# Patient Record
Sex: MALE | Race: WHITE | NOT HISPANIC OR LATINO
[De-identification: names, ages, dates, MRNs, and addresses within clinical notes are randomized per-mention and may not be internally consistent; named-entity substitution may affect disease eponyms.]

---

## 2020-01-01 ENCOUNTER — RESULT REVIEW (OUTPATIENT)
Age: 60
End: 2020-01-01

## 2020-01-01 ENCOUNTER — APPOINTMENT (OUTPATIENT)
Dept: HEMATOLOGY ONCOLOGY | Facility: CLINIC | Age: 60
End: 2020-01-01

## 2020-01-01 ENCOUNTER — APPOINTMENT (OUTPATIENT)
Dept: INFUSION THERAPY | Facility: HOSPITAL | Age: 60
End: 2020-01-01

## 2020-01-01 ENCOUNTER — APPOINTMENT (OUTPATIENT)
Dept: PULMONOLOGY | Facility: CLINIC | Age: 60
End: 2020-01-01

## 2020-01-01 ENCOUNTER — APPOINTMENT (OUTPATIENT)
Dept: HEMATOLOGY ONCOLOGY | Facility: CLINIC | Age: 60
End: 2020-01-01
Payer: COMMERCIAL

## 2020-01-01 ENCOUNTER — APPOINTMENT (OUTPATIENT)
Dept: DISASTER EMERGENCY | Facility: CLINIC | Age: 60
End: 2020-01-01

## 2020-01-01 ENCOUNTER — NON-APPOINTMENT (OUTPATIENT)
Age: 60
End: 2020-01-01

## 2020-01-01 ENCOUNTER — OUTPATIENT (OUTPATIENT)
Dept: OUTPATIENT SERVICES | Facility: HOSPITAL | Age: 60
LOS: 1 days | Discharge: HOME | End: 2020-01-01

## 2020-01-01 VITALS
WEIGHT: 302.03 LBS | HEART RATE: 89 BPM | OXYGEN SATURATION: 97 % | SYSTOLIC BLOOD PRESSURE: 153 MMHG | TEMPERATURE: 97.7 F | HEIGHT: 69.29 IN | BODY MASS INDEX: 44.23 KG/M2 | DIASTOLIC BLOOD PRESSURE: 98 MMHG | RESPIRATION RATE: 16 BRPM

## 2020-01-01 VITALS
WEIGHT: 302.03 LBS | RESPIRATION RATE: 18 BRPM | OXYGEN SATURATION: 98 % | HEART RATE: 102 BPM | TEMPERATURE: 98 F | DIASTOLIC BLOOD PRESSURE: 88 MMHG | BODY MASS INDEX: 43.73 KG/M2 | SYSTOLIC BLOOD PRESSURE: 152 MMHG

## 2020-01-01 VITALS
WEIGHT: 304.24 LBS | SYSTOLIC BLOOD PRESSURE: 154 MMHG | OXYGEN SATURATION: 99 % | HEART RATE: 75 BPM | BODY MASS INDEX: 44.05 KG/M2 | HEIGHT: 69.69 IN | TEMPERATURE: 96.8 F | RESPIRATION RATE: 18 BRPM | DIASTOLIC BLOOD PRESSURE: 90 MMHG

## 2020-01-01 DIAGNOSIS — Z90.2 ACQUIRED ABSENCE OF LUNG [PART OF]: Chronic | ICD-10-CM

## 2020-01-01 DIAGNOSIS — R11.2 NAUSEA WITH VOMITING, UNSPECIFIED: ICD-10-CM

## 2020-01-01 DIAGNOSIS — C34.90 MALIGNANT NEOPLASM OF UNSPECIFIED PART OF UNSPECIFIED BRONCHUS OR LUNG: ICD-10-CM

## 2020-01-01 DIAGNOSIS — Z51.11 ENCOUNTER FOR ANTINEOPLASTIC CHEMOTHERAPY: ICD-10-CM

## 2020-01-01 LAB
ALBUMIN SERPL ELPH-MCNC: 4 G/DL
ALBUMIN SERPL ELPH-MCNC: 4 G/DL
ALBUMIN SERPL ELPH-MCNC: 4.2 G/DL
ALP BLD-CCNC: 76 U/L
ALP BLD-CCNC: 85 U/L
ALP BLD-CCNC: 89 U/L
ALT SERPL-CCNC: 28 U/L
ALT SERPL-CCNC: 45 U/L
ALT SERPL-CCNC: 49 U/L
ANION GAP SERPL CALC-SCNC: 11 MMOL/L
ANION GAP SERPL CALC-SCNC: 11 MMOL/L
ANION GAP SERPL CALC-SCNC: 12 MMOL/L
AST SERPL-CCNC: 25 U/L
AST SERPL-CCNC: 29 U/L
AST SERPL-CCNC: 33 U/L
BASOPHILS # BLD AUTO: 0 K/UL — SIGNIFICANT CHANGE UP (ref 0–0.2)
BASOPHILS # BLD AUTO: 0 K/UL — SIGNIFICANT CHANGE UP (ref 0–0.2)
BASOPHILS # BLD AUTO: 0.01 K/UL — SIGNIFICANT CHANGE UP (ref 0–0.2)
BASOPHILS # BLD AUTO: 0.02 K/UL — SIGNIFICANT CHANGE UP (ref 0–0.2)
BASOPHILS NFR BLD AUTO: 0 % — SIGNIFICANT CHANGE UP (ref 0–2)
BASOPHILS NFR BLD AUTO: 0 % — SIGNIFICANT CHANGE UP (ref 0–2)
BASOPHILS NFR BLD AUTO: 0.2 % — SIGNIFICANT CHANGE UP (ref 0–2)
BILIRUB SERPL-MCNC: 0.4 MG/DL
BILIRUB SERPL-MCNC: 0.4 MG/DL
BILIRUB SERPL-MCNC: 0.5 MG/DL
BUN SERPL-MCNC: 17 MG/DL
BUN SERPL-MCNC: 19 MG/DL
BUN SERPL-MCNC: 20 MG/DL
CALCIUM SERPL-MCNC: 9 MG/DL
CALCIUM SERPL-MCNC: 9.6 MG/DL
CALCIUM SERPL-MCNC: 9.6 MG/DL
CEA SERPL-MCNC: 61.5 NG/ML
CHLORIDE SERPL-SCNC: 102 MMOL/L
CHLORIDE SERPL-SCNC: 103 MMOL/L
CHLORIDE SERPL-SCNC: 103 MMOL/L
CO2 SERPL-SCNC: 26 MMOL/L
CO2 SERPL-SCNC: 27 MMOL/L
CO2 SERPL-SCNC: 27 MMOL/L
CREAT SERPL-MCNC: 0.82 MG/DL
CREAT SERPL-MCNC: 0.88 MG/DL
CREAT SERPL-MCNC: 0.96 MG/DL
EOSINOPHIL # BLD AUTO: 0 K/UL — SIGNIFICANT CHANGE UP (ref 0–0.5)
EOSINOPHIL # BLD AUTO: 0.04 K/UL — SIGNIFICANT CHANGE UP (ref 0–0.5)
EOSINOPHIL # BLD AUTO: 0.06 K/UL — SIGNIFICANT CHANGE UP (ref 0–0.5)
EOSINOPHIL # BLD AUTO: 0.13 K/UL — SIGNIFICANT CHANGE UP (ref 0–0.5)
EOSINOPHIL NFR BLD AUTO: 0 % — SIGNIFICANT CHANGE UP (ref 0–6)
EOSINOPHIL NFR BLD AUTO: 0.7 % — SIGNIFICANT CHANGE UP (ref 0–6)
EOSINOPHIL NFR BLD AUTO: 0.7 % — SIGNIFICANT CHANGE UP (ref 0–6)
EOSINOPHIL NFR BLD AUTO: 1.4 % — SIGNIFICANT CHANGE UP (ref 0–6)
GLUCOSE SERPL-MCNC: 103 MG/DL
GLUCOSE SERPL-MCNC: 104 MG/DL
GLUCOSE SERPL-MCNC: 98 MG/DL
HCT VFR BLD CALC: 35.9 % — LOW (ref 39–50)
HCT VFR BLD CALC: 36.6 % — LOW (ref 39–50)
HCT VFR BLD CALC: 41.1 % — SIGNIFICANT CHANGE UP (ref 39–50)
HCT VFR BLD CALC: 41.5 % — SIGNIFICANT CHANGE UP (ref 39–50)
HCT VFR BLD CALC: 41.9 % — SIGNIFICANT CHANGE UP (ref 39–50)
HCT VFR BLD CALC: 43.3 % — SIGNIFICANT CHANGE UP (ref 39–50)
HGB BLD-MCNC: 12.4 G/DL — LOW (ref 13–17)
HGB BLD-MCNC: 12.5 G/DL — LOW (ref 13–17)
HGB BLD-MCNC: 13.9 G/DL — SIGNIFICANT CHANGE UP (ref 13–17)
HGB BLD-MCNC: 14.1 G/DL — SIGNIFICANT CHANGE UP (ref 13–17)
HGB BLD-MCNC: 14.2 G/DL — SIGNIFICANT CHANGE UP (ref 13–17)
HGB BLD-MCNC: 14.7 G/DL — SIGNIFICANT CHANGE UP (ref 13–17)
IMM GRANULOCYTES NFR BLD AUTO: 0.3 % — SIGNIFICANT CHANGE UP (ref 0–1.5)
IMM GRANULOCYTES NFR BLD AUTO: 0.3 % — SIGNIFICANT CHANGE UP (ref 0–1.5)
IMM GRANULOCYTES NFR BLD AUTO: 0.5 % — SIGNIFICANT CHANGE UP (ref 0–1.5)
IMM GRANULOCYTES NFR BLD AUTO: 0.6 % — SIGNIFICANT CHANGE UP (ref 0–1.5)
LYMPHOCYTES # BLD AUTO: 1.38 K/UL — SIGNIFICANT CHANGE UP (ref 1–3.3)
LYMPHOCYTES # BLD AUTO: 1.55 K/UL — SIGNIFICANT CHANGE UP (ref 1–3.3)
LYMPHOCYTES # BLD AUTO: 1.63 K/UL — SIGNIFICANT CHANGE UP (ref 1–3.3)
LYMPHOCYTES # BLD AUTO: 16 % — SIGNIFICANT CHANGE UP (ref 13–44)
LYMPHOCYTES # BLD AUTO: 19.4 % — SIGNIFICANT CHANGE UP (ref 13–44)
LYMPHOCYTES # BLD AUTO: 2.17 K/UL — SIGNIFICANT CHANGE UP (ref 1–3.3)
LYMPHOCYTES # BLD AUTO: 22 % — SIGNIFICANT CHANGE UP (ref 13–44)
LYMPHOCYTES # BLD AUTO: 3.12 K/UL — SIGNIFICANT CHANGE UP (ref 1–3.3)
LYMPHOCYTES # BLD AUTO: 3.32 K/UL — HIGH (ref 1–3.3)
LYMPHOCYTES # BLD AUTO: 33.7 % — SIGNIFICANT CHANGE UP (ref 13–44)
LYMPHOCYTES # BLD AUTO: 36.7 % — SIGNIFICANT CHANGE UP (ref 13–44)
LYMPHOCYTES # BLD AUTO: 38.5 % — SIGNIFICANT CHANGE UP (ref 13–44)
MCHC RBC-ENTMCNC: 30.9 PG — SIGNIFICANT CHANGE UP (ref 27–34)
MCHC RBC-ENTMCNC: 31.2 PG — SIGNIFICANT CHANGE UP (ref 27–34)
MCHC RBC-ENTMCNC: 31.3 PG — SIGNIFICANT CHANGE UP (ref 27–34)
MCHC RBC-ENTMCNC: 31.7 PG — SIGNIFICANT CHANGE UP (ref 27–34)
MCHC RBC-ENTMCNC: 32.1 PG — SIGNIFICANT CHANGE UP (ref 27–34)
MCHC RBC-ENTMCNC: 32.8 PG — SIGNIFICANT CHANGE UP (ref 27–34)
MCHC RBC-ENTMCNC: 33.7 G/DL — SIGNIFICANT CHANGE UP (ref 32–36)
MCHC RBC-ENTMCNC: 33.8 G/DL — SIGNIFICANT CHANGE UP (ref 32–36)
MCHC RBC-ENTMCNC: 33.9 G/DL — SIGNIFICANT CHANGE UP (ref 32–36)
MCHC RBC-ENTMCNC: 34.2 G/DL — SIGNIFICANT CHANGE UP (ref 32–36)
MCHC RBC-ENTMCNC: 34.2 G/DL — SIGNIFICANT CHANGE UP (ref 32–36)
MCHC RBC-ENTMCNC: 34.5 G/DL — SIGNIFICANT CHANGE UP (ref 32–36)
MCV RBC AUTO: 91.9 FL — SIGNIFICANT CHANGE UP (ref 80–100)
MCV RBC AUTO: 92.3 FL — SIGNIFICANT CHANGE UP (ref 80–100)
MCV RBC AUTO: 92.4 FL — SIGNIFICANT CHANGE UP (ref 80–100)
MCV RBC AUTO: 92.6 FL — SIGNIFICANT CHANGE UP (ref 80–100)
MCV RBC AUTO: 93 FL — SIGNIFICANT CHANGE UP (ref 80–100)
MCV RBC AUTO: 96.1 FL — SIGNIFICANT CHANGE UP (ref 80–100)
MONOCYTES # BLD AUTO: 0.22 K/UL — SIGNIFICANT CHANGE UP (ref 0–0.9)
MONOCYTES # BLD AUTO: 0.26 K/UL — SIGNIFICANT CHANGE UP (ref 0–0.9)
MONOCYTES # BLD AUTO: 0.28 K/UL — SIGNIFICANT CHANGE UP (ref 0–0.9)
MONOCYTES # BLD AUTO: 0.71 K/UL — SIGNIFICANT CHANGE UP (ref 0–0.9)
MONOCYTES # BLD AUTO: 0.99 K/UL — HIGH (ref 0–0.9)
MONOCYTES # BLD AUTO: 1.2 K/UL — HIGH (ref 0–0.9)
MONOCYTES NFR BLD AUTO: 10.7 % — SIGNIFICANT CHANGE UP (ref 2–14)
MONOCYTES NFR BLD AUTO: 12.6 % — SIGNIFICANT CHANGE UP (ref 2–14)
MONOCYTES NFR BLD AUTO: 13.3 % — SIGNIFICANT CHANGE UP (ref 2–14)
MONOCYTES NFR BLD AUTO: 2.6 % — SIGNIFICANT CHANGE UP (ref 2–14)
MONOCYTES NFR BLD AUTO: 3 % — SIGNIFICANT CHANGE UP (ref 2–14)
MONOCYTES NFR BLD AUTO: 4 % — SIGNIFICANT CHANGE UP (ref 2–14)
MYELOCYTES NFR BLD: 2 % — HIGH (ref 0–0)
NEUTROPHILS # BLD AUTO: 2.68 K/UL — SIGNIFICANT CHANGE UP (ref 1.8–7.4)
NEUTROPHILS # BLD AUTO: 4.41 K/UL — SIGNIFICANT CHANGE UP (ref 1.8–7.4)
NEUTROPHILS # BLD AUTO: 4.98 K/UL — SIGNIFICANT CHANGE UP (ref 1.8–7.4)
NEUTROPHILS # BLD AUTO: 5.08 K/UL — SIGNIFICANT CHANGE UP (ref 1.8–7.4)
NEUTROPHILS # BLD AUTO: 6.47 K/UL — SIGNIFICANT CHANGE UP (ref 1.8–7.4)
NEUTROPHILS # BLD AUTO: 6.97 K/UL — SIGNIFICANT CHANGE UP (ref 1.8–7.4)
NEUTROPHILS NFR BLD AUTO: 47.5 % — SIGNIFICANT CHANGE UP (ref 43–77)
NEUTROPHILS NFR BLD AUTO: 48.8 % — SIGNIFICANT CHANGE UP (ref 43–77)
NEUTROPHILS NFR BLD AUTO: 53.7 % — SIGNIFICANT CHANGE UP (ref 43–77)
NEUTROPHILS NFR BLD AUTO: 72 % — SIGNIFICANT CHANGE UP (ref 43–77)
NEUTROPHILS NFR BLD AUTO: 77.2 % — HIGH (ref 43–77)
NEUTROPHILS NFR BLD AUTO: 81 % — HIGH (ref 43–77)
NRBC # BLD: 0 /100 WBCS — SIGNIFICANT CHANGE UP (ref 0–0)
NRBC # BLD: 0 /100 — SIGNIFICANT CHANGE UP (ref 0–0)
NRBC # BLD: 0 /100 — SIGNIFICANT CHANGE UP (ref 0–0)
NRBC # BLD: SIGNIFICANT CHANGE UP /100 WBCS (ref 0–0)
NRBC # BLD: SIGNIFICANT CHANGE UP /100 WBCS (ref 0–0)
PLAT MORPH BLD: NORMAL — SIGNIFICANT CHANGE UP
PLAT MORPH BLD: NORMAL — SIGNIFICANT CHANGE UP
PLATELET # BLD AUTO: 132 K/UL — LOW (ref 150–400)
PLATELET # BLD AUTO: 154 K/UL — SIGNIFICANT CHANGE UP (ref 150–400)
PLATELET # BLD AUTO: 209 K/UL — SIGNIFICANT CHANGE UP (ref 150–400)
PLATELET # BLD AUTO: 285 K/UL — SIGNIFICANT CHANGE UP (ref 150–400)
PLATELET # BLD AUTO: 330 K/UL — SIGNIFICANT CHANGE UP (ref 150–400)
PLATELET # BLD AUTO: 58 K/UL — LOW (ref 150–400)
POTASSIUM SERPL-SCNC: 3.7 MMOL/L
POTASSIUM SERPL-SCNC: 4.7 MMOL/L
POTASSIUM SERPL-SCNC: 4.7 MMOL/L
PROT SERPL-MCNC: 6.4 G/DL
PROT SERPL-MCNC: 6.5 G/DL
PROT SERPL-MCNC: 6.9 G/DL
RBC # BLD: 3.81 M/UL — LOW (ref 4.2–5.8)
RBC # BLD: 3.86 M/UL — LOW (ref 4.2–5.8)
RBC # BLD: 4.45 M/UL — SIGNIFICANT CHANGE UP (ref 4.2–5.8)
RBC # BLD: 4.48 M/UL — SIGNIFICANT CHANGE UP (ref 4.2–5.8)
RBC # BLD: 4.56 M/UL — SIGNIFICANT CHANGE UP (ref 4.2–5.8)
RBC # BLD: 4.69 M/UL — SIGNIFICANT CHANGE UP (ref 4.2–5.8)
RBC # FLD: 13.1 % — SIGNIFICANT CHANGE UP (ref 10.3–14.5)
RBC # FLD: 13.9 % — SIGNIFICANT CHANGE UP (ref 10.3–14.5)
RBC # FLD: 14.4 % — SIGNIFICANT CHANGE UP (ref 10.3–14.5)
RBC # FLD: 15.2 % — HIGH (ref 10.3–14.5)
RBC # FLD: 15.4 % — HIGH (ref 10.3–14.5)
RBC # FLD: 17.4 % — HIGH (ref 10.3–14.5)
RBC BLD AUTO: SIGNIFICANT CHANGE UP
RBC BLD AUTO: SIGNIFICANT CHANGE UP
SARS-COV-2 N GENE NPH QL NAA+PROBE: NOT DETECTED
SODIUM SERPL-SCNC: 140 MMOL/L
SODIUM SERPL-SCNC: 141 MMOL/L
SODIUM SERPL-SCNC: 141 MMOL/L
WBC # BLD: 5.64 K/UL — SIGNIFICANT CHANGE UP (ref 3.8–10.5)
WBC # BLD: 7.05 K/UL — SIGNIFICANT CHANGE UP (ref 3.8–10.5)
WBC # BLD: 8.39 K/UL — SIGNIFICANT CHANGE UP (ref 3.8–10.5)
WBC # BLD: 8.61 K/UL — SIGNIFICANT CHANGE UP (ref 3.8–10.5)
WBC # BLD: 9.04 K/UL — SIGNIFICANT CHANGE UP (ref 3.8–10.5)
WBC # BLD: 9.27 K/UL — SIGNIFICANT CHANGE UP (ref 3.8–10.5)
WBC # FLD AUTO: 5.64 K/UL — SIGNIFICANT CHANGE UP (ref 3.8–10.5)
WBC # FLD AUTO: 7.05 K/UL — SIGNIFICANT CHANGE UP (ref 3.8–10.5)
WBC # FLD AUTO: 8.39 K/UL — SIGNIFICANT CHANGE UP (ref 3.8–10.5)
WBC # FLD AUTO: 8.61 K/UL — SIGNIFICANT CHANGE UP (ref 3.8–10.5)
WBC # FLD AUTO: 9.04 K/UL — SIGNIFICANT CHANGE UP (ref 3.8–10.5)
WBC # FLD AUTO: 9.27 K/UL — SIGNIFICANT CHANGE UP (ref 3.8–10.5)

## 2020-01-01 PROCEDURE — 99214 OFFICE O/P EST MOD 30 MIN: CPT

## 2020-01-01 PROCEDURE — 99072 ADDL SUPL MATRL&STAF TM PHE: CPT

## 2020-01-01 PROCEDURE — 99215 OFFICE O/P EST HI 40 MIN: CPT

## 2020-01-01 PROCEDURE — 99213 OFFICE O/P EST LOW 20 MIN: CPT

## 2020-02-24 ENCOUNTER — TRANSCRIPTION ENCOUNTER (OUTPATIENT)
Age: 60
End: 2020-02-24

## 2020-02-24 ENCOUNTER — LABORATORY RESULT (OUTPATIENT)
Age: 60
End: 2020-02-24

## 2020-02-24 ENCOUNTER — APPOINTMENT (OUTPATIENT)
Dept: PULMONOLOGY | Facility: CLINIC | Age: 60
End: 2020-02-24
Payer: COMMERCIAL

## 2020-02-24 VITALS
HEIGHT: 70 IN | HEART RATE: 92 BPM | RESPIRATION RATE: 18 BRPM | TEMPERATURE: 97.7 F | BODY MASS INDEX: 45.1 KG/M2 | DIASTOLIC BLOOD PRESSURE: 85 MMHG | SYSTOLIC BLOOD PRESSURE: 132 MMHG | OXYGEN SATURATION: 90 % | WEIGHT: 315 LBS

## 2020-02-24 DIAGNOSIS — Z82.49 FAMILY HISTORY OF ISCHEMIC HEART DISEASE AND OTHER DISEASES OF THE CIRCULATORY SYSTEM: ICD-10-CM

## 2020-02-24 DIAGNOSIS — Z87.891 PERSONAL HISTORY OF NICOTINE DEPENDENCE: ICD-10-CM

## 2020-02-24 DIAGNOSIS — Z78.9 OTHER SPECIFIED HEALTH STATUS: ICD-10-CM

## 2020-02-24 DIAGNOSIS — Z80.0 FAMILY HISTORY OF MALIGNANT NEOPLASM OF DIGESTIVE ORGANS: ICD-10-CM

## 2020-02-24 PROCEDURE — 99406 BEHAV CHNG SMOKING 3-10 MIN: CPT

## 2020-02-24 PROCEDURE — 99205 OFFICE O/P NEW HI 60 MIN: CPT | Mod: 25

## 2020-02-24 NOTE — ASSESSMENT
[FreeTextEntry1] : 59M former smoker (quit 2/18/2020) with obesity presents for initial evaluation of abnormal lung CT - with evidence of pulmonary nodules and interstitial lung disease:\par \par 1. Pulmonary Nodule and Adrenal Nodule - given extensive smoking history there is a risk of malignancy - plan for PET/CT scan and then subsequent biopsy based on PET findings. Depending on PET/CT will consider biopsy of adrenal gland or lung nodule vs thoracic surgery evaluation.\par - Smoking cessation - patient now quit for 1 week and will continue to abstain from smoking\par - PET/CT scan as part of initial treatment strategy and to guide biopsy\par - patient amenable to enrolling in SEER study\par - PreTest Probability of Malignancy is 25.2% (Partha Calculator) and 15.9% (Lavern Calculator) - both fall in the intermediate range and recommend followup with functional imaging study.\par \par 2. Interstitial Lung Disease - no symptoms at present therefore will hold off initiation of medications. 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 One of these is the likely culprit. He does have crackles on lung exam and digital clubbing.\par - PFTs at next visit\par - serologic workup today\par - Will hold off discussion of antifibrotic therapy until after evaluation of pulmonary nodules\par \par 3. Snoring/ANTIONETTE - patient with signs and symptoms suggestive of sleep disorder breathing\par - referred for HST to evaluate for ANTIONETTE and nocturnal hypoxemia\par \par 4. Followup visit with PFT on 4/22/2020. \par - Patient/wife provided information for patient portal. They will notify me once he has his PET/CT done at [facility] and we will decide on next course of action including biopsy.\par \par 5. Health Maintenance - patient refused vaccinations today based on personal preference\par - Patient encouraged to followup with PMD or GI for evaluation of colon cancer screening. Given his family history he is at risk for colon cancer.

## 2020-02-24 NOTE — COUNSELING
[Cessation strategies including cessation program discussed] : Cessation strategies including cessation program discussed [Risk of tobacco use and health benefits of smoking cessation discussed] : Risk of tobacco use and health benefits of smoking cessation discussed [Use of nicotine replacement therapies and other medications discussed] : Use of nicotine replacement therapies and other medications discussed [Willing to Quit Smoking] : Willing to quit smoking [Tobacco Use Cessation Intermediate Greater Than 3 Minutes Up to 10 Minutes] : Tobacco Use Cessation Intermediate Greater Than 3 Minutes Up to 10 Minutes [FreeTextEntry2] : patient has quit smoking last week - at this time does not feel need for nicotine replacement or behavioral support [FreeTextEntry1] : 3

## 2020-02-24 NOTE — PHYSICAL EXAM
[No Acute Distress] : no acute distress [Well Nourished] : well nourished [Well Groomed] : well groomed [Well Developed] : well developed [No Deformities] : no deformities [III] : Mallampati Class: III [Normal Oropharynx] : normal oropharynx [Supple] : supple [Normal Appearance] : normal appearance [No JVD] : no jvd [No Neck Mass] : no neck mass [Normal Rate/Rhythm] : normal rate/rhythm [Normal S1, S2] : normal s1, s2 [No Resp Distress] : no resp distress [No Murmurs] : no murmurs [No Acc Muscle Use] : no acc muscle use [Normal Rhythm and Effort] : normal rhythm and effort [No Abnormalities] : no abnormalities [Benign] : benign [Not Tender] : not tender [Soft] : soft [Normal Gait] : normal gait [No Cyanosis] : no cyanosis [No Edema] : no edema [Normal Color/ Pigmentation] : normal color/ pigmentation [FROM] : FROM [No Focal Deficits] : no focal deficits [No Motor Deficits] : no motor deficits [Oriented x3] : oriented x3 [Normal Mood] : normal mood [Normal Affect] : normal affect [TextBox_11] : NCAT, EOMI, anicteric, MMM, nares clear, trachea midline [TextBox_68] : bilateral lower lung field inspiratory crackles R>L [TextBox_105] : + clubbing

## 2020-02-24 NOTE — HISTORY OF PRESENT ILLNESS
[Former] : former [>= 30 pack years] : >= 30 pack years [Never] : never [Difficulty Breathing During Exertion] : dyspnea on exertion [Feelings Of Weakness On Exertion] : exercise intolerance [Nasal Passage Blockage (Stuffiness)] : edema [Nonspecific Pain, Swelling, And Stiffness] : chest pain [Cough] : coughing [Fever] : fever [Wheezing] : wheezing [0  -  Nothing at all] : 0, nothing at all [Awakes with Dry Mouth] : awakes with dry mouth [Unintentional Sleep while Inactive] : unintentional sleep while inactive [Fatigue] : fatigue [Snoring] : snoring [Witnessed Apneas] : witnessed apneas [Wt Gain ___ kg] : No recent weight gain [Wt Loss ___ kg] : No recent weight loss [TextBox_4] : 59M obesity, recent former smoker (quit 1 week ago) presents for evaluation in Pulmonary Nodule Clinic after an outpatient CT done showed pulmonary nodules (2) and evidence of ILD.\par \par Patient started having cough with productive sputum a few weeks ago. He went to Urgent care and had a CXR that was abnormal. He was started on antibiotics and referred for a CT chest. He had the CT chest done 2/18/2020 at [facility] and it was read as evidence of ILD, pulmonary nodules (up to 1.5 cm) and an adrenal nodule. He was then referred to Nodule Clinic.\par \par After receiving antibiotics for a possible pneumonia he had improvement in his pulmonary symptoms. He denies any dyspnea. His cough has resolved and he no longer has sputum production. He does not have chest pain or palpitations. He does not have hemoptysis. 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 He works > 80 hours/week. He was previously exposed to numerous printing chemicals. He is a recent former smoker. He also grew up with racing pigeons from birth until 1990 with extensive bird exposure. He has not had any recent travels, vacations, or sick contacts.\par \par Patient has a family history of colon cancer in 2 brothers. He has not had a colonoscopy. He denies any rectal bleeding.\par He has not followed up with doctors regularly.\par He does not have any known family history of lung cancer. [TextBox_11] : 0.5-1 [TextBox_13] : 39 [TextBox_15] : 2020 [TextBox_42] : 2/18/2020 - There is evidence of subpleural reticular densities with associated "honeycombing" within both lungs. The right lung is more involved than the left. Within the right lung, there is involvement of the RUL, RML, and RLL. Within the less involved left lung, BROOKE appears to be slightly more involved than the LLL. Bronchial wall thickening and irregularity is seen bilaterally. An element of traction bronchiectasis cannot be excluded. There is a 1.0-1.5 cm irregular pleural based nodular density in the RUL. there is a 1.5 cm pleural based nodular density more inferiorly in the RLL. There is a prominent pretracheal LN measuring 1.6 x 1.2 cm. Hilar regions are grossly unremarkable on this noncontrast study. There is no evidence of axillary lymphadenopathy. The visualized liver and spleen are homogenous attenuation. There is a heterogeneous 3.2 x 2.2 cm nodule arising from the left adrenal gland. The right adrenal gland is unremarkable. [TextBox_52] : 1

## 2020-02-24 NOTE — REVIEW OF SYSTEMS
[Dyspnea] : dyspnea [Wheezing] : wheezing [A.M. Dry Mouth] : a.m. dry mouth [SOB on Exertion] : sob on exertion [Hay Fever] : hay fever [Seasonal Allergies] : seasonal allergies [Obesity] : obesity [Fever] : no fever [Fatigue] : no fatigue [Chills] : no chills [Poor Appetite] : no poor appetite [Dry Eyes] : no dry eyes [Ear Disturbance] : no ear disturbance [Epistaxis] : no epistaxis [Sore Throat] : no sore throat [Eye Irritation] : no eye irritation [Nasal Congestion] : no nasal congestion [Postnasal Drip] : no postnasal drip [Sinus Problems] : no sinus problems [Mouth Ulcers] : no mouth ulcers [Cough] : no cough [Hemoptysis] : no hemoptysis [Chest Tightness] : no chest tightness [Frequent URIs] : no frequent URIs [Sputum] : no sputum [Pleuritic Pain] : no pleuritic pain [Chest Discomfort] : no chest discomfort [Claudication] : no claudication [Edema] : no edema [Leg Cramps] : no leg cramps [Orthopnea] : no orthopnea [Syncope] : no syncope [Watery Eyes] : no watery eyes [Itchy Eyes] : no itchy eyes [GERD] : no gerd [Abdominal Pain] : no abdominal pain [Nausea] : no nausea [Vomiting] : no vomiting [Dysphagia] : no dysphagia [Frequency] : no dysuria [Urgency] : no frequency [Dysuria] : no urgency [Arthralgias] : no arthralgias [Myalgias] : no myalgias [Fracture] : no fracture [Rash] : no rash [Easy Bruising] : no easy bruising [Clotting Disorder/ Frequent bleeding] : no clotting disorder/ frequent bleeding [Headache] : no headache [Focal Weakness] : no focal weakness [Seizures] : no seizures [Head Injury] : no head injury [Dizziness] : no dizziness [Numbness] : no numbness [Memory Loss] : no memory loss [Paralysis] : no paralysis [Confusion] : no confusion [Depression] : no depression [Anxiety] : no anxiety [Diabetes] : no diabetes [Thyroid Problem] : no thyroid problem

## 2020-02-24 NOTE — REASON FOR VISIT
[Abnormal CXR/ Chest CT] : an abnormal CXR/ chest CT [Initial] : an initial visit [Spouse] : spouse [ILD] : ILD

## 2020-02-25 LAB
A1AT SERPL-MCNC: 139 MG/DL
ALBUMIN SERPL ELPH-MCNC: 4.2 G/DL
ALP BLD-CCNC: 66 U/L
ALT SERPL-CCNC: 16 U/L
ANION GAP SERPL CALC-SCNC: 24 MMOL/L
AST SERPL-CCNC: 18 U/L
B2 MICROGLOB SERPL-MCNC: 2 MG/L
BASOPHILS # BLD AUTO: 0.09 K/UL
BASOPHILS NFR BLD AUTO: 0.7 %
BILIRUB SERPL-MCNC: 0.4 MG/DL
BUN SERPL-MCNC: 23 MG/DL
C3 SERPL-MCNC: 136 MG/DL
C4 SERPL-MCNC: 25 MG/DL
CALCIUM SERPL-MCNC: 9.5 MG/DL
CHLORIDE SERPL-SCNC: 102 MMOL/L
CK SERPL-CCNC: 67 U/L
CO2 SERPL-SCNC: 18 MMOL/L
CREAT SERPL-MCNC: 0.87 MG/DL
CRP SERPL-MCNC: 0.96 MG/DL
EOSINOPHIL # BLD AUTO: 0.19 K/UL
EOSINOPHIL NFR BLD AUTO: 1.5 %
ERYTHROCYTE [SEDIMENTATION RATE] IN BLOOD BY WESTERGREN METHOD: 7 MM/HR
GLUCOSE SERPL-MCNC: 121 MG/DL
HCT VFR BLD CALC: 52.5 %
HGB BLD-MCNC: 16.6 G/DL
IMM GRANULOCYTES NFR BLD AUTO: 0.3 %
INR PPP: 0.87 RATIO
LDH SERPL-CCNC: 256 U/L
LYMPHOCYTES # BLD AUTO: 2.95 K/UL
LYMPHOCYTES NFR BLD AUTO: 23.8 %
MAN DIFF?: NORMAL
MCHC RBC-ENTMCNC: 30.6 PG
MCHC RBC-ENTMCNC: 31.6 GM/DL
MCV RBC AUTO: 96.7 FL
MONOCYTES # BLD AUTO: 0.99 K/UL
MONOCYTES NFR BLD AUTO: 8 %
MPO AB + PR3 PNL SER: NORMAL
NEUTROPHILS # BLD AUTO: 8.14 K/UL
NEUTROPHILS NFR BLD AUTO: 65.7 %
NT-PROBNP SERPL-MCNC: 245 PG/ML
PLATELET # BLD AUTO: 211 K/UL
POTASSIUM SERPL-SCNC: 4.6 MMOL/L
PROT SERPL-MCNC: 6.8 G/DL
PT BLD: 9.9 SEC
RBC # BLD: 5.43 M/UL
RBC # FLD: 13 %
RHEUMATOID FACT SER QL: 10 IU/ML
SODIUM SERPL-SCNC: 143 MMOL/L
WBC # FLD AUTO: 12.4 K/UL

## 2020-02-26 LAB
24R-OH-CALCIDIOL SERPL-MCNC: 32.2 PG/ML
B2 GLYCOPROT1 IGA SERPL IA-ACNC: 5.8 SAU
CARDIOLIPIN AB SER IA-ACNC: NEGATIVE
ENA JO1 AB SER IA-ACNC: <0.2 AL
ENA RNP AB SER IA-ACNC: 0.5 AL
ENA SCL70 IGG SER IA-ACNC: <0.2 AL
ENA SS-A AB SER IA-ACNC: <0.2 AL
ENA SS-B AB SER IA-ACNC: <0.2 AL

## 2020-02-27 LAB
ANA SER IF-ACNC: NEGATIVE
ANCA AB SER-IMP: NEGATIVE
C-ANCA SER-ACNC: NEGATIVE
FERRITIN SERPL-MCNC: 404 NG/ML
P-ANCA TITR SER IF: NEGATIVE
TOTAL IGE SMQN RAST: 38 KU/L
TSH SERPL-ACNC: 1.57 UIU/ML

## 2020-03-06 NOTE — CONSULT LETTER
[Dear  ___] : Dear  [unfilled], [Courtesy Letter:] : I had the pleasure of seeing your patient, [unfilled], in my office today. [Please see my note below.] : Please see my note below. [Referral Closing:] : Thank you very much for seeing this patient.  If you have any questions, please do not hesitate to contact me. [Sincerely,] : Sincerely, [FreeTextEntry2] : Chante Rice MD [FreeTextEntry3] : Ramon Ann MD\par Director of Thoracic, [facility]\par Cardiovascular & Thoracic Surgery\par Assitant Professor\par Cardiovascular & Thoracic Surgery\par [employer]\par \par [facility] \par [address] [place] [address]\[place] [address]\par [phone] Tel\par [phone] Fax\par

## 2020-03-06 NOTE — DATA REVIEWED
[FreeTextEntry1] : CT scan performed on 02/18/2020 at [facility] revealed \par -interstitial lung disease. There is evidence of extensive subpleural reticular densities within both lungs associated with varying degree of honeycombing." The right lung is more involved than the left. There are also findings suggestive of bronchia wall thickening as well s a suggestion of traction bronchiectasis. idiopathy pulmonary fibrosis cannot be excluded. Differential diagnosis would include entities such as hypersensitivity pneumonitis and connective tissue disorder.  \par - irregular pleural based nodular densities are seen in the right upper lobe and right lower lobe. There are nonspecific findings with the differential diagnosis including neoplastic involvement as well as possible small areas of consolidation atelectasis. \par -Left adrenal nodule. There is a 3.2 cm heterogenous nodule arising from the left adrenal gland. Differential diagnosis includes both benign and malignant etiologist. \par \par \par \par PET scan performed on 03/04/2020 at [facility] revealed \par -Foci of FDG activity corresponding with right upper lobe and right lower lobe pulmonary lesions asa described above. These are suspicious for malignancy.\par There is a intense FDG activity corresponding with a lytic lesion at the L5 vertebral body. Metastatic disease is suspected.\par -FDG activity at the T10 vertebral body may be due to acute compression fracture. Clinical correlation is recommended.\par - A left adrenal lesion does not demonstrate significant FDG activity.

## 2020-03-06 NOTE — HISTORY OF PRESENT ILLNESS
[FreeTextEntry1] : Mr. Swanson is a 59 year old male referred by Dr. Sharif, recent former smoker,  for initial evaluation of Lung Nodule. He has a past medical history significant for Interstitial Lung Disease, Snoring, Obesity and Lung Nodule.\par \par originally Mr. Swanson  cough with productive sputum a few weeks ago. He went to Urgent care and had a CXR that was abnormal. He was started on antibiotics and referred for a CT chest. He had the CT chest done 2/18/2020 at [facility] and it was read as evidence of ILD, pulmonary nodules (up to 1.5 cm) and an adrenal nodule. He was then referred to Nodule Clinic.\par \par He was referred by Dr. Sharif for evaluation and biopsy as the PET/Avid nodules appear peripheral in the right lung and a lung biopsy will likely yield sample than bone biopsy.

## 2020-03-10 ENCOUNTER — TRANSCRIPTION ENCOUNTER (OUTPATIENT)
Age: 60
End: 2020-03-10

## 2020-03-10 ENCOUNTER — APPOINTMENT (OUTPATIENT)
Dept: THORACIC SURGERY | Facility: CLINIC | Age: 60
End: 2020-03-10

## 2020-03-13 LAB
EJ AB SER QL: NEGATIVE
ENA JO1 AB SER IA-ACNC: <20 UNITS
ENA PM/SCL AB SER-ACNC: <20 UNITS
ENA SM+RNP AB SER IA-ACNC: <20 UNITS
ENA SS-A IGG SER QL: <20 UNITS
FIBRILLARIN AB SER QL: NEGATIVE
KU AB SER QL: NEGATIVE
MDA-5 (P140)(CADM-140): <20 UNITS
MI2 AB SER QL: NEGATIVE
NXP-2 (P140): <20 UNITS
OJ AB SER QL: NEGATIVE
PL12 AB SER QL: NEGATIVE
PL7 AB SER QL: NEGATIVE
SRP AB SERPL QL: NEGATIVE
TIF GAMMA (P155/140): <20 UNITS
U2 SNRNP AB SER QL: NEGATIVE

## 2020-03-24 ENCOUNTER — APPOINTMENT (OUTPATIENT)
Dept: THORACIC SURGERY | Facility: CLINIC | Age: 60
End: 2020-03-24

## 2020-03-24 ENCOUNTER — APPOINTMENT (OUTPATIENT)
Dept: THORACIC SURGERY | Facility: CLINIC | Age: 60
End: 2020-03-24
Payer: COMMERCIAL

## 2020-03-24 PROCEDURE — G2012 BRIEF CHECK IN BY MD/QHP: CPT

## 2020-03-24 NOTE — ASSESSMENT
[FreeTextEntry1] : This interview was done over the phone given the current COVID-19 restrictions.  I discussed with the patient and his wife regarding the need of an excisional biopsy via VATS for diagnostic purposes.  I discussed the high probability of metastatic disease and the dire need for diagnosis.  I have discussed the risks and benefits of the excisional biopsy.  They are in agreement to proceed.  He will be scheduled in the next one to two weeks.

## 2020-03-24 NOTE — CONSULT LETTER
[Consult Letter:] : I had the pleasure of evaluating your patient, [unfilled]. [Please see my note below.] : Please see my note below. [Consult Closing:] : Thank you very much for allowing me to participate in the care of this patient.  If you have any questions, please do not hesitate to contact me. [Sincerely,] : Sincerely, [FreeTextEntry2] : Dr. Dwayne Sharif (Pulm/ref) [FreeTextEntry3] : \par \par \par Ramon Ann MD\par Director of Thoracic, [facility]\par Cardiovascular & Thoracic Surgery

## 2020-03-24 NOTE — HISTORY OF PRESENT ILLNESS
[FreeTextEntry1] : Mr. Swanson is a 59 year old male who presents today for evaluation to discuss biopsy of lung nodue concerning for metastatic lung cancer, referred by his pulmonologist, Dr. Dwayne Sharif.  He is a former smoker (Quit Feb 2020, 39 years, 1 PPD) with no significant past medical history. \par \par He presented to urgent care in early February 2020 with productive cough, reports CXR was abnormal, completed course of antibiotics + notes improvement in his pulmonary symptoms.  Due to abnormla CXR, he was referred for CT Chest.\par \par CT Chest on 2/18/2020 reveal:\par - RUL 1.0-1.5 cm irregular pleural based nodular density\par - RLL 1.5 cm irregular pleural based nodular density\par - Prominent 1.6 x 1.2 cm pretracheal lymph node\par - Left adrenal 3.2 x 2.2 cm heterogeneous nodule\par - Interstitial lung disease, evidence of subpleural reticular densities with associated "honeycombing" within both lungs\par \par PET/CT on 3/4/2020 reveals:\par - RUL pleural based nodule, 1.1 x 1.3 cm, SUV 4.6\par - RLL pleural based nodule, 1.5 cm, SUV 5.3\par - Left adrenal lesion, SUV 1.6\par - Lytic lesion at L5 vertebral body, 1.6 cm, SUV 13.3\par - Increased FDG activity at T10 vertebral body, SUV 4.7\par \par

## 2020-04-22 ENCOUNTER — APPOINTMENT (OUTPATIENT)
Dept: PULMONOLOGY | Facility: CLINIC | Age: 60
End: 2020-04-22

## 2020-05-26 ENCOUNTER — OUTPATIENT (OUTPATIENT)
Dept: OUTPATIENT SERVICES | Facility: HOSPITAL | Age: 60
LOS: 1 days | End: 2020-05-26

## 2020-05-26 VITALS
SYSTOLIC BLOOD PRESSURE: 112 MMHG | HEART RATE: 87 BPM | WEIGHT: 300.05 LBS | RESPIRATION RATE: 16 BRPM | TEMPERATURE: 97 F | OXYGEN SATURATION: 96 % | HEIGHT: 70 IN | DIASTOLIC BLOOD PRESSURE: 73 MMHG

## 2020-05-26 DIAGNOSIS — R91.1 SOLITARY PULMONARY NODULE: ICD-10-CM

## 2020-05-26 LAB
ANION GAP SERPL CALC-SCNC: 15 MMO/L — HIGH (ref 7–14)
BLD GP AB SCN SERPL QL: NEGATIVE — SIGNIFICANT CHANGE UP
BUN SERPL-MCNC: 23 MG/DL — SIGNIFICANT CHANGE UP (ref 7–23)
CALCIUM SERPL-MCNC: 9.9 MG/DL — SIGNIFICANT CHANGE UP (ref 8.4–10.5)
CHLORIDE SERPL-SCNC: 101 MMOL/L — SIGNIFICANT CHANGE UP (ref 98–107)
CO2 SERPL-SCNC: 26 MMOL/L — SIGNIFICANT CHANGE UP (ref 22–31)
CREAT SERPL-MCNC: 0.99 MG/DL — SIGNIFICANT CHANGE UP (ref 0.5–1.3)
GLUCOSE SERPL-MCNC: 104 MG/DL — HIGH (ref 70–99)
HCT VFR BLD CALC: 47.1 % — SIGNIFICANT CHANGE UP (ref 39–50)
HGB BLD-MCNC: 15.8 G/DL — SIGNIFICANT CHANGE UP (ref 13–17)
MCHC RBC-ENTMCNC: 31.6 PG — SIGNIFICANT CHANGE UP (ref 27–34)
MCHC RBC-ENTMCNC: 33.5 % — SIGNIFICANT CHANGE UP (ref 32–36)
MCV RBC AUTO: 94.2 FL — SIGNIFICANT CHANGE UP (ref 80–100)
NRBC # FLD: 0 K/UL — SIGNIFICANT CHANGE UP (ref 0–0)
PLATELET # BLD AUTO: 340 K/UL — SIGNIFICANT CHANGE UP (ref 150–400)
PMV BLD: 10.4 FL — SIGNIFICANT CHANGE UP (ref 7–13)
POTASSIUM SERPL-MCNC: 4 MMOL/L — SIGNIFICANT CHANGE UP (ref 3.5–5.3)
POTASSIUM SERPL-SCNC: 4 MMOL/L — SIGNIFICANT CHANGE UP (ref 3.5–5.3)
RBC # BLD: 5 M/UL — SIGNIFICANT CHANGE UP (ref 4.2–5.8)
RBC # FLD: 12.7 % — SIGNIFICANT CHANGE UP (ref 10.3–14.5)
RH IG SCN BLD-IMP: POSITIVE — SIGNIFICANT CHANGE UP
SODIUM SERPL-SCNC: 142 MMOL/L — SIGNIFICANT CHANGE UP (ref 135–145)
WBC # BLD: 12.49 K/UL — HIGH (ref 3.8–10.5)
WBC # FLD AUTO: 12.49 K/UL — HIGH (ref 3.8–10.5)

## 2020-05-26 NOTE — H&P PST ADULT - NSICDXPROBLEM_GEN_ALL_CORE_FT
R/O PROBLEM DIAGNOSES  Problem: Solitary pulmonary nodule  Assessment and Plan: Scheduled for Flexible bronchoscopy, Robotic assisted right wedge resection on 6/2/2020  Preop instructions provided and patient verbalizes understanding.  Labs done and results pending.   Famotidine provided with instructions. Hibiclens provided with instructions and was signed by patient. Teach-back method was utilized to assess patient's understanding. Patient verbalized understanding.      Problem: ANTIONETTE (obstructive sleep apnea)  Assessment and Plan: ANTIONETTE precautions, OR booking notified R/O PROBLEM DIAGNOSES  Problem: Solitary pulmonary nodule  Assessment and Plan: Scheduled for Flexible bronchoscopy, Robotic assisted right wedge resection on 6/2/2020  Preop instructions provided and patient verbalizes understanding.  Labs done and results pending.   Famotidine provided with instructions. Hibiclens provided with instructions and was signed by patient. Teach-back method was utilized to assess patient's understanding. Patient verbalized understanding.  PAtient had medical clearance today will obtain along with EKG,      Problem: ANTIONETTE (obstructive sleep apnea)  Assessment and Plan: ANTIONETTE precautions, OR booking notified

## 2020-05-26 NOTE — H&P PST ADULT - NSANTHOSAYNRD_GEN_A_CORE
No. ANTIONETTE screening performed.  STOP BANG Legend: 0-2 = LOW Risk; 3-4 = INTERMEDIATE Risk; 5-8 = HIGH Risk

## 2020-05-26 NOTE — H&P PST ADULT - HISTORY OF PRESENT ILLNESS
60yr old morbidly obese male with preop dx of solitary pulmonary nodule presents to have PST eval for Flexible bronchoscopy, Robotic assisted right wedge resection.

## 2020-06-02 ENCOUNTER — APPOINTMENT (OUTPATIENT)
Dept: THORACIC SURGERY | Facility: HOSPITAL | Age: 60
End: 2020-06-02

## 2020-06-23 PROBLEM — J18.9 PNEUMONIA, UNSPECIFIED ORGANISM: Chronic | Status: ACTIVE | Noted: 2020-05-26

## 2020-06-23 PROBLEM — E66.01 MORBID (SEVERE) OBESITY DUE TO EXCESS CALORIES: Chronic | Status: ACTIVE | Noted: 2020-05-26

## 2020-07-13 ENCOUNTER — OUTPATIENT (OUTPATIENT)
Dept: OUTPATIENT SERVICES | Facility: HOSPITAL | Age: 60
LOS: 1 days | End: 2020-07-13

## 2020-07-13 VITALS
RESPIRATION RATE: 16 BRPM | DIASTOLIC BLOOD PRESSURE: 90 MMHG | SYSTOLIC BLOOD PRESSURE: 142 MMHG | TEMPERATURE: 98 F | HEART RATE: 92 BPM | WEIGHT: 300.05 LBS | HEIGHT: 70 IN | OXYGEN SATURATION: 97 %

## 2020-07-13 DIAGNOSIS — M79.89 OTHER SPECIFIED SOFT TISSUE DISORDERS: ICD-10-CM

## 2020-07-13 DIAGNOSIS — R91.1 SOLITARY PULMONARY NODULE: ICD-10-CM

## 2020-07-13 DIAGNOSIS — R06.83 SNORING: ICD-10-CM

## 2020-07-13 LAB
BLD GP AB SCN SERPL QL: NEGATIVE — SIGNIFICANT CHANGE UP
HCT VFR BLD CALC: 44.9 % — SIGNIFICANT CHANGE UP (ref 39–50)
HGB BLD-MCNC: 14.8 G/DL — SIGNIFICANT CHANGE UP (ref 13–17)
MCHC RBC-ENTMCNC: 30.3 PG — SIGNIFICANT CHANGE UP (ref 27–34)
MCHC RBC-ENTMCNC: 33 % — SIGNIFICANT CHANGE UP (ref 32–36)
MCV RBC AUTO: 92 FL — SIGNIFICANT CHANGE UP (ref 80–100)
NRBC # FLD: 0 K/UL — SIGNIFICANT CHANGE UP (ref 0–0)
PLATELET # BLD AUTO: 305 K/UL — SIGNIFICANT CHANGE UP (ref 150–400)
PMV BLD: 9.9 FL — SIGNIFICANT CHANGE UP (ref 7–13)
RBC # BLD: 4.88 M/UL — SIGNIFICANT CHANGE UP (ref 4.2–5.8)
RBC # FLD: 12.4 % — SIGNIFICANT CHANGE UP (ref 10.3–14.5)
RH IG SCN BLD-IMP: POSITIVE — SIGNIFICANT CHANGE UP
WBC # BLD: 12.14 K/UL — HIGH (ref 3.8–10.5)
WBC # FLD AUTO: 12.14 K/UL — HIGH (ref 3.8–10.5)

## 2020-07-13 RX ORDER — SODIUM CHLORIDE 9 MG/ML
1000 INJECTION, SOLUTION INTRAVENOUS
Refills: 0 | Status: DISCONTINUED | OUTPATIENT
Start: 2020-07-21 | End: 2020-07-23

## 2020-07-13 NOTE — H&P PST ADULT - NSICDXPROBLEM_GEN_ALL_CORE_FT
PROBLEM DIAGNOSES  Problem: Solitary pulmonary nodule  Assessment and Plan: Pt scheduled for surgery on 7/21/2020.  Pre-op instructions provided. Pt verbalized understanding.   Pepcid provided for GI prophylaxis.   Pt given detailed verbal and written instructions on chlorhexidine wash. Pt verbalized understanding with teachback.   Pt states he went for medical clearance prior to initially scheduled surgery - requested copy.   Pt given number to call to schedule preop COVID test.     Problem: Snoring  Assessment and Plan: ANTIONETTE precautions. Pt with >3 criteria on STOP-Bang Questionairre. OR booking notified.     Problem: Leg swelling  Assessment and Plan: Requested copy of recent LE doppler.

## 2020-07-13 NOTE — H&P PST ADULT - MUSCULOSKELETAL COMMENTS
pt states he has has some swelling in left leg and pain in left knee, pt saw orthopedist who did evaluation including venous doppler which he states was negative.

## 2020-07-13 NOTE — H&P PST ADULT - MUSCULOSKELETAL
details… detailed exam no calf tenderness/ROM intact/no joint erythema/normal strength/no joint warmth

## 2020-07-13 NOTE — H&P PST ADULT - HISTORY OF PRESENT ILLNESS
60yr old  male with finding of pulmonary nodule in 2/2020 presents t    Pt was scheduled for procedure prior but case was postponed by pt. 60 yr old  male with finding of pulmonary nodule on imaging done in 2/2020 presents today for presurgical evaluation for Flexible Bronchoscopy, Robotic Assisted Wedge Resection.     Pt was scheduled for procedure prior but case was postponed by pt.

## 2020-07-13 NOTE — H&P PST ADULT - VENOUS THROMBOEMBOLISM CURRENT STATUS
(0) indicator not present (1) varicose veins/(1) other risk factor (includes escalating BMI, pack-years of smoking, diabetes requiring insulin, chemotherapy, female gender and length of surgery)

## 2020-07-13 NOTE — H&P PST ADULT - ALLERGIC/IMMUNOLOGIC
COLUMBIA-SUICIDE SEVERITY RATING SCALE   Screen with Triage Points for Emergency Department      Ask questions that are bolded and underlined.   Past  month   Ask Questions 1 and 2 YES NO   1)  Have you wished you were dead or wished you could go to sleep and not wake up?      2)  Have you actually had any thoughts of killing yourself?      If YES to 2, ask questions 3, 4, 5, and 6.  If NO to 2, go directly to question 6.   3)  Have you been thinking about how you might do this?   E.g.  I thought about taking an overdose but I never made a specific plan as to when where or how I would actually do it .and I would never go through with it.       4)  Have you had these thoughts and had some intention of acting on them?   As opposed to  I have the thoughts but I definitely will not do anything about them.       5)  Have you started to work out or worked out the details of how to kill yourself? Do you intend to carry out this plan?      6)  Have you ever done anything, started to do anything, or prepared to do anything to end your life?  Examples: Collected pills, obtained a gun, gave away valuables, wrote a will or suicide note, took out pills but didn t swallow any, held a gun but changed your mind or it was grabbed from your hand, went to the roof but didn t jump; or actually took pills, tried to shoot yourself, cut yourself, tried to hang yourself, etc.    If YES, ask: Was this within the past three months?  Lifetime         Past 3 Months        Item 1:  Behavioral Health Referral at Discharge  Item 2:  Behavioral Health Referral at Discharge   Item 3:  Behavioral Health Consult (Psychiatric Nurse/) and consider Patient Safety Precautions  Item 4:  Immediate Notification of Physician and/or Behavioral Health and Patient Safety Precautions   Item 5:  Immediate Notification of Physician and/or Behavioral Health and Patient Safety Precautions  Item 6:  Over 3 months ago: Behavioral Health Consult  (Psychiatric Nurse/) and consider Patient Safety Precautions  OR  Item 6:  3 months ago or less: Immediate Notification of Physician and/or Behavioral Health and Patient Safety Precautions      Unable to complete at this time, patient intubated   details…

## 2020-07-14 DIAGNOSIS — Z01.818 ENCOUNTER FOR OTHER PREPROCEDURAL EXAMINATION: ICD-10-CM

## 2020-07-18 ENCOUNTER — APPOINTMENT (OUTPATIENT)
Dept: DISASTER EMERGENCY | Facility: CLINIC | Age: 60
End: 2020-07-18

## 2020-07-19 LAB — SARS-COV-2 N GENE NPH QL NAA+PROBE: NOT DETECTED

## 2020-07-20 ENCOUNTER — TRANSCRIPTION ENCOUNTER (OUTPATIENT)
Age: 60
End: 2020-07-20

## 2020-07-21 ENCOUNTER — RESULT REVIEW (OUTPATIENT)
Age: 60
End: 2020-07-21

## 2020-07-21 ENCOUNTER — INPATIENT (INPATIENT)
Facility: HOSPITAL | Age: 60
LOS: 1 days | Discharge: HOME HEALTH | End: 2020-07-23
Attending: THORACIC SURGERY (CARDIOTHORACIC VASCULAR SURGERY) | Admitting: THORACIC SURGERY (CARDIOTHORACIC VASCULAR SURGERY)
Payer: COMMERCIAL

## 2020-07-21 ENCOUNTER — APPOINTMENT (OUTPATIENT)
Dept: THORACIC SURGERY | Facility: HOSPITAL | Age: 60
End: 2020-07-21

## 2020-07-21 VITALS
TEMPERATURE: 98 F | HEART RATE: 84 BPM | SYSTOLIC BLOOD PRESSURE: 142 MMHG | DIASTOLIC BLOOD PRESSURE: 85 MMHG | WEIGHT: 300.05 LBS | HEIGHT: 70 IN | RESPIRATION RATE: 16 BRPM | OXYGEN SATURATION: 96 %

## 2020-07-21 DIAGNOSIS — R91.1 SOLITARY PULMONARY NODULE: ICD-10-CM

## 2020-07-21 PROCEDURE — 32666 THORACOSCOPY W/WEDGE RESECT: CPT | Mod: RT

## 2020-07-21 PROCEDURE — 88334 PATH CONSLTJ SURG CYTO XM EA: CPT | Mod: 26,59

## 2020-07-21 PROCEDURE — 88313 SPECIAL STAINS GROUP 2: CPT | Mod: 26

## 2020-07-21 PROCEDURE — 71045 X-RAY EXAM CHEST 1 VIEW: CPT | Mod: 26

## 2020-07-21 PROCEDURE — 31622 DX BRONCHOSCOPE/WASH: CPT | Mod: 59

## 2020-07-21 PROCEDURE — 32666 THORACOSCOPY W/WEDGE RESECT: CPT | Mod: AS,RT

## 2020-07-21 PROCEDURE — 88307 TISSUE EXAM BY PATHOLOGIST: CPT | Mod: 26

## 2020-07-21 PROCEDURE — 88311 DECALCIFY TISSUE: CPT | Mod: 26

## 2020-07-21 PROCEDURE — 88305 TISSUE EXAM BY PATHOLOGIST: CPT | Mod: 26

## 2020-07-21 PROCEDURE — 88360 TUMOR IMMUNOHISTOCHEM/MANUAL: CPT | Mod: 26

## 2020-07-21 PROCEDURE — 88304 TISSUE EXAM BY PATHOLOGIST: CPT | Mod: 26

## 2020-07-21 PROCEDURE — 88341 IMHCHEM/IMCYTCHM EA ADD ANTB: CPT | Mod: 26,59

## 2020-07-21 PROCEDURE — 99223 1ST HOSP IP/OBS HIGH 75: CPT

## 2020-07-21 PROCEDURE — 32674 THORACOSCOPY LYMPH NODE EXC: CPT

## 2020-07-21 PROCEDURE — 88331 PATH CONSLTJ SURG 1 BLK 1SPC: CPT | Mod: 26

## 2020-07-21 PROCEDURE — 99233 SBSQ HOSP IP/OBS HIGH 50: CPT | Mod: 57

## 2020-07-21 PROCEDURE — 88342 IMHCHEM/IMCYTCHM 1ST ANTB: CPT | Mod: 26,59

## 2020-07-21 RX ORDER — HEPARIN SODIUM 5000 [USP'U]/ML
5000 INJECTION INTRAVENOUS; SUBCUTANEOUS ONCE
Refills: 0 | Status: COMPLETED | OUTPATIENT
Start: 2020-07-21 | End: 2020-07-21

## 2020-07-21 RX ORDER — ACETAMINOPHEN 500 MG
1000 TABLET ORAL ONCE
Refills: 0 | Status: COMPLETED | OUTPATIENT
Start: 2020-07-21 | End: 2021-01-01

## 2020-07-21 RX ORDER — DIPHENHYDRAMINE HCL 50 MG
50 CAPSULE ORAL EVERY 4 HOURS
Refills: 0 | Status: DISCONTINUED | OUTPATIENT
Start: 2020-07-21 | End: 2020-07-23

## 2020-07-21 RX ORDER — HEPARIN SODIUM 5000 [USP'U]/ML
7500 INJECTION INTRAVENOUS; SUBCUTANEOUS EVERY 8 HOURS
Refills: 0 | Status: DISCONTINUED | OUTPATIENT
Start: 2020-07-21 | End: 2020-07-23

## 2020-07-21 RX ORDER — HYDROMORPHONE HYDROCHLORIDE 2 MG/ML
0.5 INJECTION INTRAMUSCULAR; INTRAVENOUS; SUBCUTANEOUS
Refills: 0 | Status: DISCONTINUED | OUTPATIENT
Start: 2020-07-21 | End: 2020-07-23

## 2020-07-21 RX ORDER — ACETAMINOPHEN 500 MG
1000 TABLET ORAL ONCE
Refills: 0 | Status: COMPLETED | OUTPATIENT
Start: 2020-07-21 | End: 2020-07-21

## 2020-07-21 RX ORDER — ACETAMINOPHEN 500 MG
975 TABLET ORAL ONCE
Refills: 0 | Status: COMPLETED | OUTPATIENT
Start: 2020-07-21 | End: 2020-07-21

## 2020-07-21 RX ORDER — IPRATROPIUM/ALBUTEROL SULFATE 18-103MCG
3 AEROSOL WITH ADAPTER (GRAM) INHALATION ONCE
Refills: 0 | Status: COMPLETED | OUTPATIENT
Start: 2020-07-21 | End: 2020-07-21

## 2020-07-21 RX ORDER — ACETAMINOPHEN 500 MG
1000 TABLET ORAL ONCE
Refills: 0 | Status: DISCONTINUED | OUTPATIENT
Start: 2020-07-21 | End: 2020-07-23

## 2020-07-21 RX ORDER — ALBUTEROL 90 UG/1
2.5 AEROSOL, METERED ORAL EVERY 8 HOURS
Refills: 0 | Status: DISCONTINUED | OUTPATIENT
Start: 2020-07-21 | End: 2020-07-22

## 2020-07-21 RX ORDER — HYDROMORPHONE HYDROCHLORIDE 2 MG/ML
30 INJECTION INTRAMUSCULAR; INTRAVENOUS; SUBCUTANEOUS
Refills: 0 | Status: DISCONTINUED | OUTPATIENT
Start: 2020-07-21 | End: 2020-07-23

## 2020-07-21 RX ORDER — GABAPENTIN 400 MG/1
300 CAPSULE ORAL ONCE
Refills: 0 | Status: COMPLETED | OUTPATIENT
Start: 2020-07-21 | End: 2020-07-21

## 2020-07-21 RX ORDER — ONDANSETRON 8 MG/1
4 TABLET, FILM COATED ORAL EVERY 6 HOURS
Refills: 0 | Status: DISCONTINUED | OUTPATIENT
Start: 2020-07-21 | End: 2020-07-23

## 2020-07-21 RX ORDER — NALOXONE HYDROCHLORIDE 4 MG/.1ML
0.1 SPRAY NASAL
Refills: 0 | Status: DISCONTINUED | OUTPATIENT
Start: 2020-07-21 | End: 2020-07-23

## 2020-07-21 RX ADMIN — HYDROMORPHONE HYDROCHLORIDE 30 MILLILITER(S): 2 INJECTION INTRAMUSCULAR; INTRAVENOUS; SUBCUTANEOUS at 19:32

## 2020-07-21 RX ADMIN — GABAPENTIN 300 MILLIGRAM(S): 400 CAPSULE ORAL at 07:03

## 2020-07-21 RX ADMIN — Medication 975 MILLIGRAM(S): at 07:03

## 2020-07-21 RX ADMIN — Medication 400 MILLIGRAM(S): at 23:00

## 2020-07-21 RX ADMIN — SODIUM CHLORIDE 30 MILLILITER(S): 9 INJECTION, SOLUTION INTRAVENOUS at 19:00

## 2020-07-21 RX ADMIN — SODIUM CHLORIDE 30 MILLILITER(S): 9 INJECTION, SOLUTION INTRAVENOUS at 07:04

## 2020-07-21 RX ADMIN — HYDROMORPHONE HYDROCHLORIDE 30 MILLILITER(S): 2 INJECTION INTRAMUSCULAR; INTRAVENOUS; SUBCUTANEOUS at 11:26

## 2020-07-21 RX ADMIN — ALBUTEROL 2.5 MILLIGRAM(S): 90 AEROSOL, METERED ORAL at 23:27

## 2020-07-21 RX ADMIN — Medication 3 MILLILITER(S): at 15:31

## 2020-07-21 RX ADMIN — HEPARIN SODIUM 7500 UNIT(S): 5000 INJECTION INTRAVENOUS; SUBCUTANEOUS at 18:17

## 2020-07-21 RX ADMIN — Medication 1000 MILLIGRAM(S): at 23:15

## 2020-07-21 RX ADMIN — HEPARIN SODIUM 5000 UNIT(S): 5000 INJECTION INTRAVENOUS; SUBCUTANEOUS at 07:07

## 2020-07-21 NOTE — PROGRESS NOTE ADULT - SUBJECTIVE AND OBJECTIVE BOX
PROCEDURE: Right VATS, Robotic-Assisted Right Upper Lobe Wedge Resection, Pleural Biopsy, and Mediastinal Lymph Node Dissection 21-Jul-2020      ISSUES:   Lung nodule  Chest tube in place  Post op pain  Morbid obesity    INTERVAL EVENTS:   OR today. Extubated in OR. Transferred to CTICU.      HISTORY:   Patient reports moderate pain at chest wall incision sites which is worse with coughing and deep breathing without associated fever or dyspnea. Pain is improved with use of pain meds.     PHYSICAL EXAM:   Gen: Comfortable, No acute distress  Eyes: Sclera white, Conjunctiva normal, Eyelids normal, Pupils symmetrical   ENT: Mucous membranes moist,  ,  ,    Neck: Trachea midline,  ,  ,  ,  ,    CV: Rate regular, Rhythm regular,  ,  ,    Resp: Breath sounds clear, No accessory muscles use, R chest tube in place,    Abd: Soft, Non-distended, Non-tender, Bowel sounds normal,  ,    Skin: Warm, No peripheral edema of lower extremities,  ,    : No fuentes  Neuro: Moving all 4 extremities,    Psych: A&Ox3      ASSESSMENT AND PLAN:     NEURO:  Post-operative Pain - Pain control with PCA and Tylenol IV PRN.       RESPIRATORY:  Hypoxia - Wean nasal cannula for goal O2sat above 92. Obtain CXR. Incentive spirometry. Chest PT and frequent suctioning. Continue bronchodilators. OOB to chair & ambulate w/ assistance. Continuous pulse oximetry for support & to prevent decompensation.             CARDIOVASCULAR:  Hemodynamically stable - Not on pressors. Continue hemodynamic monitoring.             RENAL:  Stable – LR IVF 30mL/hr. Monitor IOs and electrolytes.          GASTROINTESTINAL:  GI prophylaxis not indicated  Zofran and Reglan IV PRN for nausea  Regular consistency diet            HEMATOLOGIC:  No signs of active bleeding. Monitor Hgb in CBC in AM  DVT prophylaxis with heparin subQ and SCDs.         INFECTIOUS DISEASE:  All surgical sites appear clean. No signs of active infection. Will monitor for fever and leukocytosis.            ENDOCRINE:  Stable – Monitor glucose fingersticks for goal 120-180.          Pertinent clinical, laboratory, radiographic, hemodynamic, echocardiographic, respiratory data, microbiologic data and chart were reviewed by myself and analyzed frequently throughout the course of the day and night by myself.    Plan discussed at length with the CTICU staff and Attending CT Surgeon.     Patient's status was discussed with patient at bedside.           ________________________________________________    _________________________  VITAL SIGNS:  Vital Signs Last 24 Hrs  T(C): 36.7 (21 Jul 2020 06:45), Max: 36.7 (21 Jul 2020 06:45)  T(F): 98.1 (21 Jul 2020 06:45), Max: 98.1 (21 Jul 2020 06:45)  HR: 82 (21 Jul 2020 12:30) (75 - 86)  BP: 107/58 (21 Jul 2020 12:30) (107/58 - 142/85)  BP(mean): 70 (21 Jul 2020 12:30) (70 - 88)  RR: 17 (21 Jul 2020 12:30) (16 - 24)  SpO2: 95% (21 Jul 2020 12:30) (93% - 96%)  I/Os:   I&O's Detail    21 Jul 2020 07:01  -  21 Jul 2020 13:01  --------------------------------------------------------  IN:    lactated ringers.: 90 mL  Total IN: 90 mL    OUT:    Chest Tube: 120 mL  Total OUT: 120 mL    Total NET: -30 mL              MEDICATIONS:  MEDICATIONS  (STANDING):  heparin   Injectable 7500 Unit(s) SubCutaneous every 8 hours  HYDROmorphone PCA (1 mG/mL) 30 milliLiter(s) PCA Continuous PCA Continuous  lactated ringers. 1000 milliLiter(s) (30 mL/Hr) IV Continuous <Continuous>    MEDICATIONS  (PRN):  diphenhydrAMINE   Injectable 50 milliGRAM(s) IV Push every 4 hours PRN Pruritus  HYDROmorphone PCA (1 mG/mL) Rescue Clinician Bolus 0.5 milliGRAM(s) IV Push every 15 minutes PRN for Pain Scale GREATER THAN 6  naloxone Injectable 0.1 milliGRAM(s) IV Push every 3 minutes PRN For ANY of the following changes in patient status:  A. RR LESS THAN 10 breaths per minute, B. Oxygen saturation LESS THAN 90%, C. Sedation score of 6  ondansetron Injectable 4 milliGRAM(s) IV Push every 6 hours PRN Nausea      LABS:                  _________________________

## 2020-07-21 NOTE — BRIEF OPERATIVE NOTE - OPERATION/FINDINGS
Right VATS, Robotic-Assisted Right Upper Lobe Wedge Resection, Pleural Biopsy, and Mediastinal Lymph Node Dissection    MIRZA, Camron Sauer PA-C, was the first-assistant in this operation, including but not limited to docking the da Surinder robot, serving as the bedside assistant while the surgeon was at the robot console, and wound closure.

## 2020-07-21 NOTE — CONSULT NOTE ADULT - SUBJECTIVE AND OBJECTIVE BOX
HPI:  60M PMH obesity, 39 year pack year smoking (quit 2/2020), h/o raising pigeons (stopped 1990's) originally presented to the outpatient office of Dr. Sharif with cough and productive sputum-->found to have ILD on CTchest    PAST MEDICAL & SURGICAL HISTORY:  Pneumonia: Feb 2020  Morbid obesity  No significant past surgical history      FAMILY HISTORY:  FH: HTN (hypertension) (Father)      SOCIAL HISTORY:  Smoking: __ packs x ___ years  EtOH Use:  Marital Status:  Occupation:  Exposures:  Recent Travel:    Allergies    No Known Allergies    Intolerances        HOME MEDICATIONS:    REVIEW OF SYSTEMS:  CONSTITUTIONAL: No weakness, fevers or chills  EYES/ENT: No visual changes;  No vertigo or throat pain   NECK: No pain or stiffness  RESPIRATORY: No cough, wheezing, hemoptysis; No shortness of breath  CARDIOVASCULAR: No chest pain or palpitations  GASTROINTESTINAL: No abdominal or epigastric pain. No nausea, vomiting, or hematemesis; No diarrhea or constipation. No melena or hematochezia.  GENITOURINARY: No dysuria, frequency or hematuria  NEUROLOGICAL: No numbness or weakness  SKIN: No itching, burning, rashes, or lesions   All other review of systems is negative unless indicated above.    OBJECTIVE:  ICU Vital Signs Last 24 Hrs  T(C): 36.7 (21 Jul 2020 06:45), Max: 36.7 (21 Jul 2020 06:45)  T(F): 98.1 (21 Jul 2020 06:45), Max: 98.1 (21 Jul 2020 06:45)  HR: 87 (21 Jul 2020 16:00) (75 - 87)  BP: 122/72 (21 Jul 2020 16:00) (107/58 - 142/85)  BP(mean): 85 (21 Jul 2020 16:00) (70 - 88)  ABP: --  ABP(mean): --  RR: 20 (21 Jul 2020 16:00) (16 - 24)  SpO2: 96% (21 Jul 2020 16:00) (93% - 99%)        07-21 @ 07:01  -  07-21 @ 17:26  --------------------------------------------------------  IN: 180 mL / OUT: 180 mL / NET: 0 mL      CAPILLARY BLOOD GLUCOSE          PHYSICAL EXAM:  General: WN/WD NAD  Neurology: A&Ox3, nonfocal, BRYANT x 4  Eyes: PERRLA/ EOMI, Gross vision intact  ENT/Neck: Neck supple, trachea midline, No JVD, Gross hearing intact  Respiratory: CTA B/L, No wheezing, rales, rhonchi  CV: RRR, +S1/S2, -S3/S4, no murmurs, rubs or gallops  Abdominal: Soft, NT, ND +BS, No HSM  MSK: 5/5 strength UE/LE bilaterally  Extremities: No edema, 2+ peripheral pulses  Skin: No Rashes, Hematoma, Ecchymosis  Incisions:   Tubes:    HOSPITAL MEDICATIONS:  MEDICATIONS  (STANDING):  heparin   Injectable 7500 Unit(s) SubCutaneous every 8 hours  HYDROmorphone PCA (1 mG/mL) 30 milliLiter(s) PCA Continuous PCA Continuous  lactated ringers. 1000 milliLiter(s) (30 mL/Hr) IV Continuous <Continuous>    MEDICATIONS  (PRN):  diphenhydrAMINE   Injectable 50 milliGRAM(s) IV Push every 4 hours PRN Pruritus  HYDROmorphone PCA (1 mG/mL) Rescue Clinician Bolus 0.5 milliGRAM(s) IV Push every 15 minutes PRN for Pain Scale GREATER THAN 6  naloxone Injectable 0.1 milliGRAM(s) IV Push every 3 minutes PRN For ANY of the following changes in patient status:  A. RR LESS THAN 10 breaths per minute, B. Oxygen saturation LESS THAN 90%, C. Sedation score of 6  ondansetron Injectable 4 milliGRAM(s) IV Push every 6 hours PRN Nausea      LABS:                    MICROBIOLOGY:     RADIOLOGY:  [ ] Reviewed by me    Point of Care Ultrasound Findings:    PFT:    EKG: HPI:  60M PMH obesity, 39 year pack year smoking (quit 2020), h/o raising pigeons (stopped ) originally presented to the outpatient office of Dr. Sharif with cough and productive sputum-->found to have ILD on CTchest with 1-1.5cm nodular densities in the RUL and RLL respectively that turned out to be PET-avid on PET/CT, concerning for malignancy. Patient presents today for elective VATS wedge resection and mediastinal LN dissection, currently s/p procedure.     On exam: T: 98 BP: 122/79  HR: 80  RR: 24  SpO2: 95% 3L NC. On exam patient is comfortable, sitting up in bed with PCA pump for comfort. Chest tube present on right side, well-dressed. Mild wheezing present bilaterally. Patient has no complaints. IS at bedside.     PAST MEDICAL & SURGICAL HISTORY:  Pneumonia: 2020  Morbid obesity  No significant past surgical history      FAMILY HISTORY:  FH: HTN (hypertension) (Father)  FH: Colon CA      SOCIAL HISTORY:  Smokin pack years  EtOH Use: occasional  Marital Status:   Occupation: worked with [email or web address]  Exposures: printing chemicals  Recent Travel: none    Allergies    No Known Allergies    Intolerances        HOME MEDICATIONS:    REVIEW OF SYSTEMS:  CONSTITUTIONAL: No weakness, fevers or chills  EYES/ENT: No visual changes;  No vertigo or throat pain   NECK: No pain or stiffness  RESPIRATORY: No cough, wheezing, hemoptysis; No shortness of breath  CARDIOVASCULAR: No chest pain or palpitations  GASTROINTESTINAL: No abdominal or epigastric pain. No nausea, vomiting, or hematemesis; No diarrhea or constipation. No melena or hematochezia.  GENITOURINARY: No dysuria, frequency or hematuria  NEUROLOGICAL: No numbness or weakness  SKIN: No itching, burning, rashes, or lesions   All other review of systems is negative unless indicated above.    OBJECTIVE:  ICU Vital Signs Last 24 Hrs  T(C): 36.7 (2020 06:45), Max: 36.7 (2020 06:45)  T(F): 98.1 (2020 06:45), Max: 98.1 (2020 06:45)  HR: 87 (2020 16:00) (75 - 87)  BP: 122/72 (2020 16:00) (107/58 - 142/85)  BP(mean): 85 (2020 16:00) (70 - 88)  ABP: --  ABP(mean): --  RR: 20 (2020 16:00) (16 - 24)  SpO2: 96% (2020 16:00) (93% - 99%)        - @ 07:01  -   @ 17:26  --------------------------------------------------------  IN: 180 mL / OUT: 180 mL / NET: 0 mL      CAPILLARY BLOOD GLUCOSE          PHYSICAL EXAM:  General: WN/WD NAD  Neurology: A&Ox3, nonfocal, BRYANT x 4  Eyes: PERRLA/ EOMI, Gross vision intact  ENT/Neck: Neck supple, trachea midline, No JVD, Gross hearing intact  Respiratory: Mild bilateral wheezing.   CV: RRR, +S1/S2, -S3/S4, no murmurs, rubs or gallops  Abdominal: Soft, NT, ND +BS, No HSM  MSK: 5/5 strength UE/LE bilaterally  Extremities: No edema, 2+ peripheral pulses  Skin: No Rashes, Hematoma, Ecchymosis  Tubes: Right sided chest tube. dressings c/d/i    HOSPITAL MEDICATIONS:  MEDICATIONS  (STANDING):  heparin   Injectable 7500 Unit(s) SubCutaneous every 8 hours  HYDROmorphone PCA (1 mG/mL) 30 milliLiter(s) PCA Continuous PCA Continuous  lactated ringers. 1000 milliLiter(s) (30 mL/Hr) IV Continuous <Continuous>    MEDICATIONS  (PRN):  diphenhydrAMINE   Injectable 50 milliGRAM(s) IV Push every 4 hours PRN Pruritus  HYDROmorphone PCA (1 mG/mL) Rescue Clinician Bolus 0.5 milliGRAM(s) IV Push every 15 minutes PRN for Pain Scale GREATER THAN 6  naloxone Injectable 0.1 milliGRAM(s) IV Push every 3 minutes PRN For ANY of the following changes in patient status:  A. RR LESS THAN 10 breaths per minute, B. Oxygen saturation LESS THAN 90%, C. Sedation score of 6  ondansetron Injectable 4 milliGRAM(s) IV Push every 6 hours PRN Nausea      LABS:                    MICROBIOLOGY:     RADIOLOGY:  [x] Reviewed by me    CXR:   INTERPRETATION:     Heart size and the mediastinum cannot be accurately evaluated on this projection.  There is right lung volume loss.  The tip of the chest tube projects over the right upper chest. No gross pneumothorax seen.  There is right subcutaneous emphysema.  Patchy opacity is present throughout the right lung. There may be a small right pleural effusion.  The left lung is clear. Opacity projecting over the lower lateralleft lung is likely due to overlying soft tissues.  No acute bony abnormality seen.

## 2020-07-21 NOTE — BRIEF OPERATIVE NOTE - NSICDXBRIEFPROCEDURE_GEN_ALL_CORE_FT
PROCEDURES:  Lung wedge resection 21-Jul-2020 11:07:49  Camron Sauer  Thoracoscopic robotic assisted procedure 21-Jul-2020 11:07:37  Camron Sauer

## 2020-07-22 DIAGNOSIS — Z71.89 OTHER SPECIFIED COUNSELING: ICD-10-CM

## 2020-07-22 DIAGNOSIS — K59.00 CONSTIPATION, UNSPECIFIED: ICD-10-CM

## 2020-07-22 LAB
ANION GAP SERPL CALC-SCNC: 10 MMO/L — SIGNIFICANT CHANGE UP (ref 7–14)
BASOPHILS # BLD AUTO: 0.06 K/UL — SIGNIFICANT CHANGE UP (ref 0–0.2)
BASOPHILS NFR BLD AUTO: 0.5 % — SIGNIFICANT CHANGE UP (ref 0–2)
BUN SERPL-MCNC: 15 MG/DL — SIGNIFICANT CHANGE UP (ref 7–23)
CALCIUM SERPL-MCNC: 8.8 MG/DL — SIGNIFICANT CHANGE UP (ref 8.4–10.5)
CHLORIDE SERPL-SCNC: 99 MMOL/L — SIGNIFICANT CHANGE UP (ref 98–107)
CO2 SERPL-SCNC: 25 MMOL/L — SIGNIFICANT CHANGE UP (ref 22–31)
CREAT SERPL-MCNC: 0.86 MG/DL — SIGNIFICANT CHANGE UP (ref 0.5–1.3)
EOSINOPHIL # BLD AUTO: 0.16 K/UL — SIGNIFICANT CHANGE UP (ref 0–0.5)
EOSINOPHIL NFR BLD AUTO: 1.3 % — SIGNIFICANT CHANGE UP (ref 0–6)
GLUCOSE SERPL-MCNC: 105 MG/DL — HIGH (ref 70–99)
HCT VFR BLD CALC: 45.6 % — SIGNIFICANT CHANGE UP (ref 39–50)
HGB BLD-MCNC: 14.5 G/DL — SIGNIFICANT CHANGE UP (ref 13–17)
IMM GRANULOCYTES NFR BLD AUTO: 0.4 % — SIGNIFICANT CHANGE UP (ref 0–1.5)
LYMPHOCYTES # BLD AUTO: 2.86 K/UL — SIGNIFICANT CHANGE UP (ref 1–3.3)
LYMPHOCYTES # BLD AUTO: 22.6 % — SIGNIFICANT CHANGE UP (ref 13–44)
MAGNESIUM SERPL-MCNC: 2.1 MG/DL — SIGNIFICANT CHANGE UP (ref 1.6–2.6)
MCHC RBC-ENTMCNC: 30.1 PG — SIGNIFICANT CHANGE UP (ref 27–34)
MCHC RBC-ENTMCNC: 31.8 % — LOW (ref 32–36)
MCV RBC AUTO: 94.8 FL — SIGNIFICANT CHANGE UP (ref 80–100)
MONOCYTES # BLD AUTO: 1.16 K/UL — HIGH (ref 0–0.9)
MONOCYTES NFR BLD AUTO: 9.2 % — SIGNIFICANT CHANGE UP (ref 2–14)
NEUTROPHILS # BLD AUTO: 8.37 K/UL — HIGH (ref 1.8–7.4)
NEUTROPHILS NFR BLD AUTO: 66 % — SIGNIFICANT CHANGE UP (ref 43–77)
NRBC # FLD: 0 K/UL — SIGNIFICANT CHANGE UP (ref 0–0)
PLATELET # BLD AUTO: 271 K/UL — SIGNIFICANT CHANGE UP (ref 150–400)
PMV BLD: 9.6 FL — SIGNIFICANT CHANGE UP (ref 7–13)
POTASSIUM SERPL-MCNC: 4.3 MMOL/L — SIGNIFICANT CHANGE UP (ref 3.5–5.3)
POTASSIUM SERPL-SCNC: 4.3 MMOL/L — SIGNIFICANT CHANGE UP (ref 3.5–5.3)
RBC # BLD: 4.81 M/UL — SIGNIFICANT CHANGE UP (ref 4.2–5.8)
RBC # FLD: 12.5 % — SIGNIFICANT CHANGE UP (ref 10.3–14.5)
SODIUM SERPL-SCNC: 134 MMOL/L — LOW (ref 135–145)
WBC # BLD: 12.66 K/UL — HIGH (ref 3.8–10.5)
WBC # FLD AUTO: 12.66 K/UL — HIGH (ref 3.8–10.5)

## 2020-07-22 PROCEDURE — 99233 SBSQ HOSP IP/OBS HIGH 50: CPT

## 2020-07-22 PROCEDURE — 71045 X-RAY EXAM CHEST 1 VIEW: CPT | Mod: 26

## 2020-07-22 PROCEDURE — 99232 SBSQ HOSP IP/OBS MODERATE 35: CPT

## 2020-07-22 RX ORDER — ACETAMINOPHEN 500 MG
1000 TABLET ORAL ONCE
Refills: 0 | Status: COMPLETED | OUTPATIENT
Start: 2020-07-22 | End: 2020-07-22

## 2020-07-22 RX ORDER — IPRATROPIUM/ALBUTEROL SULFATE 18-103MCG
3 AEROSOL WITH ADAPTER (GRAM) INHALATION EVERY 6 HOURS
Refills: 0 | Status: DISCONTINUED | OUTPATIENT
Start: 2020-07-22 | End: 2020-07-23

## 2020-07-22 RX ORDER — DORNASE ALFA 1 MG/ML
2.5 SOLUTION RESPIRATORY (INHALATION) DAILY
Refills: 0 | Status: DISCONTINUED | OUTPATIENT
Start: 2020-07-22 | End: 2020-07-23

## 2020-07-22 RX ORDER — SODIUM CHLORIDE 9 MG/ML
4 INJECTION INTRAMUSCULAR; INTRAVENOUS; SUBCUTANEOUS EVERY 6 HOURS
Refills: 0 | Status: DISCONTINUED | OUTPATIENT
Start: 2020-07-22 | End: 2020-07-23

## 2020-07-22 RX ADMIN — HYDROMORPHONE HYDROCHLORIDE 0.5 MILLIGRAM(S): 2 INJECTION INTRAMUSCULAR; INTRAVENOUS; SUBCUTANEOUS at 03:45

## 2020-07-22 RX ADMIN — Medication 400 MILLIGRAM(S): at 06:45

## 2020-07-22 RX ADMIN — HYDROMORPHONE HYDROCHLORIDE 30 MILLILITER(S): 2 INJECTION INTRAMUSCULAR; INTRAVENOUS; SUBCUTANEOUS at 07:41

## 2020-07-22 RX ADMIN — Medication 3 MILLILITER(S): at 12:41

## 2020-07-22 RX ADMIN — HYDROMORPHONE HYDROCHLORIDE 30 MILLILITER(S): 2 INJECTION INTRAMUSCULAR; INTRAVENOUS; SUBCUTANEOUS at 19:08

## 2020-07-22 RX ADMIN — ALBUTEROL 2.5 MILLIGRAM(S): 90 AEROSOL, METERED ORAL at 07:57

## 2020-07-22 RX ADMIN — HYDROMORPHONE HYDROCHLORIDE 30 MILLILITER(S): 2 INJECTION INTRAMUSCULAR; INTRAVENOUS; SUBCUTANEOUS at 13:10

## 2020-07-22 RX ADMIN — HEPARIN SODIUM 7500 UNIT(S): 5000 INJECTION INTRAVENOUS; SUBCUTANEOUS at 11:25

## 2020-07-22 RX ADMIN — HYDROMORPHONE HYDROCHLORIDE 30 MILLILITER(S): 2 INJECTION INTRAMUSCULAR; INTRAVENOUS; SUBCUTANEOUS at 13:28

## 2020-07-22 RX ADMIN — Medication 1000 MILLIGRAM(S): at 07:15

## 2020-07-22 RX ADMIN — SODIUM CHLORIDE 4 MILLILITER(S): 9 INJECTION INTRAMUSCULAR; INTRAVENOUS; SUBCUTANEOUS at 12:52

## 2020-07-22 RX ADMIN — HEPARIN SODIUM 7500 UNIT(S): 5000 INJECTION INTRAVENOUS; SUBCUTANEOUS at 17:56

## 2020-07-22 RX ADMIN — Medication 3 MILLILITER(S): at 22:05

## 2020-07-22 RX ADMIN — SODIUM CHLORIDE 30 MILLILITER(S): 9 INJECTION, SOLUTION INTRAVENOUS at 07:40

## 2020-07-22 RX ADMIN — SODIUM CHLORIDE 4 MILLILITER(S): 9 INJECTION INTRAMUSCULAR; INTRAVENOUS; SUBCUTANEOUS at 22:11

## 2020-07-22 RX ADMIN — HEPARIN SODIUM 7500 UNIT(S): 5000 INJECTION INTRAVENOUS; SUBCUTANEOUS at 01:13

## 2020-07-22 NOTE — PHYSICAL THERAPY INITIAL EVALUATION ADULT - ADDITIONAL COMMENTS
Pt lives in a house with 2 exterior steps to enter. One flight of stairs within home to second floor bedroom. Pt lives with spouse, daughter, son and reports ambulating independently prior to admission.    Pt was left seated in bedside chair as found, all lines/tubes intact and call bell within reach, RN aware.

## 2020-07-22 NOTE — DIETITIAN INITIAL EVALUATION ADULT. - OTHER INFO
Patient is a 59 y/o old male with PMH of obesity, former smoker who was found to have pulmonary nodules now admitted s/p elective VATS wedge resection and mediastinal LN dissection. Pt. presented alert, oriented , tolerating PO diet, reports good appetite, PO , no known food allergies, no issues with PO nutrition , on Regular diet , pt. to be transferred to the floor as RDN visit .

## 2020-07-22 NOTE — CONSULT NOTE ADULT - PROBLEM SELECTOR RECOMMENDATION 9
-post op 2/2 anesthesia; non-obstructed  -avoid straining post op  -miralax qd  -dulcolax supp prn   -oob/ambulate as tolerated if okay with thoracic team  -high fiber diet

## 2020-07-22 NOTE — PROGRESS NOTE ADULT - ATTENDING COMMENTS
59 y/o M with PMH of significant tobacco use for which he quit in 02/2020, had CT-chest which shoiwed a CT-chest pattern of IPF/UIP with e/o honeycombing as well as irregular nodular densities in the RUL and RLL. He underwent a PET which showed uptake in the RUL/RLL.  Today, he presents for elective VATS.    Patient went for right sided VATS with RUL wedge resection, pleural biopsy and lymph node dissection yesterday.  Post-operatively he is doing well.  He was extubated in the OR and is on 3L NC saturating 97%.  He still has wheezes present bilaterally with a right sided chest tube in place.    Plan:  -Cont incentive spirometry as well as pain control to prevent atelectasis  -Wean oxygen as tolerated and encourage OOB to chair  -Would start Duonebs q6 as needed for reactive airway disease  -Follow up with results of the biopsy    Codey Morfin DO . 61 y/o M with PMH of significant tobacco use for which he quit in 02/2020, had CT-chest which shoiwed a CT-chest pattern of IPF/UIP with e/o honeycombing as well as irregular nodular densities in the RUL and RLL. He underwent a PET which showed uptake in the RUL/RLL.  Today, he presents for elective VATS.    Patient went for right sided VATS with RUL wedge resection, pleural biopsy and lymph node dissection yesterday.  Post-operatively he is doing well.  He was extubated in the OR and is on 3L NC saturating 97%.  He still has wheezes present bilaterally with a right sided chest tube in place.    Plan:  -Cont incentive spirometry as well as pain control to prevent atelectasis  -Wean oxygen as tolerated and encourage OOB to chair  -Duonebs q6 as needed for reactive airway disease  -Follow up with results of the biopsy    Codey Morfin DO .

## 2020-07-22 NOTE — PROGRESS NOTE ADULT - SUBJECTIVE AND OBJECTIVE BOX
Anesthesia Pain Management Service    SUBJECTIVE: Patient is doing well with IV PCA and no significant problems reported.    Pain Scale Score	At rest: _3__ 	With Activity: _8__ 	[X ] Refer to charted pain scores    THERAPY:    [ ] IV PCA Morphine		[ ] 5 mg/mL	[ ] 1 mg/mL  [X ] IV PCA Hydromorphone	[ ] 5 mg/mL	[X ] 1 mg/mL  [ ] IV PCA Fentanyl		[ ] 50 micrograms/mL    Demand dose __0.2_ lockout __6_ (minutes) Continuous Rate _0__ Total: _9.3__  mg used (in past 24 hours)      MEDICATIONS  (STANDING):  acetaminophen  IVPB .. 1000 milliGRAM(s) IV Intermittent once  ALBUTerol    0.083% 2.5 milliGRAM(s) Nebulizer every 8 hours  heparin   Injectable 7500 Unit(s) SubCutaneous every 8 hours  HYDROmorphone PCA (1 mG/mL) 30 milliLiter(s) PCA Continuous PCA Continuous  lactated ringers. 1000 milliLiter(s) (30 mL/Hr) IV Continuous <Continuous>    MEDICATIONS  (PRN):  diphenhydrAMINE   Injectable 50 milliGRAM(s) IV Push every 4 hours PRN Pruritus  HYDROmorphone PCA (1 mG/mL) Rescue Clinician Bolus 0.5 milliGRAM(s) IV Push every 15 minutes PRN for Pain Scale GREATER THAN 6  naloxone Injectable 0.1 milliGRAM(s) IV Push every 3 minutes PRN For ANY of the following changes in patient status:  A. RR LESS THAN 10 breaths per minute, B. Oxygen saturation LESS THAN 90%, C. Sedation score of 6  ondansetron Injectable 4 milliGRAM(s) IV Push every 6 hours PRN Nausea      OBJECTIVE: laying in bed     Sedation Score:	[ X] Alert	[ ] Drowsy 	[ ] Arousable	[ ] Asleep	[ ] Unresponsive    Side Effects:	[X ] None	[ ] Nausea	[ ] Vomiting	[ ] Pruritus  		[ ] Other:    Vital Signs Last 24 Hrs  T(C): 36.2 (22 Jul 2020 08:00), Max: 36.8 (22 Jul 2020 00:00)  T(F): 97.2 (22 Jul 2020 08:00), Max: 98.2 (22 Jul 2020 00:00)  HR: 79 (22 Jul 2020 09:00) (73 - 88)  BP: 127/57 (22 Jul 2020 09:00) (107/58 - 136/91)  BP(mean): 74 (22 Jul 2020 09:00) (70 - 99)  RR: 18 (22 Jul 2020 09:00) (13 - 24)  SpO2: 94% (22 Jul 2020 09:00) (88% - 100%)    ASSESSMENT/ PLAN    Therapy to  be:	[ X] Continue   [ ] Discontinued   [ ] Change to prn Analgesics    Documentation and Verification of current medications:   [X] Done	[ ] Not done, not elligible    Comments: Recommend non-opioid adjuvant analgesics to be used when possible and when allowed by primary surgical team.    Progress Note written now but Patient was seen earlier.

## 2020-07-22 NOTE — PHYSICAL THERAPY INITIAL EVALUATION ADULT - PATIENT PROFILE REVIEW, REHAB EVAL
yes/PT orders received: Ambulate as tolerated, Out of bed to chair. Consult with KENDY MANNING, patient may participate in PT evaluation.

## 2020-07-22 NOTE — PROGRESS NOTE ADULT - ASSESSMENT
60M PMH obesity, 39 year pack year smoking (quit 2020), h/o raising pigeons (stopped ) found to have ILD on CTchest with 1-1.5cm nodular densities in the RUL and RLL respectively that turned out to be PET-avid on PET/CT currently POD 2 s/p elective VATS wedge resection and mediastinal LN dissection.    # Post-operative management: Currently with mild hemoptysis and lower lobe wheezing on clinical exam  - IS 10x/hour and OOB2C as tolerated  - ambulate as tolerated  - Pain management to prevent splinting  - duonebs q6h PRN SOB/wheezing    When discharged can follow-up with Dr. Sharif (Pulmonologist) at [address] [place] [address]. To obtain an expedited post hospitalization appointment, please e-mail: [email or web address] and include patients name, , and discharge diagnosis.     Thank you for excellent care of this patient        Rob Mejia MD  PGY-6  Pulmonary and Critical Care Fellow  Pager: [phone]

## 2020-07-22 NOTE — CHART NOTE - NSCHARTNOTEFT_GEN_A_CORE
Pt resting in bed. No distress. No c/o SOB  When pt exerts himself or with talking O2 sat to 84-86% with 4LNC in place  Denies any SOB. no signs of Distress. Using IS to 2000.  Dr. Perez made aware. Pt with poor O2 sat to mid 80s prior to OR.   Ideal O2 sat while on oxygen is only 88%.  Will likely need home O2.   Will continue to observe

## 2020-07-22 NOTE — CONSULT NOTE ADULT - SUBJECTIVE AND OBJECTIVE BOX
HISTORY OF PRESENT ILLNESS: HPI:  Patient is a 59 y/o old male with PMH of obesity, former smoker who was found to have pulmonary nodules now admitted s/p elective VATS wedge resection and mediastinal LN dissection. Tolerated procedure well from CV perspective. Patient reports breathing more comfortable this morning on nasal cannula. Denies any cardiac history including CAD/MI/CHF/Arrhythmia. Denies chest pain, palpitations, syncope, dizziness, abd pain, n/v, fever/chills.    PAST MEDICAL & SURGICAL HISTORY:  Pneumonia: Feb 2020  Morbid obesity  No significant past surgical history    MEDICATIONS:  MEDICATIONS  (STANDING):  acetaminophen  IVPB .. 1000 milliGRAM(s) IV Intermittent once  ALBUTerol    0.083% 2.5 milliGRAM(s) Nebulizer every 8 hours  heparin   Injectable 7500 Unit(s) SubCutaneous every 8 hours  HYDROmorphone PCA (1 mG/mL) 30 milliLiter(s) PCA Continuous PCA Continuous  lactated ringers. 1000 milliLiter(s) (30 mL/Hr) IV Continuous <Continuous>      Allergies    No Known Allergies    Intolerances        FAMILY HISTORY:  FH: HTN (hypertension) (Father)    Non-contributary for premature coronary disease or sudden cardiac death    SOCIAL HISTORY:    [ ] Non-smoker  [ x] Former Smoker  [ ] Alcohol    FLU VACCINE THIS YEAR STARTS IN AUGUST:  [ ] Yes    [ ] No    IF OVER 65 HAVE YOU EVER HAD A PNA VACCINE:  [ ] Yes    [ ] No       [ ] N/A      REVIEW OF SYSTEMS:  [ ]chest pain  [ x ]shortness of breath  [  ]palpitations  [  ]syncope  [ ]near syncope [ ]upper extremity weakness   [ ] lower extremity weakness  [  ]diplopia  [  ]altered mental status   [  ]fevers  [ ]chills [ ]nausea  [ ]vomitting  [  ]dysphagia    [ ]abdominal pain  [ ]melena  [ ]BRBPR    [  ]epistaxis  [  ]rash    [ ]lower extremity edema        [x ] All others negative	  [ ] Unable to obtain      LABS:	 	    CARDIAC MARKERS:                              14.5   12.66 )-----------( 271      ( 22 Jul 2020 03:20 )             45.6     Hb Trend: 14.5<--    07-22    134<L>  |  99  |  15  ----------------------------<  105<H>  4.3   |  25  |  0.86    Ca    8.8      22 Jul 2020 03:20  Mg     2.1     07-22      Creatinine Trend: 0.86<--    Coags:      proBNP:   Lipid Profile:   HgA1c:   TSH:         PHYSICAL EXAM:  T(C): 36.2 (07-22-20 @ 08:00), Max: 36.8 (07-22-20 @ 00:00)  HR: 83 (07-22-20 @ 11:00) (73 - 88)  BP: 125/71 (07-22-20 @ 11:00) (107/58 - 136/91)  RR: 16 (07-22-20 @ 11:00) (13 - 24)  SpO2: 92% (07-22-20 @ 11:00) (88% - 100%)  Wt(kg): --   BMI (kg/m2): 43.1 (07-21-20 @ 06:45)  I&O's Summary    21 Jul 2020 07:01  -  22 Jul 2020 07:00  --------------------------------------------------------  IN: 1230 mL / OUT: 1395 mL / NET: -165 mL    22 Jul 2020 07:01  -  22 Jul 2020 11:55  --------------------------------------------------------  IN: 360 mL / OUT: 495 mL / NET: -135 mL        Gen: Appears well in NAD  HEENT:  (-)icterus (-)pallor  CV: N S1 S2 1/6 JOSE (+)2 Pulses B/l  Resp:  Clear to auscultation B/L, normal effort  GI: (+) BS Soft, NT, ND  Lymph:  (-)Edema, (-)obvious lymphadenopathy  Skin: Warm to touch, Normal turgor  Psych: Appropriate mood and affect    TELEMETRY: NSR 80s	      ECG: Pending	    RADIOLOGY:         CXR: < from: Xray Chest 1 View AP/PA (07.22.20 @ 04:46) >  IMPRESSION:     Clear lungs.    < end of copied text >      ASSESSMENT/PLAN: Patient is a 59 y/o old male with PMH of obesity, former smoker who was found to have pulmonary nodules now admitted s/p elective VATS wedge resection and mediastinal LN dissection.    - Tolerated procedure well from CV perspective  - No evidence of clinical HF or anginal symptoms  - Tele stable in NSR  - F/u EKG  - Will follow with you post op  - Supportive care per CTU appreciated    Nic Hamilton PA-C  Pager: [phone]

## 2020-07-22 NOTE — CONSULT NOTE ADULT - ASSESSMENT
60M PMH obesity, 40 year pack year smoking (quit 2/2020), h/o raising pigeons (stopped 1990's) found to have ILD on CTchest with 1-1.5cm nodular densities in the RUL and RLL respectively that turned out to be PET-avid on PET/CT currently POD 1 s/p elective VATS wedge resection and mediastinal LN dissection.    # Post-operative management  - IS 10x/hour and OOB2C as tolerated  - ambulate as tolerated  - Pain management to prevent splinting  - duonebs q6h PRN SOB/wheezing
60 yr old  male with finding of pulmonary nodule on imaging done in 2/2020 presents for Flexible Bronchoscopy, Robotic Assisted Wedge Resection. GI consulted for post-op constipation.

## 2020-07-22 NOTE — PHYSICAL THERAPY INITIAL EVALUATION ADULT - PERTINENT HX OF CURRENT PROBLEM, REHAB EVAL
Pt is a 60 year old male presenting with a pre-operative diagnosis of lung nodule. Pt s/p right VATS, robotic-assisted right upper lobe wedge resection, pleural biopsy, and mediastinal lymph node dissection on 7/21/2020. PMH: obesity, pneumonia

## 2020-07-22 NOTE — PHYSICAL THERAPY INITIAL EVALUATION ADULT - GROSSLY INTACT, SENSORY
Grossly Intact Patient 8 weeks pregnant LMP 19  vaginal bleeding and lower pelvic x 1 day. Denies dizziness, weakness, dysuria. hx  section Patient 8 weeks pregnant LMP 19  vaginal bleeding and lower pelvic x 1 day. Denies dizziness, weakness, dysuria. hx  section     id: [id number]

## 2020-07-22 NOTE — CONSULT NOTE ADULT - ATTENDING COMMENTS
Agree with above  Tolerated procedure well in terms of cv perspective  Monitor tele  Will follow with you  Continue with supportive care per LOUISA Becker MD
61 y/o M with PMH of significant tobacco use for which he quit in 02/2020, had CT-chest which shoiwed a CT-chest pattern of IPF/UIP with e/o honeycombing as well as irregular nodular densities in the RUL and RLL. He underwent a PET which showed uptake in the RUL/RLL.  Today, he presents for elective VATS.    Patient went for right sided VATS with RUL wedge resection, pleural biopsy and lymph node dissection.  Post-operatively he is doing well.  He was extubated in the OR and is on 3L NC saturating 97%.  He has wheezes present bilaterally with a right sided chest tube in place.    Plan:  -Cont incentive spirometry as well as pain control to prevent atelectasis  -Wean oxygen as tolerated and encourage OOB to chair  -Would start Duonebs q6 as needed for reactive airway disease  -Follow up with results of the biopsy    Codey Morfin DO

## 2020-07-22 NOTE — PHYSICAL THERAPY INITIAL EVALUATION ADULT - GENERAL OBSERVATIONS, REHAB EVAL
Pt received ambulating in hallway with nursing staff, +chest tube, +cardiac monitor, +oxygen via nasal cannula, in no apparent distress. Pt agreeable to participate in physical therapy evaluation.

## 2020-07-22 NOTE — CONSULT NOTE ADULT - SUBJECTIVE AND OBJECTIVE BOX
Chief Complaint:  Patient is a 60y old  Male who presents with a chief complaint of " I am having lung surgery" (13 Jul 2020 09:51)    Pneumonia  Morbid obesity  No significant past surgical history     HPI:  60 yr old  male with finding of pulmonary nodule on imaging done in 2/2020 presents for Flexible Bronchoscopy, Robotic Assisted Wedge Resection. GI consulted for post-op constipation. The patient is now s/p procedure and recovering well in the ICU. The patient endorses overall feeling well. His last bm was prior to the procedure yesterday morning. He is tolerating PO intake well, however, is not passing any flatus or having bowel movements with a mildly distended abdomen. He has no associated nausea or vomiting and endorses feeling some gas bubbles. He plans to get up and walk around the unit as discussed.     Pt was scheduled for procedure prior but case was postponed by pt. (13 Jul 2020 09:51)      No Known Allergies      acetaminophen  IVPB .. 1000 milliGRAM(s) IV Intermittent once  ALBUTerol    0.083% 2.5 milliGRAM(s) Nebulizer every 8 hours  diphenhydrAMINE   Injectable 50 milliGRAM(s) IV Push every 4 hours PRN  heparin   Injectable 7500 Unit(s) SubCutaneous every 8 hours  HYDROmorphone PCA (1 mG/mL) 30 milliLiter(s) PCA Continuous PCA Continuous  HYDROmorphone PCA (1 mG/mL) Rescue Clinician Bolus 0.5 milliGRAM(s) IV Push every 15 minutes PRN  lactated ringers. 1000 milliLiter(s) IV Continuous <Continuous>  naloxone Injectable 0.1 milliGRAM(s) IV Push every 3 minutes PRN  ondansetron Injectable 4 milliGRAM(s) IV Push every 6 hours PRN        FAMILY HISTORY:  FH: HTN (hypertension) (Father)        Review of Systems:    General:  No wt loss, fevers, chills, night sweats, fatigue  Eyes:  Good vision, no reported pain  ENT:  No sore throat, pain, runny nose, dysphagia  CV:  No pain, palpitations, no lightheadedness  Resp:  No dyspnea, cough, tachypnea, wheezing  GI: denies n/v/d, abdominal pain, melena or brbpr   :  No pain, bleeding, incontinence, nocturia  Muscle:  No pain, weakness  Neuro:  No weakness, tingling, memory problems  Psych:  No fatigue, insomnia, mood problems, depression  Endocrine:  No polyuria, polydypsia, cold/heat intolerance  Heme:  No petechiae, ecchymosis, easy bruisability  Skin:  No rash, tattoos, scars, edema    Relevant Family History:   n/c    Relevant Social History: n/c      Physical Exam:    Vital Signs:  Vital Signs Last 24 Hrs  T(C): 36.2 (22 Jul 2020 08:00), Max: 36.8 (22 Jul 2020 00:00)  T(F): 97.2 (22 Jul 2020 08:00), Max: 98.2 (22 Jul 2020 00:00)  HR: 83 (22 Jul 2020 11:00) (73 - 88)  BP: 125/71 (22 Jul 2020 11:00) (107/58 - 136/91)  BP(mean): 84 (22 Jul 2020 11:00) (70 - 99)  RR: 16 (22 Jul 2020 11:00) (13 - 24)  SpO2: 92% (22 Jul 2020 11:00) (88% - 100%)  Daily     Daily     General:  Appears stated age, well-groomed, nad  HEENT:  NC/AT,  conjunctivae clear and pink, no thyromegaly, nodules, adenopathy, no JVD  Chest:  Full & symmetric excursion, no increased effort, breath sounds clear  Cardiovascular:  Regular rhythm, S1, S2, no murmur/rub/S3/S4, no abdominal bruit, no edema  Abdomen:  Soft, non-tender, non-distended, normoactive bowel sounds,  no masses ,no hepatosplenomeagaly, no signs of chronic liver disease  Extremities:  no cyanosis,clubbing or edema  Skin:  No rash/erythema/ecchymoses/petechiae/wounds/abscess/warm/dry  Neuro/Psych:  A&Ox3  , no asterixis, no tremor, no encephalopathy    Laboratory:                            14.5   12.66 )-----------( 271      ( 22 Jul 2020 03:20 )             45.6     07-22    134<L>  |  99  |  15  ----------------------------<  105<H>  4.3   |  25  |  0.86    Ca    8.8      22 Jul 2020 03:20  Mg     2.1     07-22              Imaging:

## 2020-07-22 NOTE — PROGRESS NOTE ADULT - SUBJECTIVE AND OBJECTIVE BOX
CHIEF COMPLAINT:    Interval Events: Afebrile. Saturatin 97% on 2L NC. Currently OOB2C, using IS. Patient feeling well. No SOB. c/o minor hemoptysis overnight. Pain well controlled.     REVIEW OF SYSTEMS:  CONSTITUTIONAL: No weakness, fevers or chills  EYES/ENT: No visual changes;  No vertigo or throat pain   NECK: No pain or stiffness  RESPIRATORY: No cough, wheezing. +hemoptysis; No shortness of breath  CARDIOVASCULAR: No chest pain or palpitations  GASTROINTESTINAL: No abdominal or epigastric pain. No nausea, vomiting, or hematemesis; No diarrhea or constipation. No melena or hematochezia.  GENITOURINARY: No dysuria, frequency or hematuria  NEUROLOGICAL: No numbness or weakness  SKIN: No itching, burning, rashes, or lesions   All other review of systems is negative unless indicated above.    OBJECTIVE:  ICU Vital Signs Last 24 Hrs  T(C): 36.2 (22 Jul 2020 08:00), Max: 36.8 (22 Jul 2020 00:00)  T(F): 97.2 (22 Jul 2020 08:00), Max: 98.2 (22 Jul 2020 00:00)  HR: 79 (22 Jul 2020 08:00) (73 - 88)  BP: 134/86 (22 Jul 2020 08:00) (107/58 - 136/81)  BP(mean): 99 (22 Jul 2020 08:00) (70 - 99)  ABP: --  ABP(mean): --  RR: 13 (22 Jul 2020 08:00) (13 - 24)  SpO2: 100% (22 Jul 2020 08:00) (88% - 100%)        07-21 @ 07:01  -  07-22 @ 07:00  --------------------------------------------------------  IN: 1230 mL / OUT: 1395 mL / NET: -165 mL    07-22 @ 07:01  -  07-22 @ 08:46  --------------------------------------------------------  IN: 0 mL / OUT: 20 mL / NET: -20 mL      CAPILLARY BLOOD GLUCOSE          PHYSICAL EXAM:  General: WN/WD NAD  Neurology: A&Ox3, nonfocal, BRYANT x 4  Eyes: PERRLA/ EOMI, Gross vision intact  ENT/Neck: Neck supple, trachea midline, No JVD, Gross hearing intact  Respiratory: Mild right sided rales-->otherwise CTA. Minor wheezing posterior lower lobs bilaterally.   CV: RRR, +S1/S2, -S3/S4, no murmurs, rubs or gallops  Abdominal: Soft, NT, ND +BS, No HSM  MSK: 5/5 strength UE/LE bilaterally  Extremities: No edema, 2+ peripheral pulses  Skin: No Rashes, Hematoma, Ecchymosis      HOSPITAL MEDICATIONS:  MEDICATIONS  (STANDING):  acetaminophen  IVPB .. 1000 milliGRAM(s) IV Intermittent once  ALBUTerol    0.083% 2.5 milliGRAM(s) Nebulizer every 8 hours  heparin   Injectable 7500 Unit(s) SubCutaneous every 8 hours  HYDROmorphone PCA (1 mG/mL) 30 milliLiter(s) PCA Continuous PCA Continuous  lactated ringers. 1000 milliLiter(s) (30 mL/Hr) IV Continuous <Continuous>    MEDICATIONS  (PRN):  diphenhydrAMINE   Injectable 50 milliGRAM(s) IV Push every 4 hours PRN Pruritus  HYDROmorphone PCA (1 mG/mL) Rescue Clinician Bolus 0.5 milliGRAM(s) IV Push every 15 minutes PRN for Pain Scale GREATER THAN 6  naloxone Injectable 0.1 milliGRAM(s) IV Push every 3 minutes PRN For ANY of the following changes in patient status:  A. RR LESS THAN 10 breaths per minute, B. Oxygen saturation LESS THAN 90%, C. Sedation score of 6  ondansetron Injectable 4 milliGRAM(s) IV Push every 6 hours PRN Nausea      LABS:                        14.5   12.66 )-----------( 271      ( 22 Jul 2020 03:20 )             45.6     Hgb Trend: 14.5<--  07-22    134<L>  |  99  |  15  ----------------------------<  105<H>  4.3   |  25  |  0.86    Ca    8.8      22 Jul 2020 03:20  Mg     2.1     07-22      Creatinine Trend: 0.86<--

## 2020-07-22 NOTE — PROGRESS NOTE ADULT - SUBJECTIVE AND OBJECTIVE BOX
MAKENZIE NANCE      60y   Male   [id number]         No Known Allergies             Daily     Daily Drug Dosing Weight  Height (cm): 177.8 (21 Jul 2020 06:45)  Weight (kg): 136.1 (21 Jul 2020 06:45)  BMI (kg/m2): 43.1 (21 Jul 2020 06:45)  BSA (m2): 2.48 (21 Jul 2020 06:45)    HPI:  60 yr old  male with finding of pulmonary nodule on imaging done in 2/2020 presents today for presurgical evaluation for Flexible Bronchoscopy, Robotic Assisted Wedge Resection.     Pt was scheduled for procedure prior but case was postponed by pt. (13 Jul 2020 09:51)      Procedure: Right VATS, Robotic-Assisted Right Upper Lobe Wedge Resection, Pleural Biopsy, and Mediastinal Lymph Node Dissection  7/21/20    Issues:  Lung nodule  Postop pain  Morbid obesity  Insterstitial lung disease                 Home Medications:  Centrum Silver oral tablet: 1 tab(s) orally once a day, stop day 7/13/20 (21 Jul 2020 07:12)      PAST MEDICAL & SURGICAL HISTORY:  Pneumonia: Feb 2020  Morbid obesity  No significant past surgical history        Vital Signs Last 24 Hrs  T(C): 36.2 (22 Jul 2020 08:00), Max: 36.8 (22 Jul 2020 00:00)  T(F): 97.2 (22 Jul 2020 08:00), Max: 98.2 (22 Jul 2020 00:00)  HR: 79 (22 Jul 2020 09:00) (73 - 88)  BP: 127/57 (22 Jul 2020 09:00) (107/58 - 136/91)  BP(mean): 74 (22 Jul 2020 09:00) (70 - 99)  RR: 18 (22 Jul 2020 09:00) (13 - 24)  SpO2: 94% (22 Jul 2020 09:00) (88% - 100%)  I&O's Detail    21 Jul 2020 07:01  -  22 Jul 2020 07:00  --------------------------------------------------------  IN:    IV PiggyBack: 200 mL    lactated ringers.: 630 mL    Oral Fluid: 400 mL  Total IN: 1230 mL    OUT:    Chest Tube: 320 mL    Voided: 1075 mL  Total OUT: 1395 mL    Total NET: -165 mL      22 Jul 2020 07:01  -  22 Jul 2020 09:20  --------------------------------------------------------  IN:    lactated ringers.: 90 mL  Total IN: 90 mL    OUT:    Chest Tube: 20 mL  Total OUT: 20 mL    Total NET: 70 mL        CAPILLARY BLOOD GLUCOSE          Home Medications:  Centrum Silver oral tablet: 1 tab(s) orally once a day, stop day 7/13/20 (21 Jul 2020 07:12)      MEDICATIONS  (STANDING):  acetaminophen  IVPB .. 1000 milliGRAM(s) IV Intermittent once  ALBUTerol    0.083% 2.5 milliGRAM(s) Nebulizer every 8 hours  heparin   Injectable 7500 Unit(s) SubCutaneous every 8 hours  HYDROmorphone PCA (1 mG/mL) 30 milliLiter(s) PCA Continuous PCA Continuous  lactated ringers. 1000 milliLiter(s) (30 mL/Hr) IV Continuous <Continuous>    MEDICATIONS  (PRN):  diphenhydrAMINE   Injectable 50 milliGRAM(s) IV Push every 4 hours PRN Pruritus  HYDROmorphone PCA (1 mG/mL) Rescue Clinician Bolus 0.5 milliGRAM(s) IV Push every 15 minutes PRN for Pain Scale GREATER THAN 6  naloxone Injectable 0.1 milliGRAM(s) IV Push every 3 minutes PRN For ANY of the following changes in patient status:  A. RR LESS THAN 10 breaths per minute, B. Oxygen saturation LESS THAN 90%, C. Sedation score of 6  ondansetron Injectable 4 milliGRAM(s) IV Push every 6 hours PRN Nausea          Physical exam:                             General:               Pt is awake, alert,  appears to be in pain but not in  distress                                                  Neuro:                  Nonfocal                             Cardiovascular:   S1 & S2, regular                          Respiratory:         Air entry is fair and equal on both sides, has bilateral conducted sounds                           GI:                          Soft, nondistended and nontender, Bowel sounds active                            Ext:                        No cyanosis or edema     Labs:                                                                           14.5   12.66 )-----------( 271      ( 22 Jul 2020 03:20 )             45.6             07-22    134<L>  |  99  |  15  ----------------------------<  105<H>  4.3   |  25  |  0.86    Ca    8.8      22 Jul 2020 03:20  Mg     2.1     07-22                      CXR:  < from: Xray Chest 1 View- PORTABLE-Urgent (07.21.20 @ 11:00) >  Right chest tube tip projects over the right upper chest. No gross pneumothorax. Right subcutaneous emphysema.    Diffuse patchy right lung opacity.    Possible small right pleural effusion.        Plan:    General: 60yMale s/p Right VATS, Robotic-Assisted Right Upper Lobe Wedge Resection, Pleural Biopsy, and Mediastinal Lymph Node Dissection  7/21/20   , experiencing  pain with deep breathing.                             Neuro:                                         Pain control with PCA  /  Tylenol IV                            Cardiovascular:                                          Continue hemodynamic monitoring.                            Respiratory:                                         Pt is on 2L  nasal canula, wean off as tolerated                                          Comfortable, not in any distress.                                         Encourage incentive spirometry                                          Monitor chest tube output                                         Chest tube to  water seal                                                                  ILD: Continue bronchodilators, pulmonary toilet                            GI                                         On clear liquids, advance to regular diet as tolerated                                         Continue Zofran / Reglan for nausea - PRN	                                                                 Renal:                                         Continue LR   30cc/hr                                         Monitor I/Os and electrolytes                                                                                       Hem/ Onc:                                                                                  Monitor chest tube output &  signs of bleeding.                                          Follow CBC in AM                           Infectious disease:                                            Monitor for fever / leukocytosis.                                          All surgical incision / chest tube  sites look clean                            Endocrine                                             Continue Accu-Checks with coverage    Pt is on SQ Heparin and Venodyne boots for DVT prophylaxis.     Pertinent clinical, laboratory, radiographic, hemodynamic, echocardiographic, respiratory data, microbiologic data and chart were reviewed and analyzed frequently throughout the course of the day and night  Patient seen, examined and plan discussed with CT Surgeon  / CTICU team during rounds.    Status discussed with patient / updated plan of care          Robbie Patel MD

## 2020-07-23 ENCOUNTER — TRANSCRIPTION ENCOUNTER (OUTPATIENT)
Age: 60
End: 2020-07-23

## 2020-07-23 VITALS — RESPIRATION RATE: 24 BRPM | OXYGEN SATURATION: 78 %

## 2020-07-23 LAB
ALBUMIN SERPL ELPH-MCNC: 3.4 G/DL — SIGNIFICANT CHANGE UP (ref 3.3–5)
ALP SERPL-CCNC: 56 U/L — SIGNIFICANT CHANGE UP (ref 40–120)
ALT FLD-CCNC: 12 U/L — SIGNIFICANT CHANGE UP (ref 4–41)
ANION GAP SERPL CALC-SCNC: 7 MMO/L — SIGNIFICANT CHANGE UP (ref 7–14)
AST SERPL-CCNC: 17 U/L — SIGNIFICANT CHANGE UP (ref 4–40)
BILIRUB SERPL-MCNC: 0.3 MG/DL — SIGNIFICANT CHANGE UP (ref 0.2–1.2)
BUN SERPL-MCNC: 15 MG/DL — SIGNIFICANT CHANGE UP (ref 7–23)
CALCIUM SERPL-MCNC: 8.7 MG/DL — SIGNIFICANT CHANGE UP (ref 8.4–10.5)
CHLORIDE SERPL-SCNC: 101 MMOL/L — SIGNIFICANT CHANGE UP (ref 98–107)
CO2 SERPL-SCNC: 27 MMOL/L — SIGNIFICANT CHANGE UP (ref 22–31)
CREAT SERPL-MCNC: 0.86 MG/DL — SIGNIFICANT CHANGE UP (ref 0.5–1.3)
GLUCOSE SERPL-MCNC: 123 MG/DL — HIGH (ref 70–99)
HCT VFR BLD CALC: 39.8 % — SIGNIFICANT CHANGE UP (ref 39–50)
HGB BLD-MCNC: 13.3 G/DL — SIGNIFICANT CHANGE UP (ref 13–17)
MAGNESIUM SERPL-MCNC: 2 MG/DL — SIGNIFICANT CHANGE UP (ref 1.6–2.6)
MCHC RBC-ENTMCNC: 31.3 PG — SIGNIFICANT CHANGE UP (ref 27–34)
MCHC RBC-ENTMCNC: 33.4 % — SIGNIFICANT CHANGE UP (ref 32–36)
MCV RBC AUTO: 93.6 FL — SIGNIFICANT CHANGE UP (ref 80–100)
NRBC # FLD: 0 K/UL — SIGNIFICANT CHANGE UP (ref 0–0)
PLATELET # BLD AUTO: 255 K/UL — SIGNIFICANT CHANGE UP (ref 150–400)
PMV BLD: 10 FL — SIGNIFICANT CHANGE UP (ref 7–13)
POTASSIUM SERPL-MCNC: 4.2 MMOL/L — SIGNIFICANT CHANGE UP (ref 3.5–5.3)
POTASSIUM SERPL-SCNC: 4.2 MMOL/L — SIGNIFICANT CHANGE UP (ref 3.5–5.3)
PROT SERPL-MCNC: 6 G/DL — SIGNIFICANT CHANGE UP (ref 6–8.3)
RBC # BLD: 4.25 M/UL — SIGNIFICANT CHANGE UP (ref 4.2–5.8)
RBC # FLD: 12.5 % — SIGNIFICANT CHANGE UP (ref 10.3–14.5)
SODIUM SERPL-SCNC: 135 MMOL/L — SIGNIFICANT CHANGE UP (ref 135–145)
WBC # BLD: 12.89 K/UL — HIGH (ref 3.8–10.5)
WBC # FLD AUTO: 12.89 K/UL — HIGH (ref 3.8–10.5)

## 2020-07-23 PROCEDURE — 71045 X-RAY EXAM CHEST 1 VIEW: CPT | Mod: 26

## 2020-07-23 PROCEDURE — 71045 X-RAY EXAM CHEST 1 VIEW: CPT | Mod: 26,77

## 2020-07-23 RX ORDER — ACETAMINOPHEN 500 MG
650 TABLET ORAL EVERY 6 HOURS
Refills: 0 | Status: DISCONTINUED | OUTPATIENT
Start: 2020-07-23 | End: 2020-07-23

## 2020-07-23 RX ORDER — HYDROMORPHONE HYDROCHLORIDE 2 MG/ML
0.5 INJECTION INTRAMUSCULAR; INTRAVENOUS; SUBCUTANEOUS
Refills: 0 | Status: DISCONTINUED | OUTPATIENT
Start: 2020-07-23 | End: 2020-07-23

## 2020-07-23 RX ORDER — OXYCODONE HYDROCHLORIDE 5 MG/1
1 TABLET ORAL
Qty: 30 | Refills: 0
Start: 2020-07-23 | End: 2020-07-27

## 2020-07-23 RX ORDER — ACETAMINOPHEN 500 MG
2 TABLET ORAL
Qty: 24 | Refills: 0
Start: 2020-07-23 | End: 2020-07-25

## 2020-07-23 RX ORDER — OXYCODONE HYDROCHLORIDE 5 MG/1
10 TABLET ORAL
Refills: 0 | Status: DISCONTINUED | OUTPATIENT
Start: 2020-07-23 | End: 2020-07-23

## 2020-07-23 RX ORDER — OXYCODONE HYDROCHLORIDE 5 MG/1
5 TABLET ORAL
Refills: 0 | Status: DISCONTINUED | OUTPATIENT
Start: 2020-07-23 | End: 2020-07-23

## 2020-07-23 RX ORDER — DOCUSATE SODIUM 100 MG
1 CAPSULE ORAL
Qty: 21 | Refills: 0
Start: 2020-07-23 | End: 2020-07-29

## 2020-07-23 RX ORDER — POLYETHYLENE GLYCOL 3350 17 G/17G
17 POWDER, FOR SOLUTION ORAL ONCE
Refills: 0 | Status: COMPLETED | OUTPATIENT
Start: 2020-07-23 | End: 2020-07-23

## 2020-07-23 RX ADMIN — SODIUM CHLORIDE 4 MILLILITER(S): 9 INJECTION INTRAMUSCULAR; INTRAVENOUS; SUBCUTANEOUS at 10:09

## 2020-07-23 RX ADMIN — Medication 3 MILLILITER(S): at 10:07

## 2020-07-23 RX ADMIN — POLYETHYLENE GLYCOL 3350 17 GRAM(S): 17 POWDER, FOR SOLUTION ORAL at 12:14

## 2020-07-23 RX ADMIN — HEPARIN SODIUM 7500 UNIT(S): 5000 INJECTION INTRAVENOUS; SUBCUTANEOUS at 11:58

## 2020-07-23 RX ADMIN — Medication 650 MILLIGRAM(S): at 11:58

## 2020-07-23 RX ADMIN — HYDROMORPHONE HYDROCHLORIDE 30 MILLILITER(S): 2 INJECTION INTRAMUSCULAR; INTRAVENOUS; SUBCUTANEOUS at 07:11

## 2020-07-23 RX ADMIN — Medication 3 MILLILITER(S): at 16:24

## 2020-07-23 RX ADMIN — DORNASE ALFA 2.5 MILLIGRAM(S): 1 SOLUTION RESPIRATORY (INHALATION) at 10:09

## 2020-07-23 RX ADMIN — SODIUM CHLORIDE 4 MILLILITER(S): 9 INJECTION INTRAMUSCULAR; INTRAVENOUS; SUBCUTANEOUS at 16:29

## 2020-07-23 RX ADMIN — Medication 3 MILLILITER(S): at 05:15

## 2020-07-23 RX ADMIN — Medication 650 MILLIGRAM(S): at 17:50

## 2020-07-23 RX ADMIN — SODIUM CHLORIDE 4 MILLILITER(S): 9 INJECTION INTRAMUSCULAR; INTRAVENOUS; SUBCUTANEOUS at 05:19

## 2020-07-23 RX ADMIN — HEPARIN SODIUM 7500 UNIT(S): 5000 INJECTION INTRAVENOUS; SUBCUTANEOUS at 01:39

## 2020-07-23 NOTE — DISCHARGE NOTE PROVIDER - CARE PROVIDER_API CALL
Ramon Ann  [id number] [address] [place], [place] [address]  Phone: [phone]  Fax: [phone]  Follow Up Time:

## 2020-07-23 NOTE — DISCHARGE NOTE PROVIDER - NSDCMRMEDTOKEN_GEN_ALL_CORE_FT
acetaminophen 325 mg oral tablet: 2 tab(s) orally every 6 hours, As Needed -for moderate pain   Centrum Silver oral tablet: 1 tab(s) orally once a day, stop day 7/13/20  Colace 100 mg oral capsule: 1 cap(s) orally 3 times a day, As Needed for constipation   CXR: CXR PA/Lateral  Dx: S/p Right Upper lobe wedge rxn  Please give copy of imaging to patient and fax results to Dr. Ann&#x27;s office [phone]  Home O2: Home O2  Portable and concentrator  4L/min via Nasal Cannula continuous  Dx: Interstitial Lung Disease J84.9  oxyCODONE 5 mg oral tablet: 1 tab(s) orally every 4 to 6 hours, As Needed -Moderate Pain (4 - 6) - for severe pain MDD:4

## 2020-07-23 NOTE — DISCHARGE NOTE NURSING/CASE MANAGEMENT/SOCIAL WORK - NSSCNAMETXT_GEN_ALL_CORE
[facility]  initial visit will be day after discharge home. A nurse will call prior to the home visit.

## 2020-07-23 NOTE — PROGRESS NOTE ADULT - ASSESSMENT
60 yr old  male with finding of pulmonary nodule on imaging done in 2/2020 presents for Flexible Bronchoscopy, Robotic Assisted Wedge Resection. GI consulted for post-op constipation.

## 2020-07-23 NOTE — PROGRESS NOTE ADULT - PROBLEM SELECTOR PLAN 1
-post op 2/2 anesthesia; non-obstructed and improved  -avoid straining post op  -miralax qd  -dulcolax supp prn   -oob/ambulate as tolerated if okay with thoracic team  -high fiber diet -post op 2/2 anesthesia; non-obstructed   -avoid straining post op  -miralax qd  -dulcolax supp prn   -oob/ambulate as tolerated if okay with thoracic team  -high fiber diet

## 2020-07-23 NOTE — DISCHARGE NOTE PROVIDER - PROVIDER TOKENS
FREE:[LAST:[Seth],FIRST:[Ramon],PHONE:[phone]],FAX:[phone]],ADDRESS:[[address] [place] [address]]]

## 2020-07-23 NOTE — DISCHARGE NOTE PROVIDER - NSDCACTIVITY_GEN_ALL_CORE
Do not drive or operate machinery/No heavy lifting/straining/Walking - Outdoors allowed/Showering allowed/Walking - Indoors allowed/Do not make important decisions/Stairs allowed

## 2020-07-23 NOTE — DISCHARGE NOTE PROVIDER - NSDCFUADDAPPT_GEN_ALL_CORE_FT
See Dr. Ann's office in 7-10 days. Please call [phone] for an apt. Please get an CXR the day before your appointment and bring it with you to your follow up appointment.  Please call the office at [phone] if you have fever's chills, worsening shortness of breath, chest pain, warmth, redness or purulent discharge from the insertion site.

## 2020-07-23 NOTE — DISCHARGE NOTE PROVIDER - HOSPITAL COURSE
60 year old male, obese, former smoker, interstitial lung diease, s/p Robotic RUL wedge resection with pleural biopsy on 7/21/20. Chest tube removed on 7/23/20, with post-pull CXR showing no obvious pneumothorax. Patient baseline O2 sat 86-89. On 4L NC maintaining saturation >88%. Patient stable for discharge home.

## 2020-07-23 NOTE — PROGRESS NOTE ADULT - PROVIDER SPECIALTY LIST ADULT
Cardiology
Critical Care
Critical Care
Gastroenterology
Pain Medicine
Pulmonology
Pain Medicine

## 2020-07-23 NOTE — PROGRESS NOTE ADULT - ATTENDING COMMENTS
Seen and agree w/ PA.  Stable postop from CV perspective- no post op AFib, no heart failure exacerbation.  Leg edema is chronic, combination of venous stasis and lymphedema.  Followup in office.

## 2020-07-23 NOTE — PROGRESS NOTE ADULT - SUBJECTIVE AND OBJECTIVE BOX
Anesthesia Pain Management Service    SUBJECTIVE: Patient is doing well with IV PCA and no significant problems reported.  Patient denies taking any pain medications at home, or any illicit drug use.  Patient does admit to drinking on occasion, twice a month, 1 wine glass each time.    Pain Scale Score	At rest: _1/10__ 	With Activity: ___ 	[X ] Refer to charted pain scores    THERAPY:    [ ] IV PCA Morphine		[ ] 5 mg/mL	[ ] 1 mg/mL  [X ] IV PCA Hydromorphone	[ ] 5 mg/mL	[X ] 1 mg/mL  [ ] IV PCA Fentanyl		[ ] 50 micrograms/mL    Demand dose __0.2_ lockout __6_ (minutes) Continuous Rate _0__ Total: __7_   mg used (in past 24 hrs)      MEDICATIONS  (STANDING):  acetaminophen   Tablet .. 650 milliGRAM(s) Oral every 6 hours  albuterol/ipratropium for Nebulization 3 milliLiter(s) Nebulizer every 6 hours  dornase monica Solution 2.5 milliGRAM(s) Inhalation daily  heparin   Injectable 7500 Unit(s) SubCutaneous every 8 hours  lactated ringers. 1000 milliLiter(s) (30 mL/Hr) IV Continuous <Continuous>  sodium chloride 3%  Inhalation 4 milliLiter(s) Inhalation every 6 hours    MEDICATIONS  (PRN):  HYDROmorphone  Injectable 0.5 milliGRAM(s) IV Push every 3 hours PRN Severe breakthrough Pain (7 - 10)  naloxone Injectable 0.1 milliGRAM(s) IV Push every 3 minutes PRN For ANY of the following changes in patient status:  A. RR LESS THAN 10 breaths per minute, B. Oxygen saturation LESS THAN 90%, C. Sedation score of 6  ondansetron Injectable 4 milliGRAM(s) IV Push every 6 hours PRN Nausea  oxyCODONE    IR 5 milliGRAM(s) Oral every 3 hours PRN Moderate Pain (4 - 6)  oxyCODONE    IR 10 milliGRAM(s) Oral every 3 hours PRN Severe Pain (7 - 10)      OBJECTIVE:  Patient is lying in bed with supplemental O2.    Sedation Score:	[ X] Alert	[ ] Drowsy 	[ ] Arousable	[ ] Asleep	[ ] Unresponsive    Side Effects:	[X ] None	[ ] Nausea	[ ] Vomiting	[ ] Pruritus  		[ ] Other:    Vital Signs Last 24 Hrs  T(C): 37.2 (23 Jul 2020 08:24), Max: 37.2 (23 Jul 2020 08:24)  T(F): 99 (23 Jul 2020 08:24), Max: 99 (23 Jul 2020 08:24)  HR: 96 (23 Jul 2020 10:07) (83 - 97)  BP: 123/61 (23 Jul 2020 08:24) (115/60 - 152/69)  BP(mean): 84 (22 Jul 2020 13:00) (84 - 87)  RR: 20 (23 Jul 2020 08:24) (17 - 24)  SpO2: 91% (23 Jul 2020 08:24) (70% - 98%)    ASSESSMENT/ PLAN    Therapy to  be:	[ ] Continue   [ X] Discontinued   [X ] Change to prn Analgesics    Documentation and Verification of current medications:   [X] Done	[ ] Not done, not elligible    Comments: Discussed patient with team and will discontinue IV PCA. PRN Oral/IV opioids and/or Adjuvant non-opioid medication to be ordered at this point.

## 2020-07-23 NOTE — DISCHARGE NOTE PROVIDER - NSDCCPTREATMENT_GEN_ALL_CORE_FT
PRINCIPAL PROCEDURE  Procedure: Robotic assistance in thoracoscopic procedure  Findings and Treatment:

## 2020-07-23 NOTE — PROGRESS NOTE ADULT - SUBJECTIVE AND OBJECTIVE BOX
Anesthesia Pain Management Service- Attending Addendum    SUBJECTIVE: Patient's pain control adequate    Therapy:	  [ X] IV PCA	   [ ] Epidural           [ ] s/p Spinal Opoid              [ ] Postpartum infusion	  [ ] Patient controlled regional anesthesia (PCRA)    [ ] prn Analgesics    Allergies    No Known Allergies    Intolerances      MEDICATIONS  (STANDING):  acetaminophen   Tablet .. 650 milliGRAM(s) Oral every 6 hours  albuterol/ipratropium for Nebulization 3 milliLiter(s) Nebulizer every 6 hours  dornase monica Solution 2.5 milliGRAM(s) Inhalation daily  heparin   Injectable 7500 Unit(s) SubCutaneous every 8 hours  lactated ringers. 1000 milliLiter(s) (30 mL/Hr) IV Continuous <Continuous>  sodium chloride 3%  Inhalation 4 milliLiter(s) Inhalation every 6 hours    MEDICATIONS  (PRN):  HYDROmorphone  Injectable 0.5 milliGRAM(s) IV Push every 3 hours PRN Severe breakthrough Pain (7 - 10)  naloxone Injectable 0.1 milliGRAM(s) IV Push every 3 minutes PRN For ANY of the following changes in patient status:  A. RR LESS THAN 10 breaths per minute, B. Oxygen saturation LESS THAN 90%, C. Sedation score of 6  ondansetron Injectable 4 milliGRAM(s) IV Push every 6 hours PRN Nausea  oxyCODONE    IR 5 milliGRAM(s) Oral every 3 hours PRN Moderate Pain (4 - 6)  oxyCODONE    IR 10 milliGRAM(s) Oral every 3 hours PRN Severe Pain (7 - 10)      OBJECTIVE:   [X] No new signs     [ ] Other:    Side Effects:  [X ] None			[ ] Other:      ASSESSMENT/PLAN  -Discontinue current therapy    [ ] Therapy changed to:    [ ] IV PCA       [ ] Epidural     [ X] prn Analgesics     Comments: Pain management per primary team, APS to sign off

## 2020-07-23 NOTE — DISCHARGE NOTE NURSING/CASE MANAGEMENT/SOCIAL WORK - NSDCPEWEB_GEN_ALL_CORE
[place] website --- [email or web address] website --- [email or web address]

## 2020-07-23 NOTE — DISCHARGE NOTE NURSING/CASE MANAGEMENT/SOCIAL WORK - PATIENT PORTAL LINK FT
You can access the FollowMyHealth Patient Portal offered by [facility] by registering at the following website: [email or web address]. By joining [employer]’s FollowMyHealth portal, you will also be able to view your health information using other applications (apps) compatible with our system.

## 2020-07-23 NOTE — PROGRESS NOTE ADULT - SUBJECTIVE AND OBJECTIVE BOX
Patient denies chest pain or shortness of breath.   Review of systems otherwise (-)  	    MEDICATIONS  (STANDING):  acetaminophen   Tablet .. 650 milliGRAM(s) Oral every 6 hours  albuterol/ipratropium for Nebulization 3 milliLiter(s) Nebulizer every 6 hours  dornase monica Solution 2.5 milliGRAM(s) Inhalation daily  heparin   Injectable 7500 Unit(s) SubCutaneous every 8 hours  lactated ringers. 1000 milliLiter(s) (30 mL/Hr) IV Continuous <Continuous>  sodium chloride 3%  Inhalation 4 milliLiter(s) Inhalation every 6 hours      LABS:	 	                          13.3   12.89 )-----------( 255      ( 23 Jul 2020 05:25 )             39.8     Hemoglobin: 13.3 g/dL (07-23 @ 05:25)  Hemoglobin: 14.5 g/dL (07-22 @ 03:20)    07-23    135  |  101  |  15  ----------------------------<  123<H>  4.2   |  27  |  0.86    Ca    8.7      23 Jul 2020 05:25  Mg     2.0     07-23    TPro  6.0  /  Alb  3.4  /  TBili  0.3  /  DBili  x   /  AST  17  /  ALT  12  /  AlkPhos  56  07-23    Creatinine Trend: 0.86<--, 0.86<--  COAGS:       proBNP:   Lipid Profile:   HgA1c:   TSH:     PHYSICAL EXAM:  T(C): 37.2 (07-23-20 @ 08:24), Max: 37.2 (07-23-20 @ 08:24)  HR: 96 (07-23-20 @ 10:07) (83 - 97)  BP: 123/61 (07-23-20 @ 08:24) (115/60 - 152/69)  RR: 20 (07-23-20 @ 08:24) (17 - 24)  SpO2: 91% (07-23-20 @ 08:24) (70% - 98%)  Wt(kg): --  I&O's Summary    22 Jul 2020 07:01  -  23 Jul 2020 07:00  --------------------------------------------------------  IN: 720 mL / OUT: 1595 mL / NET: -875 mL        Gen: Appears well in NAD  HEENT:  (-)icterus (-)pallor  CV: N S1 S2 1/6 JOSE (+)2 Pulses B/l  Resp:  Clear to ausculatation B/L, normal effort  GI: (+) BS Soft, NT, ND  Lymph:  (-)Edema, (-)obvious lymphadenopathy  Skin: Warm to touch, Normal turgor  Psych: Appropriate mood and affect      TELEMETRY: SR 80-90s	      ASSESSMENT/PLAN: Patient is a 59 y/o old male with PMH of obesity, former smoker who was found to have pulmonary nodules now admitted s/p elective VATS wedge resection and mediastinal LN dissection.    - Tolerated procedure well from CV perspective  - No evidence of clinical HF or anginal symptoms  - Tele stable in NSR  - Will follow with you post op  - Supportive care per CTS appreciated  - Patient may f/u in our office after d/c    Nic Hamilton PA-C  Pager: [phone]

## 2020-07-23 NOTE — DISCHARGE NOTE PROVIDER - NSDCFUADDINST_GEN_ALL_CORE_FT
Please walk 4-5 x per day, Increase as tolerated. You may climb stairs. Continue to use incentive spirometer.   You may keep all wounds uncovered. Please shower daily. The suture will be removed in office at follow up apt.

## 2020-07-23 NOTE — PROGRESS NOTE ADULT - SUBJECTIVE AND OBJECTIVE BOX
INTERVAL HPI/OVERNIGHT EVENTS:    doing well   denies n/v/d/c, abdominal pain, melena or brbpr     MEDICATIONS  (STANDING):  acetaminophen   Tablet .. 650 milliGRAM(s) Oral every 6 hours  albuterol/ipratropium for Nebulization 3 milliLiter(s) Nebulizer every 6 hours  dornase monica Solution 2.5 milliGRAM(s) Inhalation daily  heparin   Injectable 7500 Unit(s) SubCutaneous every 8 hours  lactated ringers. 1000 milliLiter(s) (30 mL/Hr) IV Continuous <Continuous>  sodium chloride 3%  Inhalation 4 milliLiter(s) Inhalation every 6 hours    MEDICATIONS  (PRN):  HYDROmorphone  Injectable 0.5 milliGRAM(s) IV Push every 3 hours PRN Severe breakthrough Pain (7 - 10)  naloxone Injectable 0.1 milliGRAM(s) IV Push every 3 minutes PRN For ANY of the following changes in patient status:  A. RR LESS THAN 10 breaths per minute, B. Oxygen saturation LESS THAN 90%, C. Sedation score of 6  ondansetron Injectable 4 milliGRAM(s) IV Push every 6 hours PRN Nausea  oxyCODONE    IR 5 milliGRAM(s) Oral every 3 hours PRN Moderate Pain (4 - 6)  oxyCODONE    IR 10 milliGRAM(s) Oral every 3 hours PRN Severe Pain (7 - 10)      Allergies    No Known Allergies    Intolerances        Review of Systems:    General:  No wt loss, fevers, chills, night sweats, fatigue   Eyes:  Good vision, no reported pain  ENT:  No sore throat, pain, runny nose, dysphagia  CV:  No pain, palpitations, hypo/hypertension  Resp:  No dyspnea, cough, tachypnea, wheezing  GI:  No pain, No nausea, No vomiting, No diarrhea, No constipation, No weight loss, No fever, No pruritis, No rectal bleeding, No melena, No dysphagia  :  No pain, bleeding, incontinence, nocturia  Muscle:  No pain, weakness  Neuro:  No weakness, tingling, memory problems  Psych:  No fatigue, insomnia, mood problems, depression  Endocrine:  No polyuria, polydypsia, cold/heat intolerance  Heme:  No petechiae, ecchymosis, easy bruisability  Skin:  No rash, tattoos, scars, edema      Vital Signs Last 24 Hrs  T(C): 37 (23 Jul 2020 13:30), Max: 37.2 (23 Jul 2020 08:24)  T(F): 98.6 (23 Jul 2020 13:30), Max: 99 (23 Jul 2020 08:24)  HR: 93 (23 Jul 2020 13:30) (83 - 97)  BP: 139/74 (23 Jul 2020 13:30) (115/60 - 152/69)  BP(mean): 87 (23 Jul 2020 13:30) (87 - 87)  RR: 20 (23 Jul 2020 13:30) (17 - 24)  SpO2: 91% (23 Jul 2020 13:30) (70% - 98%)    PHYSICAL EXAM:    Constitutional: NAD  HEENT: EOMI, throat clear  Neck: No LAD, supple  Respiratory: CTA and P  Cardiovascular: S1 and S2, RRR, no M  Gastrointestinal: BS+, soft, NT/ND, neg HSM,  Extremities: No peripheral edema, neg clubbing, cyanosis  Vascular: 2+ peripheral pulses  Neurological: A/O x 3, no focal deficits  Psychiatric: Normal mood, normal affect  Skin: No rashes      LABS:                        13.3   12.89 )-----------( 255      ( 23 Jul 2020 05:25 )             39.8     07-23    135  |  101  |  15  ----------------------------<  123<H>  4.2   |  27  |  0.86    Ca    8.7      23 Jul 2020 05:25  Mg     2.0     07-23    TPro  6.0  /  Alb  3.4  /  TBili  0.3  /  DBili  x   /  AST  17  /  ALT  12  /  AlkPhos  56  07-23          RADIOLOGY & ADDITIONAL TESTS: INTERVAL HPI/OVERNIGHT EVENTS:    doing well   no bm  denies n/v/d, abdominal pain, melena or brbpr     MEDICATIONS  (STANDING):  acetaminophen   Tablet .. 650 milliGRAM(s) Oral every 6 hours  albuterol/ipratropium for Nebulization 3 milliLiter(s) Nebulizer every 6 hours  dornase monica Solution 2.5 milliGRAM(s) Inhalation daily  heparin   Injectable 7500 Unit(s) SubCutaneous every 8 hours  lactated ringers. 1000 milliLiter(s) (30 mL/Hr) IV Continuous <Continuous>  sodium chloride 3%  Inhalation 4 milliLiter(s) Inhalation every 6 hours    MEDICATIONS  (PRN):  HYDROmorphone  Injectable 0.5 milliGRAM(s) IV Push every 3 hours PRN Severe breakthrough Pain (7 - 10)  naloxone Injectable 0.1 milliGRAM(s) IV Push every 3 minutes PRN For ANY of the following changes in patient status:  A. RR LESS THAN 10 breaths per minute, B. Oxygen saturation LESS THAN 90%, C. Sedation score of 6  ondansetron Injectable 4 milliGRAM(s) IV Push every 6 hours PRN Nausea  oxyCODONE    IR 5 milliGRAM(s) Oral every 3 hours PRN Moderate Pain (4 - 6)  oxyCODONE    IR 10 milliGRAM(s) Oral every 3 hours PRN Severe Pain (7 - 10)      Allergies    No Known Allergies    Intolerances        Review of Systems:    General:  No wt loss, fevers, chills, night sweats, fatigue   Eyes:  Good vision, no reported pain  ENT:  No sore throat, pain, runny nose, dysphagia  CV:  No pain, palpitations, hypo/hypertension  Resp:  No dyspnea, cough, tachypnea, wheezing  GI:  No pain, No nausea, No vomiting, No diarrhea, No constipation, No weight loss, No fever, No pruritis, No rectal bleeding, No melena, No dysphagia  :  No pain, bleeding, incontinence, nocturia  Muscle:  No pain, weakness  Neuro:  No weakness, tingling, memory problems  Psych:  No fatigue, insomnia, mood problems, depression  Endocrine:  No polyuria, polydypsia, cold/heat intolerance  Heme:  No petechiae, ecchymosis, easy bruisability  Skin:  No rash, tattoos, scars, edema      Vital Signs Last 24 Hrs  T(C): 37 (23 Jul 2020 13:30), Max: 37.2 (23 Jul 2020 08:24)  T(F): 98.6 (23 Jul 2020 13:30), Max: 99 (23 Jul 2020 08:24)  HR: 93 (23 Jul 2020 13:30) (83 - 97)  BP: 139/74 (23 Jul 2020 13:30) (115/60 - 152/69)  BP(mean): 87 (23 Jul 2020 13:30) (87 - 87)  RR: 20 (23 Jul 2020 13:30) (17 - 24)  SpO2: 91% (23 Jul 2020 13:30) (70% - 98%)    PHYSICAL EXAM:    Constitutional: NAD  HEENT: EOMI, throat clear  Neck: No LAD, supple  Respiratory: CTA and P  Cardiovascular: S1 and S2, RRR, no M  Gastrointestinal: BS+, soft, NT/ND, neg HSM,  Extremities: No peripheral edema, neg clubbing, cyanosis  Vascular: 2+ peripheral pulses  Neurological: A/O x 3, no focal deficits  Psychiatric: Normal mood, normal affect  Skin: No rashes      LABS:                        13.3   12.89 )-----------( 255      ( 23 Jul 2020 05:25 )             39.8     07-23    135  |  101  |  15  ----------------------------<  123<H>  4.2   |  27  |  0.86    Ca    8.7      23 Jul 2020 05:25  Mg     2.0     07-23    TPro  6.0  /  Alb  3.4  /  TBili  0.3  /  DBili  x   /  AST  17  /  ALT  12  /  AlkPhos  56  07-23          RADIOLOGY & ADDITIONAL TESTS:

## 2020-07-23 NOTE — DISCHARGE NOTE NURSING/CASE MANAGEMENT/SOCIAL WORK - NSDCPEEMAIL_GEN_ALL_CORE
[facility] for Tobacco Control email [email or web address]

## 2020-08-04 ENCOUNTER — APPOINTMENT (OUTPATIENT)
Dept: THORACIC SURGERY | Facility: CLINIC | Age: 60
End: 2020-08-04
Payer: COMMERCIAL

## 2020-08-04 VITALS
RESPIRATION RATE: 16 BRPM | WEIGHT: 290 LBS | SYSTOLIC BLOOD PRESSURE: 134 MMHG | DIASTOLIC BLOOD PRESSURE: 86 MMHG | HEART RATE: 74 BPM | OXYGEN SATURATION: 92 % | BODY MASS INDEX: 41.61 KG/M2

## 2020-08-04 PROCEDURE — 99024 POSTOP FOLLOW-UP VISIT: CPT

## 2020-08-12 ENCOUNTER — APPOINTMENT (OUTPATIENT)
Dept: INTERVENTIONAL RADIOLOGY/VASCULAR | Facility: CLINIC | Age: 60
End: 2020-08-12
Payer: COMMERCIAL

## 2020-08-12 VITALS — BODY MASS INDEX: 41.52 KG/M2 | WEIGHT: 290 LBS | HEIGHT: 70 IN

## 2020-08-12 DIAGNOSIS — R91.8 OTHER NONSPECIFIC ABNORMAL FINDING OF LUNG FIELD: ICD-10-CM

## 2020-08-12 PROCEDURE — 99242 OFF/OP CONSLTJ NEW/EST SF 20: CPT | Mod: GT

## 2020-08-14 ENCOUNTER — APPOINTMENT (OUTPATIENT)
Dept: DISASTER EMERGENCY | Facility: CLINIC | Age: 60
End: 2020-08-14

## 2020-08-14 LAB — SARS-COV-2 N GENE NPH QL NAA+PROBE: NOT DETECTED

## 2020-08-17 ENCOUNTER — APPOINTMENT (OUTPATIENT)
Dept: PULMONOLOGY | Facility: CLINIC | Age: 60
End: 2020-08-17
Payer: COMMERCIAL

## 2020-08-17 VITALS
DIASTOLIC BLOOD PRESSURE: 85 MMHG | TEMPERATURE: 98.8 F | BODY MASS INDEX: 44.58 KG/M2 | SYSTOLIC BLOOD PRESSURE: 129 MMHG | HEIGHT: 69 IN | WEIGHT: 301 LBS | HEART RATE: 98 BPM | OXYGEN SATURATION: 92 %

## 2020-08-17 PROCEDURE — 94010 BREATHING CAPACITY TEST: CPT

## 2020-08-17 PROCEDURE — 94726 PLETHYSMOGRAPHY LUNG VOLUMES: CPT

## 2020-08-17 PROCEDURE — 99215 OFFICE O/P EST HI 40 MIN: CPT | Mod: 25

## 2020-08-17 PROCEDURE — 94729 DIFFUSING CAPACITY: CPT

## 2020-08-17 PROCEDURE — ZZZZZ: CPT

## 2020-08-17 RX ORDER — CEPHALEXIN 500 MG/1
500 CAPSULE ORAL
Qty: 14 | Refills: 0 | Status: DISCONTINUED | COMMUNITY
Start: 2020-08-06 | End: 2020-08-17

## 2020-08-17 RX ORDER — OXYCODONE 5 MG/1
5 TABLET ORAL
Qty: 20 | Refills: 0 | Status: DISCONTINUED | COMMUNITY
Start: 2020-08-06 | End: 2020-08-17

## 2020-08-17 RX ORDER — IBUPROFEN 600 MG/1
600 TABLET, FILM COATED ORAL
Qty: 30 | Refills: 0 | Status: DISCONTINUED | COMMUNITY
Start: 2020-02-17

## 2020-08-17 RX ORDER — ACETAMINOPHEN 325 MG/1
325 TABLET ORAL
Qty: 24 | Refills: 0 | Status: DISCONTINUED | COMMUNITY
Start: 2020-07-23

## 2020-08-17 NOTE — REVIEW OF SYSTEMS
[Cough] : cough [Sputum] : sputum [Obesity] : obesity [Fatigue] : no fatigue [Fever] : no fever [Recent Wt Gain (___ Lbs)] : ~T no recent weight gain [Chills] : no chills [Poor Appetite] : no poor appetite [Dry Eyes] : no dry eyes [Ear Disturbance] : no ear disturbance [Epistaxis] : no epistaxis [Sore Throat] : no sore throat [Postnasal Drip] : no postnasal drip [Dry Mouth] : no dry mouth [Sinus Problems] : no sinus problems [Hemoptysis] : no hemoptysis [Frequent URIs] : no frequent URIs [Chest Tightness] : no chest tightness [SOB on Exertion] : no sob on exertion [Dyspnea] : no dyspnea [Chest Discomfort] : no chest discomfort [Claudication] : no claudication [Leg Cramps] : no leg cramps [Edema] : no edema [Orthopnea] : no orthopnea [Watery Eyes] : no watery eyes [Hay Fever] : no hay fever [Itchy Eyes] : no itchy eyes [GERD] : no gerd [Abdominal Pain] : no abdominal pain [Nausea] : no nausea [Vomiting] : no vomiting [Dysuria] : no urgency [Arthralgias] : no arthralgias [Myalgias] : no myalgias [Back Pain] : no back pain [Rash] : no rash [Ulcerations] : no ulcerations [Headache] : no headache [Easy Bruising] : no easy bruising [Focal Weakness] : no focal weakness [Seizures] : no seizures [Numbness] : no numbness [Dizziness] : no dizziness [Confusion] : no confusion [Depression] : no depression [Paralysis] : no paralysis [Anxiety] : no anxiety

## 2020-08-17 NOTE — PHYSICAL EXAM
[Well Nourished] : well nourished [No Acute Distress] : no acute distress [Well Developed] : well developed [Normal Oropharynx] : normal oropharynx [No Deformities] : no deformities [Supple] : supple [Normal Appearance] : normal appearance [No Neck Mass] : no neck mass [Normal S1, S2] : normal s1, s2 [Normal Rate/Rhythm] : normal rate/rhythm [No Acc Muscle Use] : no acc muscle use [No Resp Distress] : no resp distress [No Abnormalities] : no abnormalities [Benign] : benign [Normal Rhythm and Effort] : normal rhythm and effort [No Cyanosis] : no cyanosis [Normal Gait] : normal gait [Not Tender] : not tender [Normal Color/ Pigmentation] : normal color/ pigmentation [No Edema] : no edema [No Motor Deficits] : no motor deficits [Oriented x3] : oriented x3 [No Focal Deficits] : no focal deficits [Normal Mood] : normal mood [Normal Affect] : normal affect [TextBox_11] : NCAT, EOMI, anicteric [TextBox_44] : large neck [TextBox_68] : coarse BS that clear with cough, fine inspiratory crackles bilaterally, bilateral expiratory wheeze [TextBox_105] : + clubbing [TextBox_89] : obese

## 2020-08-17 NOTE — HISTORY OF PRESENT ILLNESS
[Former] : former [Never] : never [Feelings Of Weakness On Exertion] : exercise intolerance [Difficulty Breathing During Exertion] : dyspnea on exertion [Nasal Passage Blockage (Stuffiness)] : edema [Wheezing] : wheezing [Nonspecific Pain, Swelling, And Stiffness] : chest pain [Cough] : coughing [0  -  Nothing at all] : 0, nothing at all [Fever] : fever [Snoring] : snoring [TextBox_4] : 60M obesity, former smoker presents for followup pulmonary evaluation. He was initially seen 2/24/2020 prior to the COVID pandemic in [place]. At that time he was noted to have a CT scan with pulmonary nodules and interstitial lung disease. \par \par He ultimately went for thoracic surgery with Dr. Ramon Ann. His RUL wedge resection demonstrated adenocarcinoma (solid predominan) as well as Langerhans cell histiocytosis. The lymph nodes taken were negative. There was evidence of fibrin and fibrous plaque around the right pleura.\par \par He has an extensive exposure history. 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 He as also around birds extensively from birth until 1990. He smoked until 2020 as well. \par \par He denies any significant pulmonary symptoms. He has a chronic cough which is slightly improved. He denies hemoptysis. He has intermittent sputum. He denies any dyspnea on exertion. He continues to work in printing and is able to perform all his necessary activities. He denies fevers, chills, or sick contacts. He is not on any bronchodilator therapy. He does not require systemic steroids\par \par He was referred to Dr. Faith for IR guided RLL biopsy. He is pending a repeat CT chest to evaluate the RLL nodule prior to biopsy. He also has evidence/concern for osseous metastasis.  [TextBox_29] : No toxic habits [TextBox_165] : patient not interested in sleep study testing [TextBox_12] : 2/24/2020 - [facility] - ILD. There is evidence of extensive subpleural reticular densities within both lungs associated with varying degrees of "honeycombing". the right lung is more involved than the left. There are also findings suggestive of bronchial wall thickening as well as suggestion of traction bronchiectasis. UIP/IPF cannot be excluded. Differential diagnosis would include entities such as HP and connective tissue disorders. Irregular pleural based nodula densities are seen in the RUL and RLL. There are nonspecific findings with the differential diagnosis including neoplastic involement as well as possible small areas of consolidation/atelectasis. PET/CT exam may be of help. Left adrenal nodule. There is a 3.2 cm heterogeneous nodule arising from the L adrenal gland. [TextBox_57] : 3/4/2020 - PET/CT - [facility] - Foci of FDG activity corresponding with RUL and RLL pulmonary lesions. These are suspicious for malignancy. THere is intense FDG activity corresponding with a lytic lesion at the L5 vertebral body. Metastatic disease is suspected. FDG activity at T10 vertebral body may be due to acute compression fracture. Clinical correlation is recommended. A left adreanl lesion does NOT demonstrate significant FDG activity.

## 2020-08-17 NOTE — ASSESSMENT
[FreeTextEntry1] : 60M extensive smoking and exposure history presenting for followup evaluation of an abnormal CT chest with evidence of pulmonary nodules and interstitial lung disease. He has had subsequent lung biopsy and is found to have adenocarcinoma of the lung.\par \par 1. Adenocarcinoma of the lung - patient is being planned for IR guided biopsy of the RLL with Dr. Faith. He is pending a repeat CT chest at [facility] prior to this biopsy.\par - PET/CT with intense FDG activity corresponding to lytic lesion at L5 suspicious for metastatic disease which would make this stage IV disease\par - I do think patient would benefit from an Oncology evaluation for discussion of treatment options and I have referred him to [facility].\par \par 2. Langerhans Cell Histiocytosis - this is often seen in patients with extensive smoking history. I do not believe this is what is causing the extensive interstitial disease\par - continue smoking cessation\par \par 3. Interstitial Lung Disease - will need followup imaging .PFT done today. Extensive serologic workup was sent at last visit which has been negative. His lung biopsy does not document any fibrotic changes but does show fibrin. \par - At this time will continue to follow with serial imaging.\par - Patient will need discussion of treatment of cancer prior to consideration of ILD treatments. 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 Pulmonary Function Testing - patient with evidence of obstructive and restrictive disease with severe reduction in diffusion capacity.\par - Patient denies pulmonary symptoms - we can hold off bronchodilator therapy at this time\par - Restrictive disease and reduction in diffusion capacity is likely related to his underlying ILD - which may require discussions around treatment in the future\par \par 5. Acute Hypoxemic Respiratory Failure - patient was discharged from [facility] with supplemental O2. However, he currently states that his O2 sats are above 88% even with activity and therefore he is not using it.\par - Continue to monitor O2 sats\par - I advised against returning home O2 at this time given the severity of his lung disease I suspect he will require O2  with activity and in the future\par \par 6. Snoring and concern for sleep disorder breathing - patient is not interested in pursuing diagnostic testing at this time. His snoring is slightly improved per his wife.\par - Weight loss will be beneficial\par \par 7. Health Maintenance and followup - patient should followup at least Q6 months for pulmonary evaluation or sooner as needed. He will call with any acute issues or questions.\par - Patient should have pneumococcal vaccine given his extensive underlying lung disease and malignancy but he is not interested at this time. Will continue to discuss on future visits.\par - Oncology evaluation

## 2020-08-20 ENCOUNTER — OUTPATIENT (OUTPATIENT)
Dept: OUTPATIENT SERVICES | Facility: HOSPITAL | Age: 60
LOS: 1 days | Discharge: HOME | End: 2020-08-20

## 2020-08-20 DIAGNOSIS — C34.90 MALIGNANT NEOPLASM OF UNSPECIFIED PART OF UNSPECIFIED BRONCHUS OR LUNG: ICD-10-CM

## 2020-08-25 ENCOUNTER — APPOINTMENT (OUTPATIENT)
Dept: HEMATOLOGY ONCOLOGY | Facility: CLINIC | Age: 60
End: 2020-08-25
Payer: COMMERCIAL

## 2020-08-25 ENCOUNTER — APPOINTMENT (OUTPATIENT)
Dept: INFUSION THERAPY | Facility: HOSPITAL | Age: 60
End: 2020-08-25

## 2020-08-25 VITALS
BODY MASS INDEX: 44.41 KG/M2 | HEART RATE: 82 BPM | RESPIRATION RATE: 14 BRPM | HEIGHT: 68.98 IN | TEMPERATURE: 98 F | SYSTOLIC BLOOD PRESSURE: 130 MMHG | DIASTOLIC BLOOD PRESSURE: 76 MMHG | OXYGEN SATURATION: 96 % | WEIGHT: 299.83 LBS

## 2020-08-25 DIAGNOSIS — R91.1 SOLITARY PULMONARY NODULE: ICD-10-CM

## 2020-08-25 DIAGNOSIS — Z78.9 OTHER SPECIFIED HEALTH STATUS: ICD-10-CM

## 2020-08-25 DIAGNOSIS — M89.9 DISORDER OF BONE, UNSPECIFIED: ICD-10-CM

## 2020-08-25 PROCEDURE — 99205 OFFICE O/P NEW HI 60 MIN: CPT

## 2020-08-27 PROBLEM — R91.1 LUNG NODULE: Status: ACTIVE | Noted: 2020-08-27

## 2020-08-27 PROBLEM — M89.9 LYTIC LESION OF BONE ON X-RAY: Status: ACTIVE | Noted: 2020-08-27

## 2020-08-27 PROBLEM — Z78.9 NO PERTINENT PAST MEDICAL HISTORY: Status: RESOLVED | Noted: 2020-08-27 | Resolved: 2020-08-27

## 2020-09-22 ENCOUNTER — APPOINTMENT (OUTPATIENT)
Dept: THORACIC SURGERY | Facility: CLINIC | Age: 60
End: 2020-09-22
Payer: COMMERCIAL

## 2020-09-22 ENCOUNTER — APPOINTMENT (OUTPATIENT)
Dept: INTERVENTIONAL RADIOLOGY/VASCULAR | Facility: CLINIC | Age: 60
End: 2020-09-22
Payer: COMMERCIAL

## 2020-09-22 VITALS
BODY MASS INDEX: 43.89 KG/M2 | DIASTOLIC BLOOD PRESSURE: 92 MMHG | WEIGHT: 297 LBS | OXYGEN SATURATION: 94 % | SYSTOLIC BLOOD PRESSURE: 137 MMHG | RESPIRATION RATE: 16 BRPM | HEART RATE: 87 BPM

## 2020-09-22 DIAGNOSIS — M89.9 DISORDER OF BONE, UNSPECIFIED: ICD-10-CM

## 2020-09-22 PROCEDURE — 99024 POSTOP FOLLOW-UP VISIT: CPT

## 2020-09-22 PROCEDURE — 99214 OFFICE O/P EST MOD 30 MIN: CPT | Mod: 95

## 2020-09-24 LAB
ANION GAP SERPL CALC-SCNC: 15 MMOL/L
BASOPHILS # BLD AUTO: 0.08 K/UL
BASOPHILS NFR BLD AUTO: 0.6 %
BUN SERPL-MCNC: 20 MG/DL
CALCIUM SERPL-MCNC: 9.1 MG/DL
CHLORIDE SERPL-SCNC: 101 MMOL/L
CO2 SERPL-SCNC: 25 MMOL/L
CREAT SERPL-MCNC: 1.05 MG/DL
EOSINOPHIL # BLD AUTO: 0.17 K/UL
EOSINOPHIL NFR BLD AUTO: 1.2 %
GLUCOSE SERPL-MCNC: 122 MG/DL
HCT VFR BLD CALC: 48.8 %
HGB BLD-MCNC: 15 G/DL
IMM GRANULOCYTES NFR BLD AUTO: 0.4 %
LYMPHOCYTES # BLD AUTO: 4.15 K/UL
LYMPHOCYTES NFR BLD AUTO: 29.5 %
MAN DIFF?: NORMAL
MCHC RBC-ENTMCNC: 30.2 PG
MCHC RBC-ENTMCNC: 30.7 GM/DL
MCV RBC AUTO: 98.2 FL
MONOCYTES # BLD AUTO: 1.11 K/UL
MONOCYTES NFR BLD AUTO: 7.9 %
NEUTROPHILS # BLD AUTO: 8.52 K/UL
NEUTROPHILS NFR BLD AUTO: 60.4 %
PLATELET # BLD AUTO: 365 K/UL
POTASSIUM SERPL-SCNC: 5 MMOL/L
RBC # BLD: 4.97 M/UL
RBC # FLD: 13.2 %
SODIUM SERPL-SCNC: 141 MMOL/L
WBC # FLD AUTO: 14.08 K/UL

## 2020-09-27 ENCOUNTER — APPOINTMENT (OUTPATIENT)
Dept: DISASTER EMERGENCY | Facility: CLINIC | Age: 60
End: 2020-09-27

## 2020-09-28 LAB — SARS-COV-2 N GENE NPH QL NAA+PROBE: NOT DETECTED

## 2020-09-29 ENCOUNTER — OUTPATIENT (OUTPATIENT)
Dept: OUTPATIENT SERVICES | Facility: HOSPITAL | Age: 60
LOS: 1 days | End: 2020-09-29
Payer: COMMERCIAL

## 2020-09-29 ENCOUNTER — RESULT REVIEW (OUTPATIENT)
Age: 60
End: 2020-09-29

## 2020-09-29 DIAGNOSIS — M89.9 DISORDER OF BONE, UNSPECIFIED: ICD-10-CM

## 2020-09-29 PROCEDURE — 88173 CYTOPATH EVAL FNA REPORT: CPT | Mod: 26

## 2020-09-29 PROCEDURE — 88360 TUMOR IMMUNOHISTOCHEM/MANUAL: CPT | Mod: 26

## 2020-09-29 PROCEDURE — 88342 IMHCHEM/IMCYTCHM 1ST ANTB: CPT | Mod: 26,59

## 2020-09-29 PROCEDURE — 88305 TISSUE EXAM BY PATHOLOGIST: CPT | Mod: 26

## 2020-09-29 PROCEDURE — 77012 CT SCAN FOR NEEDLE BIOPSY: CPT | Mod: 26

## 2020-09-29 PROCEDURE — 20225 BONE BIOPSY TROCAR/NDL DEEP: CPT

## 2020-10-05 DIAGNOSIS — M89.9 DISORDER OF BONE, UNSPECIFIED: ICD-10-CM

## 2020-10-05 LAB — NON-GYNECOLOGICAL CYTOLOGY STUDY: SIGNIFICANT CHANGE UP

## 2020-10-09 ENCOUNTER — OUTPATIENT (OUTPATIENT)
Dept: OUTPATIENT SERVICES | Facility: HOSPITAL | Age: 60
LOS: 1 days | Discharge: HOME | End: 2020-10-09

## 2020-10-09 DIAGNOSIS — C34.90 MALIGNANT NEOPLASM OF UNSPECIFIED PART OF UNSPECIFIED BRONCHUS OR LUNG: ICD-10-CM

## 2020-10-13 ENCOUNTER — APPOINTMENT (OUTPATIENT)
Dept: THORACIC SURGERY | Facility: CLINIC | Age: 60
End: 2020-10-13
Payer: COMMERCIAL

## 2020-10-13 ENCOUNTER — APPOINTMENT (OUTPATIENT)
Dept: HEMATOLOGY ONCOLOGY | Facility: CLINIC | Age: 60
End: 2020-10-13
Payer: COMMERCIAL

## 2020-10-13 PROCEDURE — 99024 POSTOP FOLLOW-UP VISIT: CPT

## 2020-10-13 PROCEDURE — 99215 OFFICE O/P EST HI 40 MIN: CPT | Mod: 95

## 2020-10-13 RX ORDER — METOCLOPRAMIDE 10 MG/1
10 TABLET ORAL
Qty: 60 | Refills: 5 | Status: ACTIVE | COMMUNITY
Start: 2020-10-13 | End: 1900-01-01

## 2020-10-13 RX ORDER — FOLIC ACID 1 MG/1
1 TABLET ORAL DAILY
Qty: 30 | Refills: 5 | Status: ACTIVE | COMMUNITY
Start: 2020-10-13 | End: 1900-01-01

## 2020-10-13 NOTE — HISTORY OF PRESENT ILLNESS
[Home] : at home, [unfilled] , at the time of the visit. [Medical Office: ([facility])___] : at the medical office located in  [Verbal consent obtained from patient] : the patient, [unfilled] [FreeTextEntry1] : Mr. Swanson is a 60 year old male who presents today for evaluation to discuss biopsy of lung nodule concerning for metastatic lung cancer, referred by his pulmonologist, Dr. Dwayne Sharif. He is a former smoker (Quit Feb 2020, 39 years, 1 PPD) with no significant past medical history. \par \par He presented to urgent care in early February 2020 with productive cough, reports CXR was abnormal, completed course of antibiotics + notes improvement in his pulmonary symptoms. Due to abnormal CXR, he was referred for CT Chest.\par \par CT Chest on 2/18/2020 reveal:\par - RUL 1.0-1.5 cm irregular pleural based nodular density\par - RLL 1.5 cm irregular pleural based nodular density\par - Prominent 1.6 x 1.2 cm pretracheal lymph node\par - Left adrenal 3.2 x 2.2 cm heterogeneous nodule\par - Interstitial lung disease, evidence of subpleural reticular densities with associated "honeycombing" within both lungs\par \par PET/CT on 3/4/2020 reveals:\par - RUL pleural based nodule, 1.1 x 1.3 cm, SUV 4.6\par - RLL pleural based nodule, 1.5 cm, SUV 5.3\par - Left adrenal lesion, SUV 1.6\par - Lytic lesion at L5 vertebral body, 1.6 cm, SUV 13.3\par - Increased FDG activity at T10 vertebral body, SUV 4.7\par \par Of note, surgery delayed due to COVID-19 pandemic.\par \par Now s/p Flexible Bronchoscopy Right Robot Assisted RUL Wedge Resection with pleural biopsy, and mediastinal lymph node dissection on 7/21/2020. Path revealed RUL AdenoCA, solid predominant, Langerhans cell histiocytosis (LCH), 2cm, G2, +Visceral pleura invasion, all LNs negative, pT2aN0 Stg IB. +S100 and CD1a. \par \par Brain MRI on 9/9/2020 showed LUPE.\par \par CT abdomen/pelvis w/ IV contrast on 9/9/2020:\par - 5cm RLL mass; multiple small LLL nodules\par - stable 1.9 x 2.5cm Lt adrenal nodule\par - stable L5 lytic lesion and a stress heterogeneous T10 vertebral body\par \par FNA of RLL nodule is cancelled by Dr. Richmond Zheng, who ordered a bone biopsy.\par \par Patient is now s/p CT guided core biopsy of L5 on 9/29/20: Path is Positive for malignant cells, lung primary, +TTF-1; PD-L1 less than 1%.\par  \par Patient was contacted today for a follow up and to discuss biopsy report. Denies SOB, cough, fever, chills, lightheadedness or dizziness .\par

## 2020-10-13 NOTE — DATA REVIEWED
[FreeTextEntry1] : CT guided core biopsy of L5 on 9/29/20: Path is Positive for malignant cells, lung primary, +TTF-1; PD-L1 less than 1%.

## 2020-10-13 NOTE — CONSULT LETTER
[Dear  ___] : Dear  [unfilled], [Consult Letter:] : I had the pleasure of evaluating your patient, [unfilled]. [( Thank you for referring [unfilled] for consultation for _____ )] : Thank you for referring [unfilled] for consultation for [unfilled] [Consult Closing:] : Thank you very much for allowing me to participate in the care of this patient.  If you have any questions, please do not hesitate to contact me. [Sincerely,] : Sincerely, [Please see my note below.] : Please see my note below. [FreeTextEntry2] : Dr. Dwayne Sharif (PULM)\par Dr. Richmond Zheng (Hem/onc) [FreeTextEntry3] : Ramon Ann MD\par Director of Thoracic, [facility]\par Cardiovascular & Thoracic Surgery\par \par [facility]\par [address]\par Oncology Building 3rd Fl\[place] [address]\par Tel: [phone]\par Fax: [phone]\par

## 2020-10-13 NOTE — ASSESSMENT
[FreeTextEntry1] : Mr. Swanson is a 60 year old male who presents today for evaluation to discuss biopsy of lung nodule concerning for metastatic lung cancer, referred by his pulmonologist, Dr. Dwayne Sharif. He is a former smoker (Quit Feb 2020, 39 years, 1 PPD) with no significant past medical history. \par \par Now s/p Flexible Bronchoscopy Right Robot Assisted RUL Wedge Resection with pleural biopsy, and mediastinal lymph node dissection on 7/21/2020. Path revealed RUL AdenoCA, solid predominant, Langerhans cell histiocytosis (LCH), 2cm, G2, +Visceral pleura invasion, all LNs negative, pT2aN0 Stg IB. +S100 and CD1a. \par \par Patient is now s/p CT guided core biopsy of L5 on 9/29/20: Path is Positive for malignant cells, lung primary, +TTF-1; PD-L1 less than 1%.\par \par I have reviewed the patient's medical records and diagnostic images at time of this office consultation and have made the following recommendation:\par 1. Path reviewed and explained to patient, clinical Stg IV with lung CA mets to bone. F/u with oncology for treatment. \par 2. RTC as needed. \par \par \par I have spent 25 minutes on the phone with patient explaining all of the above.\par \par I personally performed the services described in the documentation, reviewed the documentation recorded by the scribe in my presence and it accurately and completely records my words and actions.\par \par I, Ynes Saldana RN &  Mehdi Schulte NP, am scribing for and the presence of MELANIE Garcia, the following sections HISTORY OF PRESENT ILLNESS, PAST MEDICAL/FAMILY/SOCIAL HISTORY; REVIEW OF SYSTEMS; VITAL SIGNS; PHYSICAL EXAM; DISPOSITION.\par \par

## 2020-10-15 PROBLEM — J84.82: Chronic | Status: ACTIVE | Noted: 2020-10-14

## 2020-10-15 PROBLEM — M89.9 DISORDER OF BONE, UNSPECIFIED: Chronic | Status: ACTIVE | Noted: 2020-10-14

## 2020-10-15 PROBLEM — R91.1 SOLITARY PULMONARY NODULE: Chronic | Status: ACTIVE | Noted: 2020-10-14

## 2020-10-15 PROBLEM — C34.11 MALIGNANT NEOPLASM OF UPPER LOBE, RIGHT BRONCHUS OR LUNG: Chronic | Status: ACTIVE | Noted: 2020-10-14

## 2020-10-17 DIAGNOSIS — Z01.818 ENCOUNTER FOR OTHER PREPROCEDURAL EXAMINATION: ICD-10-CM

## 2020-10-18 ENCOUNTER — APPOINTMENT (OUTPATIENT)
Dept: DISASTER EMERGENCY | Facility: CLINIC | Age: 60
End: 2020-10-18

## 2020-10-18 LAB — SARS-COV-2 N GENE NPH QL NAA+PROBE: NOT DETECTED

## 2020-10-21 ENCOUNTER — APPOINTMENT (OUTPATIENT)
Dept: INFUSION THERAPY | Facility: HOSPITAL | Age: 60
End: 2020-10-21

## 2020-10-28 ENCOUNTER — LABORATORY RESULT (OUTPATIENT)
Age: 60
End: 2020-10-28

## 2020-10-28 ENCOUNTER — RESULT REVIEW (OUTPATIENT)
Age: 60
End: 2020-10-28

## 2020-10-28 ENCOUNTER — APPOINTMENT (OUTPATIENT)
Dept: INFUSION THERAPY | Facility: HOSPITAL | Age: 60
End: 2020-10-28

## 2020-10-28 ENCOUNTER — APPOINTMENT (OUTPATIENT)
Dept: HEMATOLOGY ONCOLOGY | Facility: CLINIC | Age: 60
End: 2020-10-28
Payer: COMMERCIAL

## 2020-10-28 LAB
BASOPHILS # BLD AUTO: 0.04 K/UL — SIGNIFICANT CHANGE UP (ref 0–0.2)
BASOPHILS NFR BLD AUTO: 0.3 % — SIGNIFICANT CHANGE UP (ref 0–2)
EOSINOPHIL # BLD AUTO: 0.02 K/UL — SIGNIFICANT CHANGE UP (ref 0–0.5)
EOSINOPHIL NFR BLD AUTO: 0.1 % — SIGNIFICANT CHANGE UP (ref 0–6)
HCT VFR BLD CALC: 44.7 % — SIGNIFICANT CHANGE UP (ref 39–50)
HGB BLD-MCNC: 14.9 G/DL — SIGNIFICANT CHANGE UP (ref 13–17)
IMM GRANULOCYTES NFR BLD AUTO: 0.7 % — SIGNIFICANT CHANGE UP (ref 0–1.5)
LYMPHOCYTES # BLD AUTO: 13.2 % — SIGNIFICANT CHANGE UP (ref 13–44)
LYMPHOCYTES # BLD AUTO: 2.06 K/UL — SIGNIFICANT CHANGE UP (ref 1–3.3)
MCHC RBC-ENTMCNC: 31.1 PG — SIGNIFICANT CHANGE UP (ref 27–34)
MCHC RBC-ENTMCNC: 33.3 G/DL — SIGNIFICANT CHANGE UP (ref 32–36)
MCV RBC AUTO: 93.3 FL — SIGNIFICANT CHANGE UP (ref 80–100)
MONOCYTES # BLD AUTO: 0.68 K/UL — SIGNIFICANT CHANGE UP (ref 0–0.9)
MONOCYTES NFR BLD AUTO: 4.4 % — SIGNIFICANT CHANGE UP (ref 2–14)
NEUTROPHILS # BLD AUTO: 12.69 K/UL — HIGH (ref 1.8–7.4)
NEUTROPHILS NFR BLD AUTO: 81.3 % — HIGH (ref 43–77)
NRBC # BLD: 0 /100 WBCS — SIGNIFICANT CHANGE UP (ref 0–0)
PLATELET # BLD AUTO: 363 K/UL — SIGNIFICANT CHANGE UP (ref 150–400)
RBC # BLD: 4.79 M/UL — SIGNIFICANT CHANGE UP (ref 4.2–5.8)
RBC # FLD: 12.9 % — SIGNIFICANT CHANGE UP (ref 10.3–14.5)
WBC # BLD: 15.6 K/UL — HIGH (ref 3.8–10.5)
WBC # FLD AUTO: 15.6 K/UL — HIGH (ref 3.8–10.5)

## 2020-10-28 PROCEDURE — 99214 OFFICE O/P EST MOD 30 MIN: CPT

## 2020-10-29 ENCOUNTER — NON-APPOINTMENT (OUTPATIENT)
Age: 60
End: 2020-10-29

## 2020-10-29 DIAGNOSIS — R11.2 NAUSEA WITH VOMITING, UNSPECIFIED: ICD-10-CM

## 2020-10-29 DIAGNOSIS — Z51.11 ENCOUNTER FOR ANTINEOPLASTIC CHEMOTHERAPY: ICD-10-CM

## 2020-10-30 ENCOUNTER — NON-APPOINTMENT (OUTPATIENT)
Age: 60
End: 2020-10-30

## 2020-11-06 ENCOUNTER — OUTPATIENT (OUTPATIENT)
Dept: OUTPATIENT SERVICES | Facility: HOSPITAL | Age: 60
LOS: 1 days | Discharge: HOME | End: 2020-11-06

## 2020-11-06 DIAGNOSIS — Z90.2 ACQUIRED ABSENCE OF LUNG [PART OF]: Chronic | ICD-10-CM

## 2020-11-06 DIAGNOSIS — C34.90 MALIGNANT NEOPLASM OF UNSPECIFIED PART OF UNSPECIFIED BRONCHUS OR LUNG: ICD-10-CM

## 2020-11-06 PROBLEM — G89.3 NEOPLASM RELATED PAIN (ACUTE) (CHRONIC): Chronic | Status: ACTIVE | Noted: 2020-10-28

## 2021-01-01 ENCOUNTER — APPOINTMENT (OUTPATIENT)
Dept: HEMATOLOGY ONCOLOGY | Facility: CLINIC | Age: 61
End: 2021-01-01
Payer: COMMERCIAL

## 2021-01-01 ENCOUNTER — NON-APPOINTMENT (OUTPATIENT)
Age: 61
End: 2021-01-01

## 2021-01-01 ENCOUNTER — INPATIENT (INPATIENT)
Facility: HOSPITAL | Age: 61
LOS: 24 days | End: 2021-11-09
Attending: STUDENT IN AN ORGANIZED HEALTH CARE EDUCATION/TRAINING PROGRAM | Admitting: STUDENT IN AN ORGANIZED HEALTH CARE EDUCATION/TRAINING PROGRAM
Payer: COMMERCIAL

## 2021-01-01 ENCOUNTER — RESULT REVIEW (OUTPATIENT)
Age: 61
End: 2021-01-01

## 2021-01-01 ENCOUNTER — APPOINTMENT (OUTPATIENT)
Dept: HEMATOLOGY ONCOLOGY | Facility: CLINIC | Age: 61
End: 2021-01-01

## 2021-01-01 ENCOUNTER — OUTPATIENT (OUTPATIENT)
Dept: OUTPATIENT SERVICES | Facility: HOSPITAL | Age: 61
LOS: 1 days | Discharge: HOME | End: 2021-01-01

## 2021-01-01 ENCOUNTER — APPOINTMENT (OUTPATIENT)
Dept: INFUSION THERAPY | Facility: HOSPITAL | Age: 61
End: 2021-01-01

## 2021-01-01 ENCOUNTER — LABORATORY RESULT (OUTPATIENT)
Age: 61
End: 2021-01-01

## 2021-01-01 ENCOUNTER — APPOINTMENT (OUTPATIENT)
Dept: CT IMAGING | Facility: IMAGING CENTER | Age: 61
End: 2021-01-01
Payer: COMMERCIAL

## 2021-01-01 ENCOUNTER — APPOINTMENT (OUTPATIENT)
Dept: DISASTER EMERGENCY | Facility: CLINIC | Age: 61
End: 2021-01-01

## 2021-01-01 ENCOUNTER — OUTPATIENT (OUTPATIENT)
Dept: OUTPATIENT SERVICES | Facility: HOSPITAL | Age: 61
LOS: 1 days | End: 2021-01-01
Payer: COMMERCIAL

## 2021-01-01 ENCOUNTER — RX RENEWAL (OUTPATIENT)
Age: 61
End: 2021-01-01

## 2021-01-01 ENCOUNTER — APPOINTMENT (OUTPATIENT)
Dept: PULMONOLOGY | Facility: CLINIC | Age: 61
End: 2021-01-01
Payer: COMMERCIAL

## 2021-01-01 ENCOUNTER — OUTPATIENT (OUTPATIENT)
Dept: OUTPATIENT SERVICES | Facility: HOSPITAL | Age: 61
LOS: 1 days | Discharge: HOME | End: 2021-01-01
Payer: COMMERCIAL

## 2021-01-01 ENCOUNTER — APPOINTMENT (OUTPATIENT)
Dept: CARDIOLOGY | Facility: CLINIC | Age: 61
End: 2021-01-01
Payer: COMMERCIAL

## 2021-01-01 ENCOUNTER — APPOINTMENT (OUTPATIENT)
Dept: PULMONOLOGY | Facility: CLINIC | Age: 61
End: 2021-01-01

## 2021-01-01 VITALS
HEART RATE: 116 BPM | OXYGEN SATURATION: 93 % | DIASTOLIC BLOOD PRESSURE: 86 MMHG | RESPIRATION RATE: 18 BRPM | SYSTOLIC BLOOD PRESSURE: 150 MMHG | TEMPERATURE: 98 F | BODY MASS INDEX: 43.95 KG/M2 | WEIGHT: 297.62 LBS

## 2021-01-01 VITALS
OXYGEN SATURATION: 83 % | WEIGHT: 285 LBS | HEART RATE: 72 BPM | DIASTOLIC BLOOD PRESSURE: 77 MMHG | RESPIRATION RATE: 18 BRPM | TEMPERATURE: 97.7 F | SYSTOLIC BLOOD PRESSURE: 116 MMHG | HEIGHT: 70 IN | BODY MASS INDEX: 40.8 KG/M2

## 2021-01-01 VITALS
WEIGHT: 302.03 LBS | RESPIRATION RATE: 18 BRPM | OXYGEN SATURATION: 92 % | HEIGHT: 69 IN | BODY MASS INDEX: 44.73 KG/M2 | HEART RATE: 107 BPM | SYSTOLIC BLOOD PRESSURE: 127 MMHG | DIASTOLIC BLOOD PRESSURE: 88 MMHG | TEMPERATURE: 96.6 F

## 2021-01-01 VITALS
OXYGEN SATURATION: 95 % | SYSTOLIC BLOOD PRESSURE: 128 MMHG | HEART RATE: 100 BPM | TEMPERATURE: 97.8 F | DIASTOLIC BLOOD PRESSURE: 75 MMHG | WEIGHT: 280.41 LBS | BODY MASS INDEX: 40.14 KG/M2 | HEIGHT: 70.04 IN | RESPIRATION RATE: 18 BRPM

## 2021-01-01 VITALS
OXYGEN SATURATION: 94 % | WEIGHT: 302.03 LBS | BODY MASS INDEX: 43.24 KG/M2 | TEMPERATURE: 97.1 F | HEIGHT: 69.96 IN | DIASTOLIC BLOOD PRESSURE: 98 MMHG | HEART RATE: 100 BPM | SYSTOLIC BLOOD PRESSURE: 154 MMHG | RESPIRATION RATE: 17 BRPM

## 2021-01-01 VITALS
HEART RATE: 74 BPM | RESPIRATION RATE: 16 BRPM | TEMPERATURE: 98 F | WEIGHT: 299.83 LBS | BODY MASS INDEX: 43.41 KG/M2 | SYSTOLIC BLOOD PRESSURE: 130 MMHG | OXYGEN SATURATION: 99 % | DIASTOLIC BLOOD PRESSURE: 74 MMHG

## 2021-01-01 VITALS
OXYGEN SATURATION: 90 % | HEART RATE: 98 BPM | HEIGHT: 70 IN | TEMPERATURE: 97.4 F | BODY MASS INDEX: 42.55 KG/M2 | WEIGHT: 297.18 LBS | RESPIRATION RATE: 17 BRPM | DIASTOLIC BLOOD PRESSURE: 86 MMHG | SYSTOLIC BLOOD PRESSURE: 154 MMHG

## 2021-01-01 VITALS
WEIGHT: 302.03 LBS | TEMPERATURE: 98 F | DIASTOLIC BLOOD PRESSURE: 70 MMHG | RESPIRATION RATE: 14 BRPM | SYSTOLIC BLOOD PRESSURE: 130 MMHG | BODY MASS INDEX: 43.73 KG/M2 | HEART RATE: 110 BPM | OXYGEN SATURATION: 99 %

## 2021-01-01 VITALS
SYSTOLIC BLOOD PRESSURE: 136 MMHG | RESPIRATION RATE: 17 BRPM | HEART RATE: 97 BPM | DIASTOLIC BLOOD PRESSURE: 92 MMHG | BODY MASS INDEX: 42.29 KG/M2 | OXYGEN SATURATION: 94 % | TEMPERATURE: 97.2 F | HEIGHT: 70 IN | WEIGHT: 295.42 LBS

## 2021-01-01 VITALS
SYSTOLIC BLOOD PRESSURE: 126 MMHG | WEIGHT: 289.47 LBS | HEART RATE: 84 BPM | HEIGHT: 70 IN | BODY MASS INDEX: 41.44 KG/M2 | TEMPERATURE: 97.8 F | RESPIRATION RATE: 18 BRPM | OXYGEN SATURATION: 96 % | DIASTOLIC BLOOD PRESSURE: 85 MMHG

## 2021-01-01 VITALS
HEART RATE: 95 BPM | HEIGHT: 70 IN | WEIGHT: 283.96 LBS | DIASTOLIC BLOOD PRESSURE: 75 MMHG | TEMPERATURE: 97.3 F | OXYGEN SATURATION: 97 % | BODY MASS INDEX: 40.65 KG/M2 | SYSTOLIC BLOOD PRESSURE: 136 MMHG | RESPIRATION RATE: 17 BRPM

## 2021-01-01 VITALS
OXYGEN SATURATION: 98 % | DIASTOLIC BLOOD PRESSURE: 78 MMHG | TEMPERATURE: 98 F | HEIGHT: 70 IN | HEART RATE: 101 BPM | SYSTOLIC BLOOD PRESSURE: 124 MMHG | RESPIRATION RATE: 24 BRPM

## 2021-01-01 VITALS
HEIGHT: 69.09 IN | HEART RATE: 116 BPM | OXYGEN SATURATION: 89 % | BODY MASS INDEX: 44.6 KG/M2 | DIASTOLIC BLOOD PRESSURE: 85 MMHG | SYSTOLIC BLOOD PRESSURE: 143 MMHG | RESPIRATION RATE: 18 BRPM | TEMPERATURE: 97 F | WEIGHT: 301.15 LBS

## 2021-01-01 VITALS
WEIGHT: 304 LBS | HEART RATE: 95 BPM | RESPIRATION RATE: 18 BRPM | TEMPERATURE: 97.3 F | OXYGEN SATURATION: 86 % | BODY MASS INDEX: 45.03 KG/M2 | DIASTOLIC BLOOD PRESSURE: 88 MMHG | HEIGHT: 69 IN | SYSTOLIC BLOOD PRESSURE: 129 MMHG

## 2021-01-01 VITALS
RESPIRATION RATE: 18 BRPM | DIASTOLIC BLOOD PRESSURE: 80 MMHG | BODY MASS INDEX: 45.22 KG/M2 | SYSTOLIC BLOOD PRESSURE: 130 MMHG | WEIGHT: 306.99 LBS | OXYGEN SATURATION: 94 % | HEART RATE: 104 BPM | TEMPERATURE: 98 F

## 2021-01-01 VITALS
DIASTOLIC BLOOD PRESSURE: 86 MMHG | TEMPERATURE: 98 F | OXYGEN SATURATION: 63 % | HEART RATE: 76 BPM | RESPIRATION RATE: 24 BRPM | SYSTOLIC BLOOD PRESSURE: 105 MMHG

## 2021-01-01 VITALS
SYSTOLIC BLOOD PRESSURE: 140 MMHG | OXYGEN SATURATION: 97 % | DIASTOLIC BLOOD PRESSURE: 86 MMHG | RESPIRATION RATE: 18 BRPM | TEMPERATURE: 97.8 F | BODY MASS INDEX: 41.06 KG/M2 | WEIGHT: 286.82 LBS | HEART RATE: 100 BPM | HEIGHT: 70.04 IN

## 2021-01-01 DIAGNOSIS — J84.9 INTERSTITIAL PULMONARY DISEASE, UNSPECIFIED: ICD-10-CM

## 2021-01-01 DIAGNOSIS — J44.9 CHRONIC OBSTRUCTIVE PULMONARY DISEASE, UNSPECIFIED: ICD-10-CM

## 2021-01-01 DIAGNOSIS — J96.01 ACUTE RESPIRATORY FAILURE WITH HYPOXIA: ICD-10-CM

## 2021-01-01 DIAGNOSIS — C79.51 SECONDARY MALIGNANT NEOPLASM OF BONE: ICD-10-CM

## 2021-01-01 DIAGNOSIS — Z51.11 ENCOUNTER FOR ANTINEOPLASTIC CHEMOTHERAPY: ICD-10-CM

## 2021-01-01 DIAGNOSIS — Z90.2 ACQUIRED ABSENCE OF LUNG [PART OF]: Chronic | ICD-10-CM

## 2021-01-01 DIAGNOSIS — R60.0 LOCALIZED EDEMA: ICD-10-CM

## 2021-01-01 DIAGNOSIS — R11.2 NAUSEA WITH VOMITING, UNSPECIFIED: ICD-10-CM

## 2021-01-01 DIAGNOSIS — J18.9 PNEUMONIA, UNSPECIFIED ORGANISM: ICD-10-CM

## 2021-01-01 DIAGNOSIS — R06.02 SHORTNESS OF BREATH: ICD-10-CM

## 2021-01-01 DIAGNOSIS — C34.90 MALIGNANT NEOPLASM OF UNSPECIFIED PART OF UNSPECIFIED BRONCHUS OR LUNG: ICD-10-CM

## 2021-01-01 DIAGNOSIS — I27.20 PULMONARY HYPERTENSION, UNSPECIFIED: ICD-10-CM

## 2021-01-01 DIAGNOSIS — C34.91 MALIGNANT NEOPLASM OF UNSPECIFIED PART OF RIGHT BRONCHUS OR LUNG: ICD-10-CM

## 2021-01-01 DIAGNOSIS — R68.2 DRY MOUTH, UNSPECIFIED: ICD-10-CM

## 2021-01-01 DIAGNOSIS — J44.1 CHRONIC OBSTRUCTIVE PULMONARY DISEASE WITH (ACUTE) EXACERBATION: ICD-10-CM

## 2021-01-01 DIAGNOSIS — J84.82: ICD-10-CM

## 2021-01-01 DIAGNOSIS — E83.39 OTHER DISORDERS OF PHOSPHORUS METABOLISM: ICD-10-CM

## 2021-01-01 DIAGNOSIS — Z00.8 ENCOUNTER FOR OTHER GENERAL EXAMINATION: ICD-10-CM

## 2021-01-01 DIAGNOSIS — C77.1 SECONDARY AND UNSPECIFIED MALIGNANT NEOPLASM OF INTRATHORACIC LYMPH NODES: ICD-10-CM

## 2021-01-01 DIAGNOSIS — Z00.00 ENCOUNTER FOR GENERAL ADULT MEDICAL EXAMINATION W/OUT ABNORMAL FINDINGS: ICD-10-CM

## 2021-01-01 DIAGNOSIS — C78.00 SECONDARY MALIGNANT NEOPLASM OF UNSPECIFIED LUNG: ICD-10-CM

## 2021-01-01 DIAGNOSIS — Z51.5 ENCOUNTER FOR PALLIATIVE CARE: ICD-10-CM

## 2021-01-01 DIAGNOSIS — E66.01 MORBID (SEVERE) OBESITY DUE TO EXCESS CALORIES: ICD-10-CM

## 2021-01-01 DIAGNOSIS — C78.7 SECONDARY MALIGNANT NEOPLASM OF LIVER AND INTRAHEPATIC BILE DUCT: ICD-10-CM

## 2021-01-01 DIAGNOSIS — R05 COUGH: ICD-10-CM

## 2021-01-01 DIAGNOSIS — Z23 ENCOUNTER FOR IMMUNIZATION: ICD-10-CM

## 2021-01-01 DIAGNOSIS — J96.11 CHRONIC RESPIRATORY FAILURE WITH HYPOXIA: ICD-10-CM

## 2021-01-01 DIAGNOSIS — Z29.9 ENCOUNTER FOR PROPHYLACTIC MEASURES, UNSPECIFIED: ICD-10-CM

## 2021-01-01 DIAGNOSIS — R74.01 ELEVATION OF LEVELS OF LIVER TRANSAMINASE LEVELS: ICD-10-CM

## 2021-01-01 DIAGNOSIS — R93.89 ABNORMAL FINDINGS ON DIAGNOSTIC IMAGING OF OTHER SPECIFIED BODY STRUCTURES: ICD-10-CM

## 2021-01-01 DIAGNOSIS — R06.83 SNORING: ICD-10-CM

## 2021-01-01 DIAGNOSIS — I50.9 HEART FAILURE, UNSPECIFIED: ICD-10-CM

## 2021-01-01 DIAGNOSIS — Z99.81 CHRONIC RESPIRATORY FAILURE WITH HYPOXIA: ICD-10-CM

## 2021-01-01 LAB
ALBUMIN SERPL ELPH-MCNC: 3.2 G/DL — LOW (ref 3.3–5)
ALBUMIN SERPL ELPH-MCNC: 3.3 G/DL — SIGNIFICANT CHANGE UP (ref 3.3–5)
ALBUMIN SERPL ELPH-MCNC: 3.4 G/DL — SIGNIFICANT CHANGE UP (ref 3.3–5)
ALBUMIN SERPL ELPH-MCNC: 3.4 G/DL — SIGNIFICANT CHANGE UP (ref 3.3–5)
ALBUMIN SERPL ELPH-MCNC: 3.5 G/DL — SIGNIFICANT CHANGE UP (ref 3.3–5)
ALBUMIN SERPL ELPH-MCNC: 3.5 G/DL — SIGNIFICANT CHANGE UP (ref 3.3–5)
ALBUMIN SERPL ELPH-MCNC: 3.6 G/DL — SIGNIFICANT CHANGE UP (ref 3.3–5)
ALBUMIN SERPL ELPH-MCNC: 3.7 G/DL — SIGNIFICANT CHANGE UP (ref 3.3–5)
ALBUMIN SERPL ELPH-MCNC: 3.7 G/DL — SIGNIFICANT CHANGE UP (ref 3.3–5)
ALBUMIN SERPL ELPH-MCNC: 3.8 G/DL — SIGNIFICANT CHANGE UP (ref 3.3–5)
ALBUMIN SERPL ELPH-MCNC: 3.9 G/DL
ALBUMIN SERPL ELPH-MCNC: 3.9 G/DL — SIGNIFICANT CHANGE UP (ref 3.3–5)
ALBUMIN SERPL ELPH-MCNC: 4 G/DL
ALBUMIN SERPL ELPH-MCNC: 4 G/DL
ALBUMIN SERPL ELPH-MCNC: 4 G/DL — SIGNIFICANT CHANGE UP (ref 3.3–5)
ALBUMIN SERPL ELPH-MCNC: 4.1 G/DL
ALBUMIN SERPL ELPH-MCNC: 4.1 G/DL — SIGNIFICANT CHANGE UP (ref 3.3–5)
ALBUMIN SERPL ELPH-MCNC: 4.2 G/DL
ALBUMIN SERPL ELPH-MCNC: 4.3 G/DL
ALP BLD-CCNC: 65 U/L
ALP BLD-CCNC: 65 U/L
ALP BLD-CCNC: 68 U/L
ALP BLD-CCNC: 80 U/L
ALP BLD-CCNC: 82 U/L
ALP BLD-CCNC: 85 U/L
ALP BLD-CCNC: 85 U/L
ALP BLD-CCNC: 86 U/L
ALP BLD-CCNC: 87 U/L
ALP SERPL-CCNC: 130 U/L — HIGH (ref 40–120)
ALP SERPL-CCNC: 135 U/L — HIGH (ref 40–120)
ALP SERPL-CCNC: 151 U/L — HIGH (ref 40–120)
ALP SERPL-CCNC: 151 U/L — HIGH (ref 40–120)
ALP SERPL-CCNC: 157 U/L — HIGH (ref 40–120)
ALP SERPL-CCNC: 159 U/L — HIGH (ref 40–120)
ALP SERPL-CCNC: 165 U/L — HIGH (ref 40–120)
ALP SERPL-CCNC: 168 U/L — HIGH (ref 40–120)
ALP SERPL-CCNC: 169 U/L — HIGH (ref 40–120)
ALP SERPL-CCNC: 170 U/L — HIGH (ref 40–120)
ALP SERPL-CCNC: 171 U/L — HIGH (ref 40–120)
ALP SERPL-CCNC: 172 U/L — HIGH (ref 40–120)
ALP SERPL-CCNC: 172 U/L — HIGH (ref 40–120)
ALP SERPL-CCNC: 177 U/L — HIGH (ref 40–120)
ALP SERPL-CCNC: 181 U/L — HIGH (ref 40–120)
ALP SERPL-CCNC: 195 U/L — HIGH (ref 40–120)
ALP SERPL-CCNC: 208 U/L — HIGH (ref 40–120)
ALP SERPL-CCNC: 219 U/L — HIGH (ref 40–120)
ALP SERPL-CCNC: 223 U/L — HIGH (ref 40–120)
ALP SERPL-CCNC: 233 U/L — HIGH (ref 40–120)
ALP SERPL-CCNC: 268 U/L — HIGH (ref 40–120)
ALT FLD-CCNC: 104 U/L — HIGH (ref 4–41)
ALT FLD-CCNC: 116 U/L — HIGH (ref 4–41)
ALT FLD-CCNC: 118 U/L — HIGH (ref 4–41)
ALT FLD-CCNC: 119 U/L — HIGH (ref 4–41)
ALT FLD-CCNC: 124 U/L — HIGH (ref 4–41)
ALT FLD-CCNC: 131 U/L — HIGH (ref 4–41)
ALT FLD-CCNC: 1394 U/L — HIGH (ref 4–41)
ALT FLD-CCNC: 151 U/L — HIGH (ref 4–41)
ALT FLD-CCNC: 211 U/L — HIGH (ref 4–41)
ALT FLD-CCNC: 225 U/L — HIGH (ref 4–41)
ALT FLD-CCNC: 36 U/L — SIGNIFICANT CHANGE UP (ref 4–41)
ALT FLD-CCNC: 38 U/L — SIGNIFICANT CHANGE UP (ref 4–41)
ALT FLD-CCNC: 42 U/L — HIGH (ref 4–41)
ALT FLD-CCNC: 46 U/L — HIGH (ref 4–41)
ALT FLD-CCNC: 58 U/L — HIGH (ref 4–41)
ALT FLD-CCNC: 63 U/L — HIGH (ref 4–41)
ALT FLD-CCNC: 66 U/L — HIGH (ref 4–41)
ALT FLD-CCNC: 745 U/L — HIGH (ref 4–41)
ALT FLD-CCNC: 79 U/L — HIGH (ref 4–41)
ALT FLD-CCNC: 84 U/L — HIGH (ref 4–41)
ALT FLD-CCNC: 955 U/L — HIGH (ref 4–41)
ALT SERPL-CCNC: 11 U/L
ALT SERPL-CCNC: 14 U/L
ALT SERPL-CCNC: 15 U/L
ALT SERPL-CCNC: 16 U/L
ALT SERPL-CCNC: 19 U/L
ALT SERPL-CCNC: 21 U/L
ALT SERPL-CCNC: 24 U/L
ALT SERPL-CCNC: 25 U/L
ALT SERPL-CCNC: 27 U/L
ANION GAP SERPL CALC-SCNC: 10 MMOL/L
ANION GAP SERPL CALC-SCNC: 10 MMOL/L
ANION GAP SERPL CALC-SCNC: 10 MMOL/L — SIGNIFICANT CHANGE UP (ref 7–14)
ANION GAP SERPL CALC-SCNC: 11 MMOL/L — SIGNIFICANT CHANGE UP (ref 7–14)
ANION GAP SERPL CALC-SCNC: 12 MMOL/L
ANION GAP SERPL CALC-SCNC: 12 MMOL/L — SIGNIFICANT CHANGE UP (ref 7–14)
ANION GAP SERPL CALC-SCNC: 12 MMOL/L — SIGNIFICANT CHANGE UP (ref 7–14)
ANION GAP SERPL CALC-SCNC: 13 MMOL/L
ANION GAP SERPL CALC-SCNC: 13 MMOL/L
ANION GAP SERPL CALC-SCNC: 13 MMOL/L — SIGNIFICANT CHANGE UP (ref 7–14)
ANION GAP SERPL CALC-SCNC: 14 MMOL/L — SIGNIFICANT CHANGE UP (ref 7–14)
ANION GAP SERPL CALC-SCNC: 15 MMOL/L — HIGH (ref 7–14)
ANION GAP SERPL CALC-SCNC: 15 MMOL/L — HIGH (ref 7–14)
ANION GAP SERPL CALC-SCNC: 16 MMOL/L
ANION GAP SERPL CALC-SCNC: 16 MMOL/L
ANION GAP SERPL CALC-SCNC: 17 MMOL/L — HIGH (ref 7–14)
ANION GAP SERPL CALC-SCNC: 18 MMOL/L — HIGH (ref 7–14)
ANION GAP SERPL CALC-SCNC: 19 MMOL/L — HIGH (ref 7–14)
ANION GAP SERPL CALC-SCNC: 8 MMOL/L — SIGNIFICANT CHANGE UP (ref 7–14)
AST SERPL-CCNC: 107 U/L — HIGH (ref 4–40)
AST SERPL-CCNC: 121 U/L — HIGH (ref 4–40)
AST SERPL-CCNC: 188 U/L — HIGH (ref 4–40)
AST SERPL-CCNC: 21 U/L
AST SERPL-CCNC: 22 U/L
AST SERPL-CCNC: 23 U/L
AST SERPL-CCNC: 24 U/L
AST SERPL-CCNC: 25 U/L
AST SERPL-CCNC: 26 U/L
AST SERPL-CCNC: 27 U/L
AST SERPL-CCNC: 28 U/L
AST SERPL-CCNC: 31 U/L
AST SERPL-CCNC: 33 U/L — SIGNIFICANT CHANGE UP (ref 4–40)
AST SERPL-CCNC: 331 U/L — HIGH (ref 4–40)
AST SERPL-CCNC: 34 U/L — SIGNIFICANT CHANGE UP (ref 4–40)
AST SERPL-CCNC: 43 U/L — HIGH (ref 4–40)
AST SERPL-CCNC: 45 U/L — HIGH (ref 4–40)
AST SERPL-CCNC: 49 U/L — HIGH (ref 4–40)
AST SERPL-CCNC: 53 U/L — HIGH (ref 4–40)
AST SERPL-CCNC: 59 U/L — HIGH (ref 4–40)
AST SERPL-CCNC: 61 U/L — HIGH (ref 4–40)
AST SERPL-CCNC: 64 U/L — HIGH (ref 4–40)
AST SERPL-CCNC: 65 U/L — HIGH (ref 4–40)
AST SERPL-CCNC: 66 U/L — HIGH (ref 4–40)
AST SERPL-CCNC: 66 U/L — HIGH (ref 4–40)
AST SERPL-CCNC: 68 U/L — HIGH (ref 4–40)
AST SERPL-CCNC: 75 U/L — HIGH (ref 4–40)
AST SERPL-CCNC: 76 U/L — HIGH (ref 4–40)
AST SERPL-CCNC: 80 U/L — HIGH (ref 4–40)
AST SERPL-CCNC: 955 U/L — HIGH (ref 4–40)
B PERT DNA SPEC QL NAA+PROBE: SIGNIFICANT CHANGE UP
B PERT+PARAPERT DNA PNL SPEC NAA+PROBE: SIGNIFICANT CHANGE UP
BASE EXCESS BLDA CALC-SCNC: 10.9 MMOL/L — HIGH (ref -2–3)
BASE EXCESS BLDA CALC-SCNC: 12.7 MMOL/L — HIGH (ref -2–3)
BASE EXCESS BLDV CALC-SCNC: 16.1 MMOL/L — HIGH (ref -2–3)
BASOPHILS # BLD AUTO: 0 K/UL — SIGNIFICANT CHANGE UP (ref 0–0.2)
BASOPHILS # BLD AUTO: 0.01 K/UL — SIGNIFICANT CHANGE UP (ref 0–0.2)
BASOPHILS # BLD AUTO: 0.02 K/UL — SIGNIFICANT CHANGE UP (ref 0–0.2)
BASOPHILS # BLD AUTO: 0.03 K/UL — SIGNIFICANT CHANGE UP (ref 0–0.2)
BASOPHILS # BLD AUTO: 0.04 K/UL — SIGNIFICANT CHANGE UP (ref 0–0.2)
BASOPHILS # BLD AUTO: 0.05 K/UL — SIGNIFICANT CHANGE UP (ref 0–0.2)
BASOPHILS # BLD AUTO: 0.06 K/UL — SIGNIFICANT CHANGE UP (ref 0–0.2)
BASOPHILS # BLD AUTO: 0.07 K/UL — SIGNIFICANT CHANGE UP (ref 0–0.2)
BASOPHILS # BLD AUTO: 0.08 K/UL — SIGNIFICANT CHANGE UP (ref 0–0.2)
BASOPHILS # BLD AUTO: 0.08 K/UL — SIGNIFICANT CHANGE UP (ref 0–0.2)
BASOPHILS # BLD AUTO: 0.09 K/UL — SIGNIFICANT CHANGE UP (ref 0–0.2)
BASOPHILS # BLD AUTO: 0.09 K/UL — SIGNIFICANT CHANGE UP (ref 0–0.2)
BASOPHILS # BLD AUTO: 0.11 K/UL — SIGNIFICANT CHANGE UP (ref 0–0.2)
BASOPHILS # BLD AUTO: 0.12 K/UL — SIGNIFICANT CHANGE UP (ref 0–0.2)
BASOPHILS # BLD AUTO: 0.12 K/UL — SIGNIFICANT CHANGE UP (ref 0–0.2)
BASOPHILS NFR BLD AUTO: 0 % — SIGNIFICANT CHANGE UP (ref 0–2)
BASOPHILS NFR BLD AUTO: 0.1 % — SIGNIFICANT CHANGE UP (ref 0–2)
BASOPHILS NFR BLD AUTO: 0.2 % — SIGNIFICANT CHANGE UP (ref 0–2)
BASOPHILS NFR BLD AUTO: 0.2 % — SIGNIFICANT CHANGE UP (ref 0–2)
BASOPHILS NFR BLD AUTO: 0.3 % — SIGNIFICANT CHANGE UP (ref 0–2)
BASOPHILS NFR BLD AUTO: 0.4 % — SIGNIFICANT CHANGE UP (ref 0–2)
BASOPHILS NFR BLD AUTO: 0.4 % — SIGNIFICANT CHANGE UP (ref 0–2)
BASOPHILS NFR BLD AUTO: 0.5 % — SIGNIFICANT CHANGE UP (ref 0–2)
BASOPHILS NFR BLD AUTO: 0.5 % — SIGNIFICANT CHANGE UP (ref 0–2)
BASOPHILS NFR BLD AUTO: 0.6 % — SIGNIFICANT CHANGE UP (ref 0–2)
BASOPHILS NFR BLD AUTO: 0.8 % — SIGNIFICANT CHANGE UP (ref 0–2)
BASOPHILS NFR BLD AUTO: 0.8 % — SIGNIFICANT CHANGE UP (ref 0–2)
BASOPHILS NFR BLD AUTO: 1 % — SIGNIFICANT CHANGE UP (ref 0–2)
BILIRUB SERPL-MCNC: 0.2 MG/DL
BILIRUB SERPL-MCNC: 0.3 MG/DL
BILIRUB SERPL-MCNC: 0.4 MG/DL
BILIRUB SERPL-MCNC: 0.5 MG/DL
BILIRUB SERPL-MCNC: 0.5 MG/DL
BILIRUB SERPL-MCNC: 0.6 MG/DL
BILIRUB SERPL-MCNC: 0.7 MG/DL — SIGNIFICANT CHANGE UP (ref 0.2–1.2)
BILIRUB SERPL-MCNC: 0.8 MG/DL — SIGNIFICANT CHANGE UP (ref 0.2–1.2)
BILIRUB SERPL-MCNC: 0.9 MG/DL — SIGNIFICANT CHANGE UP (ref 0.2–1.2)
BILIRUB SERPL-MCNC: 1 MG/DL — SIGNIFICANT CHANGE UP (ref 0.2–1.2)
BILIRUB SERPL-MCNC: 1.1 MG/DL — SIGNIFICANT CHANGE UP (ref 0.2–1.2)
BILIRUB SERPL-MCNC: 1.2 MG/DL — SIGNIFICANT CHANGE UP (ref 0.2–1.2)
BILIRUB SERPL-MCNC: 1.8 MG/DL — HIGH (ref 0.2–1.2)
BILIRUB SERPL-MCNC: 2.1 MG/DL — HIGH (ref 0.2–1.2)
BILIRUB SERPL-MCNC: 2.1 MG/DL — HIGH (ref 0.2–1.2)
BLASTS # FLD: 1 % — HIGH (ref 0–0)
BLASTS # FLD: 1 % — HIGH (ref 0–0)
BORDETELLA PARAPERTUSSIS (RAPRVP): SIGNIFICANT CHANGE UP
BUN SERPL-MCNC: 14 MG/DL
BUN SERPL-MCNC: 16 MG/DL
BUN SERPL-MCNC: 16 MG/DL
BUN SERPL-MCNC: 17 MG/DL
BUN SERPL-MCNC: 18 MG/DL
BUN SERPL-MCNC: 18 MG/DL — SIGNIFICANT CHANGE UP (ref 7–23)
BUN SERPL-MCNC: 20 MG/DL
BUN SERPL-MCNC: 20 MG/DL
BUN SERPL-MCNC: 23 MG/DL — SIGNIFICANT CHANGE UP (ref 7–23)
BUN SERPL-MCNC: 24 MG/DL — HIGH (ref 7–23)
BUN SERPL-MCNC: 25 MG/DL — HIGH (ref 7–23)
BUN SERPL-MCNC: 25 MG/DL — HIGH (ref 7–23)
BUN SERPL-MCNC: 26 MG/DL — HIGH (ref 7–23)
BUN SERPL-MCNC: 26 MG/DL — HIGH (ref 7–23)
BUN SERPL-MCNC: 33 MG/DL — HIGH (ref 7–23)
BUN SERPL-MCNC: 35 MG/DL — HIGH (ref 7–23)
BUN SERPL-MCNC: 37 MG/DL — HIGH (ref 7–23)
BUN SERPL-MCNC: 38 MG/DL — HIGH (ref 7–23)
BUN SERPL-MCNC: 38 MG/DL — HIGH (ref 7–23)
BUN SERPL-MCNC: 42 MG/DL — HIGH (ref 7–23)
BUN SERPL-MCNC: 42 MG/DL — HIGH (ref 7–23)
BUN SERPL-MCNC: 57 MG/DL — HIGH (ref 7–23)
BUN SERPL-MCNC: 58 MG/DL — HIGH (ref 7–23)
BUN SERPL-MCNC: 60 MG/DL — HIGH (ref 7–23)
BUN SERPL-MCNC: 76 MG/DL — HIGH (ref 7–23)
BUN SERPL-MCNC: 83 MG/DL — HIGH (ref 7–23)
BUN SERPL-MCNC: 86 MG/DL — HIGH (ref 7–23)
BUN SERPL-MCNC: 99 MG/DL — HIGH (ref 7–23)
C PNEUM DNA SPEC QL NAA+PROBE: SIGNIFICANT CHANGE UP
CA-I SERPL-SCNC: 1.14 MMOL/L — LOW (ref 1.15–1.33)
CALCIUM SERPL-MCNC: 10 MG/DL — SIGNIFICANT CHANGE UP (ref 8.4–10.5)
CALCIUM SERPL-MCNC: 10.3 MG/DL — SIGNIFICANT CHANGE UP (ref 8.4–10.5)
CALCIUM SERPL-MCNC: 8.8 MG/DL — SIGNIFICANT CHANGE UP (ref 8.4–10.5)
CALCIUM SERPL-MCNC: 8.8 MG/DL — SIGNIFICANT CHANGE UP (ref 8.4–10.5)
CALCIUM SERPL-MCNC: 8.9 MG/DL — SIGNIFICANT CHANGE UP (ref 8.4–10.5)
CALCIUM SERPL-MCNC: 9.1 MG/DL
CALCIUM SERPL-MCNC: 9.1 MG/DL
CALCIUM SERPL-MCNC: 9.1 MG/DL — SIGNIFICANT CHANGE UP (ref 8.4–10.5)
CALCIUM SERPL-MCNC: 9.1 MG/DL — SIGNIFICANT CHANGE UP (ref 8.4–10.5)
CALCIUM SERPL-MCNC: 9.2 MG/DL — SIGNIFICANT CHANGE UP (ref 8.4–10.5)
CALCIUM SERPL-MCNC: 9.3 MG/DL
CALCIUM SERPL-MCNC: 9.3 MG/DL
CALCIUM SERPL-MCNC: 9.3 MG/DL — SIGNIFICANT CHANGE UP (ref 8.4–10.5)
CALCIUM SERPL-MCNC: 9.3 MG/DL — SIGNIFICANT CHANGE UP (ref 8.4–10.5)
CALCIUM SERPL-MCNC: 9.4 MG/DL
CALCIUM SERPL-MCNC: 9.4 MG/DL — SIGNIFICANT CHANGE UP (ref 8.4–10.5)
CALCIUM SERPL-MCNC: 9.5 MG/DL
CALCIUM SERPL-MCNC: 9.5 MG/DL — SIGNIFICANT CHANGE UP (ref 8.4–10.5)
CALCIUM SERPL-MCNC: 9.6 MG/DL
CALCIUM SERPL-MCNC: 9.6 MG/DL
CALCIUM SERPL-MCNC: 9.6 MG/DL — SIGNIFICANT CHANGE UP (ref 8.4–10.5)
CALCIUM SERPL-MCNC: 9.7 MG/DL
CALCIUM SERPL-MCNC: 9.7 MG/DL — SIGNIFICANT CHANGE UP (ref 8.4–10.5)
CALCIUM SERPL-MCNC: 9.8 MG/DL — SIGNIFICANT CHANGE UP (ref 8.4–10.5)
CEA SERPL-MCNC: 119 NG/ML
CEA SERPL-MCNC: 21.4 NG/ML
CEA SERPL-MCNC: 52 NG/ML
CEA SERPL-MCNC: 71.6 NG/ML
CEA SERPL-MCNC: 87.7 NG/ML
CHLORIDE BLDV-SCNC: 95 MMOL/L — LOW (ref 96–108)
CHLORIDE SERPL-SCNC: 100 MMOL/L
CHLORIDE SERPL-SCNC: 101 MMOL/L
CHLORIDE SERPL-SCNC: 101 MMOL/L — SIGNIFICANT CHANGE UP (ref 98–107)
CHLORIDE SERPL-SCNC: 102 MMOL/L
CHLORIDE SERPL-SCNC: 102 MMOL/L — SIGNIFICANT CHANGE UP (ref 98–107)
CHLORIDE SERPL-SCNC: 103 MMOL/L — SIGNIFICANT CHANGE UP (ref 98–107)
CHLORIDE SERPL-SCNC: 104 MMOL/L
CHLORIDE SERPL-SCNC: 104 MMOL/L
CHLORIDE SERPL-SCNC: 105 MMOL/L
CHLORIDE SERPL-SCNC: 106 MMOL/L
CHLORIDE SERPL-SCNC: 91 MMOL/L — LOW (ref 98–107)
CHLORIDE SERPL-SCNC: 93 MMOL/L — LOW (ref 98–107)
CHLORIDE SERPL-SCNC: 94 MMOL/L — LOW (ref 98–107)
CHLORIDE SERPL-SCNC: 95 MMOL/L — LOW (ref 98–107)
CHLORIDE SERPL-SCNC: 96 MMOL/L — LOW (ref 98–107)
CHLORIDE SERPL-SCNC: 96 MMOL/L — LOW (ref 98–107)
CHLORIDE SERPL-SCNC: 97 MMOL/L — LOW (ref 98–107)
CHLORIDE SERPL-SCNC: 97 MMOL/L — LOW (ref 98–107)
CHLORIDE SERPL-SCNC: 99 MMOL/L — SIGNIFICANT CHANGE UP (ref 98–107)
CO2 BLDA-SCNC: 37 MMOL/L — HIGH (ref 19–24)
CO2 BLDA-SCNC: 40 MMOL/L — HIGH (ref 19–24)
CO2 BLDV-SCNC: 45 MMOL/L — HIGH (ref 22–26)
CO2 SERPL-SCNC: 23 MMOL/L
CO2 SERPL-SCNC: 24 MMOL/L — SIGNIFICANT CHANGE UP (ref 22–31)
CO2 SERPL-SCNC: 25 MMOL/L
CO2 SERPL-SCNC: 26 MMOL/L
CO2 SERPL-SCNC: 26 MMOL/L
CO2 SERPL-SCNC: 26 MMOL/L — SIGNIFICANT CHANGE UP (ref 22–31)
CO2 SERPL-SCNC: 27 MMOL/L
CO2 SERPL-SCNC: 27 MMOL/L
CO2 SERPL-SCNC: 28 MMOL/L — SIGNIFICANT CHANGE UP (ref 22–31)
CO2 SERPL-SCNC: 28 MMOL/L — SIGNIFICANT CHANGE UP (ref 22–31)
CO2 SERPL-SCNC: 29 MMOL/L
CO2 SERPL-SCNC: 29 MMOL/L — SIGNIFICANT CHANGE UP (ref 22–31)
CO2 SERPL-SCNC: 31 MMOL/L — SIGNIFICANT CHANGE UP (ref 22–31)
CO2 SERPL-SCNC: 31 MMOL/L — SIGNIFICANT CHANGE UP (ref 22–31)
CO2 SERPL-SCNC: 32 MMOL/L — HIGH (ref 22–31)
CO2 SERPL-SCNC: 33 MMOL/L — HIGH (ref 22–31)
CO2 SERPL-SCNC: 35 MMOL/L — HIGH (ref 22–31)
CO2 SERPL-SCNC: 35 MMOL/L — HIGH (ref 22–31)
CO2 SERPL-SCNC: 36 MMOL/L — HIGH (ref 22–31)
COVID-19 SPIKE DOMAIN AB INTERP: POSITIVE
COVID-19 SPIKE DOMAIN ANTIBODY RESULT: 81.1 U/ML — HIGH
CREAT SERPL-MCNC: 0.92 MG/DL — SIGNIFICANT CHANGE UP (ref 0.5–1.3)
CREAT SERPL-MCNC: 0.92 MG/DL — SIGNIFICANT CHANGE UP (ref 0.5–1.3)
CREAT SERPL-MCNC: 0.93 MG/DL — SIGNIFICANT CHANGE UP (ref 0.5–1.3)
CREAT SERPL-MCNC: 0.94 MG/DL
CREAT SERPL-MCNC: 0.95 MG/DL — SIGNIFICANT CHANGE UP (ref 0.5–1.3)
CREAT SERPL-MCNC: 0.95 MG/DL — SIGNIFICANT CHANGE UP (ref 0.5–1.3)
CREAT SERPL-MCNC: 0.96 MG/DL — SIGNIFICANT CHANGE UP (ref 0.5–1.3)
CREAT SERPL-MCNC: 0.98 MG/DL
CREAT SERPL-MCNC: 0.99 MG/DL
CREAT SERPL-MCNC: 0.99 MG/DL — SIGNIFICANT CHANGE UP (ref 0.5–1.3)
CREAT SERPL-MCNC: 1 MG/DL
CREAT SERPL-MCNC: 1 MG/DL
CREAT SERPL-MCNC: 1 MG/DL — SIGNIFICANT CHANGE UP (ref 0.5–1.3)
CREAT SERPL-MCNC: 1 MG/DL — SIGNIFICANT CHANGE UP (ref 0.5–1.3)
CREAT SERPL-MCNC: 1.01 MG/DL — SIGNIFICANT CHANGE UP (ref 0.5–1.3)
CREAT SERPL-MCNC: 1.01 MG/DL — SIGNIFICANT CHANGE UP (ref 0.5–1.3)
CREAT SERPL-MCNC: 1.03 MG/DL
CREAT SERPL-MCNC: 1.04 MG/DL — SIGNIFICANT CHANGE UP (ref 0.5–1.3)
CREAT SERPL-MCNC: 1.08 MG/DL
CREAT SERPL-MCNC: 1.14 MG/DL
CREAT SERPL-MCNC: 1.14 MG/DL — SIGNIFICANT CHANGE UP (ref 0.5–1.3)
CREAT SERPL-MCNC: 1.14 MG/DL — SIGNIFICANT CHANGE UP (ref 0.5–1.3)
CREAT SERPL-MCNC: 1.16 MG/DL
CREAT SERPL-MCNC: 1.17 MG/DL — SIGNIFICANT CHANGE UP (ref 0.5–1.3)
CREAT SERPL-MCNC: 1.24 MG/DL — SIGNIFICANT CHANGE UP (ref 0.5–1.3)
CREAT SERPL-MCNC: 1.42 MG/DL — HIGH (ref 0.5–1.3)
CREAT SERPL-MCNC: 1.51 MG/DL — HIGH (ref 0.5–1.3)
CREAT SERPL-MCNC: 1.62 MG/DL — HIGH (ref 0.5–1.3)
CREAT SERPL-MCNC: 2.07 MG/DL — HIGH (ref 0.5–1.3)
CREAT SERPL-MCNC: 2.16 MG/DL — HIGH (ref 0.5–1.3)
CULTURE RESULTS: SIGNIFICANT CHANGE UP
EOSINOPHIL # BLD AUTO: 0 K/UL — SIGNIFICANT CHANGE UP (ref 0–0.5)
EOSINOPHIL # BLD AUTO: 0.01 K/UL — SIGNIFICANT CHANGE UP (ref 0–0.5)
EOSINOPHIL # BLD AUTO: 0.02 K/UL — SIGNIFICANT CHANGE UP (ref 0–0.5)
EOSINOPHIL # BLD AUTO: 0.03 K/UL — SIGNIFICANT CHANGE UP (ref 0–0.5)
EOSINOPHIL # BLD AUTO: 0.04 K/UL — SIGNIFICANT CHANGE UP (ref 0–0.5)
EOSINOPHIL # BLD AUTO: 0.05 K/UL — SIGNIFICANT CHANGE UP (ref 0–0.5)
EOSINOPHIL # BLD AUTO: 0.06 K/UL — SIGNIFICANT CHANGE UP (ref 0–0.5)
EOSINOPHIL # BLD AUTO: 0.08 K/UL — SIGNIFICANT CHANGE UP (ref 0–0.5)
EOSINOPHIL # BLD AUTO: 0.09 K/UL — SIGNIFICANT CHANGE UP (ref 0–0.5)
EOSINOPHIL # BLD AUTO: 0.09 K/UL — SIGNIFICANT CHANGE UP (ref 0–0.5)
EOSINOPHIL # BLD AUTO: 0.1 K/UL — SIGNIFICANT CHANGE UP (ref 0–0.5)
EOSINOPHIL # BLD AUTO: 0.11 K/UL — SIGNIFICANT CHANGE UP (ref 0–0.5)
EOSINOPHIL # BLD AUTO: 0.12 K/UL — SIGNIFICANT CHANGE UP (ref 0–0.5)
EOSINOPHIL # BLD AUTO: 0.14 K/UL — SIGNIFICANT CHANGE UP (ref 0–0.5)
EOSINOPHIL # BLD AUTO: 0.15 K/UL — SIGNIFICANT CHANGE UP (ref 0–0.5)
EOSINOPHIL # BLD AUTO: 0.23 K/UL — SIGNIFICANT CHANGE UP (ref 0–0.5)
EOSINOPHIL NFR BLD AUTO: 0 % — SIGNIFICANT CHANGE UP (ref 0–6)
EOSINOPHIL NFR BLD AUTO: 0.1 % — SIGNIFICANT CHANGE UP (ref 0–6)
EOSINOPHIL NFR BLD AUTO: 0.2 % — SIGNIFICANT CHANGE UP (ref 0–6)
EOSINOPHIL NFR BLD AUTO: 0.3 % — SIGNIFICANT CHANGE UP (ref 0–6)
EOSINOPHIL NFR BLD AUTO: 0.3 % — SIGNIFICANT CHANGE UP (ref 0–6)
EOSINOPHIL NFR BLD AUTO: 0.4 % — SIGNIFICANT CHANGE UP (ref 0–6)
EOSINOPHIL NFR BLD AUTO: 0.6 % — SIGNIFICANT CHANGE UP (ref 0–6)
EOSINOPHIL NFR BLD AUTO: 0.6 % — SIGNIFICANT CHANGE UP (ref 0–6)
EOSINOPHIL NFR BLD AUTO: 0.7 % — SIGNIFICANT CHANGE UP (ref 0–6)
EOSINOPHIL NFR BLD AUTO: 1 % — SIGNIFICANT CHANGE UP (ref 0–6)
EOSINOPHIL NFR BLD AUTO: 1.2 % — SIGNIFICANT CHANGE UP (ref 0–6)
EOSINOPHIL NFR BLD AUTO: 1.9 % — SIGNIFICANT CHANGE UP (ref 0–6)
EOSINOPHIL NFR BLD AUTO: 2 % — SIGNIFICANT CHANGE UP (ref 0–6)
EOSINOPHIL NFR BLD AUTO: 3 % — SIGNIFICANT CHANGE UP (ref 0–6)
FLUAV SUBTYP SPEC NAA+PROBE: SIGNIFICANT CHANGE UP
FLUBV RNA SPEC QL NAA+PROBE: SIGNIFICANT CHANGE UP
GAS PNL BLDA: SIGNIFICANT CHANGE UP
GAS PNL BLDV: 140 MMOL/L — SIGNIFICANT CHANGE UP (ref 136–145)
GAS PNL BLDV: SIGNIFICANT CHANGE UP
GLUCOSE BLDV-MCNC: 134 MG/DL — HIGH (ref 70–99)
GLUCOSE SERPL-MCNC: 100 MG/DL
GLUCOSE SERPL-MCNC: 101 MG/DL — HIGH (ref 70–99)
GLUCOSE SERPL-MCNC: 102 MG/DL
GLUCOSE SERPL-MCNC: 103 MG/DL
GLUCOSE SERPL-MCNC: 106 MG/DL
GLUCOSE SERPL-MCNC: 106 MG/DL — HIGH (ref 70–99)
GLUCOSE SERPL-MCNC: 108 MG/DL
GLUCOSE SERPL-MCNC: 108 MG/DL — HIGH (ref 70–99)
GLUCOSE SERPL-MCNC: 110 MG/DL — HIGH (ref 70–99)
GLUCOSE SERPL-MCNC: 111 MG/DL — HIGH (ref 70–99)
GLUCOSE SERPL-MCNC: 113 MG/DL — HIGH (ref 70–99)
GLUCOSE SERPL-MCNC: 115 MG/DL — HIGH (ref 70–99)
GLUCOSE SERPL-MCNC: 116 MG/DL — HIGH (ref 70–99)
GLUCOSE SERPL-MCNC: 117 MG/DL
GLUCOSE SERPL-MCNC: 117 MG/DL — HIGH (ref 70–99)
GLUCOSE SERPL-MCNC: 118 MG/DL — HIGH (ref 70–99)
GLUCOSE SERPL-MCNC: 125 MG/DL — HIGH (ref 70–99)
GLUCOSE SERPL-MCNC: 128 MG/DL
GLUCOSE SERPL-MCNC: 142 MG/DL
GLUCOSE SERPL-MCNC: 150 MG/DL — HIGH (ref 70–99)
GLUCOSE SERPL-MCNC: 71 MG/DL — SIGNIFICANT CHANGE UP (ref 70–99)
GLUCOSE SERPL-MCNC: 87 MG/DL — SIGNIFICANT CHANGE UP (ref 70–99)
GLUCOSE SERPL-MCNC: 91 MG/DL — SIGNIFICANT CHANGE UP (ref 70–99)
GLUCOSE SERPL-MCNC: 92 MG/DL — SIGNIFICANT CHANGE UP (ref 70–99)
GLUCOSE SERPL-MCNC: 94 MG/DL — SIGNIFICANT CHANGE UP (ref 70–99)
GLUCOSE SERPL-MCNC: 95 MG/DL — SIGNIFICANT CHANGE UP (ref 70–99)
GLUCOSE SERPL-MCNC: 95 MG/DL — SIGNIFICANT CHANGE UP (ref 70–99)
GLUCOSE SERPL-MCNC: 96 MG/DL
GLUCOSE SERPL-MCNC: 96 MG/DL — SIGNIFICANT CHANGE UP (ref 70–99)
GLUCOSE SERPL-MCNC: 97 MG/DL — SIGNIFICANT CHANGE UP (ref 70–99)
GRAM STN FLD: SIGNIFICANT CHANGE UP
HADV DNA SPEC QL NAA+PROBE: SIGNIFICANT CHANGE UP
HCO3 BLDA-SCNC: 36 MMOL/L — HIGH (ref 21–28)
HCO3 BLDA-SCNC: 38 MMOL/L — HIGH (ref 21–28)
HCO3 BLDV-SCNC: 43 MMOL/L — HIGH (ref 22–29)
HCOV 229E RNA SPEC QL NAA+PROBE: SIGNIFICANT CHANGE UP
HCOV HKU1 RNA SPEC QL NAA+PROBE: SIGNIFICANT CHANGE UP
HCOV NL63 RNA SPEC QL NAA+PROBE: SIGNIFICANT CHANGE UP
HCOV OC43 RNA SPEC QL NAA+PROBE: SIGNIFICANT CHANGE UP
HCT VFR BLD CALC: 30.5 % — LOW (ref 39–50)
HCT VFR BLD CALC: 30.7 % — LOW (ref 39–50)
HCT VFR BLD CALC: 32.7 % — LOW (ref 39–50)
HCT VFR BLD CALC: 33.3 % — LOW (ref 39–50)
HCT VFR BLD CALC: 34.3 % — LOW (ref 39–50)
HCT VFR BLD CALC: 34.5 % — LOW (ref 39–50)
HCT VFR BLD CALC: 34.6 % — LOW (ref 39–50)
HCT VFR BLD CALC: 36.2 % — LOW (ref 39–50)
HCT VFR BLD CALC: 37.3 % — LOW (ref 39–50)
HCT VFR BLD CALC: 38.1 % — LOW (ref 39–50)
HCT VFR BLD CALC: 38.2 % — LOW (ref 39–50)
HCT VFR BLD CALC: 38.3 % — LOW (ref 39–50)
HCT VFR BLD CALC: 38.7 % — LOW (ref 39–50)
HCT VFR BLD CALC: 39.3 % — SIGNIFICANT CHANGE UP (ref 39–50)
HCT VFR BLD CALC: 39.6 % — SIGNIFICANT CHANGE UP (ref 39–50)
HCT VFR BLD CALC: 39.6 % — SIGNIFICANT CHANGE UP (ref 39–50)
HCT VFR BLD CALC: 39.9 % — SIGNIFICANT CHANGE UP (ref 39–50)
HCT VFR BLD CALC: 41.4 % — SIGNIFICANT CHANGE UP (ref 39–50)
HCT VFR BLD CALC: 41.4 % — SIGNIFICANT CHANGE UP (ref 39–50)
HCT VFR BLD CALC: 41.8 % — SIGNIFICANT CHANGE UP (ref 39–50)
HCT VFR BLD CALC: 42 % — SIGNIFICANT CHANGE UP (ref 39–50)
HCT VFR BLD CALC: 42 % — SIGNIFICANT CHANGE UP (ref 39–50)
HCT VFR BLD CALC: 42.3 % — SIGNIFICANT CHANGE UP (ref 39–50)
HCT VFR BLD CALC: 42.5 % — SIGNIFICANT CHANGE UP (ref 39–50)
HCT VFR BLD CALC: 42.7 % — SIGNIFICANT CHANGE UP (ref 39–50)
HCT VFR BLD CALC: 42.9 % — SIGNIFICANT CHANGE UP (ref 39–50)
HCT VFR BLD CALC: 43.1 % — SIGNIFICANT CHANGE UP (ref 39–50)
HCT VFR BLD CALC: 43.2 % — SIGNIFICANT CHANGE UP (ref 39–50)
HCT VFR BLD CALC: 43.4 % — SIGNIFICANT CHANGE UP (ref 39–50)
HCT VFR BLD CALC: 43.5 % — SIGNIFICANT CHANGE UP (ref 39–50)
HCT VFR BLD CALC: 43.5 % — SIGNIFICANT CHANGE UP (ref 39–50)
HCT VFR BLD CALC: 43.7 % — SIGNIFICANT CHANGE UP (ref 39–50)
HCT VFR BLD CALC: 44 % — SIGNIFICANT CHANGE UP (ref 39–50)
HCT VFR BLD CALC: 44.1 % — SIGNIFICANT CHANGE UP (ref 39–50)
HCT VFR BLD CALC: 44.4 % — SIGNIFICANT CHANGE UP (ref 39–50)
HCT VFR BLD CALC: 44.6 % — SIGNIFICANT CHANGE UP (ref 39–50)
HCT VFR BLD CALC: 45.4 % — SIGNIFICANT CHANGE UP (ref 39–50)
HCT VFR BLD CALC: 45.4 % — SIGNIFICANT CHANGE UP (ref 39–50)
HCT VFR BLD CALC: 46.4 % — SIGNIFICANT CHANGE UP (ref 39–50)
HCT VFR BLD CALC: 47.1 % — SIGNIFICANT CHANGE UP (ref 39–50)
HCT VFR BLD CALC: 47.8 % — SIGNIFICANT CHANGE UP (ref 39–50)
HCT VFR BLD CALC: 48.6 % — SIGNIFICANT CHANGE UP (ref 39–50)
HCT VFR BLD CALC: 50.9 % — HIGH (ref 39–50)
HCT VFR BLD CALC: 55.8 % — HIGH (ref 39–50)
HCT VFR BLDA CALC: 38 % — LOW (ref 39–51)
HCV AB S/CO SERPL IA: 0.14 S/CO — SIGNIFICANT CHANGE UP (ref 0–0.99)
HCV AB SERPL-IMP: SIGNIFICANT CHANGE UP
HGB BLD CALC-MCNC: 12.8 G/DL — LOW (ref 13–17)
HGB BLD-MCNC: 10.3 G/DL — LOW (ref 13–17)
HGB BLD-MCNC: 10.6 G/DL — LOW (ref 13–17)
HGB BLD-MCNC: 11.2 G/DL — LOW (ref 13–17)
HGB BLD-MCNC: 11.4 G/DL — LOW (ref 13–17)
HGB BLD-MCNC: 11.6 G/DL — LOW (ref 13–17)
HGB BLD-MCNC: 11.8 G/DL — LOW (ref 13–17)
HGB BLD-MCNC: 11.8 G/DL — LOW (ref 13–17)
HGB BLD-MCNC: 12 G/DL — LOW (ref 13–17)
HGB BLD-MCNC: 12 G/DL — LOW (ref 13–17)
HGB BLD-MCNC: 12.2 G/DL — LOW (ref 13–17)
HGB BLD-MCNC: 12.2 G/DL — LOW (ref 13–17)
HGB BLD-MCNC: 12.3 G/DL — LOW (ref 13–17)
HGB BLD-MCNC: 12.3 G/DL — LOW (ref 13–17)
HGB BLD-MCNC: 12.4 G/DL — LOW (ref 13–17)
HGB BLD-MCNC: 12.6 G/DL — LOW (ref 13–17)
HGB BLD-MCNC: 12.6 G/DL — LOW (ref 13–17)
HGB BLD-MCNC: 12.8 G/DL — LOW (ref 13–17)
HGB BLD-MCNC: 13.1 G/DL — SIGNIFICANT CHANGE UP (ref 13–17)
HGB BLD-MCNC: 13.1 G/DL — SIGNIFICANT CHANGE UP (ref 13–17)
HGB BLD-MCNC: 13.3 G/DL — SIGNIFICANT CHANGE UP (ref 13–17)
HGB BLD-MCNC: 13.3 G/DL — SIGNIFICANT CHANGE UP (ref 13–17)
HGB BLD-MCNC: 13.4 G/DL — SIGNIFICANT CHANGE UP (ref 13–17)
HGB BLD-MCNC: 13.5 G/DL — SIGNIFICANT CHANGE UP (ref 13–17)
HGB BLD-MCNC: 13.6 G/DL — SIGNIFICANT CHANGE UP (ref 13–17)
HGB BLD-MCNC: 13.7 G/DL — SIGNIFICANT CHANGE UP (ref 13–17)
HGB BLD-MCNC: 13.9 G/DL — SIGNIFICANT CHANGE UP (ref 13–17)
HGB BLD-MCNC: 13.9 G/DL — SIGNIFICANT CHANGE UP (ref 13–17)
HGB BLD-MCNC: 14.1 G/DL — SIGNIFICANT CHANGE UP (ref 13–17)
HGB BLD-MCNC: 14.1 G/DL — SIGNIFICANT CHANGE UP (ref 13–17)
HGB BLD-MCNC: 14.2 G/DL — SIGNIFICANT CHANGE UP (ref 13–17)
HGB BLD-MCNC: 14.5 G/DL — SIGNIFICANT CHANGE UP (ref 13–17)
HGB BLD-MCNC: 14.6 G/DL — SIGNIFICANT CHANGE UP (ref 13–17)
HGB BLD-MCNC: 14.6 G/DL — SIGNIFICANT CHANGE UP (ref 13–17)
HGB BLD-MCNC: 14.9 G/DL — SIGNIFICANT CHANGE UP (ref 13–17)
HGB BLD-MCNC: 15 G/DL — SIGNIFICANT CHANGE UP (ref 13–17)
HGB BLD-MCNC: 15.1 G/DL — SIGNIFICANT CHANGE UP (ref 13–17)
HGB BLD-MCNC: 15.1 G/DL — SIGNIFICANT CHANGE UP (ref 13–17)
HGB BLD-MCNC: 15.7 G/DL — SIGNIFICANT CHANGE UP (ref 13–17)
HGB BLD-MCNC: 17.6 G/DL — HIGH (ref 13–17)
HMPV RNA SPEC QL NAA+PROBE: SIGNIFICANT CHANGE UP
HPIV1 RNA SPEC QL NAA+PROBE: SIGNIFICANT CHANGE UP
HPIV2 RNA SPEC QL NAA+PROBE: SIGNIFICANT CHANGE UP
HPIV3 RNA SPEC QL NAA+PROBE: SIGNIFICANT CHANGE UP
HPIV4 RNA SPEC QL NAA+PROBE: SIGNIFICANT CHANGE UP
IANC: 10.54 K/UL — HIGH (ref 1.5–8.5)
IANC: 14.5 K/UL — HIGH (ref 1.5–8.5)
IANC: 14.82 K/UL — HIGH (ref 1.5–8.5)
IANC: 16.68 K/UL — HIGH (ref 1.5–8.5)
IANC: 20.31 K/UL — HIGH (ref 1.5–8.5)
IANC: 20.59 K/UL — HIGH (ref 1.5–8.5)
IANC: 23.36 K/UL — HIGH (ref 1.5–8.5)
IMM GRANULOCYTES NFR BLD AUTO: 0.3 % — SIGNIFICANT CHANGE UP (ref 0–1.5)
IMM GRANULOCYTES NFR BLD AUTO: 0.4 % — SIGNIFICANT CHANGE UP (ref 0–1.5)
IMM GRANULOCYTES NFR BLD AUTO: 0.5 % — SIGNIFICANT CHANGE UP (ref 0–1.5)
IMM GRANULOCYTES NFR BLD AUTO: 0.6 % — SIGNIFICANT CHANGE UP (ref 0–1.5)
IMM GRANULOCYTES NFR BLD AUTO: 0.7 % — SIGNIFICANT CHANGE UP (ref 0–1.5)
IMM GRANULOCYTES NFR BLD AUTO: 0.7 % — SIGNIFICANT CHANGE UP (ref 0–1.5)
IMM GRANULOCYTES NFR BLD AUTO: 0.8 % — SIGNIFICANT CHANGE UP (ref 0–1.5)
IMM GRANULOCYTES NFR BLD AUTO: 0.8 % — SIGNIFICANT CHANGE UP (ref 0–1.5)
IMM GRANULOCYTES NFR BLD AUTO: 0.9 % — SIGNIFICANT CHANGE UP (ref 0–1.5)
IMM GRANULOCYTES NFR BLD AUTO: 0.9 % — SIGNIFICANT CHANGE UP (ref 0–1.5)
IMM GRANULOCYTES NFR BLD AUTO: 1 % — SIGNIFICANT CHANGE UP (ref 0–1.5)
IMM GRANULOCYTES NFR BLD AUTO: 1.1 % — SIGNIFICANT CHANGE UP (ref 0–1.5)
IMM GRANULOCYTES NFR BLD AUTO: 1.2 % — SIGNIFICANT CHANGE UP (ref 0–1.5)
IMM GRANULOCYTES NFR BLD AUTO: 1.5 % — SIGNIFICANT CHANGE UP (ref 0–1.5)
IMM GRANULOCYTES NFR BLD AUTO: 1.6 % — HIGH (ref 0–1.5)
IMM GRANULOCYTES NFR BLD AUTO: 4.9 % — HIGH (ref 0–1.5)
LACTATE BLDV-MCNC: 2.3 MMOL/L — HIGH (ref 0.5–2)
LEGIONELLA AG UR QL: NEGATIVE — SIGNIFICANT CHANGE UP
LYMPHOCYTES # BLD AUTO: 0.23 K/UL — LOW (ref 1–3.3)
LYMPHOCYTES # BLD AUTO: 0.65 K/UL — LOW (ref 1–3.3)
LYMPHOCYTES # BLD AUTO: 0.76 K/UL — LOW (ref 1–3.3)
LYMPHOCYTES # BLD AUTO: 0.9 % — LOW (ref 13–44)
LYMPHOCYTES # BLD AUTO: 1.09 K/UL — SIGNIFICANT CHANGE UP (ref 1–3.3)
LYMPHOCYTES # BLD AUTO: 1.38 K/UL — SIGNIFICANT CHANGE UP (ref 1–3.3)
LYMPHOCYTES # BLD AUTO: 1.6 K/UL — SIGNIFICANT CHANGE UP (ref 1–3.3)
LYMPHOCYTES # BLD AUTO: 1.75 K/UL — SIGNIFICANT CHANGE UP (ref 1–3.3)
LYMPHOCYTES # BLD AUTO: 1.83 K/UL — SIGNIFICANT CHANGE UP (ref 1–3.3)
LYMPHOCYTES # BLD AUTO: 1.93 K/UL — SIGNIFICANT CHANGE UP (ref 1–3.3)
LYMPHOCYTES # BLD AUTO: 11.7 % — LOW (ref 13–44)
LYMPHOCYTES # BLD AUTO: 12 % — LOW (ref 13–44)
LYMPHOCYTES # BLD AUTO: 14.9 % — SIGNIFICANT CHANGE UP (ref 13–44)
LYMPHOCYTES # BLD AUTO: 18.7 % — SIGNIFICANT CHANGE UP (ref 13–44)
LYMPHOCYTES # BLD AUTO: 19.4 % — SIGNIFICANT CHANGE UP (ref 13–44)
LYMPHOCYTES # BLD AUTO: 2.07 K/UL — SIGNIFICANT CHANGE UP (ref 1–3.3)
LYMPHOCYTES # BLD AUTO: 2.2 K/UL — SIGNIFICANT CHANGE UP (ref 1–3.3)
LYMPHOCYTES # BLD AUTO: 2.33 K/UL — SIGNIFICANT CHANGE UP (ref 1–3.3)
LYMPHOCYTES # BLD AUTO: 2.52 K/UL — SIGNIFICANT CHANGE UP (ref 1–3.3)
LYMPHOCYTES # BLD AUTO: 2.53 K/UL — SIGNIFICANT CHANGE UP (ref 1–3.3)
LYMPHOCYTES # BLD AUTO: 2.54 K/UL — SIGNIFICANT CHANGE UP (ref 1–3.3)
LYMPHOCYTES # BLD AUTO: 2.63 K/UL — SIGNIFICANT CHANGE UP (ref 1–3.3)
LYMPHOCYTES # BLD AUTO: 2.68 K/UL — SIGNIFICANT CHANGE UP (ref 1–3.3)
LYMPHOCYTES # BLD AUTO: 2.81 K/UL — SIGNIFICANT CHANGE UP (ref 1–3.3)
LYMPHOCYTES # BLD AUTO: 2.87 K/UL — SIGNIFICANT CHANGE UP (ref 1–3.3)
LYMPHOCYTES # BLD AUTO: 2.89 K/UL — SIGNIFICANT CHANGE UP (ref 1–3.3)
LYMPHOCYTES # BLD AUTO: 2.9 % — LOW (ref 13–44)
LYMPHOCYTES # BLD AUTO: 2.91 K/UL — SIGNIFICANT CHANGE UP (ref 1–3.3)
LYMPHOCYTES # BLD AUTO: 21.4 % — SIGNIFICANT CHANGE UP (ref 13–44)
LYMPHOCYTES # BLD AUTO: 29 % — SIGNIFICANT CHANGE UP (ref 13–44)
LYMPHOCYTES # BLD AUTO: 3.02 K/UL — SIGNIFICANT CHANGE UP (ref 1–3.3)
LYMPHOCYTES # BLD AUTO: 3.13 K/UL — SIGNIFICANT CHANGE UP (ref 1–3.3)
LYMPHOCYTES # BLD AUTO: 3.37 K/UL — HIGH (ref 1–3.3)
LYMPHOCYTES # BLD AUTO: 3.39 K/UL — HIGH (ref 1–3.3)
LYMPHOCYTES # BLD AUTO: 3.4 % — LOW (ref 13–44)
LYMPHOCYTES # BLD AUTO: 3.64 K/UL — HIGH (ref 1–3.3)
LYMPHOCYTES # BLD AUTO: 3.86 K/UL — HIGH (ref 1–3.3)
LYMPHOCYTES # BLD AUTO: 3.96 K/UL — HIGH (ref 1–3.3)
LYMPHOCYTES # BLD AUTO: 30.6 % — SIGNIFICANT CHANGE UP (ref 13–44)
LYMPHOCYTES # BLD AUTO: 31 % — SIGNIFICANT CHANGE UP (ref 13–44)
LYMPHOCYTES # BLD AUTO: 32.2 % — SIGNIFICANT CHANGE UP (ref 13–44)
LYMPHOCYTES # BLD AUTO: 33.8 % — SIGNIFICANT CHANGE UP (ref 13–44)
LYMPHOCYTES # BLD AUTO: 34 % — SIGNIFICANT CHANGE UP (ref 13–44)
LYMPHOCYTES # BLD AUTO: 34.1 % — SIGNIFICANT CHANGE UP (ref 13–44)
LYMPHOCYTES # BLD AUTO: 34.6 % — SIGNIFICANT CHANGE UP (ref 13–44)
LYMPHOCYTES # BLD AUTO: 35 % — SIGNIFICANT CHANGE UP (ref 13–44)
LYMPHOCYTES # BLD AUTO: 35.1 % — SIGNIFICANT CHANGE UP (ref 13–44)
LYMPHOCYTES # BLD AUTO: 36.4 % — SIGNIFICANT CHANGE UP (ref 13–44)
LYMPHOCYTES # BLD AUTO: 39.8 % — SIGNIFICANT CHANGE UP (ref 13–44)
LYMPHOCYTES # BLD AUTO: 4.02 K/UL — HIGH (ref 1–3.3)
LYMPHOCYTES # BLD AUTO: 4.04 K/UL — HIGH (ref 1–3.3)
LYMPHOCYTES # BLD AUTO: 4.08 K/UL — HIGH (ref 1–3.3)
LYMPHOCYTES # BLD AUTO: 4.8 K/UL — HIGH (ref 1–3.3)
LYMPHOCYTES # BLD AUTO: 40 % — SIGNIFICANT CHANGE UP (ref 13–44)
LYMPHOCYTES # BLD AUTO: 43.5 % — SIGNIFICANT CHANGE UP (ref 13–44)
LYMPHOCYTES # BLD AUTO: 48 % — HIGH (ref 13–44)
LYMPHOCYTES # BLD AUTO: 54 % — HIGH (ref 13–44)
LYMPHOCYTES # BLD AUTO: 57 % — HIGH (ref 13–44)
LYMPHOCYTES # BLD AUTO: 58 % — HIGH (ref 13–44)
LYMPHOCYTES # BLD AUTO: 58 % — HIGH (ref 13–44)
LYMPHOCYTES # BLD AUTO: 69 % — HIGH (ref 13–44)
LYMPHOCYTES # BLD AUTO: 8.1 % — LOW (ref 13–44)
LYMPHOCYTES # BLD AUTO: 8.2 % — LOW (ref 13–44)
LYMPHOCYTES # BLD AUTO: 8.9 % — LOW (ref 13–44)
M PNEUMO DNA SPEC QL NAA+PROBE: SIGNIFICANT CHANGE UP
MAGNESIUM SERPL-MCNC: 2.1 MG/DL — SIGNIFICANT CHANGE UP (ref 1.6–2.6)
MAGNESIUM SERPL-MCNC: 2.2 MG/DL — SIGNIFICANT CHANGE UP (ref 1.6–2.6)
MAGNESIUM SERPL-MCNC: 2.3 MG/DL — SIGNIFICANT CHANGE UP (ref 1.6–2.6)
MAGNESIUM SERPL-MCNC: 2.4 MG/DL — SIGNIFICANT CHANGE UP (ref 1.6–2.6)
MAGNESIUM SERPL-MCNC: 2.4 MG/DL — SIGNIFICANT CHANGE UP (ref 1.6–2.6)
MAGNESIUM SERPL-MCNC: 2.5 MG/DL — SIGNIFICANT CHANGE UP (ref 1.6–2.6)
MAGNESIUM SERPL-MCNC: 2.6 MG/DL — SIGNIFICANT CHANGE UP (ref 1.6–2.6)
MAGNESIUM SERPL-MCNC: 2.8 MG/DL — HIGH (ref 1.6–2.6)
MAGNESIUM SERPL-MCNC: 2.8 MG/DL — HIGH (ref 1.6–2.6)
MAGNESIUM SERPL-MCNC: 3 MG/DL — HIGH (ref 1.6–2.6)
MANUAL DIF COMMENT BLD-IMP: SIGNIFICANT CHANGE UP
MCHC RBC-ENTMCNC: 30.1 GM/DL — LOW (ref 32–36)
MCHC RBC-ENTMCNC: 30.3 GM/DL — LOW (ref 32–36)
MCHC RBC-ENTMCNC: 30.5 GM/DL — LOW (ref 32–36)
MCHC RBC-ENTMCNC: 30.5 GM/DL — LOW (ref 32–36)
MCHC RBC-ENTMCNC: 30.6 GM/DL — LOW (ref 32–36)
MCHC RBC-ENTMCNC: 30.7 GM/DL — LOW (ref 32–36)
MCHC RBC-ENTMCNC: 30.7 PG — SIGNIFICANT CHANGE UP (ref 27–34)
MCHC RBC-ENTMCNC: 30.8 G/DL — LOW (ref 32–36)
MCHC RBC-ENTMCNC: 30.8 GM/DL — LOW (ref 32–36)
MCHC RBC-ENTMCNC: 30.8 PG — SIGNIFICANT CHANGE UP (ref 27–34)
MCHC RBC-ENTMCNC: 30.9 GM/DL — LOW (ref 32–36)
MCHC RBC-ENTMCNC: 31 GM/DL — LOW (ref 32–36)
MCHC RBC-ENTMCNC: 31.1 GM/DL — LOW (ref 32–36)
MCHC RBC-ENTMCNC: 31.2 PG — SIGNIFICANT CHANGE UP (ref 27–34)
MCHC RBC-ENTMCNC: 31.2 PG — SIGNIFICANT CHANGE UP (ref 27–34)
MCHC RBC-ENTMCNC: 31.3 PG — SIGNIFICANT CHANGE UP (ref 27–34)
MCHC RBC-ENTMCNC: 31.4 PG — SIGNIFICANT CHANGE UP (ref 27–34)
MCHC RBC-ENTMCNC: 31.4 PG — SIGNIFICANT CHANGE UP (ref 27–34)
MCHC RBC-ENTMCNC: 31.5 GM/DL — LOW (ref 32–36)
MCHC RBC-ENTMCNC: 31.5 PG — SIGNIFICANT CHANGE UP (ref 27–34)
MCHC RBC-ENTMCNC: 31.6 G/DL — LOW (ref 32–36)
MCHC RBC-ENTMCNC: 31.6 PG — SIGNIFICANT CHANGE UP (ref 27–34)
MCHC RBC-ENTMCNC: 31.7 PG — SIGNIFICANT CHANGE UP (ref 27–34)
MCHC RBC-ENTMCNC: 31.8 GM/DL — LOW (ref 32–36)
MCHC RBC-ENTMCNC: 31.8 GM/DL — LOW (ref 32–36)
MCHC RBC-ENTMCNC: 31.8 PG — SIGNIFICANT CHANGE UP (ref 27–34)
MCHC RBC-ENTMCNC: 31.9 PG — SIGNIFICANT CHANGE UP (ref 27–34)
MCHC RBC-ENTMCNC: 32 PG — SIGNIFICANT CHANGE UP (ref 27–34)
MCHC RBC-ENTMCNC: 32.1 G/DL — SIGNIFICANT CHANGE UP (ref 32–36)
MCHC RBC-ENTMCNC: 32.1 G/DL — SIGNIFICANT CHANGE UP (ref 32–36)
MCHC RBC-ENTMCNC: 32.1 GM/DL — SIGNIFICANT CHANGE UP (ref 32–36)
MCHC RBC-ENTMCNC: 32.1 PG — SIGNIFICANT CHANGE UP (ref 27–34)
MCHC RBC-ENTMCNC: 32.2 GM/DL — SIGNIFICANT CHANGE UP (ref 32–36)
MCHC RBC-ENTMCNC: 32.2 PG — SIGNIFICANT CHANGE UP (ref 27–34)
MCHC RBC-ENTMCNC: 32.2 PG — SIGNIFICANT CHANGE UP (ref 27–34)
MCHC RBC-ENTMCNC: 32.3 G/DL — SIGNIFICANT CHANGE UP (ref 32–36)
MCHC RBC-ENTMCNC: 32.3 G/DL — SIGNIFICANT CHANGE UP (ref 32–36)
MCHC RBC-ENTMCNC: 32.3 PG — SIGNIFICANT CHANGE UP (ref 27–34)
MCHC RBC-ENTMCNC: 32.4 G/DL — SIGNIFICANT CHANGE UP (ref 32–36)
MCHC RBC-ENTMCNC: 32.4 PG — SIGNIFICANT CHANGE UP (ref 27–34)
MCHC RBC-ENTMCNC: 32.5 G/DL — SIGNIFICANT CHANGE UP (ref 32–36)
MCHC RBC-ENTMCNC: 32.5 PG — SIGNIFICANT CHANGE UP (ref 27–34)
MCHC RBC-ENTMCNC: 32.5 PG — SIGNIFICANT CHANGE UP (ref 27–34)
MCHC RBC-ENTMCNC: 32.6 G/DL — SIGNIFICANT CHANGE UP (ref 32–36)
MCHC RBC-ENTMCNC: 32.6 PG — SIGNIFICANT CHANGE UP (ref 27–34)
MCHC RBC-ENTMCNC: 32.6 PG — SIGNIFICANT CHANGE UP (ref 27–34)
MCHC RBC-ENTMCNC: 32.7 PG — SIGNIFICANT CHANGE UP (ref 27–34)
MCHC RBC-ENTMCNC: 32.8 PG — SIGNIFICANT CHANGE UP (ref 27–34)
MCHC RBC-ENTMCNC: 32.9 G/DL — SIGNIFICANT CHANGE UP (ref 32–36)
MCHC RBC-ENTMCNC: 33 G/DL — SIGNIFICANT CHANGE UP (ref 32–36)
MCHC RBC-ENTMCNC: 33.1 G/DL — SIGNIFICANT CHANGE UP (ref 32–36)
MCHC RBC-ENTMCNC: 33.1 GM/DL — SIGNIFICANT CHANGE UP (ref 32–36)
MCHC RBC-ENTMCNC: 33.3 G/DL — SIGNIFICANT CHANGE UP (ref 32–36)
MCHC RBC-ENTMCNC: 33.3 G/DL — SIGNIFICANT CHANGE UP (ref 32–36)
MCHC RBC-ENTMCNC: 33.3 PG — SIGNIFICANT CHANGE UP (ref 27–34)
MCHC RBC-ENTMCNC: 33.6 G/DL — SIGNIFICANT CHANGE UP (ref 32–36)
MCHC RBC-ENTMCNC: 33.6 PG — SIGNIFICANT CHANGE UP (ref 27–34)
MCHC RBC-ENTMCNC: 33.8 G/DL — SIGNIFICANT CHANGE UP (ref 32–36)
MCHC RBC-ENTMCNC: 34 PG — SIGNIFICANT CHANGE UP (ref 27–34)
MCHC RBC-ENTMCNC: 34.1 PG — HIGH (ref 27–34)
MCHC RBC-ENTMCNC: 34.2 PG — HIGH (ref 27–34)
MCHC RBC-ENTMCNC: 34.3 G/DL — SIGNIFICANT CHANGE UP (ref 32–36)
MCHC RBC-ENTMCNC: 34.5 G/DL — SIGNIFICANT CHANGE UP (ref 32–36)
MCHC RBC-ENTMCNC: 34.8 G/DL — SIGNIFICANT CHANGE UP (ref 32–36)
MCHC RBC-ENTMCNC: 34.9 PG — HIGH (ref 27–34)
MCHC RBC-ENTMCNC: 35 G/DL — SIGNIFICANT CHANGE UP (ref 32–36)
MCHC RBC-ENTMCNC: 35.2 PG — HIGH (ref 27–34)
MCHC RBC-ENTMCNC: 35.3 G/DL — SIGNIFICANT CHANGE UP (ref 32–36)
MCHC RBC-ENTMCNC: 35.7 PG — HIGH (ref 27–34)
MCHC RBC-ENTMCNC: 36.8 PG — HIGH (ref 27–34)
MCHC RBC-ENTMCNC: 37.1 PG — HIGH (ref 27–34)
MCV RBC AUTO: 100.5 FL — HIGH (ref 80–100)
MCV RBC AUTO: 101.2 FL — HIGH (ref 80–100)
MCV RBC AUTO: 101.3 FL — HIGH (ref 80–100)
MCV RBC AUTO: 101.4 FL — HIGH (ref 80–100)
MCV RBC AUTO: 101.6 FL — HIGH (ref 80–100)
MCV RBC AUTO: 101.9 FL — HIGH (ref 80–100)
MCV RBC AUTO: 102.1 FL — HIGH (ref 80–100)
MCV RBC AUTO: 102.6 FL — HIGH (ref 80–100)
MCV RBC AUTO: 102.8 FL — HIGH (ref 80–100)
MCV RBC AUTO: 102.8 FL — HIGH (ref 80–100)
MCV RBC AUTO: 103 FL — HIGH (ref 80–100)
MCV RBC AUTO: 103.4 FL — HIGH (ref 80–100)
MCV RBC AUTO: 103.4 FL — HIGH (ref 80–100)
MCV RBC AUTO: 103.6 FL — HIGH (ref 80–100)
MCV RBC AUTO: 103.7 FL — HIGH (ref 80–100)
MCV RBC AUTO: 104.1 FL — HIGH (ref 80–100)
MCV RBC AUTO: 104.2 FL — HIGH (ref 80–100)
MCV RBC AUTO: 104.5 FL — HIGH (ref 80–100)
MCV RBC AUTO: 105.1 FL — HIGH (ref 80–100)
MCV RBC AUTO: 105.2 FL — HIGH (ref 80–100)
MCV RBC AUTO: 105.2 FL — HIGH (ref 80–100)
MCV RBC AUTO: 105.3 FL — HIGH (ref 80–100)
MCV RBC AUTO: 105.6 FL — HIGH (ref 80–100)
MCV RBC AUTO: 105.7 FL — HIGH (ref 80–100)
MCV RBC AUTO: 106.6 FL — HIGH (ref 80–100)
MCV RBC AUTO: 106.7 FL — HIGH (ref 80–100)
MCV RBC AUTO: 108.1 FL — HIGH (ref 80–100)
MCV RBC AUTO: 108.4 FL — HIGH (ref 80–100)
MCV RBC AUTO: 93.5 FL — SIGNIFICANT CHANGE UP (ref 80–100)
MCV RBC AUTO: 94 FL — SIGNIFICANT CHANGE UP (ref 80–100)
MCV RBC AUTO: 94.3 FL — SIGNIFICANT CHANGE UP (ref 80–100)
MCV RBC AUTO: 94.6 FL — SIGNIFICANT CHANGE UP (ref 80–100)
MCV RBC AUTO: 94.7 FL — SIGNIFICANT CHANGE UP (ref 80–100)
MCV RBC AUTO: 94.8 FL — SIGNIFICANT CHANGE UP (ref 80–100)
MCV RBC AUTO: 95.5 FL — SIGNIFICANT CHANGE UP (ref 80–100)
MCV RBC AUTO: 95.6 FL — SIGNIFICANT CHANGE UP (ref 80–100)
MCV RBC AUTO: 96.1 FL — SIGNIFICANT CHANGE UP (ref 80–100)
MCV RBC AUTO: 96.5 FL — SIGNIFICANT CHANGE UP (ref 80–100)
MCV RBC AUTO: 97.2 FL — SIGNIFICANT CHANGE UP (ref 80–100)
MCV RBC AUTO: 97.6 FL — SIGNIFICANT CHANGE UP (ref 80–100)
MCV RBC AUTO: 97.7 FL — SIGNIFICANT CHANGE UP (ref 80–100)
MCV RBC AUTO: 97.9 FL — SIGNIFICANT CHANGE UP (ref 80–100)
MCV RBC AUTO: 98.5 FL — SIGNIFICANT CHANGE UP (ref 80–100)
MCV RBC AUTO: 98.6 FL — SIGNIFICANT CHANGE UP (ref 80–100)
MCV RBC AUTO: 98.7 FL — SIGNIFICANT CHANGE UP (ref 80–100)
MCV RBC AUTO: 98.8 FL — SIGNIFICANT CHANGE UP (ref 80–100)
MCV RBC AUTO: 98.9 FL — SIGNIFICANT CHANGE UP (ref 80–100)
METAMYELOCYTES # FLD: 1 % — HIGH (ref 0–0)
METAMYELOCYTES # FLD: 1 % — HIGH (ref 0–0)
METAMYELOCYTES # FLD: 3 % — HIGH (ref 0–0)
MONOCYTES # BLD AUTO: 0.3 K/UL — SIGNIFICANT CHANGE UP (ref 0–0.9)
MONOCYTES # BLD AUTO: 0.76 K/UL — SIGNIFICANT CHANGE UP (ref 0–0.9)
MONOCYTES # BLD AUTO: 0.87 K/UL — SIGNIFICANT CHANGE UP (ref 0–0.9)
MONOCYTES # BLD AUTO: 0.88 K/UL — SIGNIFICANT CHANGE UP (ref 0–0.9)
MONOCYTES # BLD AUTO: 0.92 K/UL — HIGH (ref 0–0.9)
MONOCYTES # BLD AUTO: 0.94 K/UL — HIGH (ref 0–0.9)
MONOCYTES # BLD AUTO: 0.99 K/UL — HIGH (ref 0–0.9)
MONOCYTES # BLD AUTO: 1 K/UL — HIGH (ref 0–0.9)
MONOCYTES # BLD AUTO: 1 K/UL — HIGH (ref 0–0.9)
MONOCYTES # BLD AUTO: 1.02 K/UL — HIGH (ref 0–0.9)
MONOCYTES # BLD AUTO: 1.07 K/UL — HIGH (ref 0–0.9)
MONOCYTES # BLD AUTO: 1.12 K/UL — HIGH (ref 0–0.9)
MONOCYTES # BLD AUTO: 1.14 K/UL — HIGH (ref 0–0.9)
MONOCYTES # BLD AUTO: 1.17 K/UL — HIGH (ref 0–0.9)
MONOCYTES # BLD AUTO: 1.18 K/UL — HIGH (ref 0–0.9)
MONOCYTES # BLD AUTO: 1.19 K/UL — HIGH (ref 0–0.9)
MONOCYTES # BLD AUTO: 1.2 K/UL — HIGH (ref 0–0.9)
MONOCYTES # BLD AUTO: 1.23 K/UL — HIGH (ref 0–0.9)
MONOCYTES # BLD AUTO: 1.25 K/UL — HIGH (ref 0–0.9)
MONOCYTES # BLD AUTO: 1.27 K/UL — HIGH (ref 0–0.9)
MONOCYTES # BLD AUTO: 1.33 K/UL — HIGH (ref 0–0.9)
MONOCYTES # BLD AUTO: 1.39 K/UL — HIGH (ref 0–0.9)
MONOCYTES # BLD AUTO: 1.43 K/UL — HIGH (ref 0–0.9)
MONOCYTES # BLD AUTO: 1.47 K/UL — HIGH (ref 0–0.9)
MONOCYTES # BLD AUTO: 1.48 K/UL — HIGH (ref 0–0.9)
MONOCYTES # BLD AUTO: 1.63 K/UL — HIGH (ref 0–0.9)
MONOCYTES # BLD AUTO: 1.65 K/UL — HIGH (ref 0–0.9)
MONOCYTES # BLD AUTO: 1.65 K/UL — HIGH (ref 0–0.9)
MONOCYTES # BLD AUTO: 1.82 K/UL — HIGH (ref 0–0.9)
MONOCYTES # BLD AUTO: 1.84 K/UL — HIGH (ref 0–0.9)
MONOCYTES NFR BLD AUTO: 10 % — SIGNIFICANT CHANGE UP (ref 2–14)
MONOCYTES NFR BLD AUTO: 10.7 % — SIGNIFICANT CHANGE UP (ref 2–14)
MONOCYTES NFR BLD AUTO: 11.2 % — SIGNIFICANT CHANGE UP (ref 2–14)
MONOCYTES NFR BLD AUTO: 12.3 % — SIGNIFICANT CHANGE UP (ref 2–14)
MONOCYTES NFR BLD AUTO: 12.3 % — SIGNIFICANT CHANGE UP (ref 2–14)
MONOCYTES NFR BLD AUTO: 12.7 % — SIGNIFICANT CHANGE UP (ref 2–14)
MONOCYTES NFR BLD AUTO: 12.8 % — SIGNIFICANT CHANGE UP (ref 2–14)
MONOCYTES NFR BLD AUTO: 13.4 % — SIGNIFICANT CHANGE UP (ref 2–14)
MONOCYTES NFR BLD AUTO: 14 % — SIGNIFICANT CHANGE UP (ref 2–14)
MONOCYTES NFR BLD AUTO: 15 % — HIGH (ref 2–14)
MONOCYTES NFR BLD AUTO: 15.9 % — HIGH (ref 2–14)
MONOCYTES NFR BLD AUTO: 17.8 % — HIGH (ref 2–14)
MONOCYTES NFR BLD AUTO: 18 % — HIGH (ref 2–14)
MONOCYTES NFR BLD AUTO: 19 % — HIGH (ref 2–14)
MONOCYTES NFR BLD AUTO: 20 % — HIGH (ref 2–14)
MONOCYTES NFR BLD AUTO: 21 % — HIGH (ref 2–14)
MONOCYTES NFR BLD AUTO: 21 % — HIGH (ref 2–14)
MONOCYTES NFR BLD AUTO: 22 % — HIGH (ref 2–14)
MONOCYTES NFR BLD AUTO: 23 % — HIGH (ref 2–14)
MONOCYTES NFR BLD AUTO: 4.1 % — SIGNIFICANT CHANGE UP (ref 2–14)
MONOCYTES NFR BLD AUTO: 4.5 % — SIGNIFICANT CHANGE UP (ref 2–14)
MONOCYTES NFR BLD AUTO: 5 % — SIGNIFICANT CHANGE UP (ref 2–14)
MONOCYTES NFR BLD AUTO: 5.1 % — SIGNIFICANT CHANGE UP (ref 2–14)
MONOCYTES NFR BLD AUTO: 5.1 % — SIGNIFICANT CHANGE UP (ref 2–14)
MONOCYTES NFR BLD AUTO: 5.2 % — SIGNIFICANT CHANGE UP (ref 2–14)
MONOCYTES NFR BLD AUTO: 7.6 % — SIGNIFICANT CHANGE UP (ref 2–14)
MONOCYTES NFR BLD AUTO: 8 % — SIGNIFICANT CHANGE UP (ref 2–14)
MONOCYTES NFR BLD AUTO: 8.1 % — SIGNIFICANT CHANGE UP (ref 2–14)
MONOCYTES NFR BLD AUTO: 8.5 % — SIGNIFICANT CHANGE UP (ref 2–14)
MONOCYTES NFR BLD AUTO: 9.3 % — SIGNIFICANT CHANGE UP (ref 2–14)
MRSA PCR RESULT.: SIGNIFICANT CHANGE UP
MYELOCYTES NFR BLD: 1 % — HIGH (ref 0–0)
MYELOCYTES NFR BLD: 1 % — HIGH (ref 0–0)
MYELOCYTES NFR BLD: 2 % — HIGH (ref 0–0)
MYELOCYTES NFR BLD: 3 % — HIGH (ref 0–0)
NEUTROPHILS # BLD AUTO: 0.25 K/UL — LOW (ref 1.8–7.4)
NEUTROPHILS # BLD AUTO: 0.71 K/UL — LOW (ref 1.8–7.4)
NEUTROPHILS # BLD AUTO: 0.72 K/UL — LOW (ref 1.8–7.4)
NEUTROPHILS # BLD AUTO: 1.09 K/UL — LOW (ref 1.8–7.4)
NEUTROPHILS # BLD AUTO: 1.57 K/UL — LOW (ref 1.8–7.4)
NEUTROPHILS # BLD AUTO: 10.06 K/UL — HIGH (ref 1.8–7.4)
NEUTROPHILS # BLD AUTO: 10.54 K/UL — HIGH (ref 1.8–7.4)
NEUTROPHILS # BLD AUTO: 10.66 K/UL — HIGH (ref 1.8–7.4)
NEUTROPHILS # BLD AUTO: 14.5 K/UL — HIGH (ref 1.8–7.4)
NEUTROPHILS # BLD AUTO: 14.82 K/UL — HIGH (ref 1.8–7.4)
NEUTROPHILS # BLD AUTO: 16.68 K/UL — HIGH (ref 1.8–7.4)
NEUTROPHILS # BLD AUTO: 2.56 K/UL — SIGNIFICANT CHANGE UP (ref 1.8–7.4)
NEUTROPHILS # BLD AUTO: 20.31 K/UL — HIGH (ref 1.8–7.4)
NEUTROPHILS # BLD AUTO: 20.59 K/UL — HIGH (ref 1.8–7.4)
NEUTROPHILS # BLD AUTO: 23.65 K/UL — HIGH (ref 1.8–7.4)
NEUTROPHILS # BLD AUTO: 3.27 K/UL — SIGNIFICANT CHANGE UP (ref 1.8–7.4)
NEUTROPHILS # BLD AUTO: 3.27 K/UL — SIGNIFICANT CHANGE UP (ref 1.8–7.4)
NEUTROPHILS # BLD AUTO: 3.54 K/UL — SIGNIFICANT CHANGE UP (ref 1.8–7.4)
NEUTROPHILS # BLD AUTO: 3.69 K/UL — SIGNIFICANT CHANGE UP (ref 1.8–7.4)
NEUTROPHILS # BLD AUTO: 3.85 K/UL — SIGNIFICANT CHANGE UP (ref 1.8–7.4)
NEUTROPHILS # BLD AUTO: 3.91 K/UL — SIGNIFICANT CHANGE UP (ref 1.8–7.4)
NEUTROPHILS # BLD AUTO: 3.91 K/UL — SIGNIFICANT CHANGE UP (ref 1.8–7.4)
NEUTROPHILS # BLD AUTO: 4.48 K/UL — SIGNIFICANT CHANGE UP (ref 1.8–7.4)
NEUTROPHILS # BLD AUTO: 5.06 K/UL — SIGNIFICANT CHANGE UP (ref 1.8–7.4)
NEUTROPHILS # BLD AUTO: 5.64 K/UL — SIGNIFICANT CHANGE UP (ref 1.8–7.4)
NEUTROPHILS # BLD AUTO: 5.85 K/UL — SIGNIFICANT CHANGE UP (ref 1.8–7.4)
NEUTROPHILS # BLD AUTO: 5.88 K/UL — SIGNIFICANT CHANGE UP (ref 1.8–7.4)
NEUTROPHILS # BLD AUTO: 6.14 K/UL — SIGNIFICANT CHANGE UP (ref 1.8–7.4)
NEUTROPHILS # BLD AUTO: 6.42 K/UL — SIGNIFICANT CHANGE UP (ref 1.8–7.4)
NEUTROPHILS # BLD AUTO: 7.41 K/UL — HIGH (ref 1.8–7.4)
NEUTROPHILS # BLD AUTO: 7.91 K/UL — HIGH (ref 1.8–7.4)
NEUTROPHILS # BLD AUTO: 8.41 K/UL — HIGH (ref 1.8–7.4)
NEUTROPHILS NFR BLD AUTO: 14 % — LOW (ref 43–77)
NEUTROPHILS NFR BLD AUTO: 16 % — LOW (ref 43–77)
NEUTROPHILS NFR BLD AUTO: 21 % — LOW (ref 43–77)
NEUTROPHILS NFR BLD AUTO: 25 % — LOW (ref 43–77)
NEUTROPHILS NFR BLD AUTO: 31 % — LOW (ref 43–77)
NEUTROPHILS NFR BLD AUTO: 42.3 % — LOW (ref 43–77)
NEUTROPHILS NFR BLD AUTO: 45 % — SIGNIFICANT CHANGE UP (ref 43–77)
NEUTROPHILS NFR BLD AUTO: 45 % — SIGNIFICANT CHANGE UP (ref 43–77)
NEUTROPHILS NFR BLD AUTO: 47 % — SIGNIFICANT CHANGE UP (ref 43–77)
NEUTROPHILS NFR BLD AUTO: 48.8 % — SIGNIFICANT CHANGE UP (ref 43–77)
NEUTROPHILS NFR BLD AUTO: 48.9 % — SIGNIFICANT CHANGE UP (ref 43–77)
NEUTROPHILS NFR BLD AUTO: 49 % — SIGNIFICANT CHANGE UP (ref 43–77)
NEUTROPHILS NFR BLD AUTO: 49.3 % — SIGNIFICANT CHANGE UP (ref 43–77)
NEUTROPHILS NFR BLD AUTO: 49.8 % — SIGNIFICANT CHANGE UP (ref 43–77)
NEUTROPHILS NFR BLD AUTO: 50.2 % — SIGNIFICANT CHANGE UP (ref 43–77)
NEUTROPHILS NFR BLD AUTO: 50.4 % — SIGNIFICANT CHANGE UP (ref 43–77)
NEUTROPHILS NFR BLD AUTO: 52 % — SIGNIFICANT CHANGE UP (ref 43–77)
NEUTROPHILS NFR BLD AUTO: 53.7 % — SIGNIFICANT CHANGE UP (ref 43–77)
NEUTROPHILS NFR BLD AUTO: 6 % — LOW (ref 43–77)
NEUTROPHILS NFR BLD AUTO: 60 % — SIGNIFICANT CHANGE UP (ref 43–77)
NEUTROPHILS NFR BLD AUTO: 65.7 % — SIGNIFICANT CHANGE UP (ref 43–77)
NEUTROPHILS NFR BLD AUTO: 68.6 % — SIGNIFICANT CHANGE UP (ref 43–77)
NEUTROPHILS NFR BLD AUTO: 68.7 % — SIGNIFICANT CHANGE UP (ref 43–77)
NEUTROPHILS NFR BLD AUTO: 71.8 % — SIGNIFICANT CHANGE UP (ref 43–77)
NEUTROPHILS NFR BLD AUTO: 78.3 % — HIGH (ref 43–77)
NEUTROPHILS NFR BLD AUTO: 78.8 % — HIGH (ref 43–77)
NEUTROPHILS NFR BLD AUTO: 80 % — HIGH (ref 43–77)
NEUTROPHILS NFR BLD AUTO: 84.7 % — HIGH (ref 43–77)
NEUTROPHILS NFR BLD AUTO: 85.9 % — HIGH (ref 43–77)
NEUTROPHILS NFR BLD AUTO: 91.3 % — HIGH (ref 43–77)
NEUTROPHILS NFR BLD AUTO: 91.6 % — HIGH (ref 43–77)
NEUTROPHILS NFR BLD AUTO: 92.2 % — HIGH (ref 43–77)
NRBC # BLD: 0 /100 WBCS — SIGNIFICANT CHANGE UP
NRBC # BLD: 0 /100 WBCS — SIGNIFICANT CHANGE UP (ref 0–0)
NRBC # BLD: 0 /100 — SIGNIFICANT CHANGE UP (ref 0–0)
NRBC # BLD: 6 /100 — HIGH (ref 0–0)
NRBC # BLD: 7 /100 — HIGH (ref 0–0)
NRBC # BLD: SIGNIFICANT CHANGE UP /100 WBCS (ref 0–0)
NRBC # FLD: 0 K/UL — SIGNIFICANT CHANGE UP
NRBC # FLD: 0.02 K/UL — HIGH
NRBC # FLD: 0.02 K/UL — HIGH
NT-PROBNP SERPL-SCNC: 6752 PG/ML — HIGH
OVALOCYTES BLD QL SMEAR: SLIGHT — SIGNIFICANT CHANGE UP
PCO2 BLDA: 47 MMHG — SIGNIFICANT CHANGE UP (ref 35–48)
PCO2 BLDA: 50 MMHG — HIGH (ref 35–48)
PCO2 BLDV: 62 MMHG — HIGH (ref 42–55)
PH BLDA: 7.49 — HIGH (ref 7.35–7.45)
PH BLDA: 7.49 — HIGH (ref 7.35–7.45)
PH BLDV: 7.45 — HIGH (ref 7.32–7.43)
PHOSPHATE SERPL-MCNC: 2 MG/DL — LOW (ref 2.5–4.5)
PHOSPHATE SERPL-MCNC: 2.6 MG/DL — SIGNIFICANT CHANGE UP (ref 2.5–4.5)
PHOSPHATE SERPL-MCNC: 2.8 MG/DL — SIGNIFICANT CHANGE UP (ref 2.5–4.5)
PHOSPHATE SERPL-MCNC: 2.9 MG/DL — SIGNIFICANT CHANGE UP (ref 2.5–4.5)
PHOSPHATE SERPL-MCNC: 2.9 MG/DL — SIGNIFICANT CHANGE UP (ref 2.5–4.5)
PHOSPHATE SERPL-MCNC: 3.1 MG/DL — SIGNIFICANT CHANGE UP (ref 2.5–4.5)
PHOSPHATE SERPL-MCNC: 3.1 MG/DL — SIGNIFICANT CHANGE UP (ref 2.5–4.5)
PHOSPHATE SERPL-MCNC: 3.2 MG/DL — SIGNIFICANT CHANGE UP (ref 2.5–4.5)
PHOSPHATE SERPL-MCNC: 3.2 MG/DL — SIGNIFICANT CHANGE UP (ref 2.5–4.5)
PHOSPHATE SERPL-MCNC: 3.3 MG/DL — SIGNIFICANT CHANGE UP (ref 2.5–4.5)
PHOSPHATE SERPL-MCNC: 3.8 MG/DL — SIGNIFICANT CHANGE UP (ref 2.5–4.5)
PHOSPHATE SERPL-MCNC: 4.1 MG/DL — SIGNIFICANT CHANGE UP (ref 2.5–4.5)
PHOSPHATE SERPL-MCNC: 4.3 MG/DL — SIGNIFICANT CHANGE UP (ref 2.5–4.5)
PHOSPHATE SERPL-MCNC: 5.4 MG/DL — HIGH (ref 2.5–4.5)
PHOSPHATE SERPL-MCNC: 5.9 MG/DL — HIGH (ref 2.5–4.5)
PHOSPHATE SERPL-MCNC: 6.7 MG/DL — HIGH (ref 2.5–4.5)
PHOSPHATE SERPL-MCNC: 7.1 MG/DL — HIGH (ref 2.5–4.5)
PLAT MORPH BLD: NORMAL — SIGNIFICANT CHANGE UP
PLATELET # BLD AUTO: 100 K/UL — LOW (ref 150–400)
PLATELET # BLD AUTO: 116 K/UL — LOW (ref 150–400)
PLATELET # BLD AUTO: 124 K/UL — LOW (ref 150–400)
PLATELET # BLD AUTO: 124 K/UL — LOW (ref 150–400)
PLATELET # BLD AUTO: 141 K/UL — LOW (ref 150–400)
PLATELET # BLD AUTO: 142 K/UL — LOW (ref 150–400)
PLATELET # BLD AUTO: 146 K/UL — LOW (ref 150–400)
PLATELET # BLD AUTO: 160 K/UL — SIGNIFICANT CHANGE UP (ref 150–400)
PLATELET # BLD AUTO: 181 K/UL — SIGNIFICANT CHANGE UP (ref 150–400)
PLATELET # BLD AUTO: 183 K/UL — SIGNIFICANT CHANGE UP (ref 150–400)
PLATELET # BLD AUTO: 185 K/UL — SIGNIFICANT CHANGE UP (ref 150–400)
PLATELET # BLD AUTO: 187 K/UL — SIGNIFICANT CHANGE UP (ref 150–400)
PLATELET # BLD AUTO: 204 K/UL — SIGNIFICANT CHANGE UP (ref 150–400)
PLATELET # BLD AUTO: 205 K/UL — SIGNIFICANT CHANGE UP (ref 150–400)
PLATELET # BLD AUTO: 207 K/UL — SIGNIFICANT CHANGE UP (ref 150–400)
PLATELET # BLD AUTO: 209 K/UL — SIGNIFICANT CHANGE UP (ref 150–400)
PLATELET # BLD AUTO: 211 K/UL — SIGNIFICANT CHANGE UP (ref 150–400)
PLATELET # BLD AUTO: 211 K/UL — SIGNIFICANT CHANGE UP (ref 150–400)
PLATELET # BLD AUTO: 217 K/UL — SIGNIFICANT CHANGE UP (ref 150–400)
PLATELET # BLD AUTO: 219 K/UL — SIGNIFICANT CHANGE UP (ref 150–400)
PLATELET # BLD AUTO: 223 K/UL — SIGNIFICANT CHANGE UP (ref 150–400)
PLATELET # BLD AUTO: 227 K/UL — SIGNIFICANT CHANGE UP (ref 150–400)
PLATELET # BLD AUTO: 232 K/UL — SIGNIFICANT CHANGE UP (ref 150–400)
PLATELET # BLD AUTO: 235 K/UL — SIGNIFICANT CHANGE UP (ref 150–400)
PLATELET # BLD AUTO: 240 K/UL — SIGNIFICANT CHANGE UP (ref 150–400)
PLATELET # BLD AUTO: 250 K/UL — SIGNIFICANT CHANGE UP (ref 150–400)
PLATELET # BLD AUTO: 254 K/UL — SIGNIFICANT CHANGE UP (ref 150–400)
PLATELET # BLD AUTO: 257 K/UL — SIGNIFICANT CHANGE UP (ref 150–400)
PLATELET # BLD AUTO: 259 K/UL — SIGNIFICANT CHANGE UP (ref 150–400)
PLATELET # BLD AUTO: 260 K/UL — SIGNIFICANT CHANGE UP (ref 150–400)
PLATELET # BLD AUTO: 261 K/UL — SIGNIFICANT CHANGE UP (ref 150–400)
PLATELET # BLD AUTO: 268 K/UL — SIGNIFICANT CHANGE UP (ref 150–400)
PLATELET # BLD AUTO: 274 K/UL — SIGNIFICANT CHANGE UP (ref 150–400)
PLATELET # BLD AUTO: 286 K/UL — SIGNIFICANT CHANGE UP (ref 150–400)
PLATELET # BLD AUTO: 287 K/UL — SIGNIFICANT CHANGE UP (ref 150–400)
PLATELET # BLD AUTO: 287 K/UL — SIGNIFICANT CHANGE UP (ref 150–400)
PLATELET # BLD AUTO: 300 K/UL — SIGNIFICANT CHANGE UP (ref 150–400)
PLATELET # BLD AUTO: 305 K/UL — SIGNIFICANT CHANGE UP (ref 150–400)
PLATELET # BLD AUTO: 358 K/UL — SIGNIFICANT CHANGE UP (ref 150–400)
PLATELET # BLD AUTO: 371 K/UL — SIGNIFICANT CHANGE UP (ref 150–400)
PLATELET # BLD AUTO: 45 K/UL — LOW (ref 150–400)
PLATELET # BLD AUTO: 53 K/UL — LOW (ref 150–400)
PLATELET # BLD AUTO: 54 K/UL — LOW (ref 150–400)
PO2 BLDA: 60 MMHG — LOW (ref 83–108)
PO2 BLDA: 82 MMHG — LOW (ref 83–108)
PO2 BLDV: 52 MMHG — SIGNIFICANT CHANGE UP
POIKILOCYTOSIS BLD QL AUTO: SLIGHT — SIGNIFICANT CHANGE UP
POTASSIUM BLDV-SCNC: 3.3 MMOL/L — LOW (ref 3.5–5.1)
POTASSIUM SERPL-MCNC: 3.1 MMOL/L — LOW (ref 3.5–5.3)
POTASSIUM SERPL-MCNC: 3.2 MMOL/L — LOW (ref 3.5–5.3)
POTASSIUM SERPL-MCNC: 3.2 MMOL/L — LOW (ref 3.5–5.3)
POTASSIUM SERPL-MCNC: 3.3 MMOL/L — LOW (ref 3.5–5.3)
POTASSIUM SERPL-MCNC: 3.3 MMOL/L — LOW (ref 3.5–5.3)
POTASSIUM SERPL-MCNC: 3.4 MMOL/L — LOW (ref 3.5–5.3)
POTASSIUM SERPL-MCNC: 3.5 MMOL/L — SIGNIFICANT CHANGE UP (ref 3.5–5.3)
POTASSIUM SERPL-MCNC: 3.5 MMOL/L — SIGNIFICANT CHANGE UP (ref 3.5–5.3)
POTASSIUM SERPL-MCNC: 3.6 MMOL/L — SIGNIFICANT CHANGE UP (ref 3.5–5.3)
POTASSIUM SERPL-MCNC: 3.7 MMOL/L — SIGNIFICANT CHANGE UP (ref 3.5–5.3)
POTASSIUM SERPL-MCNC: 4.5 MMOL/L — SIGNIFICANT CHANGE UP (ref 3.5–5.3)
POTASSIUM SERPL-MCNC: 4.6 MMOL/L — SIGNIFICANT CHANGE UP (ref 3.5–5.3)
POTASSIUM SERPL-MCNC: 4.6 MMOL/L — SIGNIFICANT CHANGE UP (ref 3.5–5.3)
POTASSIUM SERPL-SCNC: 3.1 MMOL/L — LOW (ref 3.5–5.3)
POTASSIUM SERPL-SCNC: 3.2 MMOL/L — LOW (ref 3.5–5.3)
POTASSIUM SERPL-SCNC: 3.2 MMOL/L — LOW (ref 3.5–5.3)
POTASSIUM SERPL-SCNC: 3.3 MMOL/L — LOW (ref 3.5–5.3)
POTASSIUM SERPL-SCNC: 3.3 MMOL/L — LOW (ref 3.5–5.3)
POTASSIUM SERPL-SCNC: 3.4 MMOL/L — LOW (ref 3.5–5.3)
POTASSIUM SERPL-SCNC: 3.5 MMOL/L — SIGNIFICANT CHANGE UP (ref 3.5–5.3)
POTASSIUM SERPL-SCNC: 3.5 MMOL/L — SIGNIFICANT CHANGE UP (ref 3.5–5.3)
POTASSIUM SERPL-SCNC: 3.6 MMOL/L — SIGNIFICANT CHANGE UP (ref 3.5–5.3)
POTASSIUM SERPL-SCNC: 3.7 MMOL/L — SIGNIFICANT CHANGE UP (ref 3.5–5.3)
POTASSIUM SERPL-SCNC: 3.9 MMOL/L
POTASSIUM SERPL-SCNC: 4 MMOL/L
POTASSIUM SERPL-SCNC: 4.3 MMOL/L
POTASSIUM SERPL-SCNC: 4.5 MMOL/L
POTASSIUM SERPL-SCNC: 4.5 MMOL/L — SIGNIFICANT CHANGE UP (ref 3.5–5.3)
POTASSIUM SERPL-SCNC: 4.6 MMOL/L
POTASSIUM SERPL-SCNC: 4.6 MMOL/L
POTASSIUM SERPL-SCNC: 4.6 MMOL/L — SIGNIFICANT CHANGE UP (ref 3.5–5.3)
POTASSIUM SERPL-SCNC: 4.6 MMOL/L — SIGNIFICANT CHANGE UP (ref 3.5–5.3)
POTASSIUM SERPL-SCNC: 4.7 MMOL/L
POTASSIUM SERPL-SCNC: 4.7 MMOL/L
POTASSIUM SERPL-SCNC: 4.9 MMOL/L
PROT ?TM UR-MCNC: 20 MG/DL — SIGNIFICANT CHANGE UP
PROT SERPL-MCNC: 5.6 G/DL — LOW (ref 6–8.3)
PROT SERPL-MCNC: 5.6 G/DL — LOW (ref 6–8.3)
PROT SERPL-MCNC: 5.7 G/DL — LOW (ref 6–8.3)
PROT SERPL-MCNC: 5.7 G/DL — LOW (ref 6–8.3)
PROT SERPL-MCNC: 5.8 G/DL — LOW (ref 6–8.3)
PROT SERPL-MCNC: 5.8 G/DL — LOW (ref 6–8.3)
PROT SERPL-MCNC: 6 G/DL
PROT SERPL-MCNC: 6 G/DL — SIGNIFICANT CHANGE UP (ref 6–8.3)
PROT SERPL-MCNC: 6.1 G/DL — SIGNIFICANT CHANGE UP (ref 6–8.3)
PROT SERPL-MCNC: 6.1 G/DL — SIGNIFICANT CHANGE UP (ref 6–8.3)
PROT SERPL-MCNC: 6.2 G/DL
PROT SERPL-MCNC: 6.2 G/DL
PROT SERPL-MCNC: 6.3 G/DL — SIGNIFICANT CHANGE UP (ref 6–8.3)
PROT SERPL-MCNC: 6.4 G/DL
PROT SERPL-MCNC: 6.4 G/DL — SIGNIFICANT CHANGE UP (ref 6–8.3)
PROT SERPL-MCNC: 6.5 G/DL
PROT SERPL-MCNC: 6.6 G/DL
PROT SERPL-MCNC: 6.6 G/DL — SIGNIFICANT CHANGE UP (ref 6–8.3)
PROT SERPL-MCNC: 6.7 G/DL — SIGNIFICANT CHANGE UP (ref 6–8.3)
PROT SERPL-MCNC: 6.8 G/DL
PROT SERPL-MCNC: 6.8 G/DL — SIGNIFICANT CHANGE UP (ref 6–8.3)
PROT SERPL-MCNC: 6.9 G/DL — SIGNIFICANT CHANGE UP (ref 6–8.3)
PROT SERPL-MCNC: 7 G/DL — SIGNIFICANT CHANGE UP (ref 6–8.3)
PROT SERPL-MCNC: 7 G/DL — SIGNIFICANT CHANGE UP (ref 6–8.3)
RAPID RVP RESULT: SIGNIFICANT CHANGE UP
RBC # BLD: 2.88 M/UL — LOW (ref 4.2–5.8)
RBC # BLD: 2.95 M/UL — LOW (ref 4.2–5.8)
RBC # BLD: 3.14 M/UL — LOW (ref 4.2–5.8)
RBC # BLD: 3.29 M/UL — LOW (ref 4.2–5.8)
RBC # BLD: 3.4 M/UL — LOW (ref 4.2–5.8)
RBC # BLD: 3.5 M/UL — LOW (ref 4.2–5.8)
RBC # BLD: 3.54 M/UL — LOW (ref 4.2–5.8)
RBC # BLD: 3.57 M/UL — LOW (ref 4.2–5.8)
RBC # BLD: 3.58 M/UL — LOW (ref 4.2–5.8)
RBC # BLD: 3.66 M/UL — LOW (ref 4.2–5.8)
RBC # BLD: 3.71 M/UL — LOW (ref 4.2–5.8)
RBC # BLD: 3.72 M/UL — LOW (ref 4.2–5.8)
RBC # BLD: 3.83 M/UL — LOW (ref 4.2–5.8)
RBC # BLD: 3.88 M/UL — LOW (ref 4.2–5.8)
RBC # BLD: 3.9 M/UL — LOW (ref 4.2–5.8)
RBC # BLD: 3.94 M/UL — LOW (ref 4.2–5.8)
RBC # BLD: 4.02 M/UL — LOW (ref 4.2–5.8)
RBC # BLD: 4.03 M/UL — LOW (ref 4.2–5.8)
RBC # BLD: 4.04 M/UL — LOW (ref 4.2–5.8)
RBC # BLD: 4.13 M/UL — LOW (ref 4.2–5.8)
RBC # BLD: 4.16 M/UL — LOW (ref 4.2–5.8)
RBC # BLD: 4.18 M/UL — LOW (ref 4.2–5.8)
RBC # BLD: 4.18 M/UL — LOW (ref 4.2–5.8)
RBC # BLD: 4.23 M/UL — SIGNIFICANT CHANGE UP (ref 4.2–5.8)
RBC # BLD: 4.24 M/UL — SIGNIFICANT CHANGE UP (ref 4.2–5.8)
RBC # BLD: 4.25 M/UL — SIGNIFICANT CHANGE UP (ref 4.2–5.8)
RBC # BLD: 4.3 M/UL — SIGNIFICANT CHANGE UP (ref 4.2–5.8)
RBC # BLD: 4.3 M/UL — SIGNIFICANT CHANGE UP (ref 4.2–5.8)
RBC # BLD: 4.31 M/UL — SIGNIFICANT CHANGE UP (ref 4.2–5.8)
RBC # BLD: 4.33 M/UL — SIGNIFICANT CHANGE UP (ref 4.2–5.8)
RBC # BLD: 4.34 M/UL — SIGNIFICANT CHANGE UP (ref 4.2–5.8)
RBC # BLD: 4.35 M/UL — SIGNIFICANT CHANGE UP (ref 4.2–5.8)
RBC # BLD: 4.4 M/UL — SIGNIFICANT CHANGE UP (ref 4.2–5.8)
RBC # BLD: 4.46 M/UL — SIGNIFICANT CHANGE UP (ref 4.2–5.8)
RBC # BLD: 4.55 M/UL — SIGNIFICANT CHANGE UP (ref 4.2–5.8)
RBC # BLD: 4.58 M/UL — SIGNIFICANT CHANGE UP (ref 4.2–5.8)
RBC # BLD: 4.62 M/UL — SIGNIFICANT CHANGE UP (ref 4.2–5.8)
RBC # BLD: 4.63 M/UL — SIGNIFICANT CHANGE UP (ref 4.2–5.8)
RBC # BLD: 4.65 M/UL — SIGNIFICANT CHANGE UP (ref 4.2–5.8)
RBC # BLD: 4.67 M/UL — SIGNIFICANT CHANGE UP (ref 4.2–5.8)
RBC # BLD: 4.69 M/UL — SIGNIFICANT CHANGE UP (ref 4.2–5.8)
RBC # BLD: 4.72 M/UL — SIGNIFICANT CHANGE UP (ref 4.2–5.8)
RBC # BLD: 4.75 M/UL — SIGNIFICANT CHANGE UP (ref 4.2–5.8)
RBC # BLD: 4.95 M/UL — SIGNIFICANT CHANGE UP (ref 4.2–5.8)
RBC # BLD: 5.44 M/UL — SIGNIFICANT CHANGE UP (ref 4.2–5.8)
RBC # FLD: 14 % — SIGNIFICANT CHANGE UP (ref 10.3–14.5)
RBC # FLD: 14.1 % — SIGNIFICANT CHANGE UP (ref 10.3–14.5)
RBC # FLD: 14.2 % — SIGNIFICANT CHANGE UP (ref 10.3–14.5)
RBC # FLD: 14.3 % — SIGNIFICANT CHANGE UP (ref 10.3–14.5)
RBC # FLD: 14.6 % — HIGH (ref 10.3–14.5)
RBC # FLD: 14.7 % — HIGH (ref 10.3–14.5)
RBC # FLD: 14.9 % — HIGH (ref 10.3–14.5)
RBC # FLD: 15.1 % — HIGH (ref 10.3–14.5)
RBC # FLD: 15.5 % — HIGH (ref 10.3–14.5)
RBC # FLD: 15.5 % — HIGH (ref 10.3–14.5)
RBC # FLD: 15.7 % — HIGH (ref 10.3–14.5)
RBC # FLD: 15.8 % — HIGH (ref 10.3–14.5)
RBC # FLD: 16 % — HIGH (ref 10.3–14.5)
RBC # FLD: 16.6 % — HIGH (ref 10.3–14.5)
RBC # FLD: 16.7 % — HIGH (ref 10.3–14.5)
RBC # FLD: 16.8 % — HIGH (ref 10.3–14.5)
RBC # FLD: 16.8 % — HIGH (ref 10.3–14.5)
RBC # FLD: 17 % — HIGH (ref 10.3–14.5)
RBC # FLD: 17.2 % — HIGH (ref 10.3–14.5)
RBC # FLD: 17.3 % — HIGH (ref 10.3–14.5)
RBC # FLD: 17.4 % — HIGH (ref 10.3–14.5)
RBC # FLD: 17.5 % — HIGH (ref 10.3–14.5)
RBC # FLD: 17.7 % — HIGH (ref 10.3–14.5)
RBC # FLD: 17.8 % — HIGH (ref 10.3–14.5)
RBC # FLD: 17.9 % — HIGH (ref 10.3–14.5)
RBC # FLD: 18.1 % — HIGH (ref 10.3–14.5)
RBC # FLD: 18.3 % — HIGH (ref 10.3–14.5)
RBC # FLD: 18.4 % — HIGH (ref 10.3–14.5)
RBC # FLD: 18.8 % — HIGH (ref 10.3–14.5)
RBC # FLD: 19 % — HIGH (ref 10.3–14.5)
RBC # FLD: 19.2 % — HIGH (ref 10.3–14.5)
RBC # FLD: 19.3 % — HIGH (ref 10.3–14.5)
RBC # FLD: 19.8 % — HIGH (ref 10.3–14.5)
RBC # FLD: 20.7 % — HIGH (ref 10.3–14.5)
RBC # FLD: 21.1 % — HIGH (ref 10.3–14.5)
RBC BLD AUTO: ABNORMAL
RBC BLD AUTO: SIGNIFICANT CHANGE UP
RSV RNA SPEC QL NAA+PROBE: SIGNIFICANT CHANGE UP
RV+EV RNA SPEC QL NAA+PROBE: SIGNIFICANT CHANGE UP
S AUREUS DNA NOSE QL NAA+PROBE: SIGNIFICANT CHANGE UP
SAO2 % BLDA: 89.8 % — LOW (ref 94–98)
SAO2 % BLDA: 96.5 % — SIGNIFICANT CHANGE UP (ref 94–98)
SAO2 % BLDV: 83.4 % — SIGNIFICANT CHANGE UP
SARS-COV-2 IGG+IGM SERPL QL IA: 81.1 U/ML — HIGH
SARS-COV-2 IGG+IGM SERPL QL IA: POSITIVE
SARS-COV-2 N GENE NPH QL NAA+PROBE: NOT DETECTED
SARS-COV-2 RNA SPEC QL NAA+PROBE: SIGNIFICANT CHANGE UP
SODIUM SERPL-SCNC: 137 MMOL/L — SIGNIFICANT CHANGE UP (ref 135–145)
SODIUM SERPL-SCNC: 137 MMOL/L — SIGNIFICANT CHANGE UP (ref 135–145)
SODIUM SERPL-SCNC: 138 MMOL/L — SIGNIFICANT CHANGE UP (ref 135–145)
SODIUM SERPL-SCNC: 139 MMOL/L — SIGNIFICANT CHANGE UP (ref 135–145)
SODIUM SERPL-SCNC: 140 MMOL/L
SODIUM SERPL-SCNC: 140 MMOL/L
SODIUM SERPL-SCNC: 140 MMOL/L — SIGNIFICANT CHANGE UP (ref 135–145)
SODIUM SERPL-SCNC: 141 MMOL/L
SODIUM SERPL-SCNC: 141 MMOL/L
SODIUM SERPL-SCNC: 141 MMOL/L — SIGNIFICANT CHANGE UP (ref 135–145)
SODIUM SERPL-SCNC: 142 MMOL/L
SODIUM SERPL-SCNC: 142 MMOL/L
SODIUM SERPL-SCNC: 142 MMOL/L — SIGNIFICANT CHANGE UP (ref 135–145)
SODIUM SERPL-SCNC: 143 MMOL/L
SODIUM SERPL-SCNC: 143 MMOL/L
SODIUM SERPL-SCNC: 143 MMOL/L — SIGNIFICANT CHANGE UP (ref 135–145)
SODIUM SERPL-SCNC: 145 MMOL/L — SIGNIFICANT CHANGE UP (ref 135–145)
SODIUM SERPL-SCNC: 145 MMOL/L — SIGNIFICANT CHANGE UP (ref 135–145)
SODIUM SERPL-SCNC: 146 MMOL/L
SPECIMEN SOURCE: SIGNIFICANT CHANGE UP
SPECIMEN SOURCE: SIGNIFICANT CHANGE UP
TROPONIN T, HIGH SENSITIVITY RESULT: 32 NG/L — SIGNIFICANT CHANGE UP
VANCOMYCIN TROUGH SERPL-MCNC: 17.4 UG/ML — SIGNIFICANT CHANGE UP (ref 10–20)
WBC # BLD: 10.32 K/UL — SIGNIFICANT CHANGE UP (ref 3.8–10.5)
WBC # BLD: 10.36 K/UL — SIGNIFICANT CHANGE UP (ref 3.8–10.5)
WBC # BLD: 11.2 K/UL — HIGH (ref 3.8–10.5)
WBC # BLD: 11.66 K/UL — HIGH (ref 3.8–10.5)
WBC # BLD: 11.82 K/UL — HIGH (ref 3.8–10.5)
WBC # BLD: 11.83 K/UL — HIGH (ref 3.8–10.5)
WBC # BLD: 11.97 K/UL — HIGH (ref 3.8–10.5)
WBC # BLD: 11.99 K/UL — HIGH (ref 3.8–10.5)
WBC # BLD: 12.03 K/UL — HIGH (ref 3.8–10.5)
WBC # BLD: 12.26 K/UL — HIGH (ref 3.8–10.5)
WBC # BLD: 13.32 K/UL — HIGH (ref 3.8–10.5)
WBC # BLD: 13.46 K/UL — HIGH (ref 3.8–10.5)
WBC # BLD: 13.79 K/UL — HIGH (ref 3.8–10.5)
WBC # BLD: 14.17 K/UL — HIGH (ref 3.8–10.5)
WBC # BLD: 14.35 K/UL — HIGH (ref 3.8–10.5)
WBC # BLD: 14.65 K/UL — HIGH (ref 3.8–10.5)
WBC # BLD: 15.34 K/UL — HIGH (ref 3.8–10.5)
WBC # BLD: 15.72 K/UL — HIGH (ref 3.8–10.5)
WBC # BLD: 15.9 K/UL — HIGH (ref 3.8–10.5)
WBC # BLD: 16.09 K/UL — HIGH (ref 3.8–10.5)
WBC # BLD: 16.88 K/UL — HIGH (ref 3.8–10.5)
WBC # BLD: 17.06 K/UL — HIGH (ref 3.8–10.5)
WBC # BLD: 18.37 K/UL — HIGH (ref 3.8–10.5)
WBC # BLD: 18.8 K/UL — HIGH (ref 3.8–10.5)
WBC # BLD: 19.5 K/UL — HIGH (ref 3.8–10.5)
WBC # BLD: 19.55 K/UL — HIGH (ref 3.8–10.5)
WBC # BLD: 19.68 K/UL — HIGH (ref 3.8–10.5)
WBC # BLD: 20.58 K/UL — HIGH (ref 3.8–10.5)
WBC # BLD: 20.86 K/UL — HIGH (ref 3.8–10.5)
WBC # BLD: 21.41 K/UL — HIGH (ref 3.8–10.5)
WBC # BLD: 22.2 K/UL — HIGH (ref 3.8–10.5)
WBC # BLD: 22.55 K/UL — HIGH (ref 3.8–10.5)
WBC # BLD: 25.65 K/UL — HIGH (ref 3.8–10.5)
WBC # BLD: 4.16 K/UL — SIGNIFICANT CHANGE UP (ref 3.8–10.5)
WBC # BLD: 4.53 K/UL — SIGNIFICANT CHANGE UP (ref 3.8–10.5)
WBC # BLD: 5.1 K/UL — SIGNIFICANT CHANGE UP (ref 3.8–10.5)
WBC # BLD: 5.21 K/UL — SIGNIFICANT CHANGE UP (ref 3.8–10.5)
WBC # BLD: 5.9 K/UL — SIGNIFICANT CHANGE UP (ref 3.8–10.5)
WBC # BLD: 6.28 K/UL — SIGNIFICANT CHANGE UP (ref 3.8–10.5)
WBC # BLD: 7.27 K/UL — SIGNIFICANT CHANGE UP (ref 3.8–10.5)
WBC # BLD: 7.48 K/UL — SIGNIFICANT CHANGE UP (ref 3.8–10.5)
WBC # BLD: 7.74 K/UL — SIGNIFICANT CHANGE UP (ref 3.8–10.5)
WBC # BLD: 7.85 K/UL — SIGNIFICANT CHANGE UP (ref 3.8–10.5)
WBC # BLD: 8.19 K/UL — SIGNIFICANT CHANGE UP (ref 3.8–10.5)
WBC # BLD: 8.25 K/UL — SIGNIFICANT CHANGE UP (ref 3.8–10.5)
WBC # BLD: 8.68 K/UL — SIGNIFICANT CHANGE UP (ref 3.8–10.5)
WBC # BLD: 9.17 K/UL — SIGNIFICANT CHANGE UP (ref 3.8–10.5)
WBC # FLD AUTO: 10.32 K/UL — SIGNIFICANT CHANGE UP (ref 3.8–10.5)
WBC # FLD AUTO: 10.36 K/UL — SIGNIFICANT CHANGE UP (ref 3.8–10.5)
WBC # FLD AUTO: 11.2 K/UL — HIGH (ref 3.8–10.5)
WBC # FLD AUTO: 11.66 K/UL — HIGH (ref 3.8–10.5)
WBC # FLD AUTO: 11.82 K/UL — HIGH (ref 3.8–10.5)
WBC # FLD AUTO: 11.83 K/UL — HIGH (ref 3.8–10.5)
WBC # FLD AUTO: 11.97 K/UL — HIGH (ref 3.8–10.5)
WBC # FLD AUTO: 11.99 K/UL — HIGH (ref 3.8–10.5)
WBC # FLD AUTO: 12.03 K/UL — HIGH (ref 3.8–10.5)
WBC # FLD AUTO: 12.26 K/UL — HIGH (ref 3.8–10.5)
WBC # FLD AUTO: 13.32 K/UL — HIGH (ref 3.8–10.5)
WBC # FLD AUTO: 13.46 K/UL — HIGH (ref 3.8–10.5)
WBC # FLD AUTO: 13.79 K/UL — HIGH (ref 3.8–10.5)
WBC # FLD AUTO: 14.17 K/UL — HIGH (ref 3.8–10.5)
WBC # FLD AUTO: 14.35 K/UL — HIGH (ref 3.8–10.5)
WBC # FLD AUTO: 14.65 K/UL — HIGH (ref 3.8–10.5)
WBC # FLD AUTO: 15.34 K/UL — HIGH (ref 3.8–10.5)
WBC # FLD AUTO: 15.72 K/UL — HIGH (ref 3.8–10.5)
WBC # FLD AUTO: 15.9 K/UL — HIGH (ref 3.8–10.5)
WBC # FLD AUTO: 16.09 K/UL — HIGH (ref 3.8–10.5)
WBC # FLD AUTO: 16.88 K/UL — HIGH (ref 3.8–10.5)
WBC # FLD AUTO: 17.06 K/UL — HIGH (ref 3.8–10.5)
WBC # FLD AUTO: 18.37 K/UL — HIGH (ref 3.8–10.5)
WBC # FLD AUTO: 18.8 K/UL — HIGH (ref 3.8–10.5)
WBC # FLD AUTO: 19.5 K/UL — HIGH (ref 3.8–10.5)
WBC # FLD AUTO: 19.55 K/UL — HIGH (ref 3.8–10.5)
WBC # FLD AUTO: 19.68 K/UL — HIGH (ref 3.8–10.5)
WBC # FLD AUTO: 20.58 K/UL — HIGH (ref 3.8–10.5)
WBC # FLD AUTO: 20.86 K/UL — HIGH (ref 3.8–10.5)
WBC # FLD AUTO: 21.41 K/UL — HIGH (ref 3.8–10.5)
WBC # FLD AUTO: 22.2 K/UL — HIGH (ref 3.8–10.5)
WBC # FLD AUTO: 22.55 K/UL — HIGH (ref 3.8–10.5)
WBC # FLD AUTO: 25.65 K/UL — HIGH (ref 3.8–10.5)
WBC # FLD AUTO: 4.16 K/UL — SIGNIFICANT CHANGE UP (ref 3.8–10.5)
WBC # FLD AUTO: 4.53 K/UL — SIGNIFICANT CHANGE UP (ref 3.8–10.5)
WBC # FLD AUTO: 5.1 K/UL — SIGNIFICANT CHANGE UP (ref 3.8–10.5)
WBC # FLD AUTO: 5.21 K/UL — SIGNIFICANT CHANGE UP (ref 3.8–10.5)
WBC # FLD AUTO: 5.9 K/UL — SIGNIFICANT CHANGE UP (ref 3.8–10.5)
WBC # FLD AUTO: 6.28 K/UL — SIGNIFICANT CHANGE UP (ref 3.8–10.5)
WBC # FLD AUTO: 7.27 K/UL — SIGNIFICANT CHANGE UP (ref 3.8–10.5)
WBC # FLD AUTO: 7.48 K/UL — SIGNIFICANT CHANGE UP (ref 3.8–10.5)
WBC # FLD AUTO: 7.74 K/UL — SIGNIFICANT CHANGE UP (ref 3.8–10.5)
WBC # FLD AUTO: 7.85 K/UL — SIGNIFICANT CHANGE UP (ref 3.8–10.5)
WBC # FLD AUTO: 8.19 K/UL — SIGNIFICANT CHANGE UP (ref 3.8–10.5)
WBC # FLD AUTO: 8.25 K/UL — SIGNIFICANT CHANGE UP (ref 3.8–10.5)
WBC # FLD AUTO: 8.68 K/UL — SIGNIFICANT CHANGE UP (ref 3.8–10.5)
WBC # FLD AUTO: 9.17 K/UL — SIGNIFICANT CHANGE UP (ref 3.8–10.5)

## 2021-01-01 PROCEDURE — 99253 IP/OBS CNSLTJ NEW/EST LOW 45: CPT

## 2021-01-01 PROCEDURE — 99233 SBSQ HOSP IP/OBS HIGH 50: CPT

## 2021-01-01 PROCEDURE — 99072 ADDL SUPL MATRL&STAF TM PHE: CPT

## 2021-01-01 PROCEDURE — 71045 X-RAY EXAM CHEST 1 VIEW: CPT | Mod: 26

## 2021-01-01 PROCEDURE — 76604 US EXAM CHEST: CPT

## 2021-01-01 PROCEDURE — 99233 SBSQ HOSP IP/OBS HIGH 50: CPT | Mod: GC

## 2021-01-01 PROCEDURE — 99214 OFFICE O/P EST MOD 30 MIN: CPT

## 2021-01-01 PROCEDURE — 93010 ELECTROCARDIOGRAM REPORT: CPT

## 2021-01-01 PROCEDURE — 99232 SBSQ HOSP IP/OBS MODERATE 35: CPT | Mod: 1L

## 2021-01-01 PROCEDURE — 74177 CT ABD & PELVIS W/CONTRAST: CPT | Mod: 26

## 2021-01-01 PROCEDURE — 99214 OFFICE O/P EST MOD 30 MIN: CPT | Mod: 25

## 2021-01-01 PROCEDURE — 99254 IP/OBS CNSLTJ NEW/EST MOD 60: CPT

## 2021-01-01 PROCEDURE — 36000 PLACE NEEDLE IN VEIN: CPT | Mod: RT

## 2021-01-01 PROCEDURE — 99254 IP/OBS CNSLTJ NEW/EST MOD 60: CPT | Mod: 25

## 2021-01-01 PROCEDURE — 99223 1ST HOSP IP/OBS HIGH 75: CPT | Mod: GC

## 2021-01-01 PROCEDURE — 99285 EMERGENCY DEPT VISIT HI MDM: CPT

## 2021-01-01 PROCEDURE — 93971 EXTREMITY STUDY: CPT | Mod: 26

## 2021-01-01 PROCEDURE — 99232 SBSQ HOSP IP/OBS MODERATE 35: CPT

## 2021-01-01 PROCEDURE — 71260 CT THORAX DX C+: CPT | Mod: 26

## 2021-01-01 PROCEDURE — 99497 ADVNCD CARE PLAN 30 MIN: CPT

## 2021-01-01 PROCEDURE — 99254 IP/OBS CNSLTJ NEW/EST MOD 60: CPT | Mod: GC

## 2021-01-01 PROCEDURE — 84166 PROTEIN E-PHORESIS/URINE/CSF: CPT | Mod: 26

## 2021-01-01 PROCEDURE — 74177 CT ABD & PELVIS W/CONTRAST: CPT

## 2021-01-01 PROCEDURE — 71260 CT THORAX DX C+: CPT

## 2021-01-01 PROCEDURE — 93306 TTE W/DOPPLER COMPLETE: CPT

## 2021-01-01 PROCEDURE — 86335 IMMUNFIX E-PHORSIS/URINE/CSF: CPT | Mod: 26

## 2021-01-01 PROCEDURE — 76775 US EXAM ABDO BACK WALL LIM: CPT

## 2021-01-01 PROCEDURE — 99215 OFFICE O/P EST HI 40 MIN: CPT

## 2021-01-01 PROCEDURE — 99233 SBSQ HOSP IP/OBS HIGH 50: CPT | Mod: 25

## 2021-01-01 PROCEDURE — 71275 CT ANGIOGRAPHY CHEST: CPT | Mod: 26

## 2021-01-01 PROCEDURE — 99498 ADVNCD CARE PLAN ADDL 30 MIN: CPT

## 2021-01-01 PROCEDURE — 93308 TTE F-UP OR LMTD: CPT

## 2021-01-01 PROCEDURE — 93970 EXTREMITY STUDY: CPT | Mod: 26

## 2021-01-01 PROCEDURE — 99232 SBSQ HOSP IP/OBS MODERATE 35: CPT | Mod: GC

## 2021-01-01 PROCEDURE — 82565 ASSAY OF CREATININE: CPT

## 2021-01-01 PROCEDURE — 93306 TTE W/DOPPLER COMPLETE: CPT | Mod: 26

## 2021-01-01 RX ORDER — INFLUENZA VIRUS VACCINE 15; 15; 15; 15 UG/.5ML; UG/.5ML; UG/.5ML; UG/.5ML
0.5 SUSPENSION INTRAMUSCULAR ONCE
Refills: 0 | Status: DISCONTINUED | OUTPATIENT
Start: 2021-01-01 | End: 2021-01-01

## 2021-01-01 RX ORDER — POTASSIUM CHLORIDE 20 MEQ
40 PACKET (EA) ORAL ONCE
Refills: 0 | Status: COMPLETED | OUTPATIENT
Start: 2021-01-01 | End: 2021-01-01

## 2021-01-01 RX ORDER — HYDROMORPHONE HYDROCHLORIDE 2 MG/ML
4 INJECTION INTRAMUSCULAR; INTRAVENOUS; SUBCUTANEOUS
Refills: 0 | Status: DISCONTINUED | OUTPATIENT
Start: 2021-01-01 | End: 2021-01-01

## 2021-01-01 RX ORDER — FUROSEMIDE 40 MG
40 TABLET ORAL
Refills: 0 | Status: DISCONTINUED | OUTPATIENT
Start: 2021-01-01 | End: 2021-01-01

## 2021-01-01 RX ORDER — MORPHINE SULFATE 50 MG/1
2.5 CAPSULE, EXTENDED RELEASE ORAL
Refills: 0 | Status: DISCONTINUED | OUTPATIENT
Start: 2021-01-01 | End: 2021-01-01

## 2021-01-01 RX ORDER — LEVOFLOXACIN 750 MG/1
750 TABLET, FILM COATED ORAL DAILY
Qty: 7 | Refills: 0 | Status: DISCONTINUED | COMMUNITY
Start: 2021-01-01 | End: 2021-01-01

## 2021-01-01 RX ORDER — AZITHROMYCIN 500 MG/1
500 TABLET, FILM COATED ORAL DAILY
Refills: 0 | Status: DISCONTINUED | OUTPATIENT
Start: 2021-01-01 | End: 2021-01-01

## 2021-01-01 RX ORDER — MULTIVIT-MIN/FERROUS GLUCONATE 9 MG/15 ML
1 LIQUID (ML) ORAL
Qty: 0 | Refills: 0 | DISCHARGE

## 2021-01-01 RX ORDER — IPRATROPIUM/ALBUTEROL SULFATE 18-103MCG
3 AEROSOL WITH ADAPTER (GRAM) INHALATION ONCE
Refills: 0 | Status: COMPLETED | OUTPATIENT
Start: 2021-01-01 | End: 2021-01-01

## 2021-01-01 RX ORDER — MORPHINE SULFATE 50 MG/1
2.5 CAPSULE, EXTENDED RELEASE ORAL EVERY 6 HOURS
Refills: 0 | Status: DISCONTINUED | OUTPATIENT
Start: 2021-01-01 | End: 2021-01-01

## 2021-01-01 RX ORDER — FUROSEMIDE 40 MG
40 TABLET ORAL ONCE
Refills: 0 | Status: COMPLETED | OUTPATIENT
Start: 2021-01-01 | End: 2021-01-01

## 2021-01-01 RX ORDER — MORPHINE SULFATE 50 MG/1
2.5 CAPSULE, EXTENDED RELEASE ORAL EVERY 12 HOURS
Refills: 0 | Status: DISCONTINUED | OUTPATIENT
Start: 2021-01-01 | End: 2021-01-01

## 2021-01-01 RX ORDER — PREGABALIN 225 MG/1
1000 CAPSULE ORAL DAILY
Refills: 0 | Status: DISCONTINUED | OUTPATIENT
Start: 2021-01-01 | End: 2021-01-01

## 2021-01-01 RX ORDER — APIXABAN 2.5 MG/1
5 TABLET, FILM COATED ORAL EVERY 12 HOURS
Refills: 0 | Status: DISCONTINUED | OUTPATIENT
Start: 2021-01-01 | End: 2021-01-01

## 2021-01-01 RX ORDER — NYSTATIN 500MM UNIT
500000 POWDER (EA) MISCELLANEOUS
Refills: 0 | Status: DISCONTINUED | OUTPATIENT
Start: 2021-01-01 | End: 2021-01-01

## 2021-01-01 RX ORDER — HEPARIN SODIUM 5000 [USP'U]/ML
5000 INJECTION INTRAVENOUS; SUBCUTANEOUS EVERY 8 HOURS
Refills: 0 | Status: DISCONTINUED | OUTPATIENT
Start: 2021-01-01 | End: 2021-01-01

## 2021-01-01 RX ORDER — FUROSEMIDE 20 MG/1
20 TABLET ORAL
Qty: 14 | Refills: 0 | Status: DISCONTINUED | COMMUNITY
Start: 2021-01-01 | End: 2021-01-01

## 2021-01-01 RX ORDER — VANCOMYCIN HCL 1 G
1000 VIAL (EA) INTRAVENOUS ONCE
Refills: 0 | Status: COMPLETED | OUTPATIENT
Start: 2021-01-01 | End: 2021-01-01

## 2021-01-01 RX ORDER — ALBUTEROL 90 UG/1
2 AEROSOL, METERED ORAL EVERY 6 HOURS
Refills: 0 | Status: DISCONTINUED | OUTPATIENT
Start: 2021-01-01 | End: 2021-01-01

## 2021-01-01 RX ORDER — BUDESONIDE AND FORMOTEROL FUMARATE DIHYDRATE 160; 4.5 UG/1; UG/1
2 AEROSOL RESPIRATORY (INHALATION)
Refills: 0 | Status: DISCONTINUED | OUTPATIENT
Start: 2021-01-01 | End: 2021-01-01

## 2021-01-01 RX ORDER — SODIUM CHLORIDE 9 MG/ML
1000 INJECTION INTRAMUSCULAR; INTRAVENOUS; SUBCUTANEOUS
Refills: 0 | Status: DISCONTINUED | OUTPATIENT
Start: 2021-01-01 | End: 2021-01-01

## 2021-01-01 RX ORDER — CHLORHEXIDINE GLUCONATE 213 G/1000ML
1 SOLUTION TOPICAL DAILY
Refills: 0 | Status: DISCONTINUED | OUTPATIENT
Start: 2021-01-01 | End: 2021-01-01

## 2021-01-01 RX ORDER — APIXABAN 2.5 MG/1
2.5 TABLET, FILM COATED ORAL
Refills: 0 | Status: DISCONTINUED | OUTPATIENT
Start: 2021-01-01 | End: 2021-01-01

## 2021-01-01 RX ORDER — SALIVA SUBSTITUTE COMB NO.11 351 MG
15 POWDER IN PACKET (EA) MUCOUS MEMBRANE EVERY 4 HOURS
Refills: 0 | Status: DISCONTINUED | OUTPATIENT
Start: 2021-01-01 | End: 2021-01-01

## 2021-01-01 RX ORDER — POLYETHYLENE GLYCOL 3350 17 G/17G
17 POWDER, FOR SOLUTION ORAL DAILY
Refills: 0 | Status: CANCELLED | OUTPATIENT
Start: 2021-01-01 | End: 2021-01-01

## 2021-01-01 RX ORDER — CEFTRIAXONE 500 MG/1
1000 INJECTION, POWDER, FOR SOLUTION INTRAMUSCULAR; INTRAVENOUS ONCE
Refills: 0 | Status: COMPLETED | OUTPATIENT
Start: 2021-01-01 | End: 2021-01-01

## 2021-01-01 RX ORDER — AZITHROMYCIN 500 MG/1
500 TABLET, FILM COATED ORAL ONCE
Refills: 0 | Status: DISCONTINUED | OUTPATIENT
Start: 2021-01-01 | End: 2021-01-01

## 2021-01-01 RX ORDER — FUROSEMIDE 40 MG
40 TABLET ORAL EVERY 12 HOURS
Refills: 0 | Status: DISCONTINUED | OUTPATIENT
Start: 2021-01-01 | End: 2021-01-01

## 2021-01-01 RX ORDER — IPRATROPIUM BROMIDE 0.2 MG/ML
1 SOLUTION, NON-ORAL INHALATION EVERY 6 HOURS
Refills: 0 | Status: DISCONTINUED | OUTPATIENT
Start: 2021-01-01 | End: 2021-01-01

## 2021-01-01 RX ORDER — PROMETHAZINE HYDROCHLORIDE AND CODEINE PHOSPHATE 6.25; 1 MG/5ML; MG/5ML
6.25-1 SOLUTION ORAL
Qty: 600 | Refills: 0 | Status: ACTIVE | COMMUNITY
Start: 2021-01-01 | End: 1900-01-01

## 2021-01-01 RX ORDER — PREGABALIN 225 MG/1
1 CAPSULE ORAL
Qty: 0 | Refills: 0 | DISCHARGE

## 2021-01-01 RX ORDER — LEVOFLOXACIN 750 MG/1
750 TABLET, FILM COATED ORAL
Qty: 10 | Refills: 0 | Status: DISCONTINUED | COMMUNITY
Start: 2021-01-01 | End: 2021-01-01

## 2021-01-01 RX ORDER — ALBUTEROL SULFATE 90 UG/1
108 (90 BASE) INHALANT RESPIRATORY (INHALATION) EVERY 6 HOURS
Qty: 1 | Refills: 2 | Status: ACTIVE | COMMUNITY
Start: 2021-01-01 | End: 1900-01-01

## 2021-01-01 RX ORDER — MORPHINE SULFATE 50 MG/1
15 CAPSULE, EXTENDED RELEASE ORAL EVERY 12 HOURS
Refills: 0 | Status: DISCONTINUED | OUTPATIENT
Start: 2021-01-01 | End: 2021-01-01

## 2021-01-01 RX ORDER — SODIUM CHLORIDE 0.65 %
1 AEROSOL, SPRAY (ML) NASAL EVERY 4 HOURS
Refills: 0 | Status: DISCONTINUED | OUTPATIENT
Start: 2021-01-01 | End: 2021-01-01

## 2021-01-01 RX ORDER — HYDROMORPHONE HYDROCHLORIDE 2 MG/ML
4 INJECTION INTRAMUSCULAR; INTRAVENOUS; SUBCUTANEOUS ONCE
Refills: 0 | Status: DISCONTINUED | OUTPATIENT
Start: 2021-01-01 | End: 2021-01-01

## 2021-01-01 RX ORDER — MULTIVIT-MIN/FERROUS GLUCONATE 9 MG/15 ML
1 LIQUID (ML) ORAL DAILY
Refills: 0 | Status: DISCONTINUED | OUTPATIENT
Start: 2021-01-01 | End: 2021-01-01

## 2021-01-01 RX ORDER — SODIUM CHLORIDE 0.65 %
1 AEROSOL, SPRAY (ML) NASAL ONCE
Refills: 0 | Status: DISCONTINUED | OUTPATIENT
Start: 2021-01-01 | End: 2021-01-01

## 2021-01-01 RX ORDER — HYDROCODONE BITARTRATE AND HOMATROPINE METHYLBROMIDE 5; 1.5 MG/5ML; MG/5ML
5-1.5 SOLUTION ORAL EVERY 8 HOURS
Qty: 450 | Refills: 0 | Status: ACTIVE | COMMUNITY
Start: 2021-01-01 | End: 1900-01-01

## 2021-01-01 RX ORDER — FUROSEMIDE 40 MG/1
40 TABLET ORAL
Qty: 30 | Refills: 2 | Status: ACTIVE | COMMUNITY
Start: 2021-01-01 | End: 1900-01-01

## 2021-01-01 RX ORDER — METOCLOPRAMIDE HCL 10 MG
10 TABLET ORAL EVERY 6 HOURS
Refills: 0 | Status: DISCONTINUED | OUTPATIENT
Start: 2021-01-01 | End: 2021-01-01

## 2021-01-01 RX ORDER — HYDROMORPHONE HYDROCHLORIDE 2 MG/ML
0.25 INJECTION INTRAMUSCULAR; INTRAVENOUS; SUBCUTANEOUS ONCE
Refills: 0 | Status: DISCONTINUED | OUTPATIENT
Start: 2021-01-01 | End: 2021-01-01

## 2021-01-01 RX ORDER — POTASSIUM CHLORIDE 20 MEQ
40 PACKET (EA) ORAL EVERY 4 HOURS
Refills: 0 | Status: COMPLETED | OUTPATIENT
Start: 2021-01-01 | End: 2021-01-01

## 2021-01-01 RX ORDER — VANCOMYCIN HCL 1 G
1250 VIAL (EA) INTRAVENOUS EVERY 12 HOURS
Refills: 0 | Status: DISCONTINUED | OUTPATIENT
Start: 2021-01-01 | End: 2021-01-01

## 2021-01-01 RX ORDER — PANTOPRAZOLE SODIUM 20 MG/1
40 TABLET, DELAYED RELEASE ORAL
Refills: 0 | Status: DISCONTINUED | OUTPATIENT
Start: 2021-01-01 | End: 2021-01-01

## 2021-01-01 RX ORDER — POTASSIUM CHLORIDE 20 MEQ
40 PACKET (EA) ORAL ONCE
Refills: 0 | Status: DISCONTINUED | OUTPATIENT
Start: 2021-01-01 | End: 2021-01-01

## 2021-01-01 RX ORDER — BUDESONIDE, GLYCOPYRROLATE, AND FORMOTEROL FUMARATE 160; 9; 4.8 UG/1; UG/1; UG/1
160-9-4.8 AEROSOL, METERED RESPIRATORY (INHALATION) TWICE DAILY
Qty: 1 | Refills: 6 | Status: ACTIVE | COMMUNITY
Start: 2021-01-01 | End: 1900-01-01

## 2021-01-01 RX ORDER — IPRATROPIUM BROMIDE 0.2 MG/ML
500 SOLUTION, NON-ORAL INHALATION ONCE
Refills: 0 | Status: COMPLETED | OUTPATIENT
Start: 2021-01-01 | End: 2021-01-01

## 2021-01-01 RX ORDER — VANCOMYCIN HCL 1 G
VIAL (EA) INTRAVENOUS
Refills: 0 | Status: DISCONTINUED | OUTPATIENT
Start: 2021-01-01 | End: 2021-01-01

## 2021-01-01 RX ORDER — POLYETHYLENE GLYCOL 3350 17 G/17G
17 POWDER, FOR SOLUTION ORAL DAILY
Refills: 0 | Status: DISCONTINUED | OUTPATIENT
Start: 2021-01-01 | End: 2021-01-01

## 2021-01-01 RX ORDER — ENOXAPARIN SODIUM 100 MG/ML
40 INJECTION SUBCUTANEOUS DAILY
Refills: 0 | Status: DISCONTINUED | OUTPATIENT
Start: 2021-01-01 | End: 2021-01-01

## 2021-01-01 RX ORDER — PREDNISONE 10 MG/1
10 TABLET ORAL
Qty: 7 | Refills: 0 | Status: DISCONTINUED | COMMUNITY
Start: 2021-01-01 | End: 2021-01-01

## 2021-01-01 RX ORDER — DEXAMETHASONE 0.5 MG/5ML
6 ELIXIR ORAL ONCE
Refills: 0 | Status: DISCONTINUED | OUTPATIENT
Start: 2021-01-01 | End: 2021-01-01

## 2021-01-01 RX ORDER — LEVOFLOXACIN 750 MG/1
750 TABLET, FILM COATED ORAL DAILY
Qty: 5 | Refills: 0 | Status: DISCONTINUED | COMMUNITY
Start: 2021-01-01 | End: 2021-01-01

## 2021-01-01 RX ORDER — ALBUTEROL 90 UG/1
2.5 AEROSOL, METERED ORAL ONCE
Refills: 0 | Status: COMPLETED | OUTPATIENT
Start: 2021-01-01 | End: 2021-01-01

## 2021-01-01 RX ORDER — IPRATROPIUM/ALBUTEROL SULFATE 18-103MCG
3 AEROSOL WITH ADAPTER (GRAM) INHALATION EVERY 4 HOURS
Refills: 0 | Status: DISCONTINUED | OUTPATIENT
Start: 2021-01-01 | End: 2021-01-01

## 2021-01-01 RX ORDER — BUDESONIDE, GLYCOPYRROLATE, AND FORMOTEROL FUMARATE 160; 9; 4.8 UG/1; UG/1; UG/1
2 AEROSOL, METERED RESPIRATORY (INHALATION)
Qty: 0 | Refills: 0 | DISCHARGE

## 2021-01-01 RX ORDER — PIPERACILLIN AND TAZOBACTAM 4; .5 G/20ML; G/20ML
3.38 INJECTION, POWDER, LYOPHILIZED, FOR SOLUTION INTRAVENOUS EVERY 8 HOURS
Refills: 0 | Status: COMPLETED | OUTPATIENT
Start: 2021-01-01 | End: 2021-01-01

## 2021-01-01 RX ORDER — DEXAMETHASONE 4 MG/1
4 TABLET ORAL
Qty: 30 | Refills: 5 | Status: DISCONTINUED | COMMUNITY
Start: 2020-10-13 | End: 2021-01-01

## 2021-01-01 RX ORDER — VANCOMYCIN HCL 1 G
1250 VIAL (EA) INTRAVENOUS ONCE
Refills: 0 | Status: COMPLETED | OUTPATIENT
Start: 2021-01-01 | End: 2021-01-01

## 2021-01-01 RX ORDER — POTASSIUM CHLORIDE 20 MEQ
20 PACKET (EA) ORAL ONCE
Refills: 0 | Status: DISCONTINUED | OUTPATIENT
Start: 2021-01-01 | End: 2021-01-01

## 2021-01-01 RX ORDER — PIPERACILLIN AND TAZOBACTAM 4; .5 G/20ML; G/20ML
3.38 INJECTION, POWDER, LYOPHILIZED, FOR SOLUTION INTRAVENOUS ONCE
Refills: 0 | Status: COMPLETED | OUTPATIENT
Start: 2021-01-01 | End: 2021-01-01

## 2021-01-01 RX ORDER — METOPROLOL TARTRATE 50 MG
5 TABLET ORAL ONCE
Refills: 0 | Status: COMPLETED | OUTPATIENT
Start: 2021-01-01 | End: 2021-01-01

## 2021-01-01 RX ORDER — TIOTROPIUM BROMIDE 18 UG/1
1 CAPSULE ORAL; RESPIRATORY (INHALATION) DAILY
Refills: 0 | Status: DISCONTINUED | OUTPATIENT
Start: 2021-01-01 | End: 2021-01-01

## 2021-01-01 RX ORDER — METOPROLOL TARTRATE 50 MG
25 TABLET ORAL
Refills: 0 | Status: DISCONTINUED | OUTPATIENT
Start: 2021-01-01 | End: 2021-01-01

## 2021-01-01 RX ORDER — HYDROMORPHONE HYDROCHLORIDE 2 MG/ML
2 INJECTION INTRAMUSCULAR; INTRAVENOUS; SUBCUTANEOUS ONCE
Refills: 0 | Status: DISCONTINUED | OUTPATIENT
Start: 2021-01-01 | End: 2021-01-01

## 2021-01-01 RX ORDER — SIMETHICONE 80 MG/1
80 TABLET, CHEWABLE ORAL ONCE
Refills: 0 | Status: COMPLETED | OUTPATIENT
Start: 2021-01-01 | End: 2021-01-01

## 2021-01-01 RX ORDER — AZITHROMYCIN 250 MG/1
250 TABLET, FILM COATED ORAL
Qty: 6 | Refills: 0 | Status: DISCONTINUED | COMMUNITY
Start: 2021-01-01 | End: 2021-01-01

## 2021-01-01 RX ORDER — SENNA PLUS 8.6 MG/1
2 TABLET ORAL AT BEDTIME
Refills: 0 | Status: DISCONTINUED | OUTPATIENT
Start: 2021-01-01 | End: 2021-01-01

## 2021-01-01 RX ORDER — FUROSEMIDE 40 MG
40 TABLET ORAL DAILY
Refills: 0 | Status: DISCONTINUED | OUTPATIENT
Start: 2021-01-01 | End: 2021-01-01

## 2021-01-01 RX ORDER — APIXABAN 2.5 MG/1
5 TABLET, FILM COATED ORAL
Refills: 0 | Status: DISCONTINUED | OUTPATIENT
Start: 2021-01-01 | End: 2021-01-01

## 2021-01-01 RX ORDER — FUROSEMIDE 40 MG
80 TABLET ORAL
Refills: 0 | Status: DISCONTINUED | OUTPATIENT
Start: 2021-01-01 | End: 2021-01-01

## 2021-01-01 RX ADMIN — Medication 50 MILLIGRAM(S): at 05:15

## 2021-01-01 RX ADMIN — HYDROMORPHONE HYDROCHLORIDE 4 MILLIGRAM(S): 2 INJECTION INTRAMUSCULAR; INTRAVENOUS; SUBCUTANEOUS at 12:45

## 2021-01-01 RX ADMIN — Medication 50 MILLIGRAM(S): at 05:06

## 2021-01-01 RX ADMIN — Medication 1 TABLET(S): at 12:53

## 2021-01-01 RX ADMIN — Medication 1 SPRAY(S): at 21:22

## 2021-01-01 RX ADMIN — MORPHINE SULFATE 2.5 MILLIGRAM(S): 50 CAPSULE, EXTENDED RELEASE ORAL at 14:03

## 2021-01-01 RX ADMIN — PREGABALIN 1000 MICROGRAM(S): 225 CAPSULE ORAL at 12:14

## 2021-01-01 RX ADMIN — APIXABAN 5 MILLIGRAM(S): 2.5 TABLET, FILM COATED ORAL at 18:38

## 2021-01-01 RX ADMIN — CHLORHEXIDINE GLUCONATE 1 APPLICATION(S): 213 SOLUTION TOPICAL at 13:04

## 2021-01-01 RX ADMIN — SENNA PLUS 2 TABLET(S): 8.6 TABLET ORAL at 06:55

## 2021-01-01 RX ADMIN — Medication 1 SPRAY(S): at 17:37

## 2021-01-01 RX ADMIN — Medication 1 SPRAY(S): at 22:20

## 2021-01-01 RX ADMIN — TIOTROPIUM BROMIDE 1 CAPSULE(S): 18 CAPSULE ORAL; RESPIRATORY (INHALATION) at 21:04

## 2021-01-01 RX ADMIN — PREGABALIN 1000 MICROGRAM(S): 225 CAPSULE ORAL at 11:30

## 2021-01-01 RX ADMIN — Medication 3 MILLILITER(S): at 19:20

## 2021-01-01 RX ADMIN — HYDROMORPHONE HYDROCHLORIDE 4 MILLIGRAM(S): 2 INJECTION INTRAMUSCULAR; INTRAVENOUS; SUBCUTANEOUS at 18:38

## 2021-01-01 RX ADMIN — HYDROMORPHONE HYDROCHLORIDE 4 MILLIGRAM(S): 2 INJECTION INTRAMUSCULAR; INTRAVENOUS; SUBCUTANEOUS at 23:59

## 2021-01-01 RX ADMIN — MORPHINE SULFATE 2.5 MILLIGRAM(S): 50 CAPSULE, EXTENDED RELEASE ORAL at 13:58

## 2021-01-01 RX ADMIN — PREGABALIN 1000 MICROGRAM(S): 225 CAPSULE ORAL at 13:17

## 2021-01-01 RX ADMIN — Medication 3 MILLILITER(S): at 19:54

## 2021-01-01 RX ADMIN — Medication 3 MILLILITER(S): at 19:49

## 2021-01-01 RX ADMIN — Medication 3 MILLILITER(S): at 22:36

## 2021-01-01 RX ADMIN — BUDESONIDE AND FORMOTEROL FUMARATE DIHYDRATE 2 PUFF(S): 160; 4.5 AEROSOL RESPIRATORY (INHALATION) at 08:59

## 2021-01-01 RX ADMIN — PIPERACILLIN AND TAZOBACTAM 25 GRAM(S): 4; .5 INJECTION, POWDER, LYOPHILIZED, FOR SOLUTION INTRAVENOUS at 00:07

## 2021-01-01 RX ADMIN — Medication 40 MILLIGRAM(S): at 18:22

## 2021-01-01 RX ADMIN — Medication 25 MILLIGRAM(S): at 17:53

## 2021-01-01 RX ADMIN — Medication 40 MILLIEQUIVALENT(S): at 09:19

## 2021-01-01 RX ADMIN — Medication 40 MILLIEQUIVALENT(S): at 13:43

## 2021-01-01 RX ADMIN — Medication 40 MILLIGRAM(S): at 17:51

## 2021-01-01 RX ADMIN — Medication 166.67 MILLIGRAM(S): at 17:51

## 2021-01-01 RX ADMIN — MORPHINE SULFATE 2.5 MILLIGRAM(S): 50 CAPSULE, EXTENDED RELEASE ORAL at 21:43

## 2021-01-01 RX ADMIN — HEPARIN SODIUM 5000 UNIT(S): 5000 INJECTION INTRAVENOUS; SUBCUTANEOUS at 16:06

## 2021-01-01 RX ADMIN — Medication 3 MILLILITER(S): at 03:20

## 2021-01-01 RX ADMIN — Medication 3 MILLILITER(S): at 13:57

## 2021-01-01 RX ADMIN — Medication 1 TABLET(S): at 12:03

## 2021-01-01 RX ADMIN — Medication 40 MILLIGRAM(S): at 10:10

## 2021-01-01 RX ADMIN — HYDROMORPHONE HYDROCHLORIDE 4 MILLIGRAM(S): 2 INJECTION INTRAMUSCULAR; INTRAVENOUS; SUBCUTANEOUS at 21:06

## 2021-01-01 RX ADMIN — Medication 1 TABLET(S): at 12:37

## 2021-01-01 RX ADMIN — BUDESONIDE AND FORMOTEROL FUMARATE DIHYDRATE 2 PUFF(S): 160; 4.5 AEROSOL RESPIRATORY (INHALATION) at 21:33

## 2021-01-01 RX ADMIN — BUDESONIDE AND FORMOTEROL FUMARATE DIHYDRATE 2 PUFF(S): 160; 4.5 AEROSOL RESPIRATORY (INHALATION) at 08:39

## 2021-01-01 RX ADMIN — Medication 1 TABLET(S): at 12:09

## 2021-01-01 RX ADMIN — BUDESONIDE AND FORMOTEROL FUMARATE DIHYDRATE 2 PUFF(S): 160; 4.5 AEROSOL RESPIRATORY (INHALATION) at 19:59

## 2021-01-01 RX ADMIN — Medication 50 MILLIGRAM(S): at 05:47

## 2021-01-01 RX ADMIN — Medication 25 MILLIGRAM(S): at 06:26

## 2021-01-01 RX ADMIN — ENOXAPARIN SODIUM 40 MILLIGRAM(S): 100 INJECTION SUBCUTANEOUS at 12:20

## 2021-01-01 RX ADMIN — Medication 3 MILLILITER(S): at 16:09

## 2021-01-01 RX ADMIN — Medication 40 MILLIGRAM(S): at 06:26

## 2021-01-01 RX ADMIN — Medication 500000 UNIT(S): at 00:21

## 2021-01-01 RX ADMIN — Medication 3 MILLILITER(S): at 08:51

## 2021-01-01 RX ADMIN — TIOTROPIUM BROMIDE 1 CAPSULE(S): 18 CAPSULE ORAL; RESPIRATORY (INHALATION) at 09:02

## 2021-01-01 RX ADMIN — BUDESONIDE AND FORMOTEROL FUMARATE DIHYDRATE 2 PUFF(S): 160; 4.5 AEROSOL RESPIRATORY (INHALATION) at 10:21

## 2021-01-01 RX ADMIN — PREGABALIN 1000 MICROGRAM(S): 225 CAPSULE ORAL at 12:37

## 2021-01-01 RX ADMIN — Medication 3 MILLILITER(S): at 23:58

## 2021-01-01 RX ADMIN — Medication 3 MILLILITER(S): at 17:35

## 2021-01-01 RX ADMIN — Medication 3 MILLILITER(S): at 11:00

## 2021-01-01 RX ADMIN — Medication 3 MILLILITER(S): at 00:19

## 2021-01-01 RX ADMIN — CHLORHEXIDINE GLUCONATE 1 APPLICATION(S): 213 SOLUTION TOPICAL at 12:33

## 2021-01-01 RX ADMIN — HYDROMORPHONE HYDROCHLORIDE 4 MILLIGRAM(S): 2 INJECTION INTRAMUSCULAR; INTRAVENOUS; SUBCUTANEOUS at 20:25

## 2021-01-01 RX ADMIN — BUDESONIDE AND FORMOTEROL FUMARATE DIHYDRATE 2 PUFF(S): 160; 4.5 AEROSOL RESPIRATORY (INHALATION) at 07:32

## 2021-01-01 RX ADMIN — Medication 3 MILLILITER(S): at 14:54

## 2021-01-01 RX ADMIN — BUDESONIDE AND FORMOTEROL FUMARATE DIHYDRATE 2 PUFF(S): 160; 4.5 AEROSOL RESPIRATORY (INHALATION) at 08:27

## 2021-01-01 RX ADMIN — PREGABALIN 1000 MICROGRAM(S): 225 CAPSULE ORAL at 11:34

## 2021-01-01 RX ADMIN — Medication 3 MILLILITER(S): at 13:53

## 2021-01-01 RX ADMIN — PREGABALIN 1000 MICROGRAM(S): 225 CAPSULE ORAL at 12:07

## 2021-01-01 RX ADMIN — Medication 25 MILLIGRAM(S): at 17:50

## 2021-01-01 RX ADMIN — HYDROMORPHONE HYDROCHLORIDE 4 MILLIGRAM(S): 2 INJECTION INTRAMUSCULAR; INTRAVENOUS; SUBCUTANEOUS at 23:10

## 2021-01-01 RX ADMIN — MORPHINE SULFATE 15 MILLIGRAM(S): 50 CAPSULE, EXTENDED RELEASE ORAL at 18:44

## 2021-01-01 RX ADMIN — HYDROMORPHONE HYDROCHLORIDE 4 MILLIGRAM(S): 2 INJECTION INTRAMUSCULAR; INTRAVENOUS; SUBCUTANEOUS at 22:10

## 2021-01-01 RX ADMIN — Medication 1 SPRAY(S): at 17:14

## 2021-01-01 RX ADMIN — Medication 1 SPRAY(S): at 05:17

## 2021-01-01 RX ADMIN — Medication 500000 UNIT(S): at 12:38

## 2021-01-01 RX ADMIN — Medication 40 MILLIGRAM(S): at 05:40

## 2021-01-01 RX ADMIN — Medication 3 MILLILITER(S): at 10:04

## 2021-01-01 RX ADMIN — Medication 5 MILLIGRAM(S): at 17:51

## 2021-01-01 RX ADMIN — MORPHINE SULFATE 2.5 MILLIGRAM(S): 50 CAPSULE, EXTENDED RELEASE ORAL at 13:23

## 2021-01-01 RX ADMIN — BUDESONIDE AND FORMOTEROL FUMARATE DIHYDRATE 2 PUFF(S): 160; 4.5 AEROSOL RESPIRATORY (INHALATION) at 22:01

## 2021-01-01 RX ADMIN — Medication 1 SPRAY(S): at 05:35

## 2021-01-01 RX ADMIN — Medication 500000 UNIT(S): at 05:11

## 2021-01-01 RX ADMIN — Medication 1 SPRAY(S): at 09:43

## 2021-01-01 RX ADMIN — Medication 1 SPRAY(S): at 09:38

## 2021-01-01 RX ADMIN — Medication 3 MILLILITER(S): at 03:57

## 2021-01-01 RX ADMIN — MORPHINE SULFATE 15 MILLIGRAM(S): 50 CAPSULE, EXTENDED RELEASE ORAL at 06:11

## 2021-01-01 RX ADMIN — Medication 25 MILLIGRAM(S): at 05:36

## 2021-01-01 RX ADMIN — PIPERACILLIN AND TAZOBACTAM 25 GRAM(S): 4; .5 INJECTION, POWDER, LYOPHILIZED, FOR SOLUTION INTRAVENOUS at 00:23

## 2021-01-01 RX ADMIN — Medication 1 SPRAY(S): at 06:34

## 2021-01-01 RX ADMIN — MORPHINE SULFATE 2.5 MILLIGRAM(S): 50 CAPSULE, EXTENDED RELEASE ORAL at 10:33

## 2021-01-01 RX ADMIN — Medication 3 MILLILITER(S): at 15:26

## 2021-01-01 RX ADMIN — Medication 40 MILLIGRAM(S): at 17:10

## 2021-01-01 RX ADMIN — BUDESONIDE AND FORMOTEROL FUMARATE DIHYDRATE 2 PUFF(S): 160; 4.5 AEROSOL RESPIRATORY (INHALATION) at 22:12

## 2021-01-01 RX ADMIN — ENOXAPARIN SODIUM 40 MILLIGRAM(S): 100 INJECTION SUBCUTANEOUS at 12:14

## 2021-01-01 RX ADMIN — Medication 3 MILLILITER(S): at 09:27

## 2021-01-01 RX ADMIN — Medication 1 TABLET(S): at 12:27

## 2021-01-01 RX ADMIN — Medication 1 SPRAY(S): at 21:04

## 2021-01-01 RX ADMIN — BUDESONIDE AND FORMOTEROL FUMARATE DIHYDRATE 2 PUFF(S): 160; 4.5 AEROSOL RESPIRATORY (INHALATION) at 21:08

## 2021-01-01 RX ADMIN — PREGABALIN 1000 MICROGRAM(S): 225 CAPSULE ORAL at 11:43

## 2021-01-01 RX ADMIN — Medication 40 MILLIEQUIVALENT(S): at 09:42

## 2021-01-01 RX ADMIN — Medication 3 MILLILITER(S): at 23:54

## 2021-01-01 RX ADMIN — Medication 40 MILLIEQUIVALENT(S): at 12:02

## 2021-01-01 RX ADMIN — ENOXAPARIN SODIUM 40 MILLIGRAM(S): 100 INJECTION SUBCUTANEOUS at 13:30

## 2021-01-01 RX ADMIN — Medication 1 TABLET(S): at 11:42

## 2021-01-01 RX ADMIN — Medication 166.67 MILLIGRAM(S): at 06:44

## 2021-01-01 RX ADMIN — Medication 3 MILLILITER(S): at 08:20

## 2021-01-01 RX ADMIN — Medication 40 MILLIGRAM(S): at 05:20

## 2021-01-01 RX ADMIN — Medication 25 MILLIGRAM(S): at 17:14

## 2021-01-01 RX ADMIN — MORPHINE SULFATE 2.5 MILLIGRAM(S): 50 CAPSULE, EXTENDED RELEASE ORAL at 10:03

## 2021-01-01 RX ADMIN — Medication 1 SPRAY(S): at 01:16

## 2021-01-01 RX ADMIN — Medication 1 TABLET(S): at 13:30

## 2021-01-01 RX ADMIN — HYDROMORPHONE HYDROCHLORIDE 4 MILLIGRAM(S): 2 INJECTION INTRAMUSCULAR; INTRAVENOUS; SUBCUTANEOUS at 21:45

## 2021-01-01 RX ADMIN — MORPHINE SULFATE 2.5 MILLIGRAM(S): 50 CAPSULE, EXTENDED RELEASE ORAL at 21:40

## 2021-01-01 RX ADMIN — TIOTROPIUM BROMIDE 1 CAPSULE(S): 18 CAPSULE ORAL; RESPIRATORY (INHALATION) at 10:21

## 2021-01-01 RX ADMIN — Medication 1 SPRAY(S): at 17:54

## 2021-01-01 RX ADMIN — Medication 25 MILLIGRAM(S): at 05:20

## 2021-01-01 RX ADMIN — MORPHINE SULFATE 2.5 MILLIGRAM(S): 50 CAPSULE, EXTENDED RELEASE ORAL at 09:43

## 2021-01-01 RX ADMIN — HEPARIN SODIUM 5000 UNIT(S): 5000 INJECTION INTRAVENOUS; SUBCUTANEOUS at 06:32

## 2021-01-01 RX ADMIN — Medication 1 SPRAY(S): at 18:06

## 2021-01-01 RX ADMIN — CHLORHEXIDINE GLUCONATE 1 APPLICATION(S): 213 SOLUTION TOPICAL at 12:10

## 2021-01-01 RX ADMIN — Medication 1 SPRAY(S): at 09:37

## 2021-01-01 RX ADMIN — Medication 3 MILLILITER(S): at 11:19

## 2021-01-01 RX ADMIN — Medication 1 SPRAY(S): at 14:04

## 2021-01-01 RX ADMIN — PANTOPRAZOLE SODIUM 40 MILLIGRAM(S): 20 TABLET, DELAYED RELEASE ORAL at 06:26

## 2021-01-01 RX ADMIN — Medication 40 MILLIGRAM(S): at 18:53

## 2021-01-01 RX ADMIN — Medication 166.67 MILLIGRAM(S): at 15:59

## 2021-01-01 RX ADMIN — Medication 1 SPRAY(S): at 14:05

## 2021-01-01 RX ADMIN — Medication 40 MILLIGRAM(S): at 06:20

## 2021-01-01 RX ADMIN — MORPHINE SULFATE 2.5 MILLIGRAM(S): 50 CAPSULE, EXTENDED RELEASE ORAL at 11:00

## 2021-01-01 RX ADMIN — PREGABALIN 1000 MICROGRAM(S): 225 CAPSULE ORAL at 11:24

## 2021-01-01 RX ADMIN — Medication 3 MILLILITER(S): at 03:34

## 2021-01-01 RX ADMIN — BUDESONIDE AND FORMOTEROL FUMARATE DIHYDRATE 2 PUFF(S): 160; 4.5 AEROSOL RESPIRATORY (INHALATION) at 21:16

## 2021-01-01 RX ADMIN — Medication 3 MILLILITER(S): at 23:14

## 2021-01-01 RX ADMIN — MORPHINE SULFATE 2.5 MILLIGRAM(S): 50 CAPSULE, EXTENDED RELEASE ORAL at 21:48

## 2021-01-01 RX ADMIN — MORPHINE SULFATE 2.5 MILLIGRAM(S): 50 CAPSULE, EXTENDED RELEASE ORAL at 13:38

## 2021-01-01 RX ADMIN — Medication 25 MILLIGRAM(S): at 18:38

## 2021-01-01 RX ADMIN — HYDROMORPHONE HYDROCHLORIDE 4 MILLIGRAM(S): 2 INJECTION INTRAMUSCULAR; INTRAVENOUS; SUBCUTANEOUS at 10:06

## 2021-01-01 RX ADMIN — Medication 40 MILLIGRAM(S): at 06:31

## 2021-01-01 RX ADMIN — APIXABAN 5 MILLIGRAM(S): 2.5 TABLET, FILM COATED ORAL at 06:40

## 2021-01-01 RX ADMIN — Medication 3 MILLILITER(S): at 07:38

## 2021-01-01 RX ADMIN — TIOTROPIUM BROMIDE 1 CAPSULE(S): 18 CAPSULE ORAL; RESPIRATORY (INHALATION) at 10:13

## 2021-01-01 RX ADMIN — Medication 3 MILLILITER(S): at 11:48

## 2021-01-01 RX ADMIN — Medication 3 MILLILITER(S): at 17:05

## 2021-01-01 RX ADMIN — MORPHINE SULFATE 2.5 MILLIGRAM(S): 50 CAPSULE, EXTENDED RELEASE ORAL at 21:54

## 2021-01-01 RX ADMIN — ENOXAPARIN SODIUM 40 MILLIGRAM(S): 100 INJECTION SUBCUTANEOUS at 13:10

## 2021-01-01 RX ADMIN — BUDESONIDE AND FORMOTEROL FUMARATE DIHYDRATE 2 PUFF(S): 160; 4.5 AEROSOL RESPIRATORY (INHALATION) at 09:19

## 2021-01-01 RX ADMIN — Medication 40 MILLIGRAM(S): at 06:19

## 2021-01-01 RX ADMIN — PREGABALIN 1000 MICROGRAM(S): 225 CAPSULE ORAL at 11:20

## 2021-01-01 RX ADMIN — BUDESONIDE AND FORMOTEROL FUMARATE DIHYDRATE 2 PUFF(S): 160; 4.5 AEROSOL RESPIRATORY (INHALATION) at 19:36

## 2021-01-01 RX ADMIN — Medication 40 MILLIGRAM(S): at 09:40

## 2021-01-01 RX ADMIN — Medication 1 SPRAY(S): at 01:49

## 2021-01-01 RX ADMIN — Medication 80 MILLIGRAM(S): at 13:07

## 2021-01-01 RX ADMIN — Medication 3 MILLILITER(S): at 13:12

## 2021-01-01 RX ADMIN — PREGABALIN 1000 MICROGRAM(S): 225 CAPSULE ORAL at 12:38

## 2021-01-01 RX ADMIN — APIXABAN 5 MILLIGRAM(S): 2.5 TABLET, FILM COATED ORAL at 06:59

## 2021-01-01 RX ADMIN — Medication 3 MILLILITER(S): at 13:43

## 2021-01-01 RX ADMIN — Medication 1 SPRAY(S): at 05:06

## 2021-01-01 RX ADMIN — Medication 1 SPRAY(S): at 01:32

## 2021-01-01 RX ADMIN — Medication 500000 UNIT(S): at 23:26

## 2021-01-01 RX ADMIN — BUDESONIDE AND FORMOTEROL FUMARATE DIHYDRATE 2 PUFF(S): 160; 4.5 AEROSOL RESPIRATORY (INHALATION) at 09:37

## 2021-01-01 RX ADMIN — CHLORHEXIDINE GLUCONATE 1 APPLICATION(S): 213 SOLUTION TOPICAL at 12:45

## 2021-01-01 RX ADMIN — Medication 50 MILLIGRAM(S): at 18:06

## 2021-01-01 RX ADMIN — Medication 1 SPRAY(S): at 10:23

## 2021-01-01 RX ADMIN — Medication 25 MILLIGRAM(S): at 18:30

## 2021-01-01 RX ADMIN — Medication 50 MILLIGRAM(S): at 17:13

## 2021-01-01 RX ADMIN — Medication 3 MILLILITER(S): at 11:50

## 2021-01-01 RX ADMIN — Medication 1 SPRAY(S): at 09:27

## 2021-01-01 RX ADMIN — Medication 500000 UNIT(S): at 06:54

## 2021-01-01 RX ADMIN — Medication 3 MILLILITER(S): at 00:42

## 2021-01-01 RX ADMIN — Medication 58 MILLIGRAM(S): at 06:50

## 2021-01-01 RX ADMIN — Medication 50 MILLIGRAM(S): at 05:23

## 2021-01-01 RX ADMIN — HEPARIN SODIUM 5000 UNIT(S): 5000 INJECTION INTRAVENOUS; SUBCUTANEOUS at 21:58

## 2021-01-01 RX ADMIN — Medication 40 MILLIEQUIVALENT(S): at 21:20

## 2021-01-01 RX ADMIN — Medication 1 SPRAY(S): at 12:08

## 2021-01-01 RX ADMIN — Medication 1 TABLET(S): at 12:11

## 2021-01-01 RX ADMIN — Medication 40 MILLIEQUIVALENT(S): at 09:46

## 2021-01-01 RX ADMIN — Medication 3 MILLILITER(S): at 11:15

## 2021-01-01 RX ADMIN — Medication 1 TABLET(S): at 12:13

## 2021-01-01 RX ADMIN — Medication 1 SPRAY(S): at 06:11

## 2021-01-01 RX ADMIN — PIPERACILLIN AND TAZOBACTAM 25 GRAM(S): 4; .5 INJECTION, POWDER, LYOPHILIZED, FOR SOLUTION INTRAVENOUS at 08:45

## 2021-01-01 RX ADMIN — Medication 500000 UNIT(S): at 04:37

## 2021-01-01 RX ADMIN — HYDROMORPHONE HYDROCHLORIDE 4 MILLIGRAM(S): 2 INJECTION INTRAMUSCULAR; INTRAVENOUS; SUBCUTANEOUS at 21:36

## 2021-01-01 RX ADMIN — BUDESONIDE AND FORMOTEROL FUMARATE DIHYDRATE 2 PUFF(S): 160; 4.5 AEROSOL RESPIRATORY (INHALATION) at 09:45

## 2021-01-01 RX ADMIN — Medication 3 MILLILITER(S): at 23:00

## 2021-01-01 RX ADMIN — Medication 500000 UNIT(S): at 11:30

## 2021-01-01 RX ADMIN — Medication 3 MILLILITER(S): at 04:30

## 2021-01-01 RX ADMIN — ENOXAPARIN SODIUM 40 MILLIGRAM(S): 100 INJECTION SUBCUTANEOUS at 12:41

## 2021-01-01 RX ADMIN — BUDESONIDE AND FORMOTEROL FUMARATE DIHYDRATE 2 PUFF(S): 160; 4.5 AEROSOL RESPIRATORY (INHALATION) at 19:23

## 2021-01-01 RX ADMIN — HYDROMORPHONE HYDROCHLORIDE 0.25 MILLIGRAM(S): 2 INJECTION INTRAMUSCULAR; INTRAVENOUS; SUBCUTANEOUS at 02:27

## 2021-01-01 RX ADMIN — MORPHINE SULFATE 2.5 MILLIGRAM(S): 50 CAPSULE, EXTENDED RELEASE ORAL at 21:17

## 2021-01-01 RX ADMIN — PIPERACILLIN AND TAZOBACTAM 25 GRAM(S): 4; .5 INJECTION, POWDER, LYOPHILIZED, FOR SOLUTION INTRAVENOUS at 00:21

## 2021-01-01 RX ADMIN — Medication 40 MILLIGRAM(S): at 10:51

## 2021-01-01 RX ADMIN — PIPERACILLIN AND TAZOBACTAM 25 GRAM(S): 4; .5 INJECTION, POWDER, LYOPHILIZED, FOR SOLUTION INTRAVENOUS at 01:17

## 2021-01-01 RX ADMIN — PANTOPRAZOLE SODIUM 40 MILLIGRAM(S): 20 TABLET, DELAYED RELEASE ORAL at 05:37

## 2021-01-01 RX ADMIN — CHLORHEXIDINE GLUCONATE 1 APPLICATION(S): 213 SOLUTION TOPICAL at 17:43

## 2021-01-01 RX ADMIN — Medication 40 MILLIGRAM(S): at 18:41

## 2021-01-01 RX ADMIN — APIXABAN 5 MILLIGRAM(S): 2.5 TABLET, FILM COATED ORAL at 17:52

## 2021-01-01 RX ADMIN — Medication 1 TABLET(S): at 13:16

## 2021-01-01 RX ADMIN — BUDESONIDE AND FORMOTEROL FUMARATE DIHYDRATE 2 PUFF(S): 160; 4.5 AEROSOL RESPIRATORY (INHALATION) at 22:13

## 2021-01-01 RX ADMIN — TIOTROPIUM BROMIDE 1 CAPSULE(S): 18 CAPSULE ORAL; RESPIRATORY (INHALATION) at 09:37

## 2021-01-01 RX ADMIN — Medication 40 MILLIGRAM(S): at 05:45

## 2021-01-01 RX ADMIN — Medication 1 SPRAY(S): at 18:17

## 2021-01-01 RX ADMIN — Medication 1 TABLET(S): at 12:07

## 2021-01-01 RX ADMIN — Medication 40 MILLIGRAM(S): at 09:54

## 2021-01-01 RX ADMIN — PANTOPRAZOLE SODIUM 40 MILLIGRAM(S): 20 TABLET, DELAYED RELEASE ORAL at 06:02

## 2021-01-01 RX ADMIN — BUDESONIDE AND FORMOTEROL FUMARATE DIHYDRATE 2 PUFF(S): 160; 4.5 AEROSOL RESPIRATORY (INHALATION) at 00:04

## 2021-01-01 RX ADMIN — HYDROMORPHONE HYDROCHLORIDE 4 MILLIGRAM(S): 2 INJECTION INTRAMUSCULAR; INTRAVENOUS; SUBCUTANEOUS at 00:55

## 2021-01-01 RX ADMIN — APIXABAN 5 MILLIGRAM(S): 2.5 TABLET, FILM COATED ORAL at 18:30

## 2021-01-01 RX ADMIN — Medication 1 SPRAY(S): at 01:37

## 2021-01-01 RX ADMIN — Medication 500000 UNIT(S): at 05:35

## 2021-01-01 RX ADMIN — Medication 3 MILLILITER(S): at 14:17

## 2021-01-01 RX ADMIN — Medication 50 MILLIGRAM(S): at 18:14

## 2021-01-01 RX ADMIN — Medication 40 MILLIEQUIVALENT(S): at 18:50

## 2021-01-01 RX ADMIN — Medication 1 SPRAY(S): at 13:40

## 2021-01-01 RX ADMIN — TIOTROPIUM BROMIDE 1 CAPSULE(S): 18 CAPSULE ORAL; RESPIRATORY (INHALATION) at 09:53

## 2021-01-01 RX ADMIN — PREGABALIN 1000 MICROGRAM(S): 225 CAPSULE ORAL at 12:27

## 2021-01-01 RX ADMIN — Medication 3 MILLILITER(S): at 03:32

## 2021-01-01 RX ADMIN — Medication 1 SPRAY(S): at 21:43

## 2021-01-01 RX ADMIN — PIPERACILLIN AND TAZOBACTAM 25 GRAM(S): 4; .5 INJECTION, POWDER, LYOPHILIZED, FOR SOLUTION INTRAVENOUS at 19:01

## 2021-01-01 RX ADMIN — Medication 1 SPRAY(S): at 05:47

## 2021-01-01 RX ADMIN — CHLORHEXIDINE GLUCONATE 1 APPLICATION(S): 213 SOLUTION TOPICAL at 11:28

## 2021-01-01 RX ADMIN — HYDROMORPHONE HYDROCHLORIDE 4 MILLIGRAM(S): 2 INJECTION INTRAMUSCULAR; INTRAVENOUS; SUBCUTANEOUS at 09:31

## 2021-01-01 RX ADMIN — TIOTROPIUM BROMIDE 1 CAPSULE(S): 18 CAPSULE ORAL; RESPIRATORY (INHALATION) at 09:06

## 2021-01-01 RX ADMIN — Medication 40 MILLIEQUIVALENT(S): at 15:16

## 2021-01-01 RX ADMIN — Medication 1 SPRAY(S): at 17:53

## 2021-01-01 RX ADMIN — HYDROMORPHONE HYDROCHLORIDE 4 MILLIGRAM(S): 2 INJECTION INTRAMUSCULAR; INTRAVENOUS; SUBCUTANEOUS at 10:30

## 2021-01-01 RX ADMIN — Medication 1 SPRAY(S): at 14:07

## 2021-01-01 RX ADMIN — Medication 3 MILLILITER(S): at 21:53

## 2021-01-01 RX ADMIN — MORPHINE SULFATE 2.5 MILLIGRAM(S): 50 CAPSULE, EXTENDED RELEASE ORAL at 14:55

## 2021-01-01 RX ADMIN — Medication 3 MILLILITER(S): at 05:48

## 2021-01-01 RX ADMIN — BUDESONIDE AND FORMOTEROL FUMARATE DIHYDRATE 2 PUFF(S): 160; 4.5 AEROSOL RESPIRATORY (INHALATION) at 11:09

## 2021-01-01 RX ADMIN — Medication 1 SPRAY(S): at 21:09

## 2021-01-01 RX ADMIN — Medication 500000 UNIT(S): at 18:30

## 2021-01-01 RX ADMIN — Medication 3 MILLILITER(S): at 23:43

## 2021-01-01 RX ADMIN — Medication 500000 UNIT(S): at 12:07

## 2021-01-01 RX ADMIN — MORPHINE SULFATE 2.5 MILLIGRAM(S): 50 CAPSULE, EXTENDED RELEASE ORAL at 22:40

## 2021-01-01 RX ADMIN — Medication 1 SPRAY(S): at 17:13

## 2021-01-01 RX ADMIN — HYDROMORPHONE HYDROCHLORIDE 4 MILLIGRAM(S): 2 INJECTION INTRAMUSCULAR; INTRAVENOUS; SUBCUTANEOUS at 09:45

## 2021-01-01 RX ADMIN — HEPARIN SODIUM 5000 UNIT(S): 5000 INJECTION INTRAVENOUS; SUBCUTANEOUS at 06:10

## 2021-01-01 RX ADMIN — PREGABALIN 1000 MICROGRAM(S): 225 CAPSULE ORAL at 13:30

## 2021-01-01 RX ADMIN — Medication 3 MILLILITER(S): at 08:14

## 2021-01-01 RX ADMIN — Medication 1 TABLET(S): at 12:40

## 2021-01-01 RX ADMIN — PIPERACILLIN AND TAZOBACTAM 25 GRAM(S): 4; .5 INJECTION, POWDER, LYOPHILIZED, FOR SOLUTION INTRAVENOUS at 09:40

## 2021-01-01 RX ADMIN — Medication 1 SPRAY(S): at 14:56

## 2021-01-01 RX ADMIN — BUDESONIDE AND FORMOTEROL FUMARATE DIHYDRATE 2 PUFF(S): 160; 4.5 AEROSOL RESPIRATORY (INHALATION) at 10:14

## 2021-01-01 RX ADMIN — HYDROMORPHONE HYDROCHLORIDE 4 MILLIGRAM(S): 2 INJECTION INTRAMUSCULAR; INTRAVENOUS; SUBCUTANEOUS at 04:31

## 2021-01-01 RX ADMIN — HYDROMORPHONE HYDROCHLORIDE 4 MILLIGRAM(S): 2 INJECTION INTRAMUSCULAR; INTRAVENOUS; SUBCUTANEOUS at 22:59

## 2021-01-01 RX ADMIN — Medication 500000 UNIT(S): at 06:38

## 2021-01-01 RX ADMIN — MORPHINE SULFATE 2.5 MILLIGRAM(S): 50 CAPSULE, EXTENDED RELEASE ORAL at 15:20

## 2021-01-01 RX ADMIN — PIPERACILLIN AND TAZOBACTAM 25 GRAM(S): 4; .5 INJECTION, POWDER, LYOPHILIZED, FOR SOLUTION INTRAVENOUS at 09:26

## 2021-01-01 RX ADMIN — PANTOPRAZOLE SODIUM 40 MILLIGRAM(S): 20 TABLET, DELAYED RELEASE ORAL at 06:14

## 2021-01-01 RX ADMIN — Medication 3 MILLILITER(S): at 08:59

## 2021-01-01 RX ADMIN — BUDESONIDE AND FORMOTEROL FUMARATE DIHYDRATE 2 PUFF(S): 160; 4.5 AEROSOL RESPIRATORY (INHALATION) at 09:40

## 2021-01-01 RX ADMIN — Medication 3 MILLILITER(S): at 15:43

## 2021-01-01 RX ADMIN — PIPERACILLIN AND TAZOBACTAM 25 GRAM(S): 4; .5 INJECTION, POWDER, LYOPHILIZED, FOR SOLUTION INTRAVENOUS at 17:13

## 2021-01-01 RX ADMIN — Medication 1 TABLET(S): at 11:24

## 2021-01-01 RX ADMIN — MORPHINE SULFATE 2.5 MILLIGRAM(S): 50 CAPSULE, EXTENDED RELEASE ORAL at 17:15

## 2021-01-01 RX ADMIN — Medication 40 MILLIEQUIVALENT(S): at 13:29

## 2021-01-01 RX ADMIN — Medication 40 MILLIGRAM(S): at 06:41

## 2021-01-01 RX ADMIN — Medication 1 SPRAY(S): at 05:19

## 2021-01-01 RX ADMIN — MORPHINE SULFATE 2.5 MILLIGRAM(S): 50 CAPSULE, EXTENDED RELEASE ORAL at 13:39

## 2021-01-01 RX ADMIN — MORPHINE SULFATE 2.5 MILLIGRAM(S): 50 CAPSULE, EXTENDED RELEASE ORAL at 10:10

## 2021-01-01 RX ADMIN — Medication 3 MILLILITER(S): at 04:24

## 2021-01-01 RX ADMIN — Medication 3 MILLILITER(S): at 19:52

## 2021-01-01 RX ADMIN — Medication 1 SPRAY(S): at 10:18

## 2021-01-01 RX ADMIN — TIOTROPIUM BROMIDE 1 CAPSULE(S): 18 CAPSULE ORAL; RESPIRATORY (INHALATION) at 09:40

## 2021-01-01 RX ADMIN — PIPERACILLIN AND TAZOBACTAM 25 GRAM(S): 4; .5 INJECTION, POWDER, LYOPHILIZED, FOR SOLUTION INTRAVENOUS at 09:42

## 2021-01-01 RX ADMIN — Medication 1 SPRAY(S): at 17:11

## 2021-01-01 RX ADMIN — HYDROMORPHONE HYDROCHLORIDE 2 MILLIGRAM(S): 2 INJECTION INTRAMUSCULAR; INTRAVENOUS; SUBCUTANEOUS at 18:05

## 2021-01-01 RX ADMIN — PANTOPRAZOLE SODIUM 40 MILLIGRAM(S): 20 TABLET, DELAYED RELEASE ORAL at 06:21

## 2021-01-01 RX ADMIN — HYDROMORPHONE HYDROCHLORIDE 4 MILLIGRAM(S): 2 INJECTION INTRAMUSCULAR; INTRAVENOUS; SUBCUTANEOUS at 14:47

## 2021-01-01 RX ADMIN — Medication 40 MILLIGRAM(S): at 18:16

## 2021-01-01 RX ADMIN — Medication 1 TABLET(S): at 13:35

## 2021-01-01 RX ADMIN — Medication 3 MILLILITER(S): at 16:28

## 2021-01-01 RX ADMIN — APIXABAN 5 MILLIGRAM(S): 2.5 TABLET, FILM COATED ORAL at 17:13

## 2021-01-01 RX ADMIN — Medication 1 SPRAY(S): at 17:47

## 2021-01-01 RX ADMIN — Medication 50 MILLIGRAM(S): at 06:33

## 2021-01-01 RX ADMIN — Medication 25 MILLIGRAM(S): at 17:19

## 2021-01-01 RX ADMIN — Medication 40 MILLIGRAM(S): at 17:53

## 2021-01-01 RX ADMIN — Medication 1 SPRAY(S): at 13:54

## 2021-01-01 RX ADMIN — Medication 3 MILLILITER(S): at 03:26

## 2021-01-01 RX ADMIN — PIPERACILLIN AND TAZOBACTAM 25 GRAM(S): 4; .5 INJECTION, POWDER, LYOPHILIZED, FOR SOLUTION INTRAVENOUS at 09:56

## 2021-01-01 RX ADMIN — BUDESONIDE AND FORMOTEROL FUMARATE DIHYDRATE 2 PUFF(S): 160; 4.5 AEROSOL RESPIRATORY (INHALATION) at 09:53

## 2021-01-01 RX ADMIN — BUDESONIDE AND FORMOTEROL FUMARATE DIHYDRATE 2 PUFF(S): 160; 4.5 AEROSOL RESPIRATORY (INHALATION) at 19:05

## 2021-01-01 RX ADMIN — Medication 1 SPRAY(S): at 13:25

## 2021-01-01 RX ADMIN — TIOTROPIUM BROMIDE 1 CAPSULE(S): 18 CAPSULE ORAL; RESPIRATORY (INHALATION) at 08:51

## 2021-01-01 RX ADMIN — BUDESONIDE AND FORMOTEROL FUMARATE DIHYDRATE 2 PUFF(S): 160; 4.5 AEROSOL RESPIRATORY (INHALATION) at 09:31

## 2021-01-01 RX ADMIN — APIXABAN 5 MILLIGRAM(S): 2.5 TABLET, FILM COATED ORAL at 05:35

## 2021-01-01 RX ADMIN — BUDESONIDE AND FORMOTEROL FUMARATE DIHYDRATE 2 PUFF(S): 160; 4.5 AEROSOL RESPIRATORY (INHALATION) at 09:43

## 2021-01-01 RX ADMIN — Medication 500000 UNIT(S): at 21:54

## 2021-01-01 RX ADMIN — Medication 500000 UNIT(S): at 06:26

## 2021-01-01 RX ADMIN — Medication 25 MILLIGRAM(S): at 06:40

## 2021-01-01 RX ADMIN — Medication 1 SPRAY(S): at 10:30

## 2021-01-01 RX ADMIN — APIXABAN 5 MILLIGRAM(S): 2.5 TABLET, FILM COATED ORAL at 06:27

## 2021-01-01 RX ADMIN — APIXABAN 5 MILLIGRAM(S): 2.5 TABLET, FILM COATED ORAL at 17:50

## 2021-01-01 RX ADMIN — PANTOPRAZOLE SODIUM 40 MILLIGRAM(S): 20 TABLET, DELAYED RELEASE ORAL at 06:55

## 2021-01-01 RX ADMIN — MORPHINE SULFATE 2.5 MILLIGRAM(S): 50 CAPSULE, EXTENDED RELEASE ORAL at 13:53

## 2021-01-01 RX ADMIN — Medication 3 MILLILITER(S): at 03:52

## 2021-01-01 RX ADMIN — PREGABALIN 1000 MICROGRAM(S): 225 CAPSULE ORAL at 12:53

## 2021-01-01 RX ADMIN — Medication 1 SPRAY(S): at 21:48

## 2021-01-01 RX ADMIN — ENOXAPARIN SODIUM 40 MILLIGRAM(S): 100 INJECTION SUBCUTANEOUS at 12:32

## 2021-01-01 RX ADMIN — PIPERACILLIN AND TAZOBACTAM 25 GRAM(S): 4; .5 INJECTION, POWDER, LYOPHILIZED, FOR SOLUTION INTRAVENOUS at 18:53

## 2021-01-01 RX ADMIN — Medication 3 MILLILITER(S): at 15:55

## 2021-01-01 RX ADMIN — SIMETHICONE 80 MILLIGRAM(S): 80 TABLET, CHEWABLE ORAL at 15:43

## 2021-01-01 RX ADMIN — Medication 1 SPRAY(S): at 22:12

## 2021-01-01 RX ADMIN — Medication 3 MILLILITER(S): at 23:03

## 2021-01-01 RX ADMIN — Medication 50 MILLIGRAM(S): at 05:30

## 2021-01-01 RX ADMIN — Medication 1 SPRAY(S): at 06:55

## 2021-01-01 RX ADMIN — Medication 1 SPRAY(S): at 10:01

## 2021-01-01 RX ADMIN — Medication 250 MILLIGRAM(S): at 06:41

## 2021-01-01 RX ADMIN — BUDESONIDE AND FORMOTEROL FUMARATE DIHYDRATE 2 PUFF(S): 160; 4.5 AEROSOL RESPIRATORY (INHALATION) at 21:19

## 2021-01-01 RX ADMIN — Medication 1 SPRAY(S): at 22:05

## 2021-01-01 RX ADMIN — TIOTROPIUM BROMIDE 1 CAPSULE(S): 18 CAPSULE ORAL; RESPIRATORY (INHALATION) at 10:55

## 2021-01-01 RX ADMIN — BUDESONIDE AND FORMOTEROL FUMARATE DIHYDRATE 2 PUFF(S): 160; 4.5 AEROSOL RESPIRATORY (INHALATION) at 09:02

## 2021-01-01 RX ADMIN — Medication 3 MILLILITER(S): at 19:01

## 2021-01-01 RX ADMIN — Medication 1 SPRAY(S): at 21:54

## 2021-01-01 RX ADMIN — Medication 1 TABLET(S): at 12:14

## 2021-01-01 RX ADMIN — BUDESONIDE AND FORMOTEROL FUMARATE DIHYDRATE 2 PUFF(S): 160; 4.5 AEROSOL RESPIRATORY (INHALATION) at 22:11

## 2021-01-01 RX ADMIN — Medication 3 MILLILITER(S): at 07:12

## 2021-01-01 RX ADMIN — HYDROMORPHONE HYDROCHLORIDE 2 MILLIGRAM(S): 2 INJECTION INTRAMUSCULAR; INTRAVENOUS; SUBCUTANEOUS at 17:25

## 2021-01-01 RX ADMIN — Medication 1 SPRAY(S): at 09:55

## 2021-01-01 RX ADMIN — Medication 40 MILLIGRAM(S): at 05:36

## 2021-01-01 RX ADMIN — TIOTROPIUM BROMIDE 1 CAPSULE(S): 18 CAPSULE ORAL; RESPIRATORY (INHALATION) at 09:26

## 2021-01-01 RX ADMIN — Medication 1 SPRAY(S): at 01:58

## 2021-01-01 RX ADMIN — Medication 3 MILLILITER(S): at 19:29

## 2021-01-01 RX ADMIN — MORPHINE SULFATE 2.5 MILLIGRAM(S): 50 CAPSULE, EXTENDED RELEASE ORAL at 10:18

## 2021-01-01 RX ADMIN — HEPARIN SODIUM 5000 UNIT(S): 5000 INJECTION INTRAVENOUS; SUBCUTANEOUS at 14:14

## 2021-01-01 RX ADMIN — Medication 6 MILLILITER(S): at 16:25

## 2021-01-01 RX ADMIN — Medication 500000 UNIT(S): at 22:05

## 2021-01-01 RX ADMIN — PREGABALIN 1000 MICROGRAM(S): 225 CAPSULE ORAL at 13:34

## 2021-01-01 RX ADMIN — BUDESONIDE AND FORMOTEROL FUMARATE DIHYDRATE 2 PUFF(S): 160; 4.5 AEROSOL RESPIRATORY (INHALATION) at 07:12

## 2021-01-01 RX ADMIN — Medication 40 MILLIEQUIVALENT(S): at 12:14

## 2021-01-01 RX ADMIN — Medication 3 MILLILITER(S): at 23:04

## 2021-01-01 RX ADMIN — Medication 1 TABLET(S): at 12:20

## 2021-01-01 RX ADMIN — Medication 166.67 MILLIGRAM(S): at 06:14

## 2021-01-01 RX ADMIN — Medication 1 TABLET(S): at 13:10

## 2021-01-01 RX ADMIN — PANTOPRAZOLE SODIUM 40 MILLIGRAM(S): 20 TABLET, DELAYED RELEASE ORAL at 06:11

## 2021-01-01 RX ADMIN — MORPHINE SULFATE 2.5 MILLIGRAM(S): 50 CAPSULE, EXTENDED RELEASE ORAL at 06:51

## 2021-01-01 RX ADMIN — Medication 3 MILLILITER(S): at 18:50

## 2021-01-01 RX ADMIN — Medication 1 SPRAY(S): at 02:26

## 2021-01-01 RX ADMIN — Medication 3 MILLILITER(S): at 19:44

## 2021-01-01 RX ADMIN — Medication 3 MILLILITER(S): at 23:17

## 2021-01-01 RX ADMIN — Medication 3 MILLILITER(S): at 19:50

## 2021-01-01 RX ADMIN — Medication 1 TABLET(S): at 11:23

## 2021-01-01 RX ADMIN — Medication 25 MILLIGRAM(S): at 06:12

## 2021-01-01 RX ADMIN — Medication 40 MILLIGRAM(S): at 06:27

## 2021-01-01 RX ADMIN — Medication 3 MILLILITER(S): at 07:59

## 2021-01-01 RX ADMIN — Medication 40 MILLIEQUIVALENT(S): at 11:45

## 2021-01-01 RX ADMIN — MORPHINE SULFATE 2.5 MILLIGRAM(S): 50 CAPSULE, EXTENDED RELEASE ORAL at 10:37

## 2021-01-01 RX ADMIN — HYDROMORPHONE HYDROCHLORIDE 4 MILLIGRAM(S): 2 INJECTION INTRAMUSCULAR; INTRAVENOUS; SUBCUTANEOUS at 10:53

## 2021-01-01 RX ADMIN — PREGABALIN 1000 MICROGRAM(S): 225 CAPSULE ORAL at 12:09

## 2021-01-01 RX ADMIN — Medication 40 MILLIGRAM(S): at 13:05

## 2021-01-01 RX ADMIN — PIPERACILLIN AND TAZOBACTAM 25 GRAM(S): 4; .5 INJECTION, POWDER, LYOPHILIZED, FOR SOLUTION INTRAVENOUS at 09:30

## 2021-01-01 RX ADMIN — CHLORHEXIDINE GLUCONATE 1 APPLICATION(S): 213 SOLUTION TOPICAL at 11:44

## 2021-01-01 RX ADMIN — PIPERACILLIN AND TAZOBACTAM 25 GRAM(S): 4; .5 INJECTION, POWDER, LYOPHILIZED, FOR SOLUTION INTRAVENOUS at 16:43

## 2021-01-01 RX ADMIN — Medication 3 MILLILITER(S): at 10:37

## 2021-01-01 RX ADMIN — Medication 500000 UNIT(S): at 17:52

## 2021-01-01 RX ADMIN — BUDESONIDE AND FORMOTEROL FUMARATE DIHYDRATE 2 PUFF(S): 160; 4.5 AEROSOL RESPIRATORY (INHALATION) at 21:39

## 2021-01-01 RX ADMIN — Medication 40 MILLIGRAM(S): at 00:05

## 2021-01-01 RX ADMIN — Medication 1 SPRAY(S): at 13:38

## 2021-01-01 RX ADMIN — PANTOPRAZOLE SODIUM 40 MILLIGRAM(S): 20 TABLET, DELAYED RELEASE ORAL at 06:19

## 2021-01-01 RX ADMIN — Medication 3 MILLILITER(S): at 23:11

## 2021-01-01 RX ADMIN — Medication 3 MILLILITER(S): at 09:02

## 2021-01-01 RX ADMIN — Medication 3 MILLILITER(S): at 09:12

## 2021-01-01 RX ADMIN — Medication 3 MILLILITER(S): at 19:14

## 2021-01-01 RX ADMIN — HEPARIN SODIUM 5000 UNIT(S): 5000 INJECTION INTRAVENOUS; SUBCUTANEOUS at 05:14

## 2021-01-01 RX ADMIN — Medication 1 SPRAY(S): at 15:17

## 2021-01-01 RX ADMIN — PIPERACILLIN AND TAZOBACTAM 25 GRAM(S): 4; .5 INJECTION, POWDER, LYOPHILIZED, FOR SOLUTION INTRAVENOUS at 18:45

## 2021-01-01 RX ADMIN — MORPHINE SULFATE 2.5 MILLIGRAM(S): 50 CAPSULE, EXTENDED RELEASE ORAL at 10:02

## 2021-01-01 RX ADMIN — Medication 1 SPRAY(S): at 14:30

## 2021-01-01 RX ADMIN — PANTOPRAZOLE SODIUM 40 MILLIGRAM(S): 20 TABLET, DELAYED RELEASE ORAL at 06:08

## 2021-01-01 RX ADMIN — MORPHINE SULFATE 2.5 MILLIGRAM(S): 50 CAPSULE, EXTENDED RELEASE ORAL at 22:00

## 2021-01-01 RX ADMIN — CHLORHEXIDINE GLUCONATE 1 APPLICATION(S): 213 SOLUTION TOPICAL at 12:16

## 2021-01-01 RX ADMIN — MORPHINE SULFATE 2.5 MILLIGRAM(S): 50 CAPSULE, EXTENDED RELEASE ORAL at 11:20

## 2021-01-01 RX ADMIN — Medication 40 MILLIEQUIVALENT(S): at 12:19

## 2021-01-01 RX ADMIN — PANTOPRAZOLE SODIUM 40 MILLIGRAM(S): 20 TABLET, DELAYED RELEASE ORAL at 05:16

## 2021-01-01 RX ADMIN — APIXABAN 5 MILLIGRAM(S): 2.5 TABLET, FILM COATED ORAL at 06:11

## 2021-01-01 RX ADMIN — Medication 3 MILLILITER(S): at 03:33

## 2021-01-01 RX ADMIN — ENOXAPARIN SODIUM 40 MILLIGRAM(S): 100 INJECTION SUBCUTANEOUS at 13:35

## 2021-01-01 RX ADMIN — Medication 1 TABLET(S): at 11:29

## 2021-01-01 RX ADMIN — AZITHROMYCIN 255 MILLIGRAM(S): 500 TABLET, FILM COATED ORAL at 13:17

## 2021-01-01 RX ADMIN — PIPERACILLIN AND TAZOBACTAM 25 GRAM(S): 4; .5 INJECTION, POWDER, LYOPHILIZED, FOR SOLUTION INTRAVENOUS at 18:40

## 2021-01-01 RX ADMIN — MORPHINE SULFATE 2.5 MILLIGRAM(S): 50 CAPSULE, EXTENDED RELEASE ORAL at 14:00

## 2021-01-01 RX ADMIN — MORPHINE SULFATE 2.5 MILLIGRAM(S): 50 CAPSULE, EXTENDED RELEASE ORAL at 05:15

## 2021-01-01 RX ADMIN — Medication 1 SPRAY(S): at 13:09

## 2021-01-01 RX ADMIN — Medication 40 MILLIGRAM(S): at 17:47

## 2021-01-01 RX ADMIN — PIPERACILLIN AND TAZOBACTAM 25 GRAM(S): 4; .5 INJECTION, POWDER, LYOPHILIZED, FOR SOLUTION INTRAVENOUS at 00:52

## 2021-01-01 RX ADMIN — BUDESONIDE AND FORMOTEROL FUMARATE DIHYDRATE 2 PUFF(S): 160; 4.5 AEROSOL RESPIRATORY (INHALATION) at 21:31

## 2021-01-01 RX ADMIN — ALBUTEROL 2.5 MILLIGRAM(S): 90 AEROSOL, METERED ORAL at 16:15

## 2021-01-01 RX ADMIN — BUDESONIDE AND FORMOTEROL FUMARATE DIHYDRATE 2 PUFF(S): 160; 4.5 AEROSOL RESPIRATORY (INHALATION) at 21:53

## 2021-01-01 RX ADMIN — PREGABALIN 1000 MICROGRAM(S): 225 CAPSULE ORAL at 12:20

## 2021-01-01 RX ADMIN — HEPARIN SODIUM 5000 UNIT(S): 5000 INJECTION INTRAVENOUS; SUBCUTANEOUS at 21:21

## 2021-01-01 RX ADMIN — SENNA PLUS 2 TABLET(S): 8.6 TABLET ORAL at 05:36

## 2021-01-01 RX ADMIN — BUDESONIDE AND FORMOTEROL FUMARATE DIHYDRATE 2 PUFF(S): 160; 4.5 AEROSOL RESPIRATORY (INHALATION) at 20:25

## 2021-01-01 RX ADMIN — PANTOPRAZOLE SODIUM 40 MILLIGRAM(S): 20 TABLET, DELAYED RELEASE ORAL at 05:14

## 2021-01-01 RX ADMIN — Medication 3 MILLILITER(S): at 23:16

## 2021-01-01 RX ADMIN — Medication 1 SPRAY(S): at 17:19

## 2021-01-01 RX ADMIN — PIPERACILLIN AND TAZOBACTAM 25 GRAM(S): 4; .5 INJECTION, POWDER, LYOPHILIZED, FOR SOLUTION INTRAVENOUS at 00:39

## 2021-01-01 RX ADMIN — Medication 50 MILLIGRAM(S): at 07:02

## 2021-01-01 RX ADMIN — Medication 50 MILLIGRAM(S): at 17:20

## 2021-01-01 RX ADMIN — Medication 500000 UNIT(S): at 22:59

## 2021-01-01 RX ADMIN — CEFTRIAXONE 100 MILLIGRAM(S): 500 INJECTION, POWDER, FOR SOLUTION INTRAMUSCULAR; INTRAVENOUS at 05:43

## 2021-01-01 RX ADMIN — Medication 3 MILLILITER(S): at 23:13

## 2021-01-01 RX ADMIN — Medication 1 TABLET(S): at 12:32

## 2021-01-01 RX ADMIN — PREGABALIN 1000 MICROGRAM(S): 225 CAPSULE ORAL at 12:13

## 2021-01-01 RX ADMIN — Medication 500000 UNIT(S): at 17:13

## 2021-01-01 RX ADMIN — Medication 1 TABLET(S): at 12:08

## 2021-01-01 RX ADMIN — PREGABALIN 1000 MICROGRAM(S): 225 CAPSULE ORAL at 13:10

## 2021-01-01 RX ADMIN — Medication 1 SPRAY(S): at 02:06

## 2021-01-01 RX ADMIN — MORPHINE SULFATE 2.5 MILLIGRAM(S): 50 CAPSULE, EXTENDED RELEASE ORAL at 10:40

## 2021-01-01 RX ADMIN — MORPHINE SULFATE 2.5 MILLIGRAM(S): 50 CAPSULE, EXTENDED RELEASE ORAL at 06:03

## 2021-01-01 RX ADMIN — Medication 40 MILLIGRAM(S): at 18:08

## 2021-01-01 RX ADMIN — BUDESONIDE AND FORMOTEROL FUMARATE DIHYDRATE 2 PUFF(S): 160; 4.5 AEROSOL RESPIRATORY (INHALATION) at 09:59

## 2021-01-01 RX ADMIN — PREGABALIN 1000 MICROGRAM(S): 225 CAPSULE ORAL at 11:25

## 2021-01-01 RX ADMIN — Medication 1 SPRAY(S): at 05:48

## 2021-01-01 RX ADMIN — Medication 1 SPRAY(S): at 10:55

## 2021-01-01 RX ADMIN — Medication 3 MILLILITER(S): at 10:13

## 2021-01-01 RX ADMIN — Medication 1 SPRAY(S): at 09:44

## 2021-01-01 RX ADMIN — Medication 3 MILLILITER(S): at 21:49

## 2021-01-01 RX ADMIN — PREGABALIN 1000 MICROGRAM(S): 225 CAPSULE ORAL at 12:05

## 2021-01-01 RX ADMIN — Medication 1 SPRAY(S): at 21:39

## 2021-01-01 RX ADMIN — Medication 1 SPRAY(S): at 06:04

## 2021-01-01 RX ADMIN — Medication 40 MILLIGRAM(S): at 17:14

## 2021-01-01 RX ADMIN — PANTOPRAZOLE SODIUM 40 MILLIGRAM(S): 20 TABLET, DELAYED RELEASE ORAL at 06:23

## 2021-01-01 RX ADMIN — Medication 3 MILLILITER(S): at 09:01

## 2021-01-01 RX ADMIN — Medication 3 MILLILITER(S): at 18:39

## 2021-01-01 RX ADMIN — Medication 1 SPRAY(S): at 18:26

## 2021-01-01 RX ADMIN — BUDESONIDE AND FORMOTEROL FUMARATE DIHYDRATE 2 PUFF(S): 160; 4.5 AEROSOL RESPIRATORY (INHALATION) at 19:58

## 2021-01-01 RX ADMIN — Medication 1 TABLET(S): at 11:20

## 2021-01-01 RX ADMIN — MORPHINE SULFATE 2.5 MILLIGRAM(S): 50 CAPSULE, EXTENDED RELEASE ORAL at 01:15

## 2021-01-01 RX ADMIN — Medication 40 MILLIGRAM(S): at 10:34

## 2021-01-01 RX ADMIN — TIOTROPIUM BROMIDE 1 CAPSULE(S): 18 CAPSULE ORAL; RESPIRATORY (INHALATION) at 09:43

## 2021-01-01 RX ADMIN — Medication 58 MILLIGRAM(S): at 06:59

## 2021-01-01 RX ADMIN — HEPARIN SODIUM 5000 UNIT(S): 5000 INJECTION INTRAVENOUS; SUBCUTANEOUS at 22:49

## 2021-01-01 RX ADMIN — BUDESONIDE AND FORMOTEROL FUMARATE DIHYDRATE 2 PUFF(S): 160; 4.5 AEROSOL RESPIRATORY (INHALATION) at 18:50

## 2021-01-01 RX ADMIN — HEPARIN SODIUM 5000 UNIT(S): 5000 INJECTION INTRAVENOUS; SUBCUTANEOUS at 14:27

## 2021-01-01 RX ADMIN — Medication 3 MILLILITER(S): at 19:35

## 2021-01-01 RX ADMIN — MORPHINE SULFATE 15 MILLIGRAM(S): 50 CAPSULE, EXTENDED RELEASE ORAL at 17:38

## 2021-01-01 RX ADMIN — SENNA PLUS 2 TABLET(S): 8.6 TABLET ORAL at 06:26

## 2021-01-01 RX ADMIN — Medication 500000 UNIT(S): at 17:19

## 2021-01-01 RX ADMIN — MORPHINE SULFATE 2.5 MILLIGRAM(S): 50 CAPSULE, EXTENDED RELEASE ORAL at 15:45

## 2021-01-01 RX ADMIN — PIPERACILLIN AND TAZOBACTAM 25 GRAM(S): 4; .5 INJECTION, POWDER, LYOPHILIZED, FOR SOLUTION INTRAVENOUS at 09:00

## 2021-01-01 RX ADMIN — Medication 1 SPRAY(S): at 06:26

## 2021-01-01 RX ADMIN — Medication 3 MILLILITER(S): at 09:42

## 2021-01-01 RX ADMIN — TIOTROPIUM BROMIDE 1 CAPSULE(S): 18 CAPSULE ORAL; RESPIRATORY (INHALATION) at 08:42

## 2021-01-01 RX ADMIN — PIPERACILLIN AND TAZOBACTAM 25 GRAM(S): 4; .5 INJECTION, POWDER, LYOPHILIZED, FOR SOLUTION INTRAVENOUS at 17:51

## 2021-01-01 RX ADMIN — Medication 3 MILLILITER(S): at 00:30

## 2021-01-01 RX ADMIN — Medication 40 MILLIGRAM(S): at 18:39

## 2021-01-01 RX ADMIN — Medication 1 SPRAY(S): at 21:53

## 2021-01-01 RX ADMIN — Medication 1 TABLET(S): at 11:34

## 2021-01-01 RX ADMIN — PREGABALIN 1000 MICROGRAM(S): 225 CAPSULE ORAL at 12:11

## 2021-01-01 RX ADMIN — ENOXAPARIN SODIUM 40 MILLIGRAM(S): 100 INJECTION SUBCUTANEOUS at 18:38

## 2021-01-01 RX ADMIN — Medication 125 MILLIGRAM(S): at 03:32

## 2021-01-01 RX ADMIN — ENOXAPARIN SODIUM 40 MILLIGRAM(S): 100 INJECTION SUBCUTANEOUS at 13:16

## 2021-01-01 RX ADMIN — HEPARIN SODIUM 5000 UNIT(S): 5000 INJECTION INTRAVENOUS; SUBCUTANEOUS at 15:20

## 2021-01-01 RX ADMIN — PIPERACILLIN AND TAZOBACTAM 200 GRAM(S): 4; .5 INJECTION, POWDER, LYOPHILIZED, FOR SOLUTION INTRAVENOUS at 09:35

## 2021-01-01 RX ADMIN — Medication 40 MILLIGRAM(S): at 19:03

## 2021-01-01 RX ADMIN — BUDESONIDE AND FORMOTEROL FUMARATE DIHYDRATE 2 PUFF(S): 160; 4.5 AEROSOL RESPIRATORY (INHALATION) at 08:55

## 2021-01-01 RX ADMIN — PANTOPRAZOLE SODIUM 40 MILLIGRAM(S): 20 TABLET, DELAYED RELEASE ORAL at 06:42

## 2021-01-01 RX ADMIN — SODIUM CHLORIDE 75 MILLILITER(S): 9 INJECTION INTRAMUSCULAR; INTRAVENOUS; SUBCUTANEOUS at 12:08

## 2021-01-01 RX ADMIN — Medication 25 MILLIGRAM(S): at 06:55

## 2021-01-01 RX ADMIN — Medication 1 SPRAY(S): at 05:13

## 2021-01-01 RX ADMIN — Medication 1 SPRAY(S): at 09:26

## 2021-01-01 RX ADMIN — Medication 40 MILLIGRAM(S): at 10:04

## 2021-01-01 RX ADMIN — BUDESONIDE AND FORMOTEROL FUMARATE DIHYDRATE 2 PUFF(S): 160; 4.5 AEROSOL RESPIRATORY (INHALATION) at 22:04

## 2021-01-01 RX ADMIN — BUDESONIDE AND FORMOTEROL FUMARATE DIHYDRATE 2 PUFF(S): 160; 4.5 AEROSOL RESPIRATORY (INHALATION) at 19:15

## 2021-01-01 RX ADMIN — Medication 3 MILLILITER(S): at 03:00

## 2021-01-01 RX ADMIN — PIPERACILLIN AND TAZOBACTAM 25 GRAM(S): 4; .5 INJECTION, POWDER, LYOPHILIZED, FOR SOLUTION INTRAVENOUS at 01:12

## 2021-01-01 RX ADMIN — Medication 1 SPRAY(S): at 22:01

## 2021-01-01 RX ADMIN — Medication 1 SPRAY(S): at 23:00

## 2021-01-01 RX ADMIN — PANTOPRAZOLE SODIUM 40 MILLIGRAM(S): 20 TABLET, DELAYED RELEASE ORAL at 06:32

## 2021-01-01 RX ADMIN — TIOTROPIUM BROMIDE 1 CAPSULE(S): 18 CAPSULE ORAL; RESPIRATORY (INHALATION) at 07:12

## 2021-01-01 RX ADMIN — Medication 1 SPRAY(S): at 05:23

## 2021-01-01 RX ADMIN — BUDESONIDE AND FORMOTEROL FUMARATE DIHYDRATE 2 PUFF(S): 160; 4.5 AEROSOL RESPIRATORY (INHALATION) at 09:25

## 2021-01-01 RX ADMIN — BUDESONIDE AND FORMOTEROL FUMARATE DIHYDRATE 2 PUFF(S): 160; 4.5 AEROSOL RESPIRATORY (INHALATION) at 21:00

## 2021-01-01 RX ADMIN — Medication 500 MICROGRAM(S): at 17:50

## 2021-01-01 RX ADMIN — PREGABALIN 1000 MICROGRAM(S): 225 CAPSULE ORAL at 12:32

## 2021-01-01 RX ADMIN — Medication 1 SPRAY(S): at 06:24

## 2021-01-01 RX ADMIN — Medication 40 MILLIGRAM(S): at 10:06

## 2021-04-01 NOTE — DISCHARGE NOTE PROVIDER - NSDCQMPCI_CARD_ALL_CORE
Detail Level: Generalized General Sunscreen Counseling: I recommended a broad spectrum sunscreen with a SPF of 30 or higher. I explained that SPF 30 sunscreens block approximately 97 percent of the sun's harmful rays.  Sunscreens should be applied at least 15 minutes prior to expected sun exposure and then every 2 hours after that as long as sun exposure continues. If swimming or exercising sunscreen should be reapplied every 45 minutes to an hour after getting wet or sweating. I also recommended a lip balm with a sunscreen as well. Products Recommended: Elta MD No

## 2021-04-12 PROBLEM — R05 PRODUCTIVE COUGH: Status: ACTIVE | Noted: 2021-01-01

## 2021-04-20 PROBLEM — R06.83 SNORING: Status: ACTIVE | Noted: 2020-02-24

## 2021-04-20 PROBLEM — R93.89 ABNORMAL CHEST CT: Status: ACTIVE | Noted: 2020-08-17

## 2021-04-20 PROBLEM — J18.9 PNEUMONIA: Status: ACTIVE | Noted: 2021-01-01

## 2021-04-20 PROBLEM — Z23 ENCOUNTER FOR IMMUNIZATION: Status: ACTIVE | Noted: 2020-08-17

## 2021-04-20 NOTE — PHYSICAL EXAM
[No Acute Distress] : no acute distress [Well Nourished] : well nourished [No Deformities] : no deformities [Well Developed] : well developed [Normal Oropharynx] : normal oropharynx [Normal Appearance] : normal appearance [Supple] : supple [No Neck Mass] : no neck mass [Normal Rate/Rhythm] : normal rate/rhythm [Normal S1, S2] : normal s1, s2 [No Edema] : no edema [No Resp Distress] : no resp distress [No Acc Muscle Use] : no acc muscle use [Normal Rhythm and Effort] : normal rhythm and effort [No Abnormalities] : no abnormalities [Benign] : benign [Not Tender] : not tender [Normal Gait] : normal gait [No Cyanosis] : no cyanosis [3+ Pitting] : 3+ pitting [Tenderness] : tenderness [Normal Color/ Pigmentation] : normal color/ pigmentation [No Focal Deficits] : no focal deficits [No Motor Deficits] : no motor deficits [Normal Mood] : normal mood [Oriented x3] : oriented x3 [Normal Affect] : normal affect [TextBox_11] : NCAT, EOMI, anicteric [TextBox_44] : large neck [TextBox_68] : coarse BS that clear with cough, fine inspiratory crackles bilaterally, bilateral expiratory wheeze [TextBox_89] : obese [TextBox_105] : + clubbing

## 2021-04-20 NOTE — REVIEW OF SYSTEMS
[Cough] : cough [Sputum] : sputum [Dyspnea] : dyspnea [SOB on Exertion] : sob on exertion [Obesity] : obesity [Fever] : no fever [Fatigue] : no fatigue [Recent Wt Gain (___ Lbs)] : ~T no recent weight gain [Chills] : no chills [Poor Appetite] : no poor appetite [Dry Eyes] : no dry eyes [Ear Disturbance] : no ear disturbance [Epistaxis] : no epistaxis [Sore Throat] : no sore throat [Postnasal Drip] : no postnasal drip [Dry Mouth] : no dry mouth [Sinus Problems] : no sinus problems [Hemoptysis] : no hemoptysis [Chest Tightness] : no chest tightness [Frequent URIs] : no frequent URIs [Chest Discomfort] : no chest discomfort [Claudication] : no claudication [Edema] : no edema [Leg Cramps] : no leg cramps [Orthopnea] : no orthopnea [Hay Fever] : no hay fever [Watery Eyes] : no watery eyes [Itchy Eyes] : no itchy eyes [GERD] : no gerd [Abdominal Pain] : no abdominal pain [Nausea] : no nausea [Vomiting] : no vomiting [Dysuria] : no urgency [Arthralgias] : no arthralgias [Myalgias] : no myalgias [Back Pain] : no back pain [Rash] : no rash [Ulcerations] : no ulcerations [Easy Bruising] : no easy bruising [Headache] : no headache [Focal Weakness] : no focal weakness [Seizures] : no seizures [Dizziness] : no dizziness [Numbness] : no numbness [Paralysis] : no paralysis [Confusion] : no confusion [Depression] : no depression [Anxiety] : no anxiety

## 2021-04-20 NOTE — ASSESSMENT
[FreeTextEntry1] : 60M lung adenocarcinoma on chemotherapy extensive smoking and exposure history presenting for followup evaluation of an abnormal CT chest with evidence of pulmonary nodules and interstitial lung disease. He has had subsequent lung biopsy and is found to have adenocarcinoma of the lung.\par - His most pressing complaint presently is his bilateral LE edema\par \par 1. New bilateral LE edema - pitting in nature. Will give a trial of Lasix 40mg PO daily x 7 days and patient to have followup BMP with labs at [facility] next week. Discussed use of compression stockings. Vascular cardiology (Dr. Camarena) referral information provided.\par - No hydronephrosis noted on POCUS\par \par 2. 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 Zheng. Has metastatic disease. Enlarged RLL mass on recent CT but unclear if recent pneumonia is causing some of the enlargement we are seeing. Will need repeat CT after cycle. Not on PDL1 inhibitors given ILD\par - POCUS today with bilateral irregular pleural surface with B-lines. No pleural effusion. Consolidation vs mass RLL field\par \par 3. Langerhans Cell Histiocytosis/Interstitial Lung Disease - this is often seen in patients with extensive smoking history. Unclear if this is the sole cause of his extensive ILD. Patient not interested in antifibrotic therapy. Will monitor. Patient states he has eliminated dust/chemical exposures from work.\par - continue smoking cessation\par - Extensive serologic workup was sent at last visit which has been negative. His lung biopsy does not document any fibrotic changes but does show fibrin. 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 Pulmonary Function Testing - patient with evidence of obstructive and restrictive disease with severe reduction in diffusion capacity.\par - Patient denies pulmonary symptoms - will give a trial of BrezTri and see how patient does. Continue Albuterol PRN. Patient states he is not really interested in starting new/more medications\par - Restrictive disease and reduction in diffusion capacity is likely related to his underlying ILD\par \par 5. Chronic Hypoxemic Respiratory Failure - continue supplemental O2 as needed - not  using much during the day\par - Continue to monitor O2 sats and maintain > 88%\par - I advised against returning home O2 at this time given the severity of his lung disease I suspect he will require O2  with activity and in the future\par \par 6. Snoring and concern for sleep disorder breathing - patient is not interested in pursuing diagnostic testing at this time. His snoring is slightly improved per his wife.\par - Weight loss will be beneficial\par \par 7. Echocardiogram - with stage I diastolic dysfunction and mild pulmonary hypertension. This is likely related to his underlying lung disease. Diuresis for euvolemia as able. Cardiology evaluation.\par \par 8. Health Maintenance and followup - patient should followup at least Q6 months for pulmonary evaluation or sooner as needed. He will call with any acute issues or questions.\par - Patient should have pneumococcal vaccine given his extensive underlying lung disease and malignancy but he is not interested at this time. Will continue to discuss on future visits.\par - Oncology followup\par - COVID vaccination received 1st dose with second dose planned Friday

## 2021-04-20 NOTE — PHYSICAL EXAM
[No Acute Distress] : no acute distress [Well Nourished] : well nourished [No Deformities] : no deformities [Well Developed] : well developed [Normal Oropharynx] : normal oropharynx [Normal Appearance] : normal appearance [Supple] : supple [No Neck Mass] : no neck mass [Normal Rate/Rhythm] : normal rate/rhythm [Normal S1, S2] : normal s1, s2 [No Edema] : no edema [No Resp Distress] : no resp distress [No Acc Muscle Use] : no acc muscle use [Normal Rhythm and Effort] : normal rhythm and effort [No Abnormalities] : no abnormalities [Benign] : benign [Not Tender] : not tender [Normal Gait] : normal gait [No Cyanosis] : no cyanosis [3+ Pitting] : 3+ pitting [Tenderness] : tenderness [Normal Color/ Pigmentation] : normal color/ pigmentation [No Motor Deficits] : no motor deficits [No Focal Deficits] : no focal deficits [Oriented x3] : oriented x3 [Normal Mood] : normal mood [Normal Affect] : normal affect [TextBox_11] : NCAT, EOMI, anicteric [TextBox_44] : large neck [TextBox_68] : coarse BS that clear with cough, fine inspiratory crackles bilaterally, bilateral expiratory wheeze [TextBox_105] : + clubbing [TextBox_89] : obese

## 2021-04-20 NOTE — HISTORY OF PRESENT ILLNESS
[Snoring] : snoring [TextBox_4] : 60M former smoker with an abnormal CT chest who was found to have lung adenocarcinoma in 2020 and now on chemotherapy presenting for followup evaluation.\par \par His major complaints today are related to significant LE edema. He feels that both his legs are heavy and swollen. This has made it more difficult for him to walk. He has pitting edema. He was recently given a 3 day trial of Lasix without any significant noted change. He denies any fevers or chills. His continues to have a cough and now his sputum is clear - no longer green. He has not had any hemoptysis. He has some mild dyspnea on exertion. 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 He no longer smokes. He has had a mild improvement with the Albuterol (his wife feels he has had more improvement than patient states). \par \par He was initially seen 2/24/2020 and had a CT with evidence of pulmonary nodules, emphysema, and interstitial lung disease. He ultimately went for RUL wedge resection which revealed adenocarcinoma as well as Langerhans cell histiocytosis. There was also evidence of fibrin and fibrous plaque around the right pleura.\par \par He has an extensive exposure history while working as a printer with exposure from printing press and associated dust and chemicals. He was also around birds extensively from birth until 1990 and he smoked until 2020. [TextBox_165] : patient not interested in sleep study testing [TextBox_42] : Radha - 3/16/21 - There are moderate to severe emphysematous changes noted with pulmonary fibrotic changes identified. There is no tracheomalacia. There is no significant bronchiectasis or mucus plugging identified. There is a large focus of opacity within the RLL which is increased in sized compared to the prior exam measuring 6.7 x 9 cm and previously measured 6.7 x 4.7 cm (12/2020). There are postsurgical changes noted involving the RUL redemonstrated. There are micronodules the largest in the LLL measuring 4 mm which are stable. Additional micronodules are noted smaller in size which are also stable in the LLL. There are micronodules within the BROOKE the largest 6mm also stable. Righ-sided micronodules are also noted which appear relatively stable. ... There are enlarged LN within the mediastinum the largest in the right paratracheal space measuring 2.5 x 3 cm previously measured 2.8 cm. There are additional enlarged LN in the right suprahilar location, right hilum, the subcarinal regiona as well which are also more prominent. There is no axillary adenopathy appreciated. ..>There is a left adrenal nodule measuring 3.2x2.4 cm previously measuring 3.2x1.9 cm.

## 2021-04-26 NOTE — PROCEDURE
[FreeTextEntry1] : Date: 4/20/2021\par Procedure: Lung Ultrasound, Cardiac Ultrasound, Renal Ultrasound\par Indication: Acute Hypoxemic Respiratory Failure and Cough\par \par Findings: Bilateral irregular pleural surface with B-lines. no pleural effusion. Consolidation vs mass noted in posterior right lower lung fields\par Cardiac: Right heart smaller than left heart, mild enlargement of right heart, no pericardial effusion, grossly normal LV systolic function\par Renal: no evidence of hydronephrosis\par \par Recommendations: Will increase diuresis given B-line pattern and concern for volume overload. \par No hydronephrosis noted\par No LV systolic dysfunction noted but concern for possible pulmonary hypertension\par

## 2021-08-12 NOTE — DISCUSSION/SUMMARY
[FreeTextEntry1] : \par  Keystone Flap Text: The defect edges were debeveled with a #15 scalpel blade.  Given the location of the defect, shape of the defect a keystone flap was deemed most appropriate.  Using a sterile surgical marker, an appropriate keystone flap was drawn incorporating the defect, outlining the appropriate donor tissue and placing the expected incisions within the relaxed skin tension lines where possible. The area thus outlined was incised deep to adipose tissue with a #15 scalpel blade.  The skin margins were undermined to an appropriate distance in all directions around the primary defect and laterally outward around the flap utilizing iris scissors.

## 2021-09-08 PROBLEM — Z51.11 ENCOUNTER FOR ANTINEOPLASTIC CHEMOTHERAPY: Status: ACTIVE | Noted: 2021-01-01

## 2021-10-08 PROBLEM — C78.7 SECONDARY MALIGNANT NEOPLASM OF LIVER AND INTRAHEPATIC BILE DUCT: Status: ACTIVE | Noted: 2021-01-01

## 2021-10-08 PROBLEM — C77.1 SECONDARY MALIGNANT NEOPLASM OF BRONCHOPULMONARY LYMPH NODES: Status: ACTIVE | Noted: 2021-01-01

## 2021-10-08 PROBLEM — C79.51 BONE METASTASES: Status: ACTIVE | Noted: 2020-10-13

## 2021-10-08 PROBLEM — C78.00 LUNG METASTASES: Status: ACTIVE | Noted: 2020-10-15

## 2021-10-12 PROBLEM — J96.01 ACUTE HYPOXEMIC RESPIRATORY FAILURE: Status: ACTIVE | Noted: 2020-08-17

## 2021-10-12 PROBLEM — J96.11 CHRONIC RESPIRATORY FAILURE WITH HYPOXIA, ON HOME O2 THERAPY: Status: ACTIVE | Noted: 2021-01-01

## 2021-10-12 PROBLEM — J84.82: Status: ACTIVE | Noted: 2020-08-17

## 2021-10-12 PROBLEM — J84.9 INTERSTITIAL LUNG DISEASE: Status: ACTIVE | Noted: 2020-02-24

## 2021-10-12 PROBLEM — Z00.00 ENCOUNTER FOR PREVENTIVE HEALTH EXAMINATION: Status: ACTIVE | Noted: 2020-02-20

## 2021-10-12 PROBLEM — I27.20 PULMONARY HYPERTENSION: Status: ACTIVE | Noted: 2021-01-01

## 2021-10-12 PROBLEM — R60.0 BILATERAL LOWER EXTREMITY EDEMA: Status: ACTIVE | Noted: 2021-01-01

## 2021-10-12 PROBLEM — J44.9 COPD WITH CHRONIC BRONCHITIS: Status: ACTIVE | Noted: 2021-01-01

## 2021-10-12 PROBLEM — C34.91 ADENOCARCINOMA OF LUNG, RIGHT: Status: ACTIVE | Noted: 2020-08-04

## 2021-10-12 PROBLEM — E66.01 OBESITY, CLASS III, BMI 40-49.9 (MORBID OBESITY): Status: ACTIVE | Noted: 2020-02-24

## 2021-10-12 NOTE — DISCUSSION/SUMMARY
[FreeTextEntry1] : I started to broad [facility] discussions with Christian and Annie in the office today. Clinically Christian looks worse than when we first met. His CT scans show progression of disease and his oxygenation and debility have clearly worsened.\par \par We discussed topics such as hospitalization, intubation, and cardiopulmonary resuscitation. They will think about these things and discuss further among themselves. We also discussed a Living Will and Health Care Proxy.

## 2021-10-12 NOTE — ASSESSMENT
[FreeTextEntry1] : 61M lung adenocarcinoma on chemotherapy extensive smoking and exposure history presenting for followup evaluation of an abnormal CT chest with evidence of pulmonary nodules and interstitial lung disease. He has had progression of disease on imaging.\par \par 1. Persistent bilateral LE edema - pitting in nature. Will continue Lasix 40m PO daily with monitoring of daily weights. Discussed use of compression stockings.\par - Left outer thigh area of fluctuance of unclear etiology. Not warm but definitely tender to touch. Will use ice and monitor. Does not recall trauma. If progresses or persists may need CT leg for further assessment\par \par 2. Adenocarcinoma of the lung - stage IV disease with bone metastasis\par - Continue treatment and followup with Dr. Zheng. Has metastatic disease. Enlarged RLL mass on recent CT suggests worsening of disease despite therapy. Being planned for switch to Immunotherapy given progression which is reasonable. Discussed risks with patient/wife\par \par 3. Langerhans Cell Histiocytosis/Interstitial Lung Disease - this is often seen in patients with extensive smoking history. Unclear if this is the sole cause of his extensive ILD. Patient not interested in antifibrotic therapy. Will monitor. Patient states he has eliminated dust/chemical exposures from work.\par - continue smoking cessation\par - Extensive serologic workup was sent at prior visit which has been negative. His lung biopsy does not document any fibrotic changes but does show fibrin. \par - At this time will continue to follow with serial imaging.\par - will hold off steroids given compression fractures and LE edema\par - Maintain O2 sat > 88% - has home supplemental O2 - severe hypoxemia on RA at rest\par - Avoidance of exposures as much as possible - patient is not able to step away from [email or web address] business at this time but state he has eliminated dust/chemical/printing exposures from work place.\par \par 4. Pulmonary Function Testing - patient with evidence of obstructive and restrictive disease with severe reduction in diffusion capacity.\par - Continue BrezTri - patient has not been using this lately. Continue Albuterol PRN. \par - Restrictive disease and reduction in diffusion capacity is likely related to his underlying ILD\par \par 5. Chronic Hypoxemic Respiratory Failure - continue supplemental O2 as needed\par - Continue to monitor O2 sats and maintain > 88%\par - Requires O2 ATC\par - Will add Hycodan for coughing - to help with patient comfort: iSTOP checked and in Rx\par \par 6. Snoring and concern for sleep disorder breathing - patient is not interested in pursuing diagnostic testing at this time. His snoring is slightly improved per his wife.\par - Weight loss will be beneficial\par \par 7. Echocardiogram - with stage I diastolic dysfunction and mild pulmonary hypertension. This is likely related to his underlying lung disease. Diuresis for euvolemia as able. Cardiology evaluation.\par - Diuresis and daily weights\par - PH is likely due to hypoxia and underlying lung disease. Would not start vasodilator therapy in this setting. Continue O2 and optimize diuresis.\par \par 8. Health Maintenance and followup - Followup in 1-2 months for pulmonary evaluation or sooner as needed. He will call with any acute issues or questions.\par Spirometry: reviewed\par Smoking status: Former\par Pneumococcal vaccination status: Deferred by patient - Patient should have pneumococcal vaccine given his extensive underlying lung disease and malignancy but he is not interested at this time. Will continue to discuss on future visits.\par COVID vaccination status: Completed \par [place] Sleepiness Scale: 6\par Goals of Care: 10/12/21\par \par \par The above plan was discussed with MAKENZIE NANCE  in detail. Patient verbalized understanding and agrees with plan as detailed above. Patient was provided education and counselling on current diagnosis/symptoms, diagnostic work up, treatment options and potential side effects of any prescribed therapy/therapies. MAKENZIE  was advised to call our clinic at [phone] for any new or worsening symptoms, or with any questions or concerns. In case of acute onset of respiratory symptoms or worsening presentation, patient was advised to present to nearest emergency room for further evaluation. MAKENZIE  expressed understanding and all questions/concerns were addressed.\par \par

## 2021-10-12 NOTE — CONSULT LETTER
[Dear  ___] : Dear  [unfilled], [Courtesy Letter:] : I had the pleasure of seeing your patient, [unfilled], in my office today. [Please see my note below.] : Please see my note below. [Sincerely,] : Sincerely, [FreeTextEntry3] : Dwayne Sharif MD, FACP, FCCP\par Division of Pulmonary, Critical Care, and Sleep Medicine\par Associate Professor of Medicine \par [employer] [facility]\par

## 2021-10-12 NOTE — HISTORY OF PRESENT ILLNESS
[Snoring] : snoring [With Patient/Caregiver] : With Patient/Caregiver [TextBox_4] : 61M former smoker with an abnormal CT chest who was found to have lung adenocarcinoma and interstitial lung disease in 2020 and now on chemotherapy presenting for followup evaluation. He has had progressive hypoxemia and progression of his chemotherapy. He is now being planned for initiation for immunotherapy.\par \par - Plan to start immunotherapy this week with Dr. 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 He feels that both his legs are heavy and swollen. This has made it more difficult for him to walk. He has pitting edema. He does respond well to Lasix. He denies any fevers or chills. His continues to have a cough and now his sputum is clear - no longer green. He has not had any hemoptysis. He has some mild dyspnea on exertion. 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 He no longer smokes. He has had a mild improvement with the Albuterol (his wife feels he has had more improvement than patient states). \par \par He was initially seen 2/24/2020 and had a CT with evidence of pulmonary nodules, emphysema, and interstitial lung disease. He ultimately went for RUL wedge resection which revealed adenocarcinoma as well as Langerhans cell histiocytosis. There was also evidence of fibrin and fibrous plaque around the right pleura.\par \par He has an extensive exposure history while working as a printer with exposure from "[employer] and associated dust and chemicals. He was also around birds extensively from birth until 1990 and he smoked until 2020. [TextBox_165] : patient not interested in sleep study testing [TextBox_27] : Radha - 3/16/21 - There are moderate to severe emphysematous changes noted with pulmonary fibrotic changes identified. There is no tracheomalacia. There is no significant bronchiectasis or mucus plugging identified. There is a large focus of opacity within the RLL which is increased in sized compared to the prior exam measuring 6.7 x 9 cm and previously measured 6.7 x 4.7 cm (12/2020). There are postsurgical changes noted involving the RUL redemonstrated. There are micronodules the largest in the LLL measuring 4 mm which are stable. Additional micronodules are noted smaller in size which are also stable in the LLL. There are micronodules within the BROOKE the largest 6mm also stable. Righ-sided micronodules are also noted which appear relatively stable. ... There are enlarged LN within the mediastinum the largest in the right paratracheal space measuring 2.5 x 3 cm previously measured 2.8 cm. There are additional enlarged LN in the right suprahilar location, right hilum, the subcarinal regiona as well which are also more prominent. There is no axillary adenopathy appreciated. ..>There is a left adrenal nodule measuring 3.2x2.4 cm previously measuring 3.2x1.9 cm. [TextBox_42] : \par PROCEDURE DATE: 10/04/2021\par \par \par \par INTERPRETATION: CT CHEST AND CT ABDOMEN/PELVIS\par \par INDICATION: Metastatic NSCLC on chemotherapy; evaluate treatment response;.\par \par TECHNIQUE: Enhanced helical images were obtained of the chest and pelvis. Coronal and sagittal images were reconstructed. Images were obtained after the uneventful administration of 90 cc of nonionic intravenous contrast (Omnipaque 350). 10 cc of nonionic intravenous contrast (Omnipaque 350) was discarded. Maximum intensity projection images were generated.\par \par COMPARISON: CT chest 7/27/2021.\par \par FINDINGS:\par \par CHEST:\par \par Lungs, airways and pleura: Approximate 9 cm right lower lobe mass, previously 5 cm, and contiguous with a new consolidative process involving the superior right lower lobe and the entire middle lobe, occluding the middle lobe bronchus. Increased peripheral consolidation along the pleura and new adjacent 1.1 cm nodule in the right lower lobe (2-64).\par \par New both geographic nodular areas of groundglass/mixed ground glass throughout the left upper and lower lobes, as well as the right lower lobe.\par \par Stable background of fibrosis and superimposed mild emphysema. New trace right pleural effusion.\par \par Mediastinum and lymph nodes: Unremarkable partially included thyroid. Increased size of multiple right paratracheal and subcarinal lymph nodes, for example 1.8 cm right paratracheal lymph node (2-29), prior 1.1 cm. Stable 1.2 cm AP window lymph node.\par \par Heart and vasculature: Dilated right atrium. No pericardial effusion. Normal caliber aorta and main pulmonary artery.\par \par Bones and soft tissues: Degenerative changes of the spine. Stable sclerosis and loss of height of the T4 vertebral body. Stable loss of height of the T9 vertebral body.\par \par \par ABDOMEN:\par \par Liver: Increased size and number of several liver lesions.\par \par Biliary system: Unremarkable.\par \par Spleen:Unremarkable.\par \par Pancreas: Unremarkable.\par \par Adrenals: Slightly larger 2.9 cm left adrenal nodule, prior 2.7 cm.\par \par Kidneys: Stable multiple small hypodensities in both kidneys, too small to characterize.\par \par GI tract: Unremarkable.\par \par Pelvis: Unremarkable.\par \par Other: No ascites. No enlarged lymph nodes. Normal caliber aorta.\par \par Bones and soft tissues: Stable lytic lesion in the L4 vertebral body.\par \par \par IMPRESSION:\par \par Since 7/27/2021:\par \par Larger primary right lower lobe neoplasm.\par \par New consolidative processes in the right lower and middle lobes, likely malignant.\par \par Superimposed new areas of groundglass in both lower lobes and left upper lobe of uncertain etiology and may be a superimposed infection.\par \par Increased size of mediastinal lymph nodes, left adrenal metastasis, and size and number of hepatic metastases. [AdvancecareDate] : 10/12/21 [FreeTextEntry4] : Discussed with patient and wife regarding discussions of advanced care planning. Patient did not make his wishes known to me but will discuss with wife regarding whether or not he would want intubation and/or CPR (18 min)

## 2021-10-12 NOTE — PHYSICAL EXAM
[No Acute Distress] : no acute distress [Well Nourished] : well nourished [No Deformities] : no deformities [Well Developed] : well developed [Normal Oropharynx] : normal oropharynx [Normal Appearance] : normal appearance [Supple] : supple [No Neck Mass] : no neck mass [Normal Rate/Rhythm] : normal rate/rhythm [Normal S1, S2] : normal s1, s2 [No Edema] : no edema [No Resp Distress] : no resp distress [No Acc Muscle Use] : no acc muscle use [Normal Rhythm and Effort] : normal rhythm and effort [No Abnormalities] : no abnormalities [Benign] : benign [Not Tender] : not tender [No Cyanosis] : no cyanosis [3+ Pitting] : 3+ pitting [Tenderness] : tenderness [Normal Color/ Pigmentation] : normal color/ pigmentation [No Focal Deficits] : no focal deficits [No Motor Deficits] : no motor deficits [Oriented x3] : oriented x3 [Normal Mood] : normal mood [Normal Affect] : normal affect [TextBox_11] : NCAT, EOMI, anicteric [TextBox_44] : large neck [TextBox_68] : coarse BS right base, egophany, fine inspiratory crackles bilaterally, bilateral expiratory wheeze [TextBox_89] : obese [TextBox_99] : Left thigh area of induration and mild fluctuance - tender to palpation  [TextBox_105] : + clubbing

## 2021-10-12 NOTE — REVIEW OF SYSTEMS
[Cough] : cough [Sputum] : sputum [Dyspnea] : dyspnea [SOB on Exertion] : sob on exertion [Edema] : edema [Arthralgias] : arthralgias [Myalgias] : myalgias [Obesity] : obesity [Fever] : no fever [Fatigue] : no fatigue [Recent Wt Gain (___ Lbs)] : ~T no recent weight gain [Chills] : no chills [Poor Appetite] : no poor appetite [Dry Eyes] : no dry eyes [Ear Disturbance] : no ear disturbance [Epistaxis] : no epistaxis [Sore Throat] : no sore throat [Postnasal Drip] : no postnasal drip [Dry Mouth] : no dry mouth [Sinus Problems] : no sinus problems [Hemoptysis] : no hemoptysis [Chest Tightness] : no chest tightness [Frequent URIs] : no frequent URIs [Chest Discomfort] : no chest discomfort [Claudication] : no claudication [Leg Cramps] : no leg cramps [Orthopnea] : no orthopnea [Hay Fever] : no hay fever [Watery Eyes] : no watery eyes [Itchy Eyes] : no itchy eyes [Abdominal Pain] : no abdominal pain [GERD] : no gerd [Nausea] : no nausea [Vomiting] : no vomiting [Dysuria] : no urgency [Back Pain] : no back pain [Rash] : no rash [Ulcerations] : no ulcerations [Easy Bruising] : no easy bruising [Headache] : no headache [Focal Weakness] : no focal weakness [Dizziness] : no dizziness [Seizures] : no seizures [Numbness] : no numbness [Paralysis] : no paralysis [Confusion] : no confusion [Depression] : no depression [Anxiety] : no anxiety [TextBox_119] : Neuropathy

## 2021-10-15 NOTE — ED PROVIDER NOTE - NSICDXPASTMEDICALHX_GEN_ALL_CORE_FT
PAST MEDICAL HISTORY:  Langerhans cell histiocytosis of lung     Lung nodule     Lytic lesion of bone on x-ray     Morbid obesity     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 metastatic to bone     Pneumonia Feb 2020    Primary adenocarcinoma of upper lobe of right lung

## 2021-10-15 NOTE — ED ADULT NURSE NOTE - CHIEF COMPLAINT QUOTE
Arrives from home with Annie (wife) [phone] who reports pt became acutely more SOB this evening while resting at home. Pt recently dx with lung ca, pulmonary HTN on 4L NC at baseline. On EMS arrival was 78% on 4L. Received neb tx x 2 with improvement, on 6L currently breathing is even and unlabored. Noted +2 B/L pedal edema, pt reports has been worsening x 1 week.

## 2021-10-15 NOTE — ED PROVIDER NOTE - PHYSICAL EXAMINATION
Const: Well-nourished, Well-developed, appearing stated age.  Eyes: no conjunctival injection, and symmetrical lids.  HEENT: Head NCAT, no lesions. Atraumatic external nose and ears. Moist MM.  Neck: Symmetric, trachea midline.   CVS: +S1/S2,  RESP: Labored respiratory effort. bilateral crackles with mild wheezing.   GI: Nontender/Nondistended, No CVA tenderness b/l.   MSK: Normocephalic/Atraumatic, Lower Extremities bilateral edema.   Skin: Warm, dry and intact.   Neuro:  Motor & Sensation grossly intact.  Psych: Awake, Alert, & Oriented (AAO) x3. Appropriate mood and affect.

## 2021-10-15 NOTE — ED PROVIDER NOTE - ATTENDING CONTRIBUTION TO CARE
61M w h/o lung ca s/p RLL resection on chemo, copd, ILD, pulm HTN, on 4L NC at home p/w worsening sob. States he had sudden worsening of sob today w/ worsening bilateral lower extremity edema. Denies chest pain, palpitations, f/c. Reports cough and uri symptoms 2 weeks ago. Denies h/o PE or DVT.     Except as documented in the HPI,  all other systems are negative    CONSTITUTIONAL: NAD, awake, alert  HEAD: Normocephalic; atraumatic  ENMT: External appears normal, MMM  NECK: no tenderness, FROM  CARD: Normal Sl, S2; no audible murmurs  RESP: + tachypnea, + rales at the bases, + wheezes  ABD: soft, non-distended; non-tender  MSK: bilateral pedal edema, normal ROM in all four extremities  SKIN: Warm, dry, no rashes  NEURO: aaox3, moving all extremities spontaneously    61M w h/o lung ca s/p RLL resection on chemo, copd, ILD, pulm HTN, on 4L NC p/w worsened SOB suddenly, multiple risk factors for PE, will obtain cta, acs workup, patient w/ wheezes and rales, will treat w/ nebs and reassess, diureses as well given patient w/ bilateral edema and on lasix at home

## 2021-10-15 NOTE — ED PROVIDER NOTE - PROGRESS NOTE DETAILS
Dave Higgins, PGY1 pt breathing comfortably on non rebreather s/p duonebs. States duoneb treatment helped him significantly. Will give steroids. waiting CTA prior to admission Dave Higgins, PGY1 cta chest shows multiple pulmonary issues. will start abx for pna in ED.

## 2021-10-15 NOTE — ED ADULT NURSE NOTE - OBJECTIVE STATEMENT
Patient present in room 13. A&O x4, complaining of SOB at home for the past week, sating at 88% on 3 L NC. Patient states that his baseline has been 85-90% at home. Patient has bilateral swelling +4 that started today, "it never gets this bad". Patient Vital signs within normal limits, on NRB sating at 95%. Denies chest pain, dizziness, fever, chills, weakness. Labs sent as ordered. Will continue to monitor. Patient present in room 13. A&O x4, complaining of SOB at home for the past week, sating at 88% on 3 L NC. Patient states that his baseline has been 85-90% at home. Patient has bilateral swelling +4 that started today, "it never gets this bad". Patient Vital signs within normal limits, Patient's breathing slightly labored, and use of accessory muscles. Placed on NRB sating at 95%, states that has helped his breathing. Patient given DuoNeb treatment, and states that he feels better. Denies chest pain, dizziness, fever, chills, weakness. Ultrasound IV placed by MD, Labs sent as ordered. Will continue to monitor.

## 2021-10-15 NOTE — ED PROVIDER NOTE - OBJECTIVE STATEMENT
Pt is a 62 yo male with PM of lung cancer s/p RLL resection on chemo, COPD, ILD, pulm HTN. Pt is a 60 yo male with PM of lung cancer s/p RLL resection on chemo, COPD, ILD, pulm HTN. who presents with SOB. Pt has had worsening SOB for the past week past week, but today suddenly worsened. Pt denies any CP, palpitations, cough Pt is a 60 yo male with PM of lung cancer s/p RLL resection on chemo, COPD, ILD, pulm HTN. on 4l NC at home who presents with SOB. Pt has had worsening SOB for the  past week, but today suddenly worsened. Pt has had bilateral leg edema but states they have significantly worsened today. Pt denies any CP, palpitations, cough. Pt had a cough and felt ill 1.5 wks ago and was given abx, last dose Monday, and symptoms had improved. denies any n/v/f/c diarrhea. ,

## 2021-10-15 NOTE — ED PROVIDER NOTE - CLINICAL SUMMARY MEDICAL DECISION MAKING FREE TEXT BOX
Pt is a 60 yo male with PM of lung cancer s/p RLL resection on chemo, COPD, ILD, pulm HTN. on 4l NC at home who presents with SOB. r/o PE vs COPD exacerbation vs pleural effusion secondary to malignancy vs new onset chf . CTA, labs, img, duonebs, meds, ecg, cxr.

## 2021-10-15 NOTE — ED ADULT TRIAGE NOTE - CHIEF COMPLAINT QUOTE
Arrives from home with Annie (wife) [phone] who reports pt became acutely more SOB this evening while resting at home. Pt recently dx with lung ca, pulmonary HTN on 4L NC at baseline. On EMS arrival was 78% on 4L. Received neb tx x 2 with improvement, on 6L currently breathing is even and unlabored. Noted +2 B/L pedal edema, pt reports has been worsening x 1 week.

## 2021-10-16 NOTE — H&P ADULT - PROBLEM SELECTOR PLAN 9
- Lovenox for DVT prophylaxis  - Regular diet as tolerated  - GI proph  - Maintain O2 sat > 88%  - Aggressive diuresis  - Aspiration precautions

## 2021-10-16 NOTE — DISCHARGE NOTE PROVIDER - NSDCCPTREATMENT_GEN_ALL_CORE_FT
PRINCIPAL PROCEDURE  Procedure: CT chest w con  Findings and Treatment: A CT scan of your chest was performed to investigate the cause of your shortness of breath. The CT ruled out a pulmonary embolism however it did find enlargement of your lymph nodes, metastases from your cancer, interstitial lung disease, and fluid in your lungs. Please continue to follow up with your pulmonologist and oncologist after discharge.      SECONDARY PROCEDURE  Procedure: Venous doppler lower extremity  Findings and Treatment: An ultrasound was performed on your legs to rule out a possible clot. No clot was seen on the ultrasound.     PRINCIPAL PROCEDURE  Procedure: CT chest w con  Findings and Treatment: A CT scan of your chest was performed to investigate the cause of your shortness of breath. The CT ruled out a pulmonary embolism however it did find enlargement of your lymph nodes, metastases from your cancer, interstitial lung disease, and fluid in your lungs. Please continue to follow up with your pulmonologist and oncologist after discharge.      SECONDARY PROCEDURE  Procedure: Venous doppler lower extremity  Findings and Treatment: An ultrasound was performed on your legs to rule out a possible clot. No clot was seen on the ultrasound.    Procedure: 2D echocardiography  Findings and Treatment: An ultrasound of your heart showed that the right side of your heart had some issues pumping blood.

## 2021-10-16 NOTE — ED ADULT NURSE REASSESSMENT NOTE - NS ED NURSE REASSESS COMMENT FT1
received report on pt from nite shift/ pt sating at 83 on 6l NC/ lower extremities extremely edematous/ team saw pt and charged to high flow o2/  iv patent  resp 30  pt stated this is what  he is at home/

## 2021-10-16 NOTE — CONSULT NOTE ADULT - ASSESSMENT
61M NSCLC (on therapy), ILD with occupational exposure and smoking history, and chronic hypoxemic respiratory failure presenting with acute on chronic hypoxemia as well as volume overload. He has had a CTPA in the ED which is worse than a recent CT and is concerning for volume overload, possible infection, and most notably progression of cancer.      1. Acute on Chronic Hypoxemic Respiratory Failure - continue supplemental O2 with goal O2 sat > 88%  - Patient was saturating 81-84% on 8L NC at the time of my evaluation in ED  - Would start patient on HFNC in ED and titrate as able. HFNC should allow him to eat and be less claustraphobic  - Can use NIPPV as needed - but does not require this at present    2. Postobstructive pneumonia - it is reasonable to start broad spectrum antibiotics and followup cultures  - check nasal MRSA  - check urine legionella  - aggressive chest PT - aerobika, bed percussion, and chest physiotherapy to help with airway clearance  - does not require bronchoscopy at this time    3. Progressive NSCLC - patient recently started on Immunotherapy  - His current admission is not likely to be related to initiation of immunotherapy this past Wednesday  - Oncology Evaluation (Dr. Richmond Zheng)  - Patient's imaging is suggestive of liver, adrenal, and bone metastases    4. R/O Pulmonary Hypertension  - Please check 2 D echo - given underlying lung disease patient is likely to have pulmonary hypertension  - Aggressive diuresis - would consider Lasix IV BID with close monitoring of I/O  - No role for pulmonary vasodilator therapy    5. Palliative  - I have had extensive GOC discussions with patient and his wife as outpatient and again today in ED. Patient is fiercely independent and I do not believe he would want intubation or to be dependent on mechanical ventilation. His long term survival will not be altered by ventilation. However, at this time he remains FULL CODE as he is not sure about what he would want done and he will discuss further with his wife Annie. 18 min.  - Would suggest Palliative Care Evaluation for assistance and followup  - Can use low dose Morphine as needed for dyspnea     6. Obstructive Lung Disease   - Patient has obstructive lung disease on PFTs   - Was on bronchodilator therapy at home - would use Symbicort and Spiriva while in the hospital  - Incentive spirometer and Acapella  - Would hold off oral corticosteroids from a pulmonary perspective at this time    7. Gen  - DVT proph  - GI proph  - Maintain O2 sat > 88%  - Aggressive diuresis  - Aspiration precautions  - Palliative Care Eval/GOC

## 2021-10-16 NOTE — DISCHARGE NOTE PROVIDER - NSDCCPCAREPLAN_GEN_ALL_CORE_FT
PRINCIPAL DISCHARGE DIAGNOSIS  Diagnosis: Lung cancer  Assessment and Plan of Treatment:       SECONDARY DISCHARGE DIAGNOSES  Diagnosis: Lung cancer  Assessment and Plan of Treatment:      PRINCIPAL DISCHARGE DIAGNOSIS  Diagnosis: Lung cancer  Assessment and Plan of Treatment: You were admitted to the hospital for shortness of breath with significant leg swelling. In the hospital you got imaging of your lungs which showed lung cancer, interstitial lung disease, pneumonia, and fluid. An ultrasound of your heart showed that the right side of your heart was having trouble pumping blood through the lungs. You were started on antibiotics and supplemental oxygen via high flow nasal cannula. Throughout your stay you experienced multiple desaturations of your blood oxygen level and had to have your high flow nasal cannula oxygen rate increased several times. We also gave you diuretics to help you urinate in order to decrease the swelling in your legs. We had a goals of care discussion with you and heme/onc and it was agreed that you would receive curative chemotherapy if you would be able to be discharged safefly off the high flow nasal cannula. Oral steroids were also started to help with your pulmonary symptoms.

## 2021-10-16 NOTE — H&P ADULT - NSHPOUTPATIENTPROVIDERS_GEN_ALL_CORE
PCP: Dr. Ignacio Perez ([id number] [id number]; [phone])  Pulmonologist: Dr. Dwayne Sharif  Onologist: Dr. Richmond Zheng

## 2021-10-16 NOTE — H&P ADULT - NSHPPHYSICALEXAM_GEN_ALL_CORE
VITALS:   T(C): 36.6 (10-16-21 @ 06:05), Max: 36.8 (10-15-21 @ 19:14)  HR: 91 (10-16-21 @ 06:05) (91 - 104)  BP: 129/66 (10-16-21 @ 06:05) (124/78 - 132/77)  RR: 24 (10-16-21 @ 06:05) (18 - 24)  SpO2: 95% (10-16-21 @ 06:05) (93% - 98%)    GENERAL: NAD, lying in bed comfortably  HEAD:  Atraumatic, normocephalic  EYES: EOMI, PERRLA, conjunctiva and sclera clear  ENT: Moist mucous membranes  NECK: Supple, no JVD  HEART: Regular rate and rhythm, no murmurs, rubs, or gallops  LUNGS: Unlabored respirations.  Clear to auscultation bilaterally, no crackles, wheezing, or rhonchi  ABDOMEN: Soft, nontender, nondistended, +BS  EXTREMITIES: 2+ peripheral pulses bilaterally. No clubbing, cyanosis, or edema  NERVOUS SYSTEM:  A&Ox3, no focal deficits   SKIN: No rashes or lesions VITALS:   T(C): 36.6 (10-16-21 @ 06:05), Max: 36.8 (10-15-21 @ 19:14)  HR: 91 (10-16-21 @ 06:05) (91 - 104)  BP: 129/66 (10-16-21 @ 06:05) (124/78 - 132/77)  RR: 24 (10-16-21 @ 06:05) (18 - 24)  SpO2: 95% (10-16-21 @ 06:05) (93% - 98%)    GENERAL: NAD, lying in bed comfortably on nasal cannula   HEAD:  Atraumatic, normocephalic  EYES: EOMI, PERRLA, conjunctiva and sclera clear  ENT: Moist mucous membranes  NECK: Supple, no JVD  HEART: Regular rate and rhythm, no murmurs, rubs, or gallops  LUNGS: crackles, wheezing, and rhonchi b/l  ABDOMEN: Soft, nontender, nondistended, +BS  EXTREMITIES: + clubbing, pitting edema b/l in the lower extremities  NERVOUS SYSTEM:  A&Ox3, no focal deficits   SKIN: erythematous rash on the left thigh that is tender to palpation

## 2021-10-16 NOTE — H&P ADULT - ASSESSMENT
62 y/o M with history of lung cancer s/p RLL resection on chemo, COPD, ILD, pulmonary HTN, on 4L NC at home presents with SOB x 1 week. On admission the patient was tachypneic and tachycardic but afebrile. PE notable for wheezing, crackles and ronchi. Labs on admission remarkable for an elevated WBC, elevated BNP, and transaminitis. LE U/S ruled out evidence of DVT and CTA ruled out evidence of PE. DDx includes HF exacerbation, pulmonary infection, exacerbation 2/2 pt's lung cancer, or COPD exacerbation. 62 y/o M with history of lung cancer s/p RLL resection on chemo, COPD, ILD, pulmonary HTN, on 4L NC at home presents with SOB x 1 week. On admission the patient was tachypneic and tachycardic but afebrile. PE notable for wheezing, crackles and ronchi. Labs on admission remarkable for an elevated WBC, elevated BNP, and transaminitis. LE U/S ruled out evidence of DVT and CTA ruled out evidence of PE. DDx includes HF exacerbation, pulmonary infection, exacerbation 2/2 pt's lung cancer, worsening ILD 2/2 pembrolizumab, or COPD exacerbation.

## 2021-10-16 NOTE — ED ADULT NURSE REASSESSMENT NOTE - NS ED NURSE REASSESS COMMENT FT1
as per MD Walters, Patient is now on 6L sating at 86% comfortably, in no acute distress. MD schuler. Will continue to monitor.

## 2021-10-16 NOTE — ED ADULT NURSE REASSESSMENT NOTE - NS ED NURSE REASSESS COMMENT FT1
pt sitting up in bed comfortable   sats 96-99 on high flow o2 . meds given as ordered/ pt offers no complaints

## 2021-10-16 NOTE — DISCHARGE NOTE PROVIDER - NSDCCAREPROVSEEN_GEN_ALL_CORE_FT
[facility] medicine team 5  [facility] pulmonology   [facility] medicine team 5  [facility] pulmonology  [facility] palliative   [facility] Heme/onc

## 2021-10-16 NOTE — H&P ADULT - PROBLEM SELECTOR PLAN 3
- elevated AST/ALT on admission  - monitor - BNP of 6752 on admission   - CTA consistent with possible HF  - c/w furosemide  - echocardiogram to evaluate - diffuse hepatic metastases  - follows Dr. Richmond Zheng outpatient  - s/p RLL resection and chemotherapy  - currently on pembrolizumab (started on 10/13)  - onc consult

## 2021-10-16 NOTE — DISCHARGE NOTE PROVIDER - NSDCMRMEDTOKEN_GEN_ALL_CORE_FT
Breztri Aerosphere inhalation aerosol: 2 puff(s) inhaled 2 times a day  Centrum Silver oral tablet: 1 tab(s) orally once a day, stop day 7/13/20  furosemide 40 mg oral tablet: 1 tab(s) orally once a day  metoclopramide 10 mg oral tablet: 10 milligram(s) orally every 6 hours, As Needed for nausea  Vitamin B12 1000 mcg oral tablet: 1 tab(s) orally once a day

## 2021-10-16 NOTE — H&P ADULT - PROBLEM SELECTOR PLAN 5
- elevated AST/ALT on admission  - monitor - BNP of 6752 on admission   - CTA consistent with possible HF  - c/w furosemide  - echocardiogram to evaluate

## 2021-10-16 NOTE — H&P ADULT - NSHPREVIEWOFSYSTEMS_GEN_ALL_CORE
REVIEW OF SYSTEMS:    CONSTITUTIONAL: No weakness, fevers or chills  EYES/ENT: No visual changes;  No vertigo or throat pain   NECK: No pain or stiffness  RESPIRATORY: No cough, wheezing, hemoptysis; No shortness of breath  CARDIOVASCULAR: No chest pain or palpitations  GASTROINTESTINAL: No abdominal or epigastric pain. No nausea, vomiting, or hematemesis; No diarrhea or constipation. No melena or hematochezia.  GENITOURINARY: No dysuria, frequency or hematuria  NEUROLOGICAL: No numbness or weakness  SKIN: No itching, rashes REVIEW OF SYSTEMS:    CONSTITUTIONAL: No focal weakness, fevers or chills  EYES/ENT: No visual changes;  No vertigo or throat pain   NECK: No pain or stiffness  RESPIRATORY: SOB, cough, wheezing  CARDIOVASCULAR: No chest pain or palpitations  GASTROINTESTINAL: No abdominal or epigastric pain. No nausea, vomiting, or hematemesis; No diarrhea or constipation. No melena or hematochezia.  GENITOURINARY: No dysuria, frequency or hematuria  NEUROLOGICAL: No numbness or weakness  SKIN: new rash on left thigh that is painful

## 2021-10-16 NOTE — H&P ADULT - NSHPSOCIALHISTORY_GEN_ALL_CORE
Patient lives at home with his wife and daughter and is able to perform all his ADLs but has been having increasing trouble the last week due to the increasing swelling in his legs. He has a 25 pack year history of smoking and occasionally consumes alcohol. Denies any recreational drug use. 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

## 2021-10-16 NOTE — CONSULT NOTE ADULT - NSCONSULTADDITIONALINFOA_GEN_ALL_CORE
FULL NOTE TO FOLLOW    - Diuresis with IV BID lasix and close monitoring of I/O - worsening pitting edema  - Treat for potential bacterial pneumonia - broad spectrum antibitiocs, urine legionella, nasal MRSA, sputum culture  - Progression of cancer - Onc evaluation (patient of Dr. Zheng) - recently started on Immunotherapy - this is not consistent with ILD due to immunotherapy. Patient has progressed despite prior chemo. 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 switch to HFNC with goal O2 sat > 88%  - Airway clearance    Discussed with residents at bedside, patient and wife  Please call palliative Care for continued GOC discussions. Patient is currently full code but I do not believe Christian would want intubation based on my prior discussions with he and his wife - but he is understandably scared. He has always been a very independent person. Please call the Pulmonary Consult Team with any questions or concerns. Our office is [phone] for any issues this weekend.

## 2021-10-16 NOTE — H&P ADULT - HISTORY OF PRESENT ILLNESS
Bailey Qureshi MD   Psychiatry PGY1   Pager: [phone]  ---    60 yo M with PMH of lung cancer s/p RLL resection on chemo, COPD, ILD, pulmonary HTN, on 4L NC at home presents with SOB x 1 week.    Denies fever, chills, chest pain, shortness of breath, abdominal pain, nausea, vomiting, changes in bowel habits, or urinary symptoms.    ED Course: Afebrile, tachycardic (), 129//77, tachypneic 18-24, O2 Sat 93-98. Leukocytosis, transaminitis, ProBNP 6752, Trop WNL. CT chest showed mets to liver, interstitial pulmonary edema, Right pleural effusion, left mediastinal and left hilar metastatic LAD, possible right lung abscess. S/p douneb x4, furoseminde x1, vanc x1, CTX x1, azithromycin x1.  Bailey Qureshi MD   Psychiatry PGY1   Pager: [phone]  ---    60 yo M with PMH of lung cancer s/p RLL resection on chemo, COPD, ILD, pulmonary HTN, on 4L NC at home presents with SOB x 1 week.  Patient states that he first experienced increasing difficulty breathing 2 weeks ago as he began to sleep with more pillows at night. He went from 1 pillow to a wedge and 2 pillows. One week ago he experienced a pulmonary infection with green sputum when he coughed and that was when his SOB significantly worsened. He received antibiotics which significantly improved his SOB. However, over the last week his leg swelling has worsened and he is experiencing tingling in his legs and increasing difficulty ambulating which brought him into the ED. Pt has a little cough currently but denies any sputum. Denies fever, chills, chest pain, abdominal pain, nausea, vomiting, changes in bowel habits, or urinary symptoms.    ED Course: Afebrile, tachycardic (), 129//77, tachypneic 18-24, O2 Sat 93-98. Leukocytosis, transaminitis, ProBNP 6752, Trop WNL. CT chest showed mets to liver, interstitial pulmonary edema, Right pleural effusion, left mediastinal and left hilar metastatic LAD, possible right lung abscess. S/p douneb x4, furoseminde x1, vanc x1, CTX x1, azithromycin x1.

## 2021-10-16 NOTE — H&P ADULT - PROBLEM SELECTOR PLAN 6
- Lovenox for DVT prophylaxis  - Regular diet as tolerated  - Incentive spirometry - CT showed worsening of ILD since 10/2021  - ILD could be worsened by pt's pembrolizumab  - continue to monitor

## 2021-10-16 NOTE — H&P ADULT - NSICDXFAMILYHX_GEN_ALL_CORE_FT
FAMILY HISTORY:  Father  Still living? Unknown  FH: HTN (hypertension), Age at diagnosis: Age Unknown     FAMILY HISTORY:  Father  Still living? No  FH: heart attack, Age at diagnosis: Age Unknown  FH: HTN (hypertension), Age at diagnosis: Age Unknown

## 2021-10-16 NOTE — CONSULT NOTE ADULT - SUBJECTIVE AND OBJECTIVE BOX
[facility] DIVISION OF PULMONARY, CRITICAL CARE AND SLEEP MEDICINE  PULMONARY CONSULTATION NOTE  OFFICE NUMBER: [phone]    NAME: MAKENZIE NANCE  MEDICAL RECORD NUMBER: [id number]    CHIEF COMPLAINT: Patient is a 61y old  Male who presents with a chief complaint of SOB (16 Oct 2021 07:44)      HISTORY OF PRESENT ILLNESS:       ====================BACKGROUND INFORMATION============================    FAMILY HISTORY: FAMILY HISTORY:  FH: HTN (hypertension) (Father)    FH: heart attack (Father)        PAST MEDICAL AND SURGICAL HISTORY: 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    Status post partial lobectomy of lung        ALLERGIES:Allergies    No Known Allergies    Intolerances        HOME MEDICATIONS:     OUTPATIENT PULMONARY DOCTOR:     SOCIAL HISTORY:  TOBACCO USE:  ALCOHOL:  DRUGS:  MARITAL STATUS:  EMPLOYMENT:  EXPOSURES:  RECENT TRAVELS:  PETS:  CODE STATUS:    ====================REVIEW OF SYSTEMS=====================================  CONSTITUTIONAL:  CARDIOVASCULAR:  PULMONARY:  GASTROINTESTINAL:  [] ALL OTHER REVIEW OF SYSTEMS ARE NEGATIVE   [] UNABLE TO OBTAIN REVIEW OF SYSTEMS DUE TO ______________      ====================PHYSICAL EXAM=========================================    VITALS: ICU Vital Signs Last 24 Hrs  T(C): 36.6 (16 Oct 2021 08:28), Max: 36.8 (15 Oct 2021 19:14)  T(F): 97.8 (16 Oct 2021 08:28), Max: 98.3 (15 Oct 2021 19:14)  HR: 92 (16 Oct 2021 08:28) (91 - 104)  BP: 111/72 (16 Oct 2021 08:28) (111/72 - 132/77)  BP(mean): 84 (16 Oct 2021 06:05) (84 - 92)  ABP: --  ABP(mean): --  RR: 20 (16 Oct 2021 08:28) (18 - 24)  SpO2: 83% (16 Oct 2021 08:28) (83% - 98%)      INTAKE and OUTPUT: I&O's Summary      WEIGHT: Daily Height in cm: 177.8 (15 Oct 2021 19:14)    Daily     GLUCOSE: CAPILLARY BLOOD GLUCOSE          VENTILATOR SETTINGS:     GENERAL:  HEENT:  NECK:   LYMPH NODES:  CARDIOVASCULAR:  PULMONARY:  ABDOMEN:  SKIN:  EXTREMITIES:  NEUROLOGIC:  PSYCHIATRIC:    ====================MEDICATIONS===========================================  MEDICATIONS  (STANDING):  albuterol/ipratropium for Nebulization 3 milliLiter(s) Nebulizer every 4 hours  azithromycin  IVPB 500 milliGRAM(s) IV Intermittent once      MEDICATIONS  (PRN):      ====================LABORATORY RESULTS====================================  CBC Full  -  ( 15 Oct 2021 22:25 )  WBC Count : 13.46 K/uL  RBC Count : 4.04 M/uL  Hemoglobin : 12.8 g/dL  Hematocrit : 42.5 %  Platelet Count - Automated : 305 K/uL  Mean Cell Volume : 105.2 fL  Mean Cell Hemoglobin : 31.7 pg  Mean Cell Hemoglobin Concentration : 30.1 gm/dL  Auto Neutrophil # : 10.54 K/uL  Auto Lymphocyte # : 1.09 K/uL  Auto Monocyte # : 1.65 K/uL  Auto Eosinophil # : 0.01 K/uL  Auto Basophil # : 0.07 K/uL  Auto Neutrophil % : 78.3 %  Auto Lymphocyte % : 8.1 %  Auto Monocyte % : 12.3 %  Auto Eosinophil % : 0.1 %  Auto Basophil % : 0.5 %                                    10-15    145  |  102  |  18  ----------------------------<  91  3.5   |  28  |  1.01    Ca    9.2      15 Oct 2021 22:25    TPro  6.6  /  Alb  3.7  /  TBili  1.1  /  DBili  x   /  AST  45<H>  /  ALT  46<H>  /  AlkPhos  157<H>  10-15            Creatinine Trend: 1.01<--      ====================RADIOLOGY and ECHOCARDIOGRAPHY=======================    CXR:    CT CHEST:    ECHOCARDIOGRAPHY:   [facility] DIVISION OF PULMONARY, CRITICAL CARE AND SLEEP MEDICINE  PULMONARY CONSULTATION NOTE  OFFICE NUMBER: [phone]    NAME: MAKENZIE NANCE  MEDICAL RECORD NUMBER: [id number]    CHIEF COMPLAINT: Patient is a 61y old  Male who presents with a chief complaint of SOB (16 Oct 2021 07:44)      HISTORY OF PRESENT ILLNESS:   61M NSCLC on chemotherapy, interstitial lung disease (prior biopsy with Langerhan's cell histiocytosis), with chronic hypoxemic respiratory failure on home O2, and obesity presents for evaluation of progressive shortness of breath and hypoxia.    Patient is on home O2 4L at baseline. His wife noted desaturation into 60s and increased O2 to 8L with persistent hypoxia. Patient was not in distress but I advised that they come to the ED for further evaluation given inability to maintain saturations on his home O2. He has recently been treated with 2 courses of antibiotics for suspected pneumonia and started on Lasix 40mg PO QD for volume overload state. He has become more fatigued and less mobile. He has had worsening edema.     He also has NSCLC and is under treatment with Dr. Richmond Zheng ([facility]). He has had evidence of progressive disease despite chemotherapy. He was switched to immunotherapy (Keytruda) this past week. He had initially been held off this therapy given his ILD and hypoxia.     He does not have any history of VTE. He has not had any recent travels or sick contacts. He has not had any fevers. He has had a persistent cough with sputum production intermittently. He has not had any hemoptysis. He denies chest pain or palpitations. He denies nausea, vomiting, or abdominal pain.       ====================BACKGROUND INFORMATION============================    FAMILY HISTORY: FAMILY HISTORY:  FH: HTN (hypertension) (Father)  FH: heart attack (Father)      PAST MEDICAL AND SURGICAL HISTORY: PAST MEDICAL & SURGICAL HISTORY:  Morbid obesity  Pneumonia  Feb 2020  Lung nodule  Primary adenocarcinoma of upper lobe of right lung  Langerhans cell histiocytosis of lung  Lytic lesion of bone on x-ray  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 metastatic to bone  Status post partial lobectomy of lung      ALLERGIES:Allergies: No Known Allergies    HOME MEDICATIONS: Reviewed     OUTPATIENT PULMONARY DOCTOR: Dwayne Sharif    SOCIAL HISTORY:  TOBACCO USE: Former  ALCOHOL: Social  DRUGS: Denied  MARITAL STATUS:    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)  EXPOSURES: extensive occupational exposures  RECENT TRAVELS: Denied   PETS: Denied   CODE STATUS: Full Code    ====================REVIEW OF SYSTEMS=====================================  CONSTITUTIONAL: Dyspnea, Fatigue, Swelling/edema  CARDIOVASCULAR: Denies chest pain or palpitations  PULMONARY: Cough, Dyspnea, Hypoxia, Shortness of breath, no hemoptysis  GASTROINTESTINAL: Denies nausea, vomiting, or abdominal pain  [x] ALL OTHER REVIEW OF SYSTEMS ARE NEGATIVE   [] UNABLE TO OBTAIN REVIEW OF SYSTEMS DUE TO ______________      ====================PHYSICAL EXAM=========================================    VITALS: ICU Vital Signs Last 24 Hrs  T(C): 36.6 (16 Oct 2021 08:28), Max: 36.8 (15 Oct 2021 19:14)  T(F): 97.8 (16 Oct 2021 08:28), Max: 98.3 (15 Oct 2021 19:14)  HR: 92 (16 Oct 2021 08:28) (91 - 104)  BP: 111/72 (16 Oct 2021 08:28) (111/72 - 132/77)  BP(mean): 84 (16 Oct 2021 06:05) (84 - 92)  RR: 20 (16 Oct 2021 08:28) (18 - 24)  SpO2: 83% (16 Oct 2021 08:28) (83% - 98%)      INTAKE and OUTPUT: I&O's Summary      WEIGHT: Daily Height in cm: 177.8 (15 Oct 2021 19:14)    Daily   GLUCOSE: CAPILLARY BLOOD GLUCOSE    VENTILATOR SETTINGS: N/A    GENERAL: AAOx3, comfortable, hypoxic but not dyspneic  HEENT: NCAT, EOMI, anicteric, MMM, nares clear, trachea midline, no thrush  NECK: large neck, no JVD  LYMPH NODES: no palpable supraclavicular LAD   CARDIOVASCULAR: RRR, S1S2, systolic murmur  PULMONARY: decreased BS right base, prolonged expiratory phase, end expiratory wheeze, no accessory muscle use  ABDOMEN: obese, soft, NT, ND, +BS  SKIN: warm and dry  EXTREMITIES: bilateral 2+ pitting edema below knee, bilateral digital clubbing, no cyanosis  NEUROLOGIC: nonfocal exam, moves all extremities  PSYCHIATRIC: calm    ====================MEDICATIONS===========================================  MEDICATIONS  (STANDING):  albuterol/ipratropium for Nebulization 3 milliLiter(s) Nebulizer every 4 hours  azithromycin  IVPB 500 milliGRAM(s) IV Intermittent once      MEDICATIONS  (PRN):      ====================LABORATORY RESULTS====================================  CBC Full  -  ( 15 Oct 2021 22:25 )  WBC Count : 13.46 K/uL  RBC Count : 4.04 M/uL  Hemoglobin : 12.8 g/dL  Hematocrit : 42.5 %  Platelet Count - Automated : 305 K/uL  Mean Cell Volume : 105.2 fL  Mean Cell Hemoglobin : 31.7 pg  Mean Cell Hemoglobin Concentration : 30.1 gm/dL  Auto Neutrophil # : 10.54 K/uL  Auto Lymphocyte # : 1.09 K/uL  Auto Monocyte # : 1.65 K/uL  Auto Eosinophil # : 0.01 K/uL  Auto Basophil # : 0.07 K/uL  Auto Neutrophil % : 78.3 %  Auto Lymphocyte % : 8.1 %  Auto Monocyte % : 12.3 %  Auto Eosinophil % : 0.1 %  Auto Basophil % : 0.5 %                                    10-15    145  |  102  |  18  ----------------------------<  91  3.5   |  28  |  1.01    Ca    9.2      15 Oct 2021 22:25    TPro  6.6  /  Alb  3.7  /  TBili  1.1  /  DBili  x   /  AST  45<H>  /  ALT  46<H>  /  AlkPhos  157<H>  10-15        Creatinine Trend: 1.01<--      ====================RADIOLOGY and ECHOCARDIOGRAPHY=======================    CXR:   < from: Xray Chest 1 View- PORTABLE-Urgent (10.15.21 @ 22:15) >  IMPRESSION:  Right lower lobe mass, fibrosis, and emphysema are better evaluated on prior CT.  No new consolidation.    < end of copied text >  LE DUPLEX: < from: US Duplex Venous Lower Ext Complete, Bilateral (10.16.21 @ 01:31) >    IMPRESSION:  No evidence of deep venous thrombosis in either lower extremity to the level of the popliteal veins.    Calf veins not visualized.    < end of copied text >    CT CHEST: < from: CT Angio Chest PE Protocol w/ IV Cont (10.16.21 @ 02:00) >  COMPARISON:  CT scan 10/04/2021    FINDINGS:  Pulmonary arteries: Normal. No pulmonary emboli.  Aorta: Unremarkable. No aortic aneurysm. No aortic dissection.    Lungs: Smooth interlobular septal thickening compatible with hydrostatic  interstitial pulmonary edema/CHF. Diffuse patchy groundglass opacity left lung progressive since 10/04/2021 diffuse consolidation right middle and lower lobes, progressive in the right lower lobe with areas of hypoattenuation. Occlusion rightmiddle and lower lobe bronchi progressive since 10/04/2021  Pleural spaces: Small right pleural effusion.  Heart: Cardiomegaly.  Esophagus: Unremarkable.  Lymph nodes: There is a right hilar/infrahilar malignancy extending into the  right lower lobe and confluent with right paratracheal, right hilar, and  subcarinal lymphadenopathy. There is also left mediastinal  and left hilar metastatic lymphadenopathy.    Liver: Diffuse hepatic metastases. Hepatic cirrhosis.  Adrenal glands: Incompletely visualized isodense 2.5 cm left adrenal mass is  suspicious for a metastasis.  Bones/joints: Chronic T4 vertebral body compression deformity. Underlying  neoplastic lesion not excluded.  Soft tissues: Unremarkable.  Other findings: Emphysema.    IMPRESSION:  1. Diffuse hepatic metastases. Hepatic cirrhosis.  2. Incompletely visualized isodense 2.5 cm left adrenal mass is suspicious for  a metastasis.  3. There is a right hilar/infrahilar malignancy extending into the right lower  lobe and confluent with right paratracheal, right hilar, and subcarinal  lymphadenopathy, presumably primary lung cancer. There is resulting occlusion  of the right lower lobe airways and postobstructive pneumonia the entire right  lower lobe and right middle lobe.  4. Chronic T4 vertebral body compression deformity. Underlying neoplastic  lesion not excluded.  5. Hydrostatic interstitial pulmonary edema/CHF.  6. Small right pleural effusion.  7 There is also left mediastinal and left hilar metastatic lymphadenopathy.    < end of copied text >      ECHOCARDIOGRAPHY:

## 2021-10-16 NOTE — H&P ADULT - PROBLEM SELECTOR PLAN 1
- hx of COPD, ILD, lung cancer s/p RLL resection on chemo, pulm HTN on 4L NC at home  - c/w NC and monitor pulse ox  - CTA and U/S of LE ruled out DVT and PE  - BNP of 6752 on admission   - Trop wnl   - CT chest remarkable for possible pulmonary malignancy, edema, effusion, and lymphadenopathy  - no new consolidations on CXR  - c/w furosemide and Beztri  - Albuterol PRN  - pulm consult - hx of COPD, ILD, lung cancer s/p RLL resection on chemo, pulm HTN on 4L NC at home  - c/w NC and monitor pulse ox  - CTA and U/S of LE ruled out DVT and PE  - BNP of 6752 on admission   - Trop wnl   - CT chest remarkable for possible pulmonary malignancy, edema, effusion, and lymphadenopathy  - no new consolidations on CXR  - c/w furosemide and Beztri  - Albuterol PRN  - Broad spectrum abx for possible pneumonia  - urine legionella, nasal MRSA, sputum culture  - pulm recs appreciated - hx of COPD, ILD, lung cancer s/p RLL resection on chemo, pulm HTN on 4L NC at home  - c/w NC and monitor pulse ox, sat goal of 88%  - s/p Vanc + CTX + azithromycin in ED   - CTA and U/S of LE ruled out DVT and PE  - BNP of 6752 on admission   - Trop wnl   - CT chest remarkable for possible pulmonary malignancy, edema, effusion, and lymphadenopathy  - no new consolidations on CXR  - c/w furosemide and Beztri  - Albuterol PRN  - Broad spectrum abx for possible pneumonia  - urine legionella, nasal MRSA, sputum culture  - pulm recs appreciated  - Chest PT  - Goals of care  - Palliative consult - hx of COPD, ILD, lung cancer s/p RLL resection on chemo, pulm HTN on 4L NC at home  - c/w NC and monitor pulse ox, sat goal of 88%  - s/p Vanc + CTX + azithromycin in ED   - CTA and U/S of LE ruled out DVT and PE  - BNP of 6752 on admission   - Trop wnl   - CT chest remarkable for possible pulmonary malignancy, edema, effusion, and lymphadenopathy  - no new consolidations on CXR  - c/w furosemide and Beztri  - Albuterol PRN  - Broad spectrum abx for possible pneumonia  - urine legionella, nasal MRSA, sputum culture  - pulm recs appreciated  - Chest PT consulted  - Goals of care  - Palliative consulted - hx of COPD, ILD, lung cancer s/p RLL resection on chemo, pulm HTN on 4L NC at home  - c/w NC and monitor pulse ox, sat goal of 88%  - s/p Vanc + CTX + azithromycin in ED   - CTA and U/S of LE ruled out DVT and PE  - BNP of 6752 on admission   - Trop wnl   - CT chest remarkable for possible pulmonary malignancy, edema, effusion, and lymphadenopathy  - no new consolidations on CXR  - c/w furosemide and Beztri  - Albuterol PRN  - Zosyn for possible pneumonia  - f/u urine legionella, nasal MRSA, sputum culture  - pulm recs appreciated  - Chest PT consulted  - Goals of care  - Palliative consulted - hx of COPD, ILD, lung cancer s/p RLL resection on chemo, pulm HTN on 4L NC at home  - c/w HFNC and monitor pulse ox, sat goal of 88%  - s/p Vanc + CTX + azithromycin in ED   - CTA and U/S of LE ruled out DVT and PE  - BNP of 6752 on admission   - Trop wnl   - CT chest remarkable for possible pulmonary malignancy, edema, effusion, and lymphadenopathy  - no new consolidations on CXR  - c/w furosemide and Beztri  - pulm recs appreciated  - Palliative consulted

## 2021-10-16 NOTE — DISCHARGE NOTE PROVIDER - HOSPITAL COURSE
HPI:  60 yo M with PMH of lung cancer s/p RLL resection on chemo, COPD, ILD, pulmonary HTN, on 4L NC at home presents with SOB x 1 week.  Patient states that he first experienced increasing difficulty breathing 2 weeks ago as he began to sleep with more pillows at night. He went from 1 pillow to a wedge and 2 pillows. One week ago he experienced a pulmonary infection with green sputum when he coughed and that was when his SOB significantly worsened. He received antibiotics which significantly improved his SOB. However, over the last week his leg swelling has worsened and he is experiencing tingling in his legs and increasing difficulty ambulating which brought him into the ED. Pt has a little cough currently but denies any sputum. Denies fever, chills, chest pain, abdominal pain, nausea, vomiting, changes in bowel habits, or urinary symptoms.    ED Course: Afebrile, tachycardic (), 129//77, tachypneic 18-24, O2 Sat 93-98. Leukocytosis, transaminitis, ProBNP 6752, Trop WNL. CT chest showed mets to liver, interstitial pulmonary edema, Right pleural effusion, left mediastinal and left hilar metastatic LAD, possible right lung abscess. S/p douneb x4, furoseminde x1, vanc x1, CTX x1, azithromycin x1.  (16 Oct 2021 07:44)    Hospital Course:     HPI:  62 yo M with PMH of lung cancer s/p RLL resection on chemo, COPD, ILD, pulmonary HTN, on 4L NC at home presents with SOB x 1 week.  Patient states that he first experienced increasing difficulty breathing 2 weeks ago as he began to sleep with more pillows at night. He went from 1 pillow to a wedge and 2 pillows. One week ago he experienced a pulmonary infection with green sputum when he coughed and that was when his SOB significantly worsened. He received antibiotics which significantly improved his SOB. However, over the last week his leg swelling has worsened and he is experiencing tingling in his legs and increasing difficulty ambulating which brought him into the ED. Pt has a little cough currently but denies any sputum. Denies fever, chills, chest pain, abdominal pain, nausea, vomiting, changes in bowel habits, or urinary symptoms.    ED Course: Afebrile, tachycardic (), 129//77, tachypneic 18-24, O2 Sat 93-98. Leukocytosis, transaminitis, ProBNP 6752, Trop WNL. CT chest showed mets to liver, interstitial pulmonary edema, Right pleural effusion, left mediastinal and left hilar metastatic LAD, possible right lung abscess. S/p douneb x4, furoseminde x1, vanc x1, CTX x1, azithromycin x1.  (16 Oct 2021 07:44)    Hospital Course:    In the hospital, the patient was started on 40L HFNC and patient sat'ed well. EKG showed sinus tachycardia, RA enlargement, R axis deviation, and nonspecific ST and T wave abnormalities. Pt was given vancomycin and Zosyn to cover possible pneumonia and furosemide to help diurese fluid. Duonebs were started as well. Echocardiogram showed ________.    HPI:  62 yo M with PMH of lung cancer s/p RLL resection on chemo, COPD, ILD, pulmonary HTN, on 4L NC at home presents with SOB x 1 week.  Patient states that he first experienced increasing difficulty breathing 2 weeks ago as he began to sleep with more pillows at night. He went from 1 pillow to a wedge and 2 pillows. One week ago he experienced a pulmonary infection with green sputum when he coughed and that was when his SOB significantly worsened. He received antibiotics which significantly improved his SOB. However, over the last week his leg swelling has worsened and he is experiencing tingling in his legs and increasing difficulty ambulating which brought him into the ED. Pt has a little cough currently but denies any sputum. Denies fever, chills, chest pain, abdominal pain, nausea, vomiting, changes in bowel habits, or urinary symptoms.    ED Course: Afebrile, tachycardic (), 129//77, tachypneic 18-24, O2 Sat 93-98. Leukocytosis, transaminitis, ProBNP 6752, Trop WNL. CT chest showed mets to liver, interstitial pulmonary edema, Right pleural effusion, left mediastinal and left hilar metastatic LAD, possible right lung abscess. S/p douneb x4, furoseminde x1, vanc x1, CTX x1, azithromycin x1.  (16 Oct 2021 07:44)    Hospital Course:    In the hospital, the patient was started on 40L HFNC and patient sat'ed well. EKG showed sinus tachycardia, RA enlargement, R axis deviation, and nonspecific ST and T wave abnormalities. Pt was given vancomycin and Zosyn to cover possible pneumonia and furosemide to help diurese fluid. Duonebs were started as well. Urine legionella and MRSA PCR were negative. Sputum was remarkable for GNRs, GPRs, and yeast. Echocardiogram showed ________.    HPI:  62 yo M with PMH of lung cancer s/p RLL resection on chemo, COPD, ILD, pulmonary HTN, on 4L NC at home presents with SOB x 1 week.  Patient states that he first experienced increasing difficulty breathing 2 weeks ago as he began to sleep with more pillows at night. He went from 1 pillow to a wedge and 2 pillows. One week ago he experienced a pulmonary infection with green sputum when he coughed and that was when his SOB significantly worsened. He received antibiotics which significantly improved his SOB. However, over the last week his leg swelling has worsened and he is experiencing tingling in his legs and increasing difficulty ambulating which brought him into the ED. Pt has a little cough currently but denies any sputum. Denies fever, chills, chest pain, abdominal pain, nausea, vomiting, changes in bowel habits, or urinary symptoms.    ED Course: Afebrile, tachycardic (), 129//77, tachypneic 18-24, O2 Sat 93-98. Leukocytosis, transaminitis, ProBNP 6752, Trop WNL. CT chest showed mets to liver, interstitial pulmonary edema, Right pleural effusion, left mediastinal and left hilar metastatic LAD, possible right lung abscess. S/p douneb x4, furoseminde x1, vanc x1, CTX x1, azithromycin x1.  (16 Oct 2021 07:44)    Hospital Course:    In the hospital, the patient was started on 40L HFNC and patient sat'ed well. EKG showed sinus tachycardia, RA enlargement, R axis deviation, and nonspecific ST and T wave abnormalities. Pt was given vancomycin, azithromycin, and Zosyn to cover possible pneumonia and furosemide to help diurese fluid. Duonebs were started as well. Urine legionella and MRSA PCR were negative so vancomycin and azithromycin were d/c'ed and patient was only kept on Zosyn. Patient was started on morphine per palliative. Sputum was remarkable for GNRs, GPRs, and yeast. During the stay, the patient was kept on 40L 100% HFNC. Echocardiogram showed ________. HPI:  60 yo M with PMH of lung cancer s/p RLL resection on chemo, COPD, ILD, pulmonary HTN, on 4L NC at home presents with SOB x 1 week.  Patient states that he first experienced increasing difficulty breathing 2 weeks ago as he began to sleep with more pillows at night. He went from 1 pillow to a wedge and 2 pillows. One week ago he experienced a pulmonary infection with green sputum when he coughed and that was when his SOB significantly worsened. He received antibiotics which significantly improved his SOB. However, over the last week his leg swelling has worsened and he is experiencing tingling in his legs and increasing difficulty ambulating which brought him into the ED. Pt has a little cough currently but denies any sputum. Denies fever, chills, chest pain, abdominal pain, nausea, vomiting, changes in bowel habits, or urinary symptoms.    ED Course: Afebrile, tachycardic (), 129//77, tachypneic 18-24, O2 Sat 93-98. Leukocytosis, transaminitis, ProBNP 6752, Trop WNL. CT chest showed mets to liver, interstitial pulmonary edema, Right pleural effusion, left mediastinal and left hilar metastatic LAD, possible right lung abscess. S/p douneb x4, furoseminde x1, vanc x1, CTX x1, azithromycin x1.  (16 Oct 2021 07:44)    Hospital Course:    In the hospital, the patient was started on 40L HFNC and patient sat'ed well. EKG showed sinus tachycardia, RA enlargement, R axis deviation, and nonspecific ST and T wave abnormalities. Pt was given vancomycin, azithromycin, and Zosyn to cover possible pneumonia and furosemide to help diurese fluid. Duonebs were started as well. Urine legionella and MRSA PCR were negative so vancomycin and azithromycin were d/c'ed and patient was only kept on Zosyn. Patient was started on morphine per palliative. Sputum was remarkable for GNRs, GPRs, and yeast. During the stay, the patient was started on 40L 100% HFNC and uptitrated if the patient began de-satting. Echocardiogram showed right ventricular systolic dysfunction. A goals of care discussion was performed with Heme/onc and it was agreed that patient would continue therapeutic chemotherapy upon d/c if patient was able get down-titrated off HFNC. Oral prednisone was also prescribed to potentially help with his pulmonary symptoms.

## 2021-10-16 NOTE — ED ADULT NURSE REASSESSMENT NOTE - NS ED NURSE REASSESS COMMENT FT1
Pt has no diet order, pt requesting food. Provider Kevin Maki contacted at [phone]. Awaiting diet orders.

## 2021-10-16 NOTE — ED ADULT NURSE REASSESSMENT NOTE - NS ED NURSE REASSESS COMMENT FT1
Break RB: A&Ox4. NSR on cardiac monitor. No respiratory distress noted. O2 sat 95% on high flow nasal cannula. Pt. states that he doesn't feel SOB.

## 2021-10-16 NOTE — DISCHARGE NOTE PROVIDER - NSDCFUSCHEDAPPT_GEN_ALL_CORE_FT
MAKENZIE NANCE ; 11/03/2021 ; [facility]  MAKENZIE NANCE ; 11/03/2021 ; [facility]  MAKENZIE NANCE ; 11/24/2021 ; [facility]  MAKENZIE NANCE ; 11/24/2021 ; [facility]  MAKENZIE NANCE ; 12/15/2021 ; [facility]  MAKENZIE NANCE ; 12/15/2021 ; [facility]  MAKENZIE NANCE ; 12/16/2021 ; NPP [address]

## 2021-10-16 NOTE — CONSULT NOTE ADULT - TIME BILLING
Review of records/results, pulmonary evaluation and management, advanced care planning discussions, and discussion with patient/wife and medical team.

## 2021-10-16 NOTE — H&P ADULT - PROBLEM SELECTOR PLAN 4
- Lovenox for DVT prophylaxis  - Regular diet as tolerated  - Incentive spirometry - CT showed worsening of ILD since 10/2021  - ILD could be worsened by pt's pembrolizumab  - continue to monitor - check nasal MRSA  - check urine legionella  - aggressive chest PT - aerobika, bed percussion, and chest physiotherapy to help with airway clearance  - does not require bronchoscopy at this time  - c/w Zosyn

## 2021-10-16 NOTE — H&P ADULT - NSHPLABSRESULTS_GEN_ALL_CORE
12.8   13.46 )-----------( 305      ( 15 Oct 2021 22:25 )             42.5   10-15    145  |  102  |  18  ----------------------------<  91  3.5   |  28  |  1.01    Ca    9.2      15 Oct 2021 22:25    TPro  6.6  /  Alb  3.7  /  TBili  1.1  /  DBili  x   /  AST  45<H>  /  ALT  46<H>  /  AlkPhos  157<H>  10-15    Serum Pro-Brain Natriuretic Peptide: 6752: Acute congestive heart failure is unlikely if NT-proBNP is < 300 pg/mL.   Troponin T, High Sensitivity Result: 32    `< from: CT Angio Chest PE Protocol w/ IV Cont (10.16.21 @ 02:00) >  COMPARISON:  CT scan 10/04/2021    FINDINGS:  Pulmonary arteries: Normal. No pulmonary emboli.  Aorta: Unremarkable. No aortic aneurysm. No aortic dissection.    Lungs: Smooth interlobular septal thickening compatible with hydrostatic  interstitial pulmonary edema/CHF. Diffuse patchy groundglass opacity left lung progressive since 10/04/2021 diffuse consolidation right middle and lower lobes, progressive in the right lower lobe with areas of hypoattenuation. Occlusion rightmiddle and lower lobe bronchi progressive since 10/04/2021  Pleural spaces: Small right pleural effusion.  Heart: Cardiomegaly.  Esophagus: Unremarkable.  Lymph nodes: There is a right hilar/infrahilar malignancy extending into the  right lower lobe and confluent with right paratracheal, right hilar, and  subcarinal lymphadenopathy. There is also left mediastinal  and left hilar metastatic lymphadenopathy.    Liver: Diffuse hepatic metastases. Hepatic cirrhosis.  Adrenal glands: Incompletely visualized isodense 2.5 cm left adrenal mass is  suspicious for a metastasis.  Bones/joints: Chronic T4 vertebral body compression deformity. Underlying  neoplastic lesion not excluded.  Soft tissues: Unremarkable.  Other findings: Emphysema.    IMPRESSION:  1. Diffuse hepatic metastases. Hepatic cirrhosis.  2. Incompletely visualized isodense 2.5 cm left adrenal mass is suspicious for  a metastasis.  3. There is a right hilar/infrahilar malignancy extending into the right lower  lobe and confluent with right paratracheal, right hilar, and subcarinal  lymphadenopathy, presumably primary lung cancer. There is resulting occlusion  of the right lower lobe airways and postobstructive pneumonia the entire right  lower lobe and right middle lobe.  4. Chronic T4 vertebral body compression deformity. Underlying neoplastic  lesion not excluded.  5. Hydrostatic interstitial pulmonary edema/CHF.  6. Small right pleural effusion.  7 There is also left mediastinal and left hilar metastatic lymphadenopathy.  .    --- End of Report ---    < end of copied text >        < from: US Duplex Venous Lower Ext Complete, Bilateral (10.16.21 @ 01:31) >    FINDINGS:    RIGHT:  Normal compressibility of the RIGHT common femoral, femoral and popliteal veins.  Doppler examination shows normal spontaneous and phasic flow.  Calf veins not visualized.    LEFT:  Normal compressibility of the LEFT common femoral, femoral and popliteal veins.  Doppler examination shows normal spontaneous and phasic flow.  Calf veins not visualized.    IMPRESSION:  No evidence of deep venous thrombosis in either lower extremity to the level of the popliteal veins.    Calf veins not visualized.          --- End of Report ---    < end of copied text >

## 2021-10-16 NOTE — H&P ADULT - PROBLEM SELECTOR PLAN 7
- Check 2 D echo - given underlying lung disease patient is likely to have pulmonary hypertension  - Aggressive diuresis - Lasix IV BID with close monitoring of I/O  - No role for pulmonary vasodilator therapy

## 2021-10-16 NOTE — H&P ADULT - PROBLEM SELECTOR PLAN 2
- diffuse hepatic metastases  - follows Dr. Richmond Zheng outpatient  - s/p RLL resection and chemotherapy  - currently on pembrolizumab  - onc consult - diffuse hepatic metastases  - follows Dr. Richmond Zheng outpatient  - s/p RLL resection and chemotherapy  - currently on pembrolizumab (started on 10/13)  - onc consult - Patient has obstructive lung disease on PFTs   - Was on bronchodilator therapy at home - would use Symbicort and Spiriva while in the hospital  - Incentive spirometer and Acapella  - Would hold off oral corticosteroids from a pulmonary perspective at this time

## 2021-10-17 NOTE — PROGRESS NOTE ADULT - PROBLEM SELECTOR PLAN 2
- Patient has obstructive lung disease on PFTs   - Was on bronchodilator therapy at home - would use Symbicort and Spiriva while in the hospital  - Incentive spirometer and Acapella  - Would hold off oral corticosteroids from a pulmonary perspective at this time

## 2021-10-17 NOTE — PROGRESS NOTE ADULT - PROBLEM SELECTOR PLAN 4
- nasal MRSA pending   - urine legionella pending   - aggressive chest PT - aerobika, bed percussion, and chest physiotherapy to help with airway clearance  - does not require bronchoscopy at this time  - c/w Zosyn - nasal MRSA pending   - urine legionella pending   - aggressive chest PT - aerobika, bed percussion, and chest physiotherapy to help with airway clearance  - does not require bronchoscopy at this time  - broad spectrum abc:  Zosyn, Vanc, azithro

## 2021-10-17 NOTE — PROGRESS NOTE ADULT - ASSESSMENT
60 y/o M with history of NSCLC s/p RLL resection on chemo, COPD, ILD, pulmonary HTN, on 4L NC at home presents with SOB x 1 week. On admission the patient was tachypneic and tachycardic but afebrile. PE notable for wheezing, crackles and ronchi. Labs on admission remarkable for an elevated WBC, elevated BNP, and transaminitis. LE U/S ruled out evidence of DVT and CTA ruled out evidence of PE. DDx includes HF exacerbation, pulmonary infection, exacerbation 2/2 pt's lung cancer, worsening ILD 2/2 pembrolizumab, or COPD exacerbation.   60 y/o M with history of NSCLC s/p RLL resection on chemo, COPD, ILD, pulmonary HTN, on 4L NC at home presents with SOB x 1 week. On admission the patient was tachypneic and tachycardic but afebrile. PE notable for wheezing, crackles and ronchi. Labs on admission remarkable for an elevated WBC, elevated BNP, and transaminitis. LE U/S ruled out evidence of DVT and CTA ruled out evidence of PE. DDx includes HF exacerbation, pulmonary infection, exacerbation 2/2 pt's lung cancer, worsening ILD 2/2 pembrolizumab, or COPD exacerbation. Pt asymp today, doing well on 40% HFNC, maintaining sats 83-98%.

## 2021-10-17 NOTE — CONSULT NOTE ADULT - SUBJECTIVE AND OBJECTIVE BOX
HPI: 60 yo M with PMH of lung cancer s/p RLL resection on chemo, COPD, ILD, pulmonary HTN, on 4L NC at home presents with SOB x 1 week.  Patient states that he first experienced increasing difficulty breathing 2 weeks ago as he began to sleep with more pillows at night. He went from 1 pillow to a wedge and 2 pillows. One week ago he experienced a pulmonary infection with green sputum when he coughed and that was when his SOB significantly worsened. He received antibiotics which significantly improved his SOB. However, over the last week his leg swelling has worsened and he is experiencing tingling in his legs and increasing difficulty ambulating which brought him into the ED. Pt has a little cough currently but denies any sputum. Denies fever, chills, chest pain, abdominal pain, nausea, vomiting, changes in bowel habits, or urinary symptoms.    ED Course: Afebrile, tachycardic (), 129//77, tachypneic 18-24, O2 Sat 93-98. Leukocytosis, transaminitis, ProBNP 6752, Trop WNL. CT chest showed mets to liver, interstitial pulmonary edema, Right pleural effusion, left mediastinal and left hilar metastatic LAD, possible right lung abscess. S/p douneb x4, furoseminde x1, vanc x1, CTX x1, azithromycin x1.      Oncologic History:  Disease: NSCLC   Pathology: -Lung, RUL wedge resection 7/21/2020: Adenocarcinoma, 2.0 cm, unifocal, solid predominant with additional acinar and papillary patterns, grade 2, SARAH absent, positive for visceral pleural invasion, LVSI absent, margins negative, lymph nodes negative. Langerhans' cell histiocytosis is noted. PDL1 Low-Positive at 10-20%. Foundation One with no actionable mutations.     Metastatic NSCLC with adenocarcinoma histology; tumor tested PDL1 low-positive and contains no actionable mutations. Avoided front-line use of immune checkpoint inhibitor therapy given ILD. Started first line systemic therapy with Carboplatin and Pemetrexed in late Oct 2020 with overall stable disease. Treated with 6 cycles of doublet chemotherapy through Feb 2021 followed by 1 cycle of maintenance Pemetrexed in early March 2021. Developed disease progression. Started 2nd line Nab-Paclitaxel + Ramucirumab in late March 2021; achieved OK. Developed disease progression on restaging scan in late July 2021. Began 3rd line Gemcitabine in mid Aug 2021 with disease progression after 2 cycles. Recommend:   -D/C Gemcitabine  -This represents a challenging situation. Patient has been treated with 3 cycles of cytotoxic chemotherapy. Immune checkpoint inhibitor therapy was avoided due to his underlying ILD and concern for development of pneumonitis. Given that we are now approaching the fourth line setting and he has not been exposed to ICI therapy, recommend treatment with single agent pembrolizumab 200 mg IV administered every 3 weeks given the PDL1 positivity. Agent is approved in this setting. Risks, benefits and side effects of therapy, including the risk of pneumonitis, were discussed with the patient who agreed to proceed and signed the consent form. Drug information sheets were provided. We will arrange for the start of therapy for next week.      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    Status post partial lobectomy of lung        Allergies    No Known Allergies    Intolerances        MEDICATIONS  (STANDING):  albuterol/ipratropium for Nebulization 3 milliLiter(s) Nebulizer every 4 hours  azithromycin  IVPB 500 milliGRAM(s) IV Intermittent daily  budesonide 160 MICROgram(s)/formoterol 4.5 MICROgram(s) Inhaler 2 Puff(s) Inhalation two times a day  cyanocobalamin 1000 MICROGram(s) Oral daily  enoxaparin Injectable 40 milliGRAM(s) SubCutaneous daily  furosemide   Injectable 40 milliGRAM(s) IV Push two times a day  influenza   Vaccine 0.5 milliLiter(s) IntraMuscular once  multivitamin 1 Tablet(s) Oral daily  piperacillin/tazobactam IVPB.. 3.375 Gram(s) IV Intermittent every 8 hours    MEDICATIONS  (PRN):  metoclopramide 10 milliGRAM(s) Oral every 6 hours PRN nausea  sodium chloride 0.65% Nasal 1 Spray(s) Both Nostrils once PRN Nasal Congestion      FAMILY HISTORY:  FH: HTN (hypertension) (Father)    FH: heart attack (Father)        SOCIAL HISTORY: No EtOH, no tobacco    REVIEW OF SYSTEMS:    CONSTITUTIONAL: No weakness, fevers or chills  EYES/ENT: No visual changes;  No vertigo or throat pain   NECK: No pain or stiffness  RESPIRATORY: No cough, wheezing, hemoptysis; No shortness of breath  CARDIOVASCULAR: No chest pain or palpitations  GASTROINTESTINAL: No abdominal or epigastric pain. No nausea, vomiting, or hematemesis; No diarrhea or constipation. No melena or hematochezia.  GENITOURINARY: No dysuria, frequency or hematuria  NEUROLOGICAL: No numbness or weakness  SKIN: No itching, burning, rashes, or lesions   All other review of systems is negative unless indicated above.        T(F): 97.8 (10-17-21 @ 05:44), Max: 98.4 (10-16-21 @ 18:51)  HR: 82 (10-17-21 @ 09:23)  BP: 117/67 (10-17-21 @ 05:44)  RR: 20 (10-17-21 @ 07:45)  SpO2: 98% (10-17-21 @ 09:23)  Wt(kg): --    GENERAL: NAD, well-developed  HEAD:  Atraumatic, Normocephalic  EYES: EOMI, PERRLA, conjunctiva and sclera clear  NECK: Supple, No JVD  CHEST/LUNG: Clear to auscultation bilaterally; No wheeze  HEART: Regular rate and rhythm; No murmurs, rubs, or gallops  ABDOMEN: Soft, Nontender, Nondistended; Bowel sounds present  EXTREMITIES:  2+ Peripheral Pulses, No clubbing, cyanosis, or edema  NEUROLOGY: non-focal  SKIN: No rashes or lesions                          12.2   14.35 )-----------( 268      ( 17 Oct 2021 06:28 )             39.3       10-17    141  |  99  |  25<H>  ----------------------------<  115<H>  3.7   |  32<H>  |  1.14    Ca    9.4      17 Oct 2021 06:28  Phos  5.4     10-17  Mg     2.20     10-17    TPro  5.8<L>  /  Alb  3.4  /  TBili  0.7  /  DBili  x   /  AST  33  /  ALT  36  /  AlkPhos  135<H>  10-17      Magnesium, Serum: 2.20 mg/dL (10-17 @ 06:28)  Phosphorus Level, Serum: 5.4 mg/dL (10-17 @ 06:28)           HPI: 62 yo M with PMH of lung cancer s/p RLL resection on chemo, COPD, ILD, pulmonary HTN, on 4L NC at home presents with SOB x 1 week.  Patient states that he first experienced increasing difficulty breathing 2 weeks ago as he began to sleep with more pillows at night. He went from 1 pillow to a wedge and 2 pillows. One week ago he experienced a pulmonary infection with green sputum when he coughed and that was when his SOB significantly worsened. He received antibiotics which significantly improved his SOB. However, over the last week his leg swelling has worsened and he is experiencing tingling in his legs and increasing difficulty ambulating which brought him into the ED. Pt has a little cough currently but denies any sputum. Denies fever, chills, chest pain, abdominal pain, nausea, vomiting, changes in bowel habits, or urinary symptoms.    ED Course: Afebrile, tachycardic (), 129//77, tachypneic 18-24, O2 Sat 93-98. Leukocytosis, transaminitis, ProBNP 6752, Trop WNL. CT chest showed mets to liver, interstitial pulmonary edema, Right pleural effusion, left mediastinal and left hilar metastatic LAD, possible right lung abscess. S/p douneb x4, furoseminde x1, vanc x1, CTX x1, azithromycin x1.      Oncologic History:  Disease: NSCLC   Pathology: -Lung, RUL wedge resection 7/21/2020: Adenocarcinoma, 2.0 cm, unifocal, solid predominant with additional acinar and papillary patterns, grade 2, SARAH absent, positive for visceral pleural invasion, LVSI absent, margins negative, lymph nodes negative. Langerhans' cell histiocytosis is noted. PDL1 Low-Positive at 10-20%. Foundation One with no actionable mutations.     -[place] [facility] [place] Started first line systemic therapy with Carboplatin and Pemetrexed in late Oct 2020 (avoided front-line use of immune checkpoint inhibitor therapy given ILD). Treated with 6 cycles of doublet chemotherapy through Feb 2021 followed by 1 cycle of maintenance Pemetrexed in early March 2021. Developed disease progression.   -Started 2nd line Nab-Paclitaxel + Ramucirumab in late March 2021; achieved FL.   -Developed disease progression on restaging scan in late July 2021. Began 3rd line Gemcitabine in mid Aug 2021 with disease progression after 2 cycles.   -Started on single-agent pembrolizumab on 10/13/21    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    Status post partial lobectomy of lung        Allergies    No Known Allergies    Intolerances        MEDICATIONS  (STANDING):  albuterol/ipratropium for Nebulization 3 milliLiter(s) Nebulizer every 4 hours  azithromycin  IVPB 500 milliGRAM(s) IV Intermittent daily  budesonide 160 MICROgram(s)/formoterol 4.5 MICROgram(s) Inhaler 2 Puff(s) Inhalation two times a day  cyanocobalamin 1000 MICROGram(s) Oral daily  enoxaparin Injectable 40 milliGRAM(s) SubCutaneous daily  furosemide   Injectable 40 milliGRAM(s) IV Push two times a day  influenza   Vaccine 0.5 milliLiter(s) IntraMuscular once  multivitamin 1 Tablet(s) Oral daily  piperacillin/tazobactam IVPB.. 3.375 Gram(s) IV Intermittent every 8 hours    MEDICATIONS  (PRN):  metoclopramide 10 milliGRAM(s) Oral every 6 hours PRN nausea  sodium chloride 0.65% Nasal 1 Spray(s) Both Nostrils once PRN Nasal Congestion      FAMILY HISTORY:  FH: HTN (hypertension) (Father)    FH: heart attack (Father)        SOCIAL HISTORY: No EtOH, no tobacco    REVIEW OF SYSTEMS:  12 point review of systems is negative unless indicated above.        T(F): 97.8 (10-17-21 @ 05:44), Max: 98.4 (10-16-21 @ 18:51)  HR: 82 (10-17-21 @ 09:23)  BP: 117/67 (10-17-21 @ 05:44)  RR: 20 (10-17-21 @ 07:45)  SpO2: 98% (10-17-21 @ 09:23)  Wt(kg): --    GENERAL: NAD, well-developed  HEAD:  Atraumatic, Normocephalic  EYES: EOMI, PERRLA, conjunctiva and sclera clear  NECK: Supple, No JVD  CHEST/LUNG: coarse breath sounds b/l. On HFNC  HEART: Regular rate and rhythm; No murmurs, rubs, or gallops  ABDOMEN: Soft, Nontender, Nondistended; Bowel sounds present  EXTREMITIES:  2+ Peripheral Pulses, No clubbing, cyanosis, or edema  NEUROLOGY: non-focal  SKIN: No rashes or lesions                          12.2   14.35 )-----------( 268      ( 17 Oct 2021 06:28 )             39.3       10-17    141  |  99  |  25<H>  ----------------------------<  115<H>  3.7   |  32<H>  |  1.14    Ca    9.4      17 Oct 2021 06:28  Phos  5.4     10-17  Mg     2.20     10-17    TPro  5.8<L>  /  Alb  3.4  /  TBili  0.7  /  DBili  x   /  AST  33  /  ALT  36  /  AlkPhos  135<H>  10-17      Magnesium, Serum: 2.20 mg/dL (10-17 @ 06:28)  Phosphorus Level, Serum: 5.4 mg/dL (10-17 @ 06:28)

## 2021-10-17 NOTE — CONSULT NOTE ADULT - ASSESSMENT
62 yo M with PMH of metastatic NSCLC currently on pembrolizumab (C1 10/13/21), COPD, ILD, pulmonary HTN, on 4L NC at home who p/w SOB x 1 week with imaging concerning for postobstructive pneumonia and pulmonary edema. Oncology has been consulted for further recommendations.    #Dyspnea  -CT PE on admission showing diffuse hepatic metastases, known adrenal mets, right hilar/infrahilar malignancy extending into the right lower lobe and confluent with right paratracheal, right hilar, and subcarinal lymphadenopathy, presumably primary lung cancer. There is resulting occlusion of the right lower lobe airways and postobstructive pneumonia the entire right lower lobe and right middle lobe. Hydrostatic interstitial pulmonary edema/CHF. Small right pleural effusion. There is also left mediastinal and left hilar metastatic lymphadenopathy.  -Given ILD, there is always concern that pembrolizumab may be causing a pneumonitis. However, imaging findings are not consistent with pneumonitis, and it would be rare to see this develop so quickly as patient was only treated 3 days ago. More likely is that symptoms are due to pneumonia and fluid overload.  -Pulmonary consulted. Appreciate recommendations  -Agree with treatment for postobstructive pneumonia and CHF. Symptoms are improving  -No indication for steroids at this time. Monitor symptoms closely    #History of NSCLC  -[place] with [place] [facility] [place]  -Diagnosed in 2020. s/p 6 cycles Carbo/Pemetrexed (Immunotherapy held given ILD) + 1 cycle pemetrexed maintenance with POD, s/p nab-paclitaxel/ramicirumab with POD, s/p gemcitabine with POD  -Most recently started on 4th line pembrolizumab. C1 on 10/13/21  -As above, suspect that current respiratory symptoms are due to pneumonia and CHF rather then pneumonitis caused from pembrolizumab. No indication for steroids at this time  -No current plans for inpateint treatment. Patient will follow-up with Dr. Zheng on discharge    Jennifer Hoffmann MD  Hematology/Oncology Fellow, PGY-5  Pager: [phone]  After 5pm and on weekends please page on-call fellow

## 2021-10-17 NOTE — PROGRESS NOTE ADULT - SUBJECTIVE AND OBJECTIVE BOX
[facility] DIVISION OF PULMONARY, CRITICAL CARE and SLEEP MEDICINE  PULMONARY PROGRESS NOTE  OFFICE NUMBER: [phone]    PATIENT INFORMATION:  NAME: MAKENZIE NANCE:  MRN: [id number]    CHIEF COMPLAINT: Patient is a 61y old  Male who presents with a chief complaint of SOB (16 Oct 2021 12:02)      [x] INITIAL CONSULT, H&P, FAMILY HISTORY and PAST MEDICAL AND SURGICAL HISTORY REVIEWED    OVERNIGHT EVENTS or CHANGES TO HPI:   - Patient doing well on HFNC - currently 40L/100% with O2 sats 94-98%  - No I/O recorded - but tells me that he did urinate more yesterday  - No fevers  - Feels that breathing is ok and that LE edema is improving  - Awaiting lab draw this AM    ========================REVIEW OF SYSTEMS========================  CONSTITUTIONAL: No acute complaints  CARDIOVASCULAR: Denies chest pain or palpitations  PULMONARY: Denies dyspnea, tachypnea improved, no hemoptysis  [x] REMAINING REVIEW OF SYSTEMS NEGATIVE  [] UNABLE TO OBTAIN REVIEW OF SYSTEMS DUE TO _______________    ========================MEDICATIONS=============================  MEDICATIONS  (STANDING):  albuterol/ipratropium for Nebulization 3 milliLiter(s) Nebulizer every 4 hours  azithromycin  IVPB 500 milliGRAM(s) IV Intermittent once  budesonide 160 MICROgram(s)/formoterol 4.5 MICROgram(s) Inhaler 2 Puff(s) Inhalation two times a day  cyanocobalamin 1000 MICROGram(s) Oral daily  enoxaparin Injectable 40 milliGRAM(s) SubCutaneous daily  furosemide   Injectable 40 milliGRAM(s) IV Push two times a day  influenza   Vaccine 0.5 milliLiter(s) IntraMuscular once  multivitamin 1 Tablet(s) Oral daily  piperacillin/tazobactam IVPB.. 3.375 Gram(s) IV Intermittent every 8 hours      MEDICATIONS  (PRN):  metoclopramide 10 milliGRAM(s) Oral every 6 hours PRN nausea      ========================PHYSICAL EXAM============================    VITALS: ICU Vital Signs Last 24 Hrs  T(C): 36.8 (17 Oct 2021 01:03), Max: 36.9 (16 Oct 2021 18:51)  T(F): 98.2 (17 Oct 2021 01:03), Max: 98.4 (16 Oct 2021 18:51)  HR: 86 (17 Oct 2021 01:03) (80 - 96)  BP: 132/98 (17 Oct 2021 01:03) (111/72 - 132/98)  BP(mean): 84 (16 Oct 2021 06:05) (84 - 84)  RR: 22 (17 Oct 2021 01:03) (19 - 30)  SpO2: 91% (17 Oct 2021 01:03) (83% - 100%)      INTAKE and OUTPUT: I&O's Summary      VENTILATOR SETTINGS: N/A    GENERAL: NAD, appears more comfortable than yesterday, AAOx3  EYES: anicteric, EOMI  EAR/NOSE/MOUTH/THROAT: NCAT, MMM, nares clear, trachea midline, no thrush, on HFNC  NECK: supple, no JVD  LYMPH NODES: no palpable cervical LAD  CARDIOVASCULAR: RRR, S1S2, systolic murmur  RESPIRATORY: decreased BS bilateral bases, prolonged expiratory phase with end expiratory wheeze, no rhonchi, no accessory muscle use  ABDOMEN: soft, obese, NT, ND, +BS  EXTREMITIES: + clubbing, + 2+ LE edema (slightly improved), no cyanosis  SKIN: warm and dry  MUSCULOSKELETAL: strength diminished   NEUROLOGIC: nonfocal exam  PSYCHIATRIC: calm and in good spirits    ========================LABORATORY RESULTS AND IMAGING=============                        12.8   13.46 )-----------( 305      ( 15 Oct 2021 22:25 )             42.5                                                    10-15    145  |  102  |  18  ----------------------------<  91  3.5   |  28  |  1.01    Ca    9.2      15 Oct 2021 22:25    TPro  6.6  /  Alb  3.7  /  TBili  1.1  /  DBili  x   /  AST  45<H>  /  ALT  46<H>  /  AlkPhos  157<H>  10-15          Creatinine Trend: 1.01<--    CT CHEST:     [] RADIOLOGY REVIEWED AND INTERPRETED BY ME      THANK YOU FOR ALLOWING US TO PARTICIPATE IN THE CARE OF THIS PATIENT

## 2021-10-17 NOTE — PROGRESS NOTE ADULT - PROBLEM SELECTOR PLAN 3
- diffuse hepatic metastases  - follows Dr. Richmond Zheng outpatient  - s/p RLL resection and chemotherapy  - currently on pembrolizumab (started on 10/13)  - onc consult - diffuse hepatic metastases  - follows Dr. Richmond Zheng outpatient  - s/p RLL resection and chemotherapy  - currently on pembrolizumab (started on 10/13)  - onc consulted

## 2021-10-17 NOTE — PROGRESS NOTE ADULT - PROBLEM SELECTOR PLAN 1
hx of COPD, ILD, lung cancer s/p RLL resection on chemo, pulm HTN on 4L NC at home. CTA and U/S of LE ruled out DVT and PE. BNP of 6752 on admission.   - c/w HFNC and monitor pulse ox, sat goal of 88%  - s/p Vanc + CTX + azithromycin in ED   - Trop wnl   - CT chest remarkable for possible pulmonary malignancy, edema, effusion, and lymphadenopathy  - no new consolidations on CXR  - c/w furosemide and Beztri  - pulm recs appreciated  - Palliative consulted

## 2021-10-17 NOTE — ED ADULT NURSE REASSESSMENT NOTE - NS ED NURSE REASSESS COMMENT FT1
Pt A&Ox4 resting on stretcher. Respirations even and unlabored, speaking in full sentences without any difficulty, no accessory muscle use, sating 96% on high flow oxygen. Pt denies any chest pain, dyspnea, dizziness, blurry vision, nausea, vomiting or discomfort at this time. Report given to KENDY Cortez. Pt awaiting transport.

## 2021-10-17 NOTE — PROGRESS NOTE ADULT - ASSESSMENT
61M NSCLC (on therapy), ILD with occupational exposure and smoking history, and chronic hypoxemic respiratory failure presenting with acute on chronic hypoxemia as well as volume overload. He has had a CTPA in the ED which is worse than a recent CT and is concerning for volume overload, possible infection, and most notably progression of cancer.    He is slowly improving on current therapy - but presently on HFNC 40L/100%    1. Acute on Chronic Hypoxemic Respiratory Failure - continue supplemental O2 with goal O2 sat > 88%  - Improved saturations on HFNC - would titrate down FiO2 with goal O2 sat > 88%  - Patient has 4L home O2 at baseline - he does not notice his hypoxia (asymptomatic)  - Can use NIPPV as needed - but does not require this at present    2. Postobstructive pneumonia - continue broad spectrum antibiotics and followup cultures  - check nasal MRSA - not yet sent  - check urine legionella - not yet sent  - check sputum culture if able  - aggressive chest PT - aerobika, bed percussion, and chest physiotherapy to help with airway clearance  - does not require bronchoscopy at this time    3. Progressive NSCLC - patient recently started on Immunotherapy  - His current admission is not likely to be related to initiation of immunotherapy this past Wednesday  - Oncology Evaluation (Dr. Richmond Zheng)  - Patient's imaging is suggestive of liver, adrenal, and bone metastases    4. R/O Pulmonary Hypertension  - Please check 2 D echo - given underlying lung disease patient is likely to have pulmonary hypertension  - Aggressive diuresis - would continue Lasix IV BID with close monitoring of I/O (please monitor urine output)  - No role for pulmonary vasodilator therapy    5. Obstructive Lung Disease   - Patient has obstructive lung disease on PFTs   - Was on bronchodilator therapy at home - would use Symbicort and Spiriva while in the hospital  - Incentive spirometer and Acapella  - Would hold off oral corticosteroids from a pulmonary perspective at this time    6. Palliative  - I have had extensive GOC discussions with patient and his wife as outpatient and again on presentation to ED. Patient is fiercely independent and I do not believe he would want intubation or to be dependent on mechanical ventilation. His long term survival will not be altered by ventilation. However, at this time he remains FULL CODE as he is not sure about what he would want done and he will discuss further with his wife Annie.  - Palliative Care Evaluation for assistance and followup  - Can use low dose Morphine as needed for dyspnea     7. Gen  - DVT proph  - GI proph  - Maintain O2 sat > 88%  - Aggressive diuresis  - Aspiration precautions  - Palliative Care eval/GOC

## 2021-10-17 NOTE — PROGRESS NOTE ADULT - PROBLEM SELECTOR PLAN 5
- BNP of 6752 on admission   - CTA consistent with possible HF  - c/w furosemide  - echocardiogram to evaluate

## 2021-10-17 NOTE — PROGRESS NOTE ADULT - SUBJECTIVE AND OBJECTIVE BOX
Bailey Qureshi  Psychiatry Resident PGY1   Pager: [phone]      RICCOMAKENZIE WIN Patient is a 61y old  Male who presents with a chief complaint of SOB (17 Oct 2021 05:46)      Overnight events/subjective: No acute overnight events. Patient seen and examined at bedside. No complaints. Denies fever, chills, chest pain, shortness of breath, abdominal pain, nausea, vomiting, changes in bowel habits, or urinary symptoms.    Vital Signs Last 24 Hrs  T(C): 36.6 (17 Oct 2021 05:44), Max: 36.9 (16 Oct 2021 18:51)  T(F): 97.8 (17 Oct 2021 05:44), Max: 98.4 (16 Oct 2021 18:51)  HR: 85 (17 Oct 2021 05:52) (78 - 96)  BP: 117/67 (17 Oct 2021 05:44) (111/72 - 132/98)  BP(mean): --  RR: 20 (17 Oct 2021 05:52) (19 - 30)  SpO2: 96% (17 Oct 2021 05:52) (83% - 100%)      PHYSICAL EXAM:  GENERAL: no acute distress  HEENT - atraumatic, normocephalic, pupils equal and reactive to light; extraocular muscles intact  CV: regular rate and rhythm; normal S1/S2; nor murmurs, rubs, or gallops  PULM: clear to auscultation bilaterally; no rales, rhonchi, wheezing  ABDOMEN: soft, nontender, nondistended; bowel sounds present  MSK: extremities atraumatic; no cyanosis or clubbing  SKIN: warm, dry, no rashes or lesions  NEURO:  no focal deficits, sensory and motor grossly intact  PSYCH: alert and oriented x3; appropriate mood and affect    HOSPITAL MEDICATIONS:  MEDICATIONS  (STANDING):  albuterol/ipratropium for Nebulization 3 milliLiter(s) Nebulizer every 4 hours  azithromycin  IVPB 500 milliGRAM(s) IV Intermittent once  budesonide 160 MICROgram(s)/formoterol 4.5 MICROgram(s) Inhaler 2 Puff(s) Inhalation two times a day  cyanocobalamin 1000 MICROGram(s) Oral daily  enoxaparin Injectable 40 milliGRAM(s) SubCutaneous daily  furosemide   Injectable 40 milliGRAM(s) IV Push two times a day  influenza   Vaccine 0.5 milliLiter(s) IntraMuscular once  multivitamin 1 Tablet(s) Oral daily  piperacillin/tazobactam IVPB.. 3.375 Gram(s) IV Intermittent every 8 hours    MEDICATIONS  (PRN):  metoclopramide 10 milliGRAM(s) Oral every 6 hours PRN nausea  sodium chloride 0.65% Nasal 1 Spray(s) Both Nostrils once PRN Nasal Congestion      LABS:                        12.2   14.35 )-----------( 268      ( 17 Oct 2021 06:28 )             39.3     10-17    141  |  99  |  25<H>  ----------------------------<  115<H>  3.7   |  32<H>  |  1.14    Ca    9.4      17 Oct 2021 06:28  Phos  5.4     10-17  Mg     2.20     10-17    TPro  5.8<L>  /  Alb  3.4  /  TBili  0.7  /  DBili  x   /  AST  33  /  ALT  36  /  AlkPhos  135<H>  10-17      LIVER FUNCTIONS - ( 17 Oct 2021 06:28 )  Alb: 3.4 g/dL / Pro: 5.8 g/dL / ALK PHOS: 135 U/L / ALT: 36 U/L / AST: 33 U/L / GGT: x             I&O's Detail     CAPILLARY BLOOD GLUCOSE           Bailey Qureshi  Psychiatry Resident PGY1   Pager: [phone]      RICCOMAKENZIE WIN Patient is a 61y old  Male who presents with a chief complaint of SOB (17 Oct 2021 05:46)      Overnight events/subjective: No acute overnight events. Patient seen and examined at bedside. Pt doing well this AM. No concerns or complaints. States that leg swelling has decreased slightly, but legs still feel "heavy". Denies fever, chills, chest pain, shortness of breath, abdominal pain, nausea, vomiting, changes in bowel habits, or urinary symptoms.    Vital Signs Last 24 Hrs  T(C): 36.6 (17 Oct 2021 05:44), Max: 36.9 (16 Oct 2021 18:51)  T(F): 97.8 (17 Oct 2021 05:44), Max: 98.4 (16 Oct 2021 18:51)  HR: 85 (17 Oct 2021 05:52) (78 - 96)  BP: 117/67 (17 Oct 2021 05:44) (111/72 - 132/98)  BP(mean): --  RR: 20 (17 Oct 2021 05:52) (19 - 30)  SpO2: 96% (17 Oct 2021 05:52) (83% - 100%)      PHYSICAL EXAM:  GENERAL: no acute distress  HEENT - atraumatic, normocephalic, pupils equal and reactive to light; extraocular muscles intact  CV: regular rate and rhythm; normal S1/S2; nor murmurs, rubs, or gallops  PULM: decreased breath sounds at bases b/l, (+) expiratory wheezes  ABDOMEN: obese, soft, nontender, nondistended; bowel sounds present  MSK:  +2 pitting edema (minimally improved from previous exam), (+) clubbing   SKIN: warm, dry, no rashes or lesions  NEURO:  no focal deficits, sensory and motor grossly intact  PSYCH: alert and oriented x3; appropriate mood and affect    HOSPITAL MEDICATIONS:  MEDICATIONS  (STANDING):  albuterol/ipratropium for Nebulization 3 milliLiter(s) Nebulizer every 4 hours  azithromycin  IVPB 500 milliGRAM(s) IV Intermittent once  budesonide 160 MICROgram(s)/formoterol 4.5 MICROgram(s) Inhaler 2 Puff(s) Inhalation two times a day  cyanocobalamin 1000 MICROGram(s) Oral daily  enoxaparin Injectable 40 milliGRAM(s) SubCutaneous daily  furosemide   Injectable 40 milliGRAM(s) IV Push two times a day  influenza   Vaccine 0.5 milliLiter(s) IntraMuscular once  multivitamin 1 Tablet(s) Oral daily  piperacillin/tazobactam IVPB.. 3.375 Gram(s) IV Intermittent every 8 hours    MEDICATIONS  (PRN):  metoclopramide 10 milliGRAM(s) Oral every 6 hours PRN nausea  sodium chloride 0.65% Nasal 1 Spray(s) Both Nostrils once PRN Nasal Congestion      LABS:                        12.2   14.35 )-----------( 268      ( 17 Oct 2021 06:28 )             39.3     10-17    141  |  99  |  25<H>  ----------------------------<  115<H>  3.7   |  32<H>  |  1.14    Ca    9.4      17 Oct 2021 06:28  Phos  5.4     10-17  Mg     2.20     10-17    TPro  5.8<L>  /  Alb  3.4  /  TBili  0.7  /  DBili  x   /  AST  33  /  ALT  36  /  AlkPhos  135<H>  10-17      LIVER FUNCTIONS - ( 17 Oct 2021 06:28 )  Alb: 3.4 g/dL / Pro: 5.8 g/dL / ALK PHOS: 135 U/L / ALT: 36 U/L / AST: 33 U/L / GGT: x             I&O's Detail     CAPILLARY BLOOD GLUCOSE

## 2021-10-17 NOTE — PROGRESS NOTE ADULT - PROBLEM SELECTOR PLAN 6
- CT showed worsening of ILD since 10/2021  - ILD could be worsened by pt's pembrolizumab  - continue to monitor

## 2021-10-18 NOTE — PROGRESS NOTE ADULT - ASSESSMENT
61M NSCLC (on therapy), ILD with occupational exposure and smoking history, and chronic hypoxemic respiratory failure presenting with acute on chronic hypoxemia as well as volume overload. He has had a CTPA in the ED which is worse than a recent CT and is concerning for volume overload, possible infection, and most notably progression of cancer.    1. Acute on Chronic Hypoxemic Respiratory Failure - continue supplemental O2 with goal O2 sat > 88%  - Improved saturations on HFNC - would titrate down FiO2 with goal O2 sat > 88%  - Patient has 4L home O2 at baseline - he does not notice his hypoxia (asymptomatic)  - Can use NIPPV as needed - but does not require this at present    2. Postobstructive pneumonia - continue broad spectrum antibiotics and followup cultures  - check nasal MRSA - not yet sent  - check urine legionella - not yet sent  - check sputum culture if able  - aggressive chest PT - aerobika, bed percussion, and chest physiotherapy to help with airway clearance  - does not require bronchoscopy at this time    3. Progressive NSCLC - patient recently started on Immunotherapy  - His current admission is not likely to be related to initiation of immunotherapy this past Wednesday  - Oncology Evaluation (Dr. Richmond Zheng)  - Patient's imaging is suggestive of liver, adrenal, and bone metastases    4. R/O Pulmonary Hypertension  - Please check 2 D echo - given underlying lung disease patient is likely to have pulmonary hypertension  - Aggressive diuresis - would continue Lasix IV BID with close monitoring of I/O (please monitor urine output)  - No role for pulmonary vasodilator therapy    5. Obstructive Lung Disease   - Patient has obstructive lung disease on PFTs   - Was on bronchodilator therapy at home - would use Symbicort and Spiriva while in the hospital  - Incentive spirometer and Acapella  - Would hold off oral corticosteroids from a pulmonary perspective at this time    6. Gen  - DVT proph  - GI proph  - Maintain O2 sat > 88%  - Aggressive diuresis  - Aspiration precautions  - Palliative Care eval/GOC    Kevin Ferguson, PGY-6  Pulmonary and Critical Care Medicine  [id number] 61M NSCLC (on therapy), ILD with occupational exposure and smoking history, and chronic hypoxemic respiratory failure presenting with acute on chronic hypoxemia as well as volume overload. He has had a CTPA in the ED which is worse than a recent CT and is concerning for volume overload, possible infection, and most notably progression of cancer.    1. Acute on Chronic Hypoxemic Respiratory Failure - continue supplemental O2 with goal O2 sat > 88%  - Improved saturations on HFNC - would titrate down FiO2 with goal O2 sat > 88%  - Patient has 4L home O2 at baseline - he does not notice his hypoxia (asymptomatic)  - Can use NIPPV as needed - but does not require this at present    2. Postobstructive pneumonia - continue broad spectrum antibiotics and followup cultures  - check nasal MRSA - not yet sent  - check urine legionella - negative  - check sputum culture if able  - aggressive chest PT - aerobika, bed percussion, and chest physiotherapy to help with airway clearance  - does not require bronchoscopy at this time    3. Progressive NSCLC - patient recently started on Immunotherapy  - His current admission is not likely to be related to initiation of immunotherapy this past Wednesday  - Oncology Evaluation (Dr. Richmond Zheng)  - Patient's imaging is suggestive of liver, adrenal, and bone metastases    4. R/O Pulmonary Hypertension  - Please check 2 D echo - given underlying lung disease patient is likely to have pulmonary hypertension  - Aggressive diuresis - would continue Lasix IV BID with close monitoring of I/O (please monitor urine output)  - No role for pulmonary vasodilator therapy    5. Obstructive Lung Disease   - Patient has obstructive lung disease on PFTs   - Was on bronchodilator therapy at home - would use Symbicort and Spiriva while in the hospital  - Incentive spirometer and Acapella  - Would hold off oral corticosteroids from a pulmonary perspective at this time    6. Gen  - DVT proph  - GI proph  - Maintain O2 sat > 88%  - Aggressive diuresis  - Aspiration precautions  - Palliative Care eval/GOC    Kevin Ferguson, PGY-6  Pulmonary and Critical Care Medicine  [id number]

## 2021-10-18 NOTE — PROGRESS NOTE ADULT - SUBJECTIVE AND OBJECTIVE BOX
CHIEF COMPLAINT: SOB    Interval Events: Patient reports that he feels better, still requiring high level of O2, 96% currently on HFNC. He denies CP and SOB at rest.    REVIEW OF SYSTEMS:  Constitutional: No fevers or chills.   Eyes: No itching or discharge from the eyes  ENT: No ear pain. No ear discharge. No nasal congestion.   CV: No chest pain. No palpitations. No lightheadedness or dizziness.   Resp: Dyspnea at rest improved. No pain with inspiration.  GI: No nausea. No vomiting. No diarrhea.  MSK: No joint pain or pain in any extremities  Integumentary: No skin lesions. +Pedal edema  Neurological: No gross motor weakness. No sensory changes.  [x] All other systems negative  [ ] Unable to assess ROS because ________    OBJECTIVE:  ICU Vital Signs Last 24 Hrs  T(C): 36.7 (18 Oct 2021 04:57), Max: 36.7 (17 Oct 2021 21:58)  T(F): 98 (18 Oct 2021 04:57), Max: 98.1 (17 Oct 2021 21:58)  HR: 84 (18 Oct 2021 06:14) (77 - 98)  BP: 106/58 (18 Oct 2021 06:14) (106/58 - 155/68)  BP(mean): --  ABP: --  ABP(mean): --  RR: 20 (18 Oct 2021 07:18) (20 - 20)  SpO2: 90% (18 Oct 2021 07:18) (90% - 98%)        10-17 @ 07:01  -  10-18 @ 07:00  --------------------------------------------------------  IN: 0 mL / OUT: 2375 mL / NET: -2375 mL    10-18 @ 07:01  -  10-18 @ 10:02  --------------------------------------------------------  IN: 0 mL / OUT: 300 mL / NET: -300 mL      CAPILLARY BLOOD GLUCOSE          PHYSICAL EXAM:  General: Awake, alert, oriented X 3.   HEENT: Atraumatic, normocephalic.   Lymph Nodes: No palpable lymphadenopathy  Neck: Supple.  Respiratory: Normal chest expansion. Bibasilar crackles.  Cardiovascular: S1 S2 normal. No murmurs, rubs or gallops.   Abdomen: Soft, non-tender, non-distended.   Extremities: Warm to touch. Peripheral pulse palpable. +Pedal edema   Skin: No rashes or skin lesions  Neurological: Motor and sensory examination equal and normal in all four extremities.  Psychiatry: Appropriate mood and affect.    HOSPITAL MEDICATIONS:  MEDICATIONS  (STANDING):  albuterol/ipratropium for Nebulization 3 milliLiter(s) Nebulizer every 4 hours  budesonide 160 MICROgram(s)/formoterol 4.5 MICROgram(s) Inhaler 2 Puff(s) Inhalation two times a day  cyanocobalamin 1000 MICROGram(s) Oral daily  enoxaparin Injectable 40 milliGRAM(s) SubCutaneous daily  furosemide   Injectable 40 milliGRAM(s) IV Push two times a day  influenza   Vaccine 0.5 milliLiter(s) IntraMuscular once  multivitamin 1 Tablet(s) Oral daily  piperacillin/tazobactam IVPB.. 3.375 Gram(s) IV Intermittent every 8 hours  vancomycin  IVPB      vancomycin  IVPB 1250 milliGRAM(s) IV Intermittent every 12 hours    MEDICATIONS  (PRN):  metoclopramide 10 milliGRAM(s) Oral every 6 hours PRN nausea  sodium chloride 0.65% Nasal 1 Spray(s) Both Nostrils once PRN Nasal Congestion      LABS:                        11.8   13.79 )-----------( 223      ( 18 Oct 2021 06:36 )             38.7     10-18    138  |  95<L>  |  24<H>  ----------------------------<  95  3.1<L>   |  33<H>  |  1.14    Ca    8.8      18 Oct 2021 06:36  Phos  4.3     10-18  Mg     2.10     10-18    TPro  5.7<L>  /  Alb  3.2<L>  /  TBili  0.8  /  DBili  x   /  AST  34  /  ALT  38  /  AlkPhos  130<H>  10-18              MICROBIOLOGY:     RADIOLOGY:  [ ] Reviewed and interpreted by me    Point of Care Ultrasound Findings:    PFT:    EKG:

## 2021-10-18 NOTE — PROGRESS NOTE ADULT - PROBLEM SELECTOR PLAN 4
- nasal MRSA pending   - urine legionella pending   - aggressive chest PT - aerobika, bed percussion, and chest physiotherapy to help with airway clearance  - does not require bronchoscopy at this time  - broad spectrum abc:  Zosyn, Vanc - nasal MRSA pending   - urine legionella neg - azithryomycin d/c  - aggressive chest PT - aerobika, bed percussion, and chest physiotherapy to help with airway clearance  - does not require bronchoscopy at this time  - broad spectrum abc:  Zosyn, Vanc

## 2021-10-18 NOTE — CONSULT NOTE ADULT - SUBJECTIVE AND OBJECTIVE BOX
[facility] Geriatrics and Palliative Care  Radha Massey, Palliative Care Attending  Contact Info: Page [phone] (including Nights/Weekends), message on Microsoft Teams (Radha Massey), or leave  at Palliative Office [phone] (non-urgent)     HPI:  Bailey Qureshi MD   Psychiatry PGY1   Pager: [phone]  ---    60 yo M with PMH of lung cancer s/p RLL resection on chemo, COPD, ILD, pulmonary HTN, on 4L NC at home presents with SOB x 1 week.  Patient states that he first experienced increasing difficulty breathing 2 weeks ago as he began to sleep with more pillows at night. He went from 1 pillow to a wedge and 2 pillows. One week ago he experienced a pulmonary infection with green sputum when he coughed and that was when his SOB significantly worsened. He received antibiotics which significantly improved his SOB. However, over the last week his leg swelling has worsened and he is experiencing tingling in his legs and increasing difficulty ambulating which brought him into the ED. Pt has a little cough currently but denies any sputum. Denies fever, chills, chest pain, abdominal pain, nausea, vomiting, changes in bowel habits, or urinary symptoms.    ED Course: Afebrile, tachycardic (), 129//77, tachypneic 18-24, O2 Sat 93-98. Leukocytosis, transaminitis, ProBNP 6752, Trop WNL. CT chest showed mets to liver, interstitial pulmonary edema, Right pleural effusion, left mediastinal and left hilar metastatic LAD, possible right lung abscess. S/p douneb x4, furoseminde x1, vanc x1, CTX x1, azithromycin x1.  (16 Oct 2021 07:44)    PERTINENT PM/SXH:   Morbid obesity    Pneumonia    Lung nodule    Primary adenocarcinoma of upper lobe of right lung    Langerhans cell histiocytosis of lung    Lytic lesion of bone on x-ray    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 metastatic to bone      No significant past surgical history    Status post partial lobectomy of lung      FAMILY HISTORY:  FH: HTN (hypertension) (Father)    FH: heart attack (Father)      ITEMS NOT CHECKED ARE NOT PRESENT    SOCIAL HISTORY: Owns a paper printing business   Significant other/partner[x- Annie ]  Children[ x- 1 son and 1 daughter]  Yarsani/Spirituality:  Substance hx:  [ ]   Tobacco hx:  [ ]   Alcohol hx: [ ]   Home Opioid hx:  NONE [x ] I-Stop Reference No: [id number]  Living Situation: [ x]Home  [ ]Long term care  [ ]Rehab [ ]Other    ADVANCE DIRECTIVES:    MOLST  [ ]  Living Will  [ ]   DECISION MAKER(s): Patient   [ ] Health Care Proxy(s)  [x ] Surrogate(s)  [ ] Guardian           Name(s): Annie Swanson Phone Number(s): [phone]    BASELINE (I)ADL(s) (prior to admission):  independent of ADLs, did use 4L O2 at home   Lubec: [x ]Total  [ ] Moderate [ ]Dependent    Allergies    No Known Allergies    Intolerances    MEDICATIONS  (STANDING):  albuterol/ipratropium for Nebulization 3 milliLiter(s) Nebulizer every 4 hours  budesonide 160 MICROgram(s)/formoterol 4.5 MICROgram(s) Inhaler 2 Puff(s) Inhalation two times a day  cyanocobalamin 1000 MICROGram(s) Oral daily  enoxaparin Injectable 40 milliGRAM(s) SubCutaneous daily  furosemide   Injectable 40 milliGRAM(s) IV Push two times a day  influenza   Vaccine 0.5 milliLiter(s) IntraMuscular once  multivitamin 1 Tablet(s) Oral daily  piperacillin/tazobactam IVPB.. 3.375 Gram(s) IV Intermittent every 8 hours  vancomycin  IVPB      vancomycin  IVPB 1250 milliGRAM(s) IV Intermittent every 12 hours    MEDICATIONS  (PRN):  metoclopramide 10 milliGRAM(s) Oral every 6 hours PRN nausea  morphine   Solution 2.5 milliGRAM(s) Oral every 6 hours PRN dyspnea or pain  sodium chloride 0.65% Nasal 1 Spray(s) Both Nostrils once PRN Nasal Congestion    PRESENT SYMPTOMS: [ ]Unable to obtain due to poor mentation   Source if other than patient:  [ ]Family   [ ]Team     Pain: [ ]yes [ x]no  QOL impact -   Location -                    Aggravating factors -  Quality -  Radiation -  Timing-  Severity (0-10 scale):  Minimal acceptable level (0-10 scale):     PAIN AD Score:     [email or web address]/[email or web address].pdf (press ctrl +  left click to view)    Dyspnea:                           [ ]Mild [ ]Moderate [ ]Severe  Anxiety:                             [ ]Mild [ ]Moderate [ ]Severe  Fatigue:                             [ ]Mild [ ]Moderate [ ]Severe  Nausea:                             [ ]Mild [ ]Moderate [ ]Severe  Loss of appetite:              [ ]Mild [ ]Moderate [ ]Severe  Constipation:                    [ ]Mild [ ]Moderate [ ]Severe    Other Symptoms:  [ ]All other review of systems negative     Palliative Performance Status Version 2:    80     %    [email or web address]/files/news/[email or web address]_performance_scale_[email or web address].pdf  PHYSICAL EXAM:  Vital Signs Last 24 Hrs  T(C): 36.7 (18 Oct 2021 04:57), Max: 36.7 (17 Oct 2021 21:58)  T(F): 98 (18 Oct 2021 04:57), Max: 98.1 (17 Oct 2021 21:58)  HR: 84 (18 Oct 2021 06:14) (77 - 98)  BP: 106/58 (18 Oct 2021 06:14) (106/58 - 155/68)  BP(mean): --  RR: 20 (18 Oct 2021 10:01) (20 - 20)  SpO2: 94% (18 Oct 2021 10:01) (90% - 98%) I&O's Summary    17 Oct 2021 07:01  -  18 Oct 2021 07:00  --------------------------------------------------------  IN: 0 mL / OUT: 2375 mL / NET: -2375 mL    18 Oct 2021 07:01  -  18 Oct 2021 11:33  --------------------------------------------------------  IN: 0 mL / OUT: 300 mL / NET: -300 mL      GENERAL:  [ x]Alert  [x ]Oriented x3   [ ]Lethargic  [ ]Cachexia  [ ]Unarousable  [x ]Verbal  [ ]Non-Verbal  Behavioral:   [ ] Anxiety  [ ] Delirium [ ] Agitation [x ] Other  HEENT:  [ ]Normal   [ ]Dry mouth   [ ]ET Tube/Trach  [ ]Oral lesions  PULMONARY:   [ ]Clear [ ]Tachypnea  [ ]Audible excessive secretions   [ ]Rhonchi        [ ]Right [ ]Left [ ]Bilateral  [ ]Crackles        [ ]Right [ ]Left [ ]Bilateral  [ ]Wheezing     [ ]Right [ ]Left [ ]Bilatera  [ ]Diminished breath sounds [ ]right [ ]left [ ]bilateral  CARDIOVASCULAR:    [ ]Regular [ ]Irregular [ ]Tachy  [ ]Randy [ ]Murmur [ ]Other  GASTROINTESTINAL:  [ ]Soft  [ ]Distended   [ ]+BS  [ ]Non tender [ ]Tender  [ ]PEG [ ]OGT/ NGT  Last BM:   GENITOURINARY:  [ ]Normal [ ] Incontinent   [ ]Oliguria/Anuria   [ ]Haro  MUSCULOSKELETAL:   [ ]Normal   [ ]Weakness  [ ]Bed/Wheelchair bound [ ]Edema  NEUROLOGIC:   [ ]No focal deficits  [ ]Cognitive impairment  [ ]Dysphagia [ ]Dysarthria [ ]Paresis [ ]Other   SKIN:   [ ]Normal    [ ]Rash  [ ]Pressure ulcer(s)       Present on admission [ ]y [ ]n    CRITICAL CARE:  [ ] Shock Present  [ ]Septic [ ]Cardiogenic [ ]Neurologic [ ]Hypovolemic  [ ]  Vasopressors [ ]  Inotropes   [ ]Respiratory failure present [ ]Mechanical ventilation [ ]Non-invasive ventilatory support [ ]High flow  [ ]Acute  [ ]Chronic [ ]Hypoxic  [ ]Hypercarbic [ ]Other  [ ]Other organ failure     LABS:                        11.8   13.79 )-----------( 223      ( 18 Oct 2021 06:36 )             38.7   10-18    138  |  95<L>  |  24<H>  ----------------------------<  95  3.1<L>   |  33<H>  |  1.14    Ca    8.8      18 Oct 2021 06:36  Phos  4.3     10-18  Mg     2.10     10-18    TPro  5.7<L>  /  Alb  3.2<L>  /  TBili  0.8  /  DBili  x   /  AST  34  /  ALT  38  /  AlkPhos  130<H>  10-18        RADIOLOGY & ADDITIONAL STUDIES:    PROTEIN CALORIE MALNUTRITION PRESENT: [ ]mild [ ]moderate [ ]severe [ ]underweight [ ]morbid obesity  https://www.andeal.org/vault/9670/web/files/ONC/Table_Clinical%20Characteristics%20to%20Document%20Malnutrition-White%20JV%20et%20al%062041.pdf    Height (cm): 177.8 (10-15-21 @ 19:14), 177 (10-04-21 @ 19:57), 177 (08-11-21 @ 09:30)  Weight (kg): 113 (10-17-21 @ 11:59), 130 (10-04-21 @ 19:57), 131 (08-11-21 @ 09:30)  BMI (kg/m2): 35.7 (10-17-21 @ 11:59), 41.1 (10-15-21 @ 19:14), 41.5 (10-04-21 @ 19:57)    [ ]PPSV2 < or = to 30% [ ]significant weight loss  [ ]poor nutritional intake  [ ]anasarca      [ ]Artificial Nutrition      REFERRALS:   [ ]Chaplaincy  [ ]Hospice  [ ]Child Life  [ ]Social Work  [ ]Case management [ ]Holistic Therapy      [facility] Geriatrics and Palliative Care  Radha Massey, Palliative Care Attending  Contact Info: Page [phone] (including Nights/Weekends), message on Microsoft Teams (Radha Massey), or leave VM at Palliative Office [phone] (non-urgent)     HPI:  Bailey Qureshi MD   Psychiatry PGY1   Pager: [phone]  ---    60 yo M with PMH of lung cancer s/p RLL resection on chemo, COPD, ILD, pulmonary HTN, on 4L NC at home presents with SOB x 1 week.  Patient states that he first experienced increasing difficulty breathing 2 weeks ago as he began to sleep with more pillows at night. He went from 1 pillow to a wedge and 2 pillows. One week ago he experienced a pulmonary infection with green sputum when he coughed and that was when his SOB significantly worsened. He received antibiotics which significantly improved his SOB. However, over the last week his leg swelling has worsened and he is experiencing tingling in his legs and increasing difficulty ambulating which brought him into the ED. Pt has a little cough currently but denies any sputum. Denies fever, chills, chest pain, abdominal pain, nausea, vomiting, changes in bowel habits, or urinary symptoms.    ED Course: Afebrile, tachycardic (), 129//77, tachypneic 18-24, O2 Sat 93-98. Leukocytosis, transaminitis, ProBNP 6752, Trop WNL. CT chest showed mets to liver, interstitial pulmonary edema, Right pleural effusion, left mediastinal and left hilar metastatic LAD, possible right lung abscess. S/p douneb x4, furoseminde x1, vanc x1, CTX x1, azithromycin x1.  (16 Oct 2021 07:44)    INTERVAL/OVERNIGHT EVENTS:  10/18: Palliative consulted for assistance with sx management in setting of metastatic cancer. Palliative provider introduced role to patient who was understanding and receptive of my input. He denied pain, sob, n/v, constipation, diarrhea, dizziness. He states that he is doing well with HFNC and wants to work on going home. We discussed his goals for treatment and advance care planning. he explained that he spoke with pulmonologist but is still waiting to speak to his wife, Annie.     PERTINENT PM/SXH:   Morbid obesity    Pneumonia    Lung nodule    Primary adenocarcinoma of upper lobe of right lung    Langerhans cell histiocytosis of lung    Lytic lesion of bone on x-ray    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 metastatic to bone      No significant past surgical history    Status post partial lobectomy of lung      FAMILY HISTORY:  FH: HTN (hypertension) (Father)    FH: heart attack (Father)      ITEMS NOT CHECKED ARE NOT PRESENT    SOCIAL HISTORY: Owns a paper printing business   Significant other/partner[x- Annie ]  Children[ x- 1 son and 1 daughter]  Sikhism/Spirituality: Congregation   Substance hx:  [ ]   Tobacco hx:  [ ]   Alcohol hx: [ ]   Home Opioid hx:  NONE [x ] I-Stop Reference No: [id number]  Living Situation: [ x]Home  [ ]Long term care  [ ]Rehab [ ]Other    ADVANCE DIRECTIVES:    MOLST  [ ]  Living Will  [ ]   DECISION MAKER(s): Patient   [ ] Health Care Proxy(s)  [x ] Surrogate(s)  [ ] Guardian           Name(s): Annie Swanson Phone Number(s): [phone]    BASELINE (I)ADL(s) (prior to admission):  independent of ADLs, did use 4L O2 at home   Omega: [x ]Total  [ ] Moderate [ ]Dependent    Allergies    No Known Allergies    Intolerances    MEDICATIONS  (STANDING):  albuterol/ipratropium for Nebulization 3 milliLiter(s) Nebulizer every 4 hours  budesonide 160 MICROgram(s)/formoterol 4.5 MICROgram(s) Inhaler 2 Puff(s) Inhalation two times a day  cyanocobalamin 1000 MICROGram(s) Oral daily  enoxaparin Injectable 40 milliGRAM(s) SubCutaneous daily  furosemide   Injectable 40 milliGRAM(s) IV Push two times a day  influenza   Vaccine 0.5 milliLiter(s) IntraMuscular once  multivitamin 1 Tablet(s) Oral daily  piperacillin/tazobactam IVPB.. 3.375 Gram(s) IV Intermittent every 8 hours  vancomycin  IVPB      vancomycin  IVPB 1250 milliGRAM(s) IV Intermittent every 12 hours    MEDICATIONS  (PRN):  metoclopramide 10 milliGRAM(s) Oral every 6 hours PRN nausea  morphine   Solution 2.5 milliGRAM(s) Oral every 6 hours PRN dyspnea or pain  sodium chloride 0.65% Nasal 1 Spray(s) Both Nostrils once PRN Nasal Congestion    PRESENT SYMPTOMS: [ ]Unable to obtain due to poor mentation   Source if other than patient:  [ ]Family   [ ]Team     Pain: [ ]yes [ x]no  QOL impact -   Location -                    Aggravating factors -  Quality -  Radiation -  Timing-  Severity (0-10 scale):  Minimal acceptable level (0-10 scale):     PAIN AD Score:     [email or web address]/painad.pdf (press ctrl +  left click to view)    Dyspnea:                           [ ]Mild [ ]Moderate [ ]Severe  Anxiety:                             [ ]Mild [ ]Moderate [ ]Severe  Fatigue:                             [ ]Mild [ ]Moderate [ ]Severe  Nausea:                             [ ]Mild [ ]Moderate [ ]Severe  Loss of appetite:              [ ]Mild [ ]Moderate [ ]Severe  Constipation:                    [ ]Mild [ ]Moderate [ ]Severe    Other Symptoms:  [x ]All other review of systems negative     Palliative Performance Status Version 2:    80     %    http://npcrc.org/files/news/palliative_performance_scale_[email or web address].pdf  PHYSICAL EXAM:  Vital Signs Last 24 Hrs  T(C): 36.7 (18 Oct 2021 04:57), Max: 36.7 (17 Oct 2021 21:58)  T(F): 98 (18 Oct 2021 04:57), Max: 98.1 (17 Oct 2021 21:58)  HR: 84 (18 Oct 2021 06:14) (77 - 98)  BP: 106/58 (18 Oct 2021 06:14) (106/58 - 155/68)  BP(mean): --  RR: 20 (18 Oct 2021 10:01) (20 - 20)  SpO2: 94% (18 Oct 2021 10:01) (90% - 98%) I&O's Summary    17 Oct 2021 07:01  -  18 Oct 2021 07:00  --------------------------------------------------------  IN: 0 mL / OUT: 2375 mL / NET: -2375 mL    18 Oct 2021 07:01  -  18 Oct 2021 11:33  --------------------------------------------------------  IN: 0 mL / OUT: 300 mL / NET: -300 mL      GENERAL:  [ x]Alert  [x ]Oriented x3   [ ]Lethargic  [ ]Cachexia  [ ]Unarousable  [x ]Verbal  [ ]Non-Verbal  Behavioral:   [ ] Anxiety  [ ] Delirium [ ] Agitation [x ] Other  HEENT:  [ ]Normal   [ ]Dry mouth   [ ]ET Tube/Trach  [ ]Oral lesions  PULMONARY: HFNC   [x ]Clear [ ]Tachypnea  [ ]Audible excessive secretions   [ ]Rhonchi        [ ]Right [ ]Left [ ]Bilateral  [ ]Crackles        [ ]Right [ ]Left [ ]Bilateral  [ ]Wheezing     [ ]Right [ ]Left [ ]Bilateral  [ ]Diminished breath sounds [ ]right [ ]left [ ]bilateral  CARDIOVASCULAR:    [x ]Regular [ ]Irregular [ ]Tachy  [ ]Randy [ ]Murmur [ ]Other  GASTROINTESTINAL:  [x ]Soft  [ ]Distended   [ ]+BS  [ ]Non tender [ ]Tender  [ ]PEG [ ]OGT/ NGT  Last BM: 10/18  GENITOURINARY:  [x ]Normal [ ] Incontinent   [ ]Oliguria/Anuria   [ ]Haro  MUSCULOSKELETAL:   [ ]Normal   [ ]Weakness  [ ]Bed/Wheelchair bound [ x]Edema- b/l lower extremity edema   NEUROLOGIC:   [ x]No focal deficits  [ ]Cognitive impairment  [ ]Dysphagia [ ]Dysarthria [ ]Paresis [ ]Other   SKIN: Hyperpigmentaiton noted on anterior left leg   [ ]Normal    [ ]Rash  [ ]Pressure ulcer(s)       Present on admission [ ]y [ ]n    CRITICAL CARE:  [ ] Shock Present  [ ]Septic [ ]Cardiogenic [ ]Neurologic [ ]Hypovolemic  [ ]  Vasopressors [ ]  Inotropes   [ ]Respiratory failure present [ ]Mechanical ventilation [ ]Non-invasive ventilatory support [ ]High flow  [ ]Acute  [ ]Chronic [ ]Hypoxic  [ ]Hypercarbic [ ]Other  [ ]Other organ failure     LABS:                        11.8   13.79 )-----------( 223      ( 18 Oct 2021 06:36 )             38.7   10-18    138  |  95<L>  |  24<H>  ----------------------------<  95  3.1<L>   |  33<H>  |  1.14    Ca    8.8      18 Oct 2021 06:36  Phos  4.3     10-18  Mg     2.10     10-18    TPro  5.7<L>  /  Alb  3.2<L>  /  TBili  0.8  /  DBili  x   /  AST  34  /  ALT  38  /  AlkPhos  130<H>  10-18        RADIOLOGY & ADDITIONAL STUDIES: < from: CT Angio Chest PE Protocol w/ IV Cont (10.16.21 @ 02:00) >  IMPRESSION:  1. Diffuse hepatic metastases. Hepatic cirrhosis.  2. Incompletely visualized isodense 2.5 cm left adrenal mass is suspicious for  a metastasis.  3. There is a right hilar/infrahilar malignancy extending into the right lower  lobe and confluent with right paratracheal, right hilar, and subcarinal  lymphadenopathy, presumably primary lung cancer. There is resulting occlusion  of the right lower lobe airways and postobstructive pneumonia the entire right  lower lobe and right middle lobe.  4. Chronic T4 vertebral body compression deformity. Underlying neoplastic  lesion not excluded.  5. Hydrostatic interstitial pulmonary edema/CHF.  6. Small right pleural effusion.  7 There is also left mediastinal and left hilar metastatic lymphadenopathy.    < end of copied text >      PROTEIN CALORIE MALNUTRITION PRESENT: [ ]mild [ ]moderate [ ]severe [ ]underweight [ ]morbid obesity  https://www.andeal.org/vault/2440/web/files/ONC/Table_Clinical%20Characteristics%20to%20Document%20Malnutrition-White%20JV%20et%20al%202012.pdf    Height (cm): 177.8 (10-15-21 @ 19:14), 177 (10-04-21 @ 19:57), 177 (08-11-21 @ 09:30)  Weight (kg): 113 (10-17-21 @ 11:59), 130 (10-04-21 @ 19:57), 131 (08-11-21 @ 09:30)  BMI (kg/m2): 35.7 (10-17-21 @ 11:59), 41.1 (10-15-21 @ 19:14), 41.5 (10-04-21 @ 19:57)    [ ]PPSV2 < or = to 30% [ ]significant weight loss  [ ]poor nutritional intake  [ ]anasarca      [ ]Artificial Nutrition      REFERRALS:   [ ]Chaplaincy  [ ]Hospice  [ ]Child Life  [ ]Social Work  [ ]Case management [ ]Holistic Therapy

## 2021-10-18 NOTE — PROGRESS NOTE ADULT - SUBJECTIVE AND OBJECTIVE BOX
Kevin Maki MS4    Patient is a 61y old  Male who presents with a chief complaint of SOB (17 Oct 2021 11:52)    SUBJECTIVE / OVERNIGHT EVENTS: Pt examined at bedside in NAD on HFNC. Pt states his leg swelling is improving and it is getting easier to ambulate. Pt denies any current SOB, chest pain, n/v, f/c, abdominal pain, or changes in BMs/urination.    MEDICATIONS  (STANDING):  albuterol/ipratropium for Nebulization 3 milliLiter(s) Nebulizer every 4 hours  budesonide 160 MICROgram(s)/formoterol 4.5 MICROgram(s) Inhaler 2 Puff(s) Inhalation two times a day  cyanocobalamin 1000 MICROGram(s) Oral daily  enoxaparin Injectable 40 milliGRAM(s) SubCutaneous daily  furosemide   Injectable 40 milliGRAM(s) IV Push two times a day  influenza   Vaccine 0.5 milliLiter(s) IntraMuscular once  multivitamin 1 Tablet(s) Oral daily  piperacillin/tazobactam IVPB.. 3.375 Gram(s) IV Intermittent every 8 hours  potassium chloride    Tablet ER 40 milliEquivalent(s) Oral once  vancomycin  IVPB      vancomycin  IVPB 1250 milliGRAM(s) IV Intermittent every 12 hours    MEDICATIONS  (PRN):  metoclopramide 10 milliGRAM(s) Oral every 6 hours PRN nausea  sodium chloride 0.65% Nasal 1 Spray(s) Both Nostrils once PRN Nasal Congestion    Vital Signs Last 24 Hrs  T(C): 36.7 (18 Oct 2021 04:57), Max: 36.7 (17 Oct 2021 21:58)  T(F): 98 (18 Oct 2021 04:57), Max: 98.1 (17 Oct 2021 21:58)  HR: 84 (18 Oct 2021 06:14) (77 - 98)  BP: 106/58 (18 Oct 2021 06:14) (106/58 - 155/68)  BP(mean): --  RR: 20 (18 Oct 2021 04:57) (20 - 20)  SpO2: 92% (18 Oct 2021 04:57) (91% - 98%)  CAPILLARY BLOOD GLUCOSE      I&O's Summary    17 Oct 2021 07:01  -  18 Oct 2021 07:00  --------------------------------------------------------  IN: 0 mL / OUT: 2375 mL / NET: -2375 mL      PHYSICAL EXAM:  GENERAL: NAD, obese   HEAD:  Atraumatic, Normocephalic  EYES: Conjunctiva and sclera clear  NECK: Supple  CHEST/LUNG: wheezing and crackles b/l  HEART: Regular rate and rhythm; No murmurs, rubs, or gallops  ABDOMEN: Soft, Nontender, Nondistended; Bowel sounds present  EXTREMITIES:  2+ Peripheral Pulses; clubbing b/l in the fingers; pitting edema b/l in the LEs  PSYCH: AAOx3  NEUROLOGY: non-focal      LABS:                        11.8   13.79 )-----------( 223      ( 18 Oct 2021 06:36 )             38.7     10-18    138  |  95<L>  |  24<H>  ----------------------------<  95  3.1<L>   |  33<H>  |  1.14    Ca    8.8      18 Oct 2021 06:36  Phos  4.3     10-18  Mg     2.10     10-18    TPro  5.7<L>  /  Alb  3.2<L>  /  TBili  0.8  /  DBili  x   /  AST  34  /  ALT  38  /  AlkPhos  130<H>  10-18          RADIOLOGY & ADDITIONAL TESTS:    Imaging Personally Reviewed:    Consultant(s) Notes Reviewed:      Care Discussed with Consultants/Other Providers:

## 2021-10-18 NOTE — PROGRESS NOTE ADULT - PROBLEM SELECTOR PLAN 3
- diffuse hepatic metastases  - follows Dr. Richmond Zheng outpatient  - s/p RLL resection and chemotherapy  - was on pembrolizumab (started on 10/13)  - onc following  - no current plans for inpatient treatment  - f/u with Dr. Zheng outpatient

## 2021-10-18 NOTE — CONSULT NOTE ADULT - PROBLEM SELECTOR RECOMMENDATION 9
Dyspnea 2/2 Post obstructive pna vs. pulmonary edema in setting of metastatic NSCLC  > Patient currently asymptomatic. We discussed starting on low dose opioid and encouraged to take it prior to activity. Started 2.5mg PO morphine q6hr prn dyspnea or pain   > Pulm recs appreciated: taper HFNC, continue nebs  > Continue diuresis   > At home, patient on 4L NC O2.  > please ensure patient is on bowel regimen while on opioids

## 2021-10-18 NOTE — CONSULT NOTE ADULT - PROBLEM SELECTOR RECOMMENDATION 3
Patient's goals are to return home and continue with DMT   Palliative will continue to assist with sx management.   [facility]- Patient would like to speak to his wife, Annie, before further advance care planning is established. He also states his daughter is an RN at [employer]. He has a son as well, very supportive family.     Thank you for allowing us to participate in your patient's care. We will continue to follow with you. Please page [phone] for any q's or c's.     Radah Massey D.O.   Palliative Medicine

## 2021-10-18 NOTE — CONSULT NOTE ADULT - PROBLEM SELECTOR RECOMMENDATION 2
Onc care at [facility] with Dr. Zheng   s/p RLL resection and chemotherapy, Last chemo on 10/13  > Heme/onc recs appreciated

## 2021-10-18 NOTE — PROGRESS NOTE ADULT - PROBLEM SELECTOR PLAN 5
- BNP of 6752 on admission   - CTA consistent with possible HF  - c/w furosemide  - echocardiogram showed sinus tachycardia, right atrial enlargement, rightward axis, and nonspecific ST and T wave abnormalities - BNP of 6752 on admission   - CTA consistent with possible HF  - c/w furosemide  - EKG showed sinus tachycardia, right atrial enlargement, rightward axis, and nonspecific ST and T wave abnormalities  - echocardiogram pending - BNP of 6752 on admission   - CTA consistent with possible HF  - c/w furosemide  - EKG showed sinus tachycardia, right atrial enlargement, rightward axis, and nonspecific ST and T wave abnormalities  - echocardiogram pending  - daily standing weights

## 2021-10-18 NOTE — PROGRESS NOTE ADULT - PROBLEM SELECTOR PLAN 1
hx of COPD, ILD, lung cancer s/p RLL resection on chemo, pulm HTN on 4L NC at home. CTA and U/S of LE ruled out DVT and PE. BNP of 6752 on admission.   - c/w HFNC and monitor pulse ox, sat goal of 88%  - s/p Vanc + CTX + azithromycin in ED   - Trop wnl   - CT chest remarkable for possible pulmonary malignancy, edema, effusion, and lymphadenopathy  - no new consolidations on CXR  - c/w furosemide and Beztri  - pulm recs appreciated  - Palliative consulted hx of COPD, ILD, lung cancer s/p RLL resection on chemo, pulm HTN on 4L NC at home. CTA and U/S of LE ruled out DVT and PE. BNP of 6752 on admission.   - c/w HFNC and monitor pulse ox, sat goal of 88%  - s/p Vanc + CTX + azithromycin in ED   - Trop wnl   - CT chest remarkable for possible pulmonary malignancy, edema, effusion, and lymphadenopathy  - no new consolidations on CXR  - c/w furosemide and Beztri  - pulm recs appreciated  - Palliative consulted and started on morphine  - f/u VBG due to elevated bicarb hx of COPD, ILD, lung cancer s/p RLL resection on chemo, pulm HTN on 4L NC at home. CTA and U/S of LE ruled out DVT and PE. BNP of 6752 on admission.   - c/w HFNC and monitor pulse ox, sat goal of 88%  - s/p Vanc + CTX + azithromycin in ED   - Trop wnl   - CT chest remarkable for possible pulmonary malignancy, edema, effusion, and lymphadenopathy  - no new consolidations on CXR  - c/w furosemide and duonebs  - pulm recs appreciated  - Palliative consulted and started on morphine  - f/u VBG due to elevated bicarb hx of COPD, ILD, lung cancer s/p RLL resection on chemo, pulm HTN on 4L NC at home. CTA and U/S of LE ruled out DVT and PE. BNP of 6752 on admission.   - c/w HFNC and monitor pulse ox, sat goal of 88%  - s/p Vanc + CTX + azithromycin in ED   - Trop wnl   - CT chest remarkable for possible pulmonary malignancy, edema, effusion, and lymphadenopathy  - no new consolidations on CXR  - c/w furosemide and duonebs  - pulm recs appreciated  - Palliative consulted and started on morphine  - VBG showed elevated pCO2 - f/u pulm recs

## 2021-10-18 NOTE — CONSULT NOTE ADULT - ASSESSMENT
complete note to follow     started 2.5mg PO morphine solution q6hr prn dyspnea or pain 60 y/o M with history of NSCLC s/p RLL resection on chemo, COPD, ILD, pulmonary HTN, on 4L NC at home presents with SOB x 1 week. On admission the patient was tachypneic and tachycardic but afebrile. PE notable for wheezing, crackles and ronchi. Labs on admission remarkable for an elevated WBC, elevated BNP, and transaminitis. LE U/S ruled out evidence of DVT and CTA ruled out evidence of PE. DDx includes HF exacerbation, pulmonary infection, exacerbation 2/2 pt's lung cancer, worsening ILD 2/2 pembrolizumab, or COPD exacerbation. Palliative consulted for assistance with sx management.     started 2.5mg PO morphine solution q6hr prn dyspnea or pain

## 2021-10-18 NOTE — PROGRESS NOTE ADULT - ASSESSMENT
60 y/o M with history of NSCLC s/p RLL resection on chemo, COPD, ILD, pulmonary HTN, on 4L NC at home presents with SOB x 1 week. On admission the patient was tachypneic and tachycardic but afebrile. PE notable for wheezing, crackles and ronchi. Labs on admission remarkable for an elevated WBC, elevated BNP, and transaminitis. LE U/S ruled out evidence of DVT and CTA ruled out evidence of PE. DDx includes HF exacerbation, pulmonary infection, exacerbation 2/2 pt's lung cancer, worsening ILD 2/2 pembrolizumab, or COPD exacerbation. Pt asymp today, doing well on 40% HFNC, maintaining sats 83-98%.

## 2021-10-19 NOTE — PROGRESS NOTE ADULT - SUBJECTIVE AND OBJECTIVE BOX
CHIEF COMPLAINT: SOB    Interval Events: No acute events. Still requiring high FiO2. He reports that he feels well this AM.    REVIEW OF SYSTEMS:  Constitutional: No fevers or chills.   Eyes: No itching or discharge from the eyes  ENT: No ear pain. No ear discharge. No nasal congestion.  CV: No chest pain. No palpitations. No lightheadedness or dizziness.   Resp: No dyspnea at rest. No cough.  GI: No nausea. No vomiting. No diarrhea.  MSK: No joint pain or pain in any extremities  Integumentary: No skin lesions. +Pedal edema.  Neurological: No gross motor weakness. No sensory changes.  [x] All other systems negative  [ ] Unable to assess ROS because ________    OBJECTIVE:  ICU Vital Signs Last 24 Hrs  T(C): 37.1 (19 Oct 2021 05:29), Max: 37.1 (19 Oct 2021 05:29)  T(F): 98.8 (19 Oct 2021 05:29), Max: 98.8 (19 Oct 2021 05:29)  HR: 88 (19 Oct 2021 08:19) (88 - 101)  BP: 125/80 (19 Oct 2021 05:29) (114/67 - 126/77)  BP(mean): --  ABP: --  ABP(mean): --  RR: 19 (19 Oct 2021 05:29) (18 - 20)  SpO2: 93% (19 Oct 2021 08:19) (89% - 94%)        10-18 @ 07:01  -  10-19 @ 07:00  --------------------------------------------------------  IN: 540 mL / OUT: 2850 mL / NET: -2310 mL      CAPILLARY BLOOD GLUCOSE          PHYSICAL EXAM:  General: Awake, alert, oriented X 3.   HEENT: Atraumatic, normocephalic.   Lymph Nodes: No palpable lymphadenopathy  Neck: Supple.  Respiratory: Normal chest expansion. Bibasilar crackles.  Cardiovascular: S1 S2 normal. No murmurs, rubs or gallops.   Abdomen: Soft, non-tender, non-distended. No organomegaly.  Extremities: Warm to touch. Peripheral pulse palpable. No pedal edema.   Skin: No rashes or skin lesions  Neurological: Motor and sensory examination equal and normal in all four extremities.  Psychiatry: Appropriate mood and affect.    HOSPITAL MEDICATIONS:  MEDICATIONS  (STANDING):  albuterol/ipratropium for Nebulization 3 milliLiter(s) Nebulizer every 4 hours  budesonide 160 MICROgram(s)/formoterol 4.5 MICROgram(s) Inhaler 2 Puff(s) Inhalation two times a day  cyanocobalamin 1000 MICROGram(s) Oral daily  enoxaparin Injectable 40 milliGRAM(s) SubCutaneous daily  furosemide   Injectable 40 milliGRAM(s) IV Push two times a day  influenza   Vaccine 0.5 milliLiter(s) IntraMuscular once  multivitamin 1 Tablet(s) Oral daily  piperacillin/tazobactam IVPB.. 3.375 Gram(s) IV Intermittent every 8 hours    MEDICATIONS  (PRN):  metoclopramide 10 milliGRAM(s) Oral every 6 hours PRN nausea  morphine   Solution 2.5 milliGRAM(s) Oral every 6 hours PRN dyspnea or pain  sodium chloride 0.65% Nasal 1 Spray(s) Both Nostrils once PRN Nasal Congestion      LABS:                        11.8   13.79 )-----------( 223      ( 18 Oct 2021 06:36 )             38.7     10-18    138  |  95<L>  |  24<H>  ----------------------------<  95  3.1<L>   |  33<H>  |  1.14    Ca    8.8      18 Oct 2021 06:36  Phos  4.3     10-18  Mg     2.10     10-18    TPro  5.7<L>  /  Alb  3.2<L>  /  TBili  0.8  /  DBili  x   /  AST  34  /  ALT  38  /  AlkPhos  130<H>  10-18          Venous Blood Gas:  10-18 @ 12:17  7.45/62/52/43/83.4  VBG Lactate: 2.3      MICROBIOLOGY:     RADIOLOGY:  [ ] Reviewed and interpreted by me    Point of Care Ultrasound Findings:    PFT:    EKG:

## 2021-10-19 NOTE — PROGRESS NOTE ADULT - ASSESSMENT
62 y/o M with history of NSCLC s/p RLL resection on chemo, COPD, ILD, pulmonary HTN, on 4L NC at home presents with SOB x 1 week. On admission the patient was tachypneic and tachycardic but afebrile. PE notable for wheezing, crackles and ronchi. Labs on admission remarkable for an elevated WBC, elevated BNP, and transaminitis. LE U/S ruled out evidence of DVT and CTA ruled out evidence of PE. DDx includes HF exacerbation, pulmonary infection, exacerbation 2/2 pt's lung cancer, worsening ILD 2/2 pembrolizumab, or COPD exacerbation. Pt asymp today, doing well on 40% HFNC, maintaining sats above 88%.

## 2021-10-19 NOTE — PROGRESS NOTE ADULT - PROBLEM SELECTOR PLAN 1
hx of COPD, ILD, lung cancer s/p RLL resection on chemo, pulm HTN on 4L NC at home. CTA and U/S of LE ruled out DVT and PE. BNP of 6752 on admission.   - c/w HFNC and monitor pulse ox, sat goal of 88%  - s/p Vanc + CTX + azithromycin in ED   - Trop wnl   - CT chest remarkable for possible pulmonary malignancy, edema, effusion, and lymphadenopathy  - no new consolidations on CXR  - c/w furosemide and duonebs  - pulm recs appreciated  - Palliative consulted and started on morphine  - VBG showed elevated pCO2 - f/u pulm recs

## 2021-10-19 NOTE — PROGRESS NOTE ADULT - PROBLEM SELECTOR PLAN 1
Dyspnea 2/2 Post obstructive pna vs. pulmonary edema in setting of metastatic NSCLC  > Patient remains asymptomatic. Pt is aware he has low dose opioid and encouraged to take it prior to activity. Can continue 2.5mg PO morphine q6hr prn dyspnea or pain.   > Pulm recs appreciated: taper HFNC, continue nebs  > Continue diuresis   > At home, patient on 4L NC O2.  > please ensure patient is on bowel regimen while on opioids.

## 2021-10-19 NOTE — PROGRESS NOTE ADULT - SUBJECTIVE AND OBJECTIVE BOX
Kevin Maki MS3    Patient is a 61y old  Male who presents with a chief complaint of SOB (18 Oct 2021 11:13)    SUBJECTIVE / OVERNIGHT EVENTS: Patient examined at bedside in NAD. Pt states he is feeling better compared to yesterday and that his leg swelling is improving but she still experiences some numbness/tingling in this toes. Pt denies any SOB, chest pain, n/v, abdominal pain. Pt denies any changes in urination or BMs.    MEDICATIONS  (STANDING):  albuterol/ipratropium for Nebulization 3 milliLiter(s) Nebulizer every 4 hours  budesonide 160 MICROgram(s)/formoterol 4.5 MICROgram(s) Inhaler 2 Puff(s) Inhalation two times a day  cyanocobalamin 1000 MICROGram(s) Oral daily  enoxaparin Injectable 40 milliGRAM(s) SubCutaneous daily  furosemide   Injectable 40 milliGRAM(s) IV Push two times a day  influenza   Vaccine 0.5 milliLiter(s) IntraMuscular once  multivitamin 1 Tablet(s) Oral daily  piperacillin/tazobactam IVPB.. 3.375 Gram(s) IV Intermittent every 8 hours    MEDICATIONS  (PRN):  metoclopramide 10 milliGRAM(s) Oral every 6 hours PRN nausea  morphine   Solution 2.5 milliGRAM(s) Oral every 6 hours PRN dyspnea or pain  sodium chloride 0.65% Nasal 1 Spray(s) Both Nostrils once PRN Nasal Congestion      Vital Signs Last 24 Hrs  T(C): 37.1 (19 Oct 2021 05:29), Max: 37.1 (19 Oct 2021 05:29)  T(F): 98.8 (19 Oct 2021 05:29), Max: 98.8 (19 Oct 2021 05:29)  HR: 101 (19 Oct 2021 05:29) (88 - 101)  BP: 125/80 (19 Oct 2021 05:29) (114/67 - 126/77)  BP(mean): --  RR: 19 (19 Oct 2021 05:29) (18 - 20)  SpO2: 89% (19 Oct 2021 05:29) (89% - 94%)  CAPILLARY BLOOD GLUCOSE    I&O's Summary    18 Oct 2021 07:01  -  19 Oct 2021 07:00  --------------------------------------------------------  IN: 540 mL / OUT: 2850 mL / NET: -2310 mL    PHYSICAL EXAM:  GENERAL: NAD, obese  HEAD:  Atraumatic, Normocephalic  EYES: conjunctiva and sclera clear  NECK: Supple, No JVD  CHEST/LUNG: crackles and wheezing b/l; decreased breath sounds on the right  HEART: Regular rate and rhythm; No murmurs, rubs, or gallops  ABDOMEN: Soft, Nontender, Nondistended; Bowel sounds present  EXTREMITIES:  2+ Peripheral Pulses; clubbing in the fingers b/l, pitting edema in the LEs b/l  PSYCH: AAOx3  NEUROLOGY: non-focal  SKIN: No new rashes or lesions    LABS:                        11.8   13.79 )-----------( 223      ( 18 Oct 2021 06:36 )             38.7     10-18    138  |  95<L>  |  24<H>  ----------------------------<  95  3.1<L>   |  33<H>  |  1.14    Ca    8.8      18 Oct 2021 06:36  Phos  4.3     10-18  Mg     2.10     10-18    TPro  5.7<L>  /  Alb  3.2<L>  /  TBili  0.8  /  DBili  x   /  AST  34  /  ALT  38  /  AlkPhos  130<H>  10-18      RADIOLOGY & ADDITIONAL TESTS:    Culture - Sputum . (10.19.21 @ 00:39)    Gram Stain:   Rare polymorphonuclear leukocytes per low power field  Rare Squamous epithelial cells per low power field  Few Gram Negative Rods per oil power field  Few Yeast like cells per oil power field  Moderate Gram Positive Rods per oil power field    Specimen Source: .Sputum Sputum    MRSA/MSSA PCR (10.17.21 @ 23:39)    MRSA PCR Result.: [employer]: MRSA/MSSA PCR assay is a qualitative in vitro diagnostic test for the  direct detection and differentiation of methicillin-resistant  Staphylococcus aureus (MRSA) from Staphylococcus aureus (SA). The assay  detects DNA from nasal swabs in patients atrisk for nasal colonization.  It is not intended to diagnose MRSA or SA infections nor guide or monitor  treatment for MRSA/SA infections. A negative result does not preclude  nasal colonization. The Real-Time PCR assay is FDA-approved, and its  performance has been established by [facility], [place], [place].    Staph Aureus PCR Result: [employer]

## 2021-10-19 NOTE — PROGRESS NOTE ADULT - SUBJECTIVE AND OBJECTIVE BOX
[facility] Pall[facility]  Radha Massey Palliative Care Attending  Contact Info: Page [phone] (including Nights/Weekends), message on Microsoft Teams (Radha Massey), or leave  at Palliative Office [phone] (non-urgent)     SUBJECTIVE AND OBJECTIVE: Patient seen and examined with HFNC in place, no family at bedside during visit. He continues to deny sob, pain, n/v, constipation. He states that "when the hiflo is weaned down, he sees that his oxygen level goes down but he does not feel sob". During encounter, patient intermittently taking HFNC away from nostrils as it is causing irritation around nostrils.     INTERVAL HPI/OVERNIGHT EVENTS:  > Over the past 24 hours, pt did not require PRN medications.     DNR on chart:   Allergies    No Known Allergies    Intolerances    MEDICATIONS  (STANDING):  albuterol/ipratropium for Nebulization 3 milliLiter(s) Nebulizer every 4 hours  budesonide 160 MICROgram(s)/formoterol 4.5 MICROgram(s) Inhaler 2 Puff(s) Inhalation two times a day  cyanocobalamin 1000 MICROGram(s) Oral daily  enoxaparin Injectable 40 milliGRAM(s) SubCutaneous daily  furosemide   Injectable 40 milliGRAM(s) IV Push daily  influenza   Vaccine 0.5 milliLiter(s) IntraMuscular once  multivitamin 1 Tablet(s) Oral daily  piperacillin/tazobactam IVPB.. 3.375 Gram(s) IV Intermittent every 8 hours    MEDICATIONS  (PRN):  metoclopramide 10 milliGRAM(s) Oral every 6 hours PRN nausea  morphine   Solution 2.5 milliGRAM(s) Oral every 6 hours PRN dyspnea or pain  sodium chloride 0.65% Nasal 1 Spray(s) Both Nostrils once PRN Nasal Congestion      ITEMS UNCHECKED ARE NOT PRESENT    PRESENT SYMPTOMS: [ ]Unable to obtain due to poor mentation   Source if other than patient:  [ ]Family   [ ]Team     Pain:  [ ]yes [x ]no  QOL impact -   Location -                    Aggravating factors -  Quality -  Radiation -  Timing-  Severity (0-10 scale):  Minimal acceptable level (0-10 scale):     Dyspnea:                           [ ]Mild [ ]Moderate [ ]Severe  Anxiety:                             [ ]Mild [ ]Moderate [ ]Severe  Fatigue:                             [ ]Mild [ ]Moderate [ ]Severe  Nausea:                             [ ]Mild [ ]Moderate [ ]Severe  Loss of appetite:              [ ]Mild [ ]Moderate [ ]Severe  Constipation:                    [ ]Mild [ ]Moderate [ ]Severe    PAIN AD Score:	  [email or web address].[email or web address]/painad.pdf (Ctrl + left click to view)    Other Symptoms:  [x ]All other review of systems negative     Palliative Performance Status Version 2:     80    %      http://[email or web address]/files/news/[email or web address]_performance_scale_[email or web address].pdf  PHYSICAL EXAM:  Vital Signs Last 24 Hrs  T(C): 36.9 (19 Oct 2021 15:22), Max: 37.1 (19 Oct 2021 05:29)  T(F): 98.4 (19 Oct 2021 15:22), Max: 98.8 (19 Oct 2021 05:29)  HR: 79 (19 Oct 2021 15:22) (79 - 101)  BP: 117/65 (19 Oct 2021 15:22) (117/65 - 126/77)  BP(mean): --  RR: 18 (19 Oct 2021 15:22) (18 - 19)  SpO2: 93% (19 Oct 2021 15:22) (89% - 94%) I&O's Summary    18 Oct 2021 07:01  -  19 Oct 2021 07:00  --------------------------------------------------------  IN: 540 mL / OUT: 2850 mL / NET: -2310 mL    19 Oct 2021 07:01  -  19 Oct 2021 15:57  --------------------------------------------------------  IN: 440 mL / OUT: 690 mL / NET: -250 mL       GENERAL:  [ x]Alert  [x ]Oriented x3   [ ]Lethargic  [ ]Cachexia  [ ]Unarousable  [x ]Verbal  [ ]Non-Verbal  Behavioral:   [ ] Anxiety  [ ] Delirium [ ] Agitation [x ] Other  HEENT:  [ ]Normal   [ ]Dry mouth   [ ]ET Tube/Trach  [ ]Oral lesions  PULMONARY: HFNC   [x ]Clear [ ]Tachypnea  [ ]Audible excessive secretions   [ ]Rhonchi        [ ]Right [ ]Left [ ]Bilateral  [ ]Crackles        [ ]Right [ ]Left [ ]Bilateral  [ ]Wheezing     [ ]Right [ ]Left [ ]Bilateral  [ ]Diminished breath sounds [ ]right [ ]left [ ]bilateral  CARDIOVASCULAR:    [x ]Regular [ ]Irregular [ ]Tachy  [ ]Randy [ ]Murmur [ ]Other  GASTROINTESTINAL:  [x ]Soft  [ ]Distended   [ ]+BS  [ ]Non tender [ ]Tender  [ ]PEG [ ]OGT/ NGT  Last BM: 10/18  GENITOURINARY:  [x ]Normal [ ] Incontinent   [ ]Oliguria/Anuria   [ ]Haro  MUSCULOSKELETAL:   [ ]Normal   [ ]Weakness  [ ]Bed/Wheelchair bound [ x]Edema- b/l lower extremity edema   NEUROLOGIC:   [ x]No focal deficits  [ ]Cognitive impairment  [ ]Dysphagia [ ]Dysarthria [ ]Paresis [ ]Other   SKIN: Hyperpigmentaiton noted on anterior left leg   [ ]Normal    [ ]Rash  [ ]Pressure ulcer(s)       Present on admission [ ]y [ ]n    CRITICAL CARE:  [ ] Shock Present  [ ]Septic [ ]Cardiogenic [ ]Neurologic [ ]Hypovolemic  [ ]  Vasopressors [ ]  Inotropes   [ ]Respiratory failure present [ ]Mechanical ventilation [ ]Non-invasive ventilatory support [ ]High flow  [ ]Acute  [ ]Chronic [ ]Hypoxic  [ ]Hypercarbic [ ]Other  [ ]Other organ failure     LABS:                        13.4   15.34 )-----------( 254      ( 19 Oct 2021 12:24 )             43.1   10-19    141  |  94<L>  |  23  ----------------------------<  118<H>  3.3<L>   |  36<H>  |  1.00    Ca    9.1      19 Oct 2021 12:24  Phos  4.3     10-18  Mg     2.10     10-18    TPro  6.8  /  Alb  4.0  /  TBili  0.9  /  DBili  x   /  AST  43<H>  /  ALT  42<H>  /  AlkPhos  151<H>  10-19        RADIOLOGY & ADDITIONAL STUDIES: n/a     Protein Calorie Malnutrition Present: [ ]mild [ ]moderate [ ]severe [ ]underweight [ ]morbid obesity  https://www.andeal.org/vault/6140/web/files/ONC/Table_Clinical%20Characteristics%20to%20Document%20Malnutrition-White%20JV%20et%20al%202012.pdf    Height (cm): 177.8 (10-15-21 @ 19:14), 177 (10-04-21 @ 19:57), 177 (08-11-21 @ 09:30)  Weight (kg): 113 (10-17-21 @ 11:59), 130 (10-04-21 @ 19:57), 131 (08-11-21 @ 09:30)  BMI (kg/m2): 35.7 (10-17-21 @ 11:59), 41.1 (10-15-21 @ 19:14), 41.5 (10-04-21 @ 19:57)    [ ]PPSV2 < or = 30%  [ ]significant weight loss [ ]poor nutritional intake [ ]anasarca    [ ]Artificial Nutrition    REFERRALS:   [ ]Chaplaincy  [ ]Hospice  [ ]Child Life  [ ]Social Work  [ ]Case management [ ]Holistic Therapy

## 2021-10-19 NOTE — PROGRESS NOTE ADULT - PROBLEM SELECTOR PLAN 2
Onc care at [facility] with Dr. Zheng   s/p RLL resection and chemotherapy, Last chemo on 10/13  > Heme/onc recs appreciated.

## 2021-10-19 NOTE — PROGRESS NOTE ADULT - PROBLEM SELECTOR PLAN 4
- nasal MRSA negative - d/c vancomycin  - urine legionella neg - azithryomycin d/c  - aggressive chest PT - aerobika, bed percussion, and chest physiotherapy to help with airway clearance  - does not require bronchoscopy at this time  - sputum remarkable for PMNs, gram negative rods, gram positive rods, and yeast  - c/w  Zosyn

## 2021-10-19 NOTE — PROGRESS NOTE ADULT - PROBLEM SELECTOR PLAN 3
Patient's goals are to return home and continue with DMT   Palliative will continue to assist with sx management.   [facility]- Patient would like to speak to his wife, Annie, before further advance care planning is established. He also states his daughter is an RN at [employer]. He has a son as well, very supportive family.     Thank you for allowing us to participate in your patient's care. Patient remains asymptomatic. Palliative team signing off at this time. Please page [phone] for any q's or c's.     Radha Massey D.O.   Palliative Medicine.

## 2021-10-19 NOTE — PROGRESS NOTE ADULT - ASSESSMENT
61M NSCLC (on therapy), ILD with occupational exposure and smoking history, and chronic hypoxemic respiratory failure presenting with acute on chronic hypoxemia as well as volume overload. He has had a CTPA in the ED which is worse than a recent CT and is concerning for volume overload, possible infection, and most notably progression of cancer.    1. Acute on Chronic Hypoxemic Respiratory Failure - continue supplemental O2 with goal O2 sat > 88%  - Improved saturations on HFNC - would titrate down FiO2 with goal O2 sat > 88%  - Patient has 4L home O2 at baseline - he does not notice his hypoxia (asymptomatic)  - Can use NIPPV as needed - but does not require this at present    2. Postobstructive pneumonia - continue Zosyn  - check nasal MRSA - not detected  - check urine legionella - negative  - check sputum culture if able  - aggressive chest PT - aerobika, bed percussion, and chest physiotherapy to help with airway clearance  - does not require bronchoscopy at this time    3. Progressive NSCLC - patient recently started on Immunotherapy  - His current admission is not likely to be related to initiation of immunotherapy this past Wednesday  - Oncology Evaluation (Dr. Richmond Zheng)  - Patient's imaging is suggestive of liver, adrenal, and bone metastases    4. R/O Pulmonary Hypertension  - Please check 2 D echo - given underlying lung disease patient is likely to have pulmonary hypertension  - Aggressive diuresis - would continue Lasix IV BID with close monitoring of I/O (please monitor urine output)  - No role for pulmonary vasodilator therapy    5. Obstructive Lung Disease   - Patient has obstructive lung disease on PFTs   - Was on bronchodilator therapy at home - would use Symbicort and Spiriva while in the hospital  - Can decrease frequency of Duonebs and make PRN  - Incentive spirometer and Acapella  - Would hold off oral corticosteroids from a pulmonary perspective at this time    6. Gen  - DVT proph  - GI proph  - Maintain O2 sat > 88%  - Aggressive diuresis  - Aspiration precautions  - Palliative Care eval/GOC, appreciated. Continue GOC discussion.    Kevin Ferguson, PGY-6  Pulmonary and Critical Care Medicine  [phone]

## 2021-10-19 NOTE — PROGRESS NOTE ADULT - PROBLEM SELECTOR PLAN 5
- BNP of 6752 on admission, repeat BNP 2607  - CTA consistent with possible HF  - c/w furosemide   - EKG showed sinus tachycardia, right atrial enlargement, rightward axis, and nonspecific ST and T wave abnormalities  - echocardiogram pending  - daily standing weights

## 2021-10-20 NOTE — PROGRESS NOTE ADULT - PROBLEM SELECTOR PLAN 5
- BNP of 6752 on admission, repeat BNP 2607  - CTA consistent with possible HF  - c/w furosemide   - EKG showed sinus tachycardia, right atrial enlargement, rightward axis, and nonspecific ST and T wave abnormalities  - echocardiogram shows grossly normal LV function and decreased RV systolic function  - daily standing weights

## 2021-10-20 NOTE — CHART NOTE - NSCHARTNOTEFT_GEN_A_CORE
: MD Marty and MD Maximiliano    INDICATION: Evaluation of hypoxia    PROCEDURE:  [ ] LIMITED ECHO  [x] LIMITED CHEST  [ ] LIMITED RETROPERITONEAL  [ ] LIMITED ABDOMINAL  [ ] LIMITED DVT  [ ] NEEDLE GUIDANCE VASCULAR  [ ] NEEDLE GUIDANCE THORACENTESIS  [ ] NEEDLE GUIDANCE PARACENTESIS  [ ] NEEDLE GUIDANCE PERICARDIOCENTESIS  [ ] OTHER    FINDINGS:  Thoracic: R sided basilar consolidation. B-lines on the left. No pleural effusion.    INTERPRETATION:  Right-sided consolidation at the base with left sided B-lines    Images stored in [employer]

## 2021-10-20 NOTE — PROGRESS NOTE ADULT - ASSESSMENT
60 yo M with PMH of metastatic NSCLC currently on pembrolizumab (C1 10/13/21), COPD, ILD, pulmonary HTN, on 4L NC at home who p/w SOB x 1 week with imaging concerning for postobstructive pneumonia and pulmonary edema. Oncology has been consulted for further recommendations.    #Dyspnea  -CT chest  on admission showing diffuse hepatic metastases, known adrenal mets, right hilar/infrahilar malignancy extending into the right lower lobe and confluent with right paratracheal, right hilar, and subcarinal lymphadenopathy, presumably primary lung cancer. There is resulting occlusion of the right lower lobe airways and postobstructive pneumonia the entire right lower lobe and right middle lobe. Hydrostatic interstitial pulmonary edema/CHF. Small right pleural effusion. There is also left mediastinal and left hilar metastatic lymphadenopathy.  -Given ILD, there is always concern that pembrolizumab may be causing a pneumonitis. However, imaging findings are not consistent with pneumonitis, and it would be rare to see this develop so quickly as patient was only treated 3 days ago. More likely is that symptoms are due to pneumonia and fluid overload.  -Pulmonary consulted. Appreciate recommendations, holding of bronchoscopy for now, No indication for steroids at this time.   -Agree with treatment for postobstructive pneumonia and CHF. Symptoms are improving  -Continue to titrate down O2 demand on HFNC  -Monitor symptoms closely    #History of NSCLC  -Patient follows with Dr. Zheng at [facility] [place]  -Diagnosed in 2020. s/p 6 cycles Carbo/Pemetrexed (Immunotherapy held given ILD) + 1 cycle pemetrexed maintenance with POD, s/p nab-paclitaxel/ramicirumab with POD, s/p gemcitabine with POD  -Most recently started on 4th line pembrolizumab. C1 on 10/13/21  -As above, suspect that current respiratory symptoms are due to pneumonia and CHF rather then pneumonitis caused from pembrolizumab. No indication for steroids at this time  - Appreciate Palliative Care consult for GOC and symptom management  -Family meeting tentatively planned for 1 pm tomorrow for GOC discussions  -Patient with progression of disease and just recently started on 4th line treatment , limited options remain for further DMT  -Prognosis guarded,  would continue to monitor respiratory symptoms  -Continue with supportive management  -Patient to followup with  ([facility]) upon discharge  -Oncology will continue to follow with you      Case d/w Dr. Aries VELÁSQUEZ  Oncology Physician Assistant  [facility]  Pager [phone]    If after 5pm or weekends please page On-call Oncology Fellow       62 yo M with PMH of metastatic NSCLC currently on pembrolizumab (C1 10/13/21), COPD, ILD, pulmonary HTN, on 4L NC at home who p/w SOB x 1 week with imaging concerning for postobstructive pneumonia and pulmonary edema. Oncology has been consulted for further recommendations.    #Dyspnea  -CT chest  on admission showing diffuse hepatic metastases, known adrenal mets, right hilar/infrahilar malignancy extending into the right lower lobe and confluent with right paratracheal, right hilar, and subcarinal lymphadenopathy, presumably primary lung cancer. There is resulting occlusion of the right lower lobe airways and postobstructive pneumonia the entire right lower lobe and right middle lobe. Hydrostatic interstitial pulmonary edema/CHF. Small right pleural effusion. There is also left mediastinal and left hilar metastatic lymphadenopathy.  -Given ILD, there is always concern that pembrolizumab may be causing a pneumonitis. However, imaging findings are not consistent with pneumonitis, and it would be rare to see this develop so quickly as patient was only treated 3 days ago. More likely is that symptoms are due to pneumonia and fluid overload.  -Pulmonary consulted. Appreciate recommendations, holding of bronchoscopy for now, No indication for steroids at this time.   -Agree with treatment for postobstructive pneumonia and CHF. Symptoms are improving  -Continue to titrate down O2 demand on HFNC  -Monitor symptoms closely    #History of NSCLC  -Patient follows with Dr. Zheng at [facility] [place]  -Diagnosed in 2020. s/p 6 cycles Carbo/Pemetrexed (Immunotherapy held given ILD) + 1 cycle pemetrexed maintenance with POD, s/p nab-paclitaxel/ramicirumab with POD, s/p gemcitabine with POD  -Most recently started on 4th line pembrolizumab. C1 on 10/13/21  -As above, suspect that current respiratory symptoms are due to pneumonia and CHF rather then pneumonitis caused from pembrolizumab. No indication for steroids at this time  - Appreciate Palliative Care consult for GOC and symptom management  -Family meeting tentatively planned for 1 pm tomorrow for GOC discussions  -Patient with progression of disease and just recently started on 4th line treatment , limited options remain for further DMT. Patient may benefit from hospice services.  -Prognosis guarded,  would continue to monitor respiratory symptoms  -Continue with supportive management  -Patient to followup with  ([facility]) upon discharge  -Oncology will continue to follow with you      Case d/w Dr. Aries VELÁSQUEZ  Oncology Physician Assistant  [facility]  Pager [phone]    If after 5pm or weekends please page On-call Oncology Fellow

## 2021-10-20 NOTE — PROGRESS NOTE ADULT - PROBLEM SELECTOR PLAN 1
hx of COPD, ILD, lung cancer s/p RLL resection on chemo, pulm HTN on 4L NC at home. CTA and U/S of LE ruled out DVT and PE. BNP of 6752 on admission.   - c/w HFNC and monitor pulse ox, sat goal of 88%  - s/p Vanc + CTX + azithromycin in ED   - Trop wnl   - CT chest remarkable for possible pulmonary malignancy, edema, effusion, and lymphadenopathy  - no new consolidations on CXR  - c/w furosemide and duonebs  - pulm recs appreciated  - Palliative consulted and started on morphine  - f/u pulm recs

## 2021-10-20 NOTE — PROGRESS NOTE ADULT - SUBJECTIVE AND OBJECTIVE BOX
Kevin Maki MS3    Patient is a 61y old  Male who presents with a chief complaint of SOB (19 Oct 2021 15:56)    SUBJECTIVE / OVERNIGHT EVENTS: Pt examined at bedside in NAD on HFNC. Pt denies any discomfort, SOB, chest pain, n/v, f/c. Pt states overall he is improving and that the swelling in his legs is improving. He has less pain in his legs and it is easier for him to ambulate. Denies any problems with BMs and urination.    MEDICATIONS  (STANDING):  albuterol/ipratropium for Nebulization 3 milliLiter(s) Nebulizer every 4 hours  budesonide 160 MICROgram(s)/formoterol 4.5 MICROgram(s) Inhaler 2 Puff(s) Inhalation two times a day  cyanocobalamin 1000 MICROGram(s) Oral daily  enoxaparin Injectable 40 milliGRAM(s) SubCutaneous daily  furosemide   Injectable 40 milliGRAM(s) IV Push daily  influenza   Vaccine 0.5 milliLiter(s) IntraMuscular once  multivitamin 1 Tablet(s) Oral daily  piperacillin/tazobactam IVPB.. 3.375 Gram(s) IV Intermittent every 8 hours    MEDICATIONS  (PRN):  metoclopramide 10 milliGRAM(s) Oral every 6 hours PRN nausea  morphine   Solution 2.5 milliGRAM(s) Oral every 6 hours PRN dyspnea or pain  sodium chloride 0.65% Nasal 1 Spray(s) Both Nostrils once PRN Nasal Congestion    Vital Signs Last 24 Hrs  T(C): 37.1 (20 Oct 2021 06:24), Max: 37.1 (20 Oct 2021 06:24)  T(F): 98.7 (20 Oct 2021 06:24), Max: 98.7 (20 Oct 2021 06:24)  HR: 83 (20 Oct 2021 06:24) (79 - 92)  BP: 123/66 (20 Oct 2021 06:24) (117/65 - 126/76)  BP(mean): --  RR: 18 (20 Oct 2021 06:24) (15 - 18)  SpO2: 85% (20 Oct 2021 06:24) (85% - 96%)  CAPILLARY BLOOD GLUCOSE      I&O's Summary    19 Oct 2021 07:01  -  20 Oct 2021 07:00  --------------------------------------------------------  IN: 440 mL / OUT: 1190 mL / NET: -750 mL    PHYSICAL EXAM:  GENERAL: NAD, obese, on HFNC  HEAD:  Atraumatic, Normocephalic  EYES: conjunctiva and sclera clear  NECK: Supple  CHEST/LUNG: wheezing and crackles b/l, decreased breath sounds on the right  HEART: Regular rate and rhythm; No murmurs, rubs, or gallops  ABDOMEN: Soft, Nontender, Nondistended; Bowel sounds present  EXTREMITIES:  2+ Peripheral Pulses, clubbing of the fingers and pitting edema of the LEs b/l  PSYCH: AAOx3  NEUROLOGY: non-focal    LABS:                        13.4   15.34 )-----------( 254      ( 19 Oct 2021 12:24 )             43.1     10-19    141  |  94<L>  |  23  ----------------------------<  118<H>  3.3<L>   |  36<H>  |  1.00    Ca    9.1      19 Oct 2021 12:24  Phos  2.9     10-19  Mg     2.10     10-19    TPro  6.8  /  Alb  4.0  /  TBili  0.9  /  DBili  x   /  AST  43<H>  /  ALT  42<H>  /  AlkPhos  151<H>  10-19        RADIOLOGY & ADDITIONAL TESTS:    Patient name: MAKENZIE NANCE  YOB: 1960   Age: 61 (M)   MR#: [id number]  Study Date: 10/19/2021  [facility]  Study quality: Technically good  Referring Physician: Haim Garcia MD  Blood Pressure: 117/65 mmHg  Height: 177 cm  Weight: 129 kg  BSA: 2.4 m2  ------------------------------------------------------------------------  PROCEDURE: Transthoracic echocardiogram with 2-D, M-Mode  and complete spectral and color flow Doppler.  Intravenous ultrasound enhancing agent was administered for  improved left ventricular endocardial border definition.  Following the intravenous injection of ultrasound enhancing  agent, harmonic imaging was performed.  INDICATION: Edema, unspecified (R60.9)  ------------------------------------------------------------------------  DIMENSIONS:  Dimensions:     Normal Values:  LA:     4.0 cm    2.0 - 4.0 cm  Ao:     3.8 cm    2.0 - 3.8 cm  SEPTUM: 1.0 cm    0.6 - 1.2 cm  PWT:    1.1 cm    0.6 - 1.1 cm  LVIDd:  4.8 cm    3.0 - 5.6 cm  LVIDs:  3.4 cm    1.8 - 4.0 cm  Derived Variables:  LVMI: 75 g/m2  RWT: 0.45  Fractional short: 29 %  Ejection Fraction (Teicholtz): 56 %  ------------------------------------------------------------------------  OBSERVATIONS:  Mitral Valve: Normal mitral valve.  Aortic Root: Normal aortic root.  Aortic Valve: Calcified trileaflet aortic valve with normal  opening.  Left Atrium: Normal left atrium.  LA volume index = 25  cc/m2.  Left Ventricle: Endocardium not well visualized; grossly  normal left ventricular systolic function. Endocardial  visualization enhanced with intravenous injection of echo  contrast (Definity). Normal left ventricular internal  dimensions and wall thicknesses.  Right Heart: Normal right atrium. Right ventricular  enlargement with decreased right ventricular systolic  function. Normal tricuspid valve. Normal pulmonic valve.  Minimal pulmonic regurgitation.  Pericardium/PleuraNormal pericardium with no pericardial  effusion.  ------------------------------------------------------------------------  CONCLUSIONS:  1. Normal left ventricular internal dimensions and wall  thicknesses.  2. Endocardium not well visualized; grossly normal left  ventricular systolic function. Endocardial visualization  enhanced with intravenous injection of echo contrast  (Definity).  3. Right ventricular enlargement with decreased right  ventricular systolic function.  ------------------------------------------------------------------------  Confirmed on10/19/2021 - 18:47:04 by Roger Wang M.D. RPVI  ------------------------------------------------------------------------

## 2021-10-20 NOTE — PROGRESS NOTE ADULT - ASSESSMENT
61M NSCLC (on therapy), ILD with occupational exposure and smoking history, and chronic hypoxemic respiratory failure presenting with acute on chronic hypoxemia as well as volume overload. He has had a CTPA in the ED which is worse than a recent CT and is concerning for volume overload, possible infection, and most notably progression of cancer.    1. Acute on Chronic Hypoxemic Respiratory Failure - continue supplemental O2 with goal O2 sat > 88%  - Improved saturations on HFNC - would titrate down FiO2 with goal O2 sat > 88%  - Patient has 4L home O2 at baseline - he does not notice his hypoxia (asymptomatic)  - Can use NIPPV as needed - but does not require this at present  - Weaned to FiO2 of 90% at bedside with saturation of 93%    2. Postobstructive pneumonia - continue Zosyn  - aggressive chest PT - aerobika, bed percussion, and chest physiotherapy to help with airway clearance  - does not require bronchoscopy at this time    3. Progressive NSCLC - patient recently started on Immunotherapy  - His current admission is not likely to be related to initiation of immunotherapy this past Wednesday  - Oncology Evaluation (Dr. Richmond Zheng)  - Patient's imaging is suggestive of liver, adrenal, and bone metastases    4. R/O Pulmonary Hypertension  - 2D echo with evidence of RV systolic dysfunction, likely in the setting of hypoxia  - Aggressive diuresis - would continue Lasix IV BID with close monitoring of I/O (please monitor urine output)  - No role for pulmonary vasodilator therapy  - If bicarb continues to rise, can consider dose of acetazolamide    5. Obstructive Lung Disease   - Patient has obstructive lung disease on PFTs   - Was on bronchodilator therapy at home - would use Symbicort and Spiriva while in the hospital  - Can decrease frequency of Duonebs and make PRN  - Incentive spirometer and Acapella  - Would hold off oral corticosteroids from a pulmonary perspective at this time    6. Gen  - DVT proph  - GI proph  - Maintain O2 sat > 88%  - Aggressive diuresis  - Aspiration precautions  - Palliative Care eval/GOC, appreciated.  - DNR/DNI. Please see separate goals of care note. MOLST form filled out, to be placed in chart.    Kevin Ferguson, PGY-6  Pulmonary and Critical Care Medicine  [phone]

## 2021-10-20 NOTE — GOALS OF CARE CONVERSATION - ADVANCED CARE PLANNING - CONVERSATION DETAILS
Discussion held at bedside with patient, wife (Annie), Dr. Ferguson (Pulmonary Fellow), and Dr. Viera (Pulmonary Attending). We discussed the current treatment being offered to the patient and his current condition. We explained that he was still requiring very high amounts of supplemental oxygen despite antibiotics and diuretics. We reviewed his diagnosis and imaging findings. Patient and wife report that the patient's main desire is to leave the hospital and go home. Given his high oxygen requirements, we discussed what would be the next steps in intervention if he were to decompensate, including intubation, mechanical ventilation, and cardiac resuscitation. Given his diagnosis and his wishes, both the patient and the wife reported that they would not want him to be intubated or have chest compressions should his heart stop. We agreed to begin slowly titrating down oxygen with the goal of him getting to a point where he could go home. All questions answered and emotional support provided. Discussion held at bedside with patient, wife (Annie), Dr. Ferguson (Pulmonary Fellow), and Dr. Viera (Pulmonary Attending). We discussed the current treatment being offered to the patient and his current condition. We explained that he was still requiring very high amounts of supplemental oxygen despite antibiotics and diuretics. We reviewed his diagnosis and imaging findings. Patient and wife report that the patient's main desire is to leave the hospital and go home. Given his high oxygen requirements, we discussed what would be the next steps in intervention if he were to decompensate, including intubation, mechanical ventilation, and cardiac resuscitation. Given his diagnosis and his wishes to have meaningful time at home, both the patient and his wife reported that they would not want him to be intubated or have chest compressions should his heart stop. We agreed to begin slowly titrating down oxygen with the goal of him getting to a point where he could go home. All questions answered and emotional support provided.

## 2021-10-20 NOTE — PROGRESS NOTE ADULT - PROBLEM SELECTOR PLAN 7
- echocardiogram shows grossly normal LV function and decreased RV systolic function  - Aggressive diuresis - Lasix IV BID with close monitoring of I/O  - No role for pulmonary vasodilator therapy

## 2021-10-20 NOTE — PROGRESS NOTE ADULT - SUBJECTIVE AND OBJECTIVE BOX
INTERVAL HPI/OVERNIGHT EVENTS:  Patient seen at bedside.  Pt examined at bedside in NAD on HFNC.   Pt denies any discomfort, SOB, chest pain, n/v, f/c.   Pt states overall he is improving and that the swelling in his legs is improving.   He has less pain in his legs , able to ambulate to the bathroom with high oxygen levels.   Denies any problems with BMs and urination.      VITAL SIGNS:  T(F): 98.3 (10-20-21 @ 14:09)  HR: 93 (10-20-21 @ 14:09)  BP: 117/64 (10-20-21 @ 14:09)  RR: 18 (10-20-21 @ 14:09)  SpO2: 91% (10-20-21 @ 14:09)  Wt(kg): --    PHYSICAL EXAM:  GENERAL: NAD, obese, on HFNC  HEAD:  Atraumatic, Normocephalic  EYES: conjunctiva and sclera clear  NECK: Supple  CHEST/LUNG: wheezing and crackles b/l, decreased breath sounds on the right  HEART: Regular rate and rhythm; No murmurs, rubs, or gallops  ABDOMEN: Soft, Nontender, Nondistended; Bowel sounds present  EXTREMITIES:  2+ Peripheral Pulses, clubbing of the fingers and pitting edema of the LEs b/l  PSYCH: AAOx3  NEUROLOGY: non-focal        MEDICATIONS  (STANDING):  budesonide 160 MICROgram(s)/formoterol 4.5 MICROgram(s) Inhaler 2 Puff(s) Inhalation two times a day  cyanocobalamin 1000 MICROGram(s) Oral daily  enoxaparin Injectable 40 milliGRAM(s) SubCutaneous daily  furosemide   Injectable 40 milliGRAM(s) IV Push daily  influenza   Vaccine 0.5 milliLiter(s) IntraMuscular once  multivitamin 1 Tablet(s) Oral daily  piperacillin/tazobactam IVPB.. 3.375 Gram(s) IV Intermittent every 8 hours    MEDICATIONS  (PRN):  albuterol/ipratropium for Nebulization 3 milliLiter(s) Nebulizer every 4 hours PRN Shortness of Breath and/or Wheezing  metoclopramide 10 milliGRAM(s) Oral every 6 hours PRN nausea  morphine   Solution 2.5 milliGRAM(s) Oral every 6 hours PRN dyspnea or pain  sodium chloride 0.65% Nasal 1 Spray(s) Both Nostrils once PRN Nasal Congestion      Allergies    No Known Allergies    Intolerances        LABS:                        13.6   14.17 )-----------( 142      ( 20 Oct 2021 08:08 )             43.7     10-19    141  |  94<L>  |  23  ----------------------------<  118<H>  3.3<L>   |  36<H>  |  1.00    Ca    9.1      19 Oct 2021 12:24  Phos  2.9     10-20  Mg     2.20     10-20    TPro  6.8  /  Alb  4.0  /  TBili  0.9  /  DBili  x   /  AST  43<H>  /  ALT  42<H>  /  AlkPhos  151<H>  10-19          RADIOLOGY & ADDITIONAL TESTS:  Studies reviewed.

## 2021-10-20 NOTE — PROGRESS NOTE ADULT - SUBJECTIVE AND OBJECTIVE BOX
CHIEF COMPLAINT: Shortness of breath    Interval Events: No acute events. Patient titrated down to 90% FiO2 at bedside. He reports that     REVIEW OF SYSTEMS:  Constitutional: No fevers or chills.   Eyes: No itching or discharge from the eyes  ENT: No ear pain. No ear discharge. No nasal congestion.  CV: No chest pain. No palpitations. No lightheadedness or dizziness.   Resp: No dyspnea at rest. No cough.  GI: No nausea. No vomiting. No diarrhea.  MSK: No joint pain or pain in any extremities  Integumentary: No skin lesions. No pedal edema.  Neurological: No gross motor weakness. No sensory changes.  [x] All other systems negative  [ ] Unable to assess ROS because ________    OBJECTIVE:  ICU Vital Signs Last 24 Hrs  T(C): 36.8 (20 Oct 2021 14:09), Max: 37.1 (20 Oct 2021 06:24)  T(F): 98.3 (20 Oct 2021 14:09), Max: 98.7 (20 Oct 2021 06:24)  HR: 93 (20 Oct 2021 14:09) (80 - 97)  BP: 117/64 (20 Oct 2021 14:09) (117/64 - 127/78)  BP(mean): 77 (20 Oct 2021 14:09) (77 - 77)  ABP: --  ABP(mean): --  RR: 18 (20 Oct 2021 14:09) (15 - 18)  SpO2: 91% (20 Oct 2021 14:09) (85% - 96%)        10-19 @ 07:01  -  10-20 @ 07:00  --------------------------------------------------------  IN: 440 mL / OUT: 1190 mL / NET: -750 mL    10-20 @ 07:01  -  10-20 @ 15:24  --------------------------------------------------------  IN: 380 mL / OUT: 1400 mL / NET: -1020 mL      CAPILLARY BLOOD GLUCOSE          PHYSICAL EXAM:  General: Awake, alert, oriented X 3.   HEENT: Atraumatic, normocephalic.   Lymph Nodes: No palpable lymphadenopathy  Neck: No JVD. No carotid bruit.   Respiratory: Normal chest expansion. Bibasilar crackles.  Cardiovascular: S1 S2 normal. No murmurs, rubs or gallops.   Abdomen: Soft, non-tender, non-distended. No organomegaly.  Extremities: Warm to touch. Peripheral pulse palpable. No pedal edema.   Skin: No rashes or skin lesions  Neurological: Motor and sensory examination equal and normal in all four extremities.  Psychiatry: Appropriate mood and affect.    HOSPITAL MEDICATIONS:  MEDICATIONS  (STANDING):  budesonide 160 MICROgram(s)/formoterol 4.5 MICROgram(s) Inhaler 2 Puff(s) Inhalation two times a day  cyanocobalamin 1000 MICROGram(s) Oral daily  enoxaparin Injectable 40 milliGRAM(s) SubCutaneous daily  furosemide   Injectable 40 milliGRAM(s) IV Push daily  influenza   Vaccine 0.5 milliLiter(s) IntraMuscular once  multivitamin 1 Tablet(s) Oral daily  piperacillin/tazobactam IVPB.. 3.375 Gram(s) IV Intermittent every 8 hours    MEDICATIONS  (PRN):  albuterol/ipratropium for Nebulization 3 milliLiter(s) Nebulizer every 4 hours PRN Shortness of Breath and/or Wheezing  metoclopramide 10 milliGRAM(s) Oral every 6 hours PRN nausea  morphine   Solution 2.5 milliGRAM(s) Oral every 6 hours PRN dyspnea or pain  sodium chloride 0.65% Nasal 1 Spray(s) Both Nostrils once PRN Nasal Congestion      LABS:                        13.6   14.17 )-----------( 142      ( 20 Oct 2021 08:08 )             43.7     10-19    141  |  94<L>  |  23  ----------------------------<  118<H>  3.3<L>   |  36<H>  |  1.00    Ca    9.1      19 Oct 2021 12:24  Phos  2.9     10-20  Mg     2.20     10-20    TPro  6.8  /  Alb  4.0  /  TBili  0.9  /  DBili  x   /  AST  43<H>  /  ALT  42<H>  /  AlkPhos  151<H>  10-19              MICROBIOLOGY:     RADIOLOGY:  [x] Reviewed and interpreted by me  TTE 10/19: RV systolic dysfunction    Point of Care Ultrasound Findings:    PFT:    EKG: CHIEF COMPLAINT: Shortness of breath    Interval Events: No acute events. Patient titrated down to 90% FiO2 at bedside. He reports that he does not feel symptomatic    REVIEW OF SYSTEMS:  Constitutional: No fevers or chills.   Eyes: No itching or discharge from the eyes  ENT: No ear pain. No ear discharge. No nasal congestion.  CV: No chest pain. No palpitations. No lightheadedness or dizziness.   Resp: No dyspnea at rest. No cough.  GI: No nausea. No vomiting. No diarrhea.  MSK: No joint pain or pain in any extremities  Integumentary: No skin lesions. No pedal edema.  Neurological: No gross motor weakness. No sensory changes.  [x] All other systems negative  [ ] Unable to assess ROS because ________    OBJECTIVE:  ICU Vital Signs Last 24 Hrs  T(C): 36.8 (20 Oct 2021 14:09), Max: 37.1 (20 Oct 2021 06:24)  T(F): 98.3 (20 Oct 2021 14:09), Max: 98.7 (20 Oct 2021 06:24)  HR: 93 (20 Oct 2021 14:09) (80 - 97)  BP: 117/64 (20 Oct 2021 14:09) (117/64 - 127/78)  BP(mean): 77 (20 Oct 2021 14:09) (77 - 77)  ABP: --  ABP(mean): --  RR: 18 (20 Oct 2021 14:09) (15 - 18)  SpO2: 91% (20 Oct 2021 14:09) (85% - 96%)        10-19 @ 07:01  -  10-20 @ 07:00  --------------------------------------------------------  IN: 440 mL / OUT: 1190 mL / NET: -750 mL    10-20 @ 07:01  -  10-20 @ 15:24  --------------------------------------------------------  IN: 380 mL / OUT: 1400 mL / NET: -1020 mL      CAPILLARY BLOOD GLUCOSE          PHYSICAL EXAM:  General: Awake, alert, oriented X 3.   HEENT: Atraumatic, normocephalic.   Lymph Nodes: No palpable lymphadenopathy  Neck: No JVD. No carotid bruit.   Respiratory: Normal chest expansion. Bibasilar crackles.  Cardiovascular: S1 S2 normal. No murmurs, rubs or gallops.   Abdomen: Soft, non-tender, non-distended. No organomegaly.  Extremities: Warm to touch. Peripheral pulse palpable. No pedal edema.   Skin: No rashes or skin lesions  Neurological: Motor and sensory examination equal and normal in all four extremities.  Psychiatry: Appropriate mood and affect.    HOSPITAL MEDICATIONS:  MEDICATIONS  (STANDING):  budesonide 160 MICROgram(s)/formoterol 4.5 MICROgram(s) Inhaler 2 Puff(s) Inhalation two times a day  cyanocobalamin 1000 MICROGram(s) Oral daily  enoxaparin Injectable 40 milliGRAM(s) SubCutaneous daily  furosemide   Injectable 40 milliGRAM(s) IV Push daily  influenza   Vaccine 0.5 milliLiter(s) IntraMuscular once  multivitamin 1 Tablet(s) Oral daily  piperacillin/tazobactam IVPB.. 3.375 Gram(s) IV Intermittent every 8 hours    MEDICATIONS  (PRN):  albuterol/ipratropium for Nebulization 3 milliLiter(s) Nebulizer every 4 hours PRN Shortness of Breath and/or Wheezing  metoclopramide 10 milliGRAM(s) Oral every 6 hours PRN nausea  morphine   Solution 2.5 milliGRAM(s) Oral every 6 hours PRN dyspnea or pain  sodium chloride 0.65% Nasal 1 Spray(s) Both Nostrils once PRN Nasal Congestion      LABS:                        13.6   14.17 )-----------( 142      ( 20 Oct 2021 08:08 )             43.7     10-19    141  |  94<L>  |  23  ----------------------------<  118<H>  3.3<L>   |  36<H>  |  1.00    Ca    9.1      19 Oct 2021 12:24  Phos  2.9     10-20  Mg     2.20     10-20    TPro  6.8  /  Alb  4.0  /  TBili  0.9  /  DBili  x   /  AST  43<H>  /  ALT  42<H>  /  AlkPhos  151<H>  10-19              MICROBIOLOGY:     RADIOLOGY:  [x] Reviewed and interpreted by me  TTE 10/19: RV systolic dysfunction    Point of Care Ultrasound Findings:    PFT:    EKG:

## 2021-10-20 NOTE — PROGRESS NOTE ADULT - ASSESSMENT
62 y/o M with history of NSCLC s/p RLL resection on chemo, COPD, ILD, pulmonary HTN, on 4L NC at home presents with SOB x 1 week. On admission the patient was tachypneic and tachycardic but afebrile. PE notable for wheezing, crackles and ronchi. Labs on admission remarkable for an elevated WBC, elevated BNP, and transaminitis. LE U/S ruled out evidence of DVT and CTA ruled out evidence of PE. DDx includes HF exacerbation, pulmonary infection, exacerbation 2/2 pt's lung cancer, worsening ILD 2/2 pembrolizumab, or COPD exacerbation. Pt asymp today, doing well on 40% HFNC, maintaining sats between 85-93% over last 24 hours.

## 2021-10-21 NOTE — PROGRESS NOTE ADULT - PROBLEM SELECTOR PLAN 2
Patient would like to discuss a pain in her leg- Please call to discuss - Patient has obstructive lung disease on PFTs   - Was on bronchodilator therapy at home - would use Symbicort and Spiriva while in the hospital  - Incentive spirometer and Acapella  - Would hold off oral corticosteroids from a pulmonary perspective at this time - 2/2 to lung cancer as shown on CTA chest  - Initially on vanc, Zosyn, and azithromycin; nasal MRSA negative - d/c vancomycin; urine legionella neg - azithryomycin d/c  - c/w  Zosyn  - sputum culture wnl  - aggressive chest PT - aerobika, bed percussion, and chest physiotherapy to help with airway clearance  - does not require bronchoscopy at this time per pulm

## 2021-10-21 NOTE — PROGRESS NOTE ADULT - ASSESSMENT
62 y/o M with history of NSCLC s/p RLL resection on chemo, COPD, ILD, pulmonary HTN, on 4L NC at home presents with SOB x 1 week. On admission the patient was tachypneic and tachycardic but afebrile. PE notable for wheezing, crackles and ronchi. Labs on admission remarkable for an elevated WBC, elevated BNP, and transaminitis. LE U/S ruled out evidence of DVT and CTA ruled out evidence of PE. DDx includes HF exacerbation, pulmonary infection, exacerbation 2/2 pt's lung cancer, worsening ILD 2/2 pembrolizumab, or COPD exacerbation. Pt was satting around 80% this morning and HF had to be increased.    61M NSCLC (on therapy), ILD with occupational exposure and smoking history, and chronic hypoxemic respiratory failure presenting with acute on chronic hypoxemia and volume overload. He has had a CTPA in the ED which is worse than a recent CT neg for PE, however w worsening cancer, volume overload, possible pneumonia.

## 2021-10-21 NOTE — PROGRESS NOTE ADULT - PROBLEM SELECTOR PLAN 7
- Check 2 D echo - given underlying lung disease patient is likely to have pulmonary hypertension  - Aggressive diuresis - Lasix IV BID with close monitoring of I/O  - No role for pulmonary vasodilator therapy - hx of pulm HTN 2/2 lung pathologies  - Aggressive diuresis - Lasix IV BID with close monitoring of I/O  - No role for pulmonary vasodilator therapy  - f/u PA pressure results

## 2021-10-21 NOTE — PROGRESS NOTE ADULT - NUTRITIONAL ASSESSMENT
right hilar/infrahilar malignancy extending into the right lower lobe  Occlusion of right lower lobe airways and postobstructive pneumonia the right lower and middle lobe    interstitial pulmonary edema/CHF.  Small right pleural effusion.

## 2021-10-21 NOTE — PROGRESS NOTE ADULT - PROBLEM SELECTOR PLAN 3
- diffuse hepatic metastases  - follows Dr. Richmond Zheng outpatient  - s/p RLL resection and chemotherapy  - was on pembrolizumab (started on 10/13)  - onc following  - no current plans for inpatient treatment  - f/u with Dr. Zheng outpatient - Diagnosed in 2020. s/p 6 cycles Carbo/Pemetrexed (Immunotherapy held given ILD) + 1 cycle pemetrexed maintenance with POD, s/p nab-paclitaxel/ramicirumab with POD, s/p gemcitabine with POD; Most recently started on 4th line pembrolizumab. C1 on 10/13/21  - CT chest  on admission showing diffuse hepatic metastases, known adrenal mets, right hilar/infrahilar malignancy extending into the right lower lobe and confluent with right paratracheal, right hilar, and subcarinal lymphadenopathy, presumably primary lung cancer. There is resulting occlusion of the right lower lobe airways and postobstructive pneumonia the entire right lower lobe and right middle lobe. Hydrostatic interstitial pulmonary edema/CHF. Small right pleural effusion. There is also left mediastinal and left hilar metastatic lymphadenopathy.  - follows Dr. Richmond Zheng outpatient  - onc following  - no current plans for inpatient treatment  - f/u with Dr. Zheng outpatient  - Onc recommends hospice at this time; Onc to discuss this with patient

## 2021-10-21 NOTE — PROGRESS NOTE ADULT - PROBLEM SELECTOR PLAN 5
- BNP of 6752 on admission, repeat BNP 2607  - CTA consistent with possible HF  - c/w furosemide   - EKG showed sinus tachycardia, right atrial enlargement, rightward axis, and nonspecific ST and T wave abnormalities  - echocardiogram pending  - daily standing weights - BNP of 6752 on admission, repeat BNP 2607  - CTA consistent with possible HF  - c/w furosemide   - EKG showed sinus tachycardia, right atrial enlargement, rightward axis, and nonspecific ST and T wave abnormalities  - echocardiogram pending for pulm artery pressure  - daily standing weights - HFpEF; BNP of 6752 on admission  - EKG showed sinus tachycardia, right atrial enlargement, rightward axis, and nonspecific ST and T wave abnormalities  - EF 56% on echo (10/20), LV systolic wnl, Right ventricular enlargement with decreased RV systolic function  - echocardiogram pending for pulm artery pressure  - daily standing weights  - c/w furosemide IV BID

## 2021-10-21 NOTE — PROGRESS NOTE ADULT - PROBLEM SELECTOR PLAN 4
- nasal MRSA negative - d/c vancomycin  - urine legionella neg - azithryomycin d/c  - aggressive chest PT - aerobika, bed percussion, and chest physiotherapy to help with airway clearance  - does not require bronchoscopy at this time  - sputum remarkable for PMNs, gram negative rods, gram positive rods, and yeast  - c/w  Zosyn - nasal MRSA negative - d/c vancomycin  - urine legionella neg - azithryomycin d/c  - aggressive chest PT - aerobika, bed percussion, and chest physiotherapy to help with airway clearance  - does not require bronchoscopy at this time  - sputum culture wnl  - c/w  Zosyn - obstructive PFTs  - Was on bronchodilator therapy at home - would use Symbicort and Spiriva while in the hospital  - Incentive spirometer and Acapella  - Would hold off oral corticosteroids from a pulmonary perspective at this time

## 2021-10-21 NOTE — PROGRESS NOTE ADULT - PROBLEM SELECTOR PLAN 6
- CT showed worsening of ILD since 10/2021  - ILD could be worsened by pt's pembrolizumab  - continue to monitor - CT showed worsening of ILD since 10/2021  - ILD could be worsened by pt's pembrolizumab  - continue to monitor  - respiratory treatments as above

## 2021-10-21 NOTE — PROGRESS NOTE ADULT - SUBJECTIVE AND OBJECTIVE BOX
Kevin Maki MS3    Patient is a 61y old  Male who presents with a chief complaint of SOB (20 Oct 2021 15:24)    SUBJECTIVE / OVERNIGHT EVENTS: Overnight per nurses the pt was satting around 85% and as low as 70% however this morning the pt went up to urinate and he began to sat around 80%. Respiratory was called and his HFNC was adjusted to 60L 100% and his sat improved. Patient stated he was feeling well with no SOB or chest pain. No issues with urination or BMs. Pt states swelling in his legs is similar to yesterday. No f/c, n/v.    MEDICATIONS  (STANDING):  budesonide 160 MICROgram(s)/formoterol 4.5 MICROgram(s) Inhaler 2 Puff(s) Inhalation two times a day  cyanocobalamin 1000 MICROGram(s) Oral daily  enoxaparin Injectable 40 milliGRAM(s) SubCutaneous daily  furosemide   Injectable 40 milliGRAM(s) IV Push daily  influenza   Vaccine 0.5 milliLiter(s) IntraMuscular once  multivitamin 1 Tablet(s) Oral daily  piperacillin/tazobactam IVPB.. 3.375 Gram(s) IV Intermittent every 8 hours    MEDICATIONS  (PRN):  albuterol/ipratropium for Nebulization 3 milliLiter(s) Nebulizer every 4 hours PRN Shortness of Breath and/or Wheezing  metoclopramide 10 milliGRAM(s) Oral every 6 hours PRN nausea  morphine   Solution 2.5 milliGRAM(s) Oral every 6 hours PRN dyspnea or pain  sodium chloride 0.65% Nasal 1 Spray(s) Both Nostrils once PRN Nasal Congestion    Vital Signs Last 24 Hrs  T(C): 37.1 (21 Oct 2021 05:38), Max: 37.1 (21 Oct 2021 05:38)  T(F): 98.7 (21 Oct 2021 05:38), Max: 98.7 (21 Oct 2021 05:38)  HR: 77 (21 Oct 2021 07:39) (77 - 99)  BP: 125/67 (21 Oct 2021 05:38) (115/71 - 127/78)  BP(mean): 77 (20 Oct 2021 14:09) (77 - 77)  RR: 22 (21 Oct 2021 07:39) (15 - 22)  SpO2: 84% (21 Oct 2021 07:39) (84% - 93%)  CAPILLARY BLOOD GLUCOSE    I&O's Summary    20 Oct 2021 07:01  -  21 Oct 2021 07:00  --------------------------------------------------------  IN: 380 mL / OUT: 2350 mL / NET: -1970 mL    PHYSICAL EXAM:  GENERAL: NAD, obese, no HFNC  HEAD:  Atraumatic, Normocephalic; lips slightly cyanotic   EYES: conjunctiva and sclera clear  NECK: Supple  CHEST/LUNG: decreased breath sounds b/l right>left; crackles and wheezing b/l  HEART: Regular rate and rhythm; No murmurs, rubs, or gallops  ABDOMEN: Soft, Nontender, Nondistended; Bowel sounds present  EXTREMITIES:  2+ Peripheral Pulses, clubbing in fingers b/l, pitting edema b/l  PSYCH: AAOx3  NEUROLOGY: non-focal    LABS:                        13.6   14.17 )-----------( 142      ( 20 Oct 2021 08:08 )             43.7     10-19    141  |  94<L>  |  23  ----------------------------<  118<H>  3.3<L>   |  36<H>  |  1.00    Ca    9.1      19 Oct 2021 12:24  Phos  2.9     10-20  Mg     2.20     10-20    TPro  6.8  /  Alb  4.0  /  TBili  0.9  /  DBili  x   /  AST  43<H>  /  ALT  42<H>  /  AlkPhos  151<H>  10-19              RADIOLOGY & ADDITIONAL TESTS:    Imaging Personally Reviewed:    Consultant(s) Notes Reviewed:      Care Discussed with Consultants/Other Providers:   Kevin Maki MS3    Patient is a 61y old  Male who presents with a chief complaint of SOB (20 Oct 2021 15:24)    SUBJECTIVE / OVERNIGHT EVENTS: Overnight per nurses the pt was satting around 85% and as low as 70% however this morning the pt went up to urinate and he began to sat around 80%. Respiratory was called and his HFNC was adjusted to 60L 100% and his sat improved. Patient stated he was feeling well with no SOB or chest pain. No issues with urination or BMs. Pt states swelling in his legs is similar to yesterday. No f/c, n/v.    MEDICATIONS  (STANDING):  budesonide 160 MICROgram(s)/formoterol 4.5 MICROgram(s) Inhaler 2 Puff(s) Inhalation two times a day  cyanocobalamin 1000 MICROGram(s) Oral daily  enoxaparin Injectable 40 milliGRAM(s) SubCutaneous daily  furosemide   Injectable 40 milliGRAM(s) IV Push daily  influenza   Vaccine 0.5 milliLiter(s) IntraMuscular once  multivitamin 1 Tablet(s) Oral daily  piperacillin/tazobactam IVPB.. 3.375 Gram(s) IV Intermittent every 8 hours    MEDICATIONS  (PRN):  albuterol/ipratropium for Nebulization 3 milliLiter(s) Nebulizer every 4 hours PRN Shortness of Breath and/or Wheezing  metoclopramide 10 milliGRAM(s) Oral every 6 hours PRN nausea  morphine   Solution 2.5 milliGRAM(s) Oral every 6 hours PRN dyspnea or pain  sodium chloride 0.65% Nasal 1 Spray(s) Both Nostrils once PRN Nasal Congestion    Vital Signs Last 24 Hrs  T(C): 37.1 (21 Oct 2021 05:38), Max: 37.1 (21 Oct 2021 05:38)  T(F): 98.7 (21 Oct 2021 05:38), Max: 98.7 (21 Oct 2021 05:38)  HR: 77 (21 Oct 2021 07:39) (77 - 99)  BP: 125/67 (21 Oct 2021 05:38) (115/71 - 127/78)  BP(mean): 77 (20 Oct 2021 14:09) (77 - 77)  RR: 22 (21 Oct 2021 07:39) (15 - 22)  SpO2: 84% (21 Oct 2021 07:39) (84% - 93%)  CAPILLARY BLOOD GLUCOSE    I&O's Summary    20 Oct 2021 07:01  -  21 Oct 2021 07:00  --------------------------------------------------------  IN: 380 mL / OUT: 2350 mL / NET: -1970 mL    PHYSICAL EXAM:  GENERAL: NAD, obese, no HFNC  HEAD:  Atraumatic, Normocephalic; lips slightly cyanotic   EYES: conjunctiva and sclera clear  NECK: Supple  CHEST/LUNG: decreased breath sounds b/l right>left; crackles and wheezing b/l  HEART: Regular rate and rhythm; No murmurs, rubs, or gallops  ABDOMEN: Soft, Nontender, Nondistended; Bowel sounds present  EXTREMITIES:  2+ Peripheral Pulses, clubbing in fingers b/l, pitting edema b/l  PSYCH: AAOx3  NEUROLOGY: non-focal    LABS:                        13.6   14.17 )-----------( 142      ( 20 Oct 2021 08:08 )             43.7     10-19    141  |  94<L>  |  23  ----------------------------<  118<H>  3.3<L>   |  36<H>  |  1.00    Ca    9.1      19 Oct 2021 12:24  Phos  2.9     10-20  Mg     2.20     10-20    TPro  6.8  /  Alb  4.0  /  TBili  0.9  /  DBili  x   /  AST  43<H>  /  ALT  42<H>  /  AlkPhos  151<H>  10-19   Kevin Maki MS3    Patient is a 61y old  Male who presents with a chief complaint of SOB (20 Oct 2021 15:24)    SUBJECTIVE / OVERNIGHT EVENTS: Overnight per nurses the pt was satting around 85% and as low as 70% however this morning the pt went up to urinate and he began to sat around 80%. Respiratory was called and his HFNC was adjusted to 60L 100% and his sat improved. Patient stated he was feeling well with no SOB or chest pain. No issues with urination or BMs. Pt states swelling in his legs is similar to yesterday. No f/c, n/v.    MEDICATIONS  (STANDING):  budesonide 160 MICROgram(s)/formoterol 4.5 MICROgram(s) Inhaler 2 Puff(s) Inhalation two times a day  cyanocobalamin 1000 MICROGram(s) Oral daily  enoxaparin Injectable 40 milliGRAM(s) SubCutaneous daily  furosemide   Injectable 40 milliGRAM(s) IV Push daily  influenza   Vaccine 0.5 milliLiter(s) IntraMuscular once  multivitamin 1 Tablet(s) Oral daily  piperacillin/tazobactam IVPB.. 3.375 Gram(s) IV Intermittent every 8 hours    MEDICATIONS  (PRN):  albuterol/ipratropium for Nebulization 3 milliLiter(s) Nebulizer every 4 hours PRN Shortness of Breath and/or Wheezing  metoclopramide 10 milliGRAM(s) Oral every 6 hours PRN nausea  morphine   Solution 2.5 milliGRAM(s) Oral every 6 hours PRN dyspnea or pain  sodium chloride 0.65% Nasal 1 Spray(s) Both Nostrils once PRN Nasal Congestion    Vital Signs Last 24 Hrs  T(C): 37.1 (21 Oct 2021 05:38), Max: 37.1 (21 Oct 2021 05:38)  T(F): 98.7 (21 Oct 2021 05:38), Max: 98.7 (21 Oct 2021 05:38)  HR: 77 (21 Oct 2021 07:39) (77 - 99)  BP: 125/67 (21 Oct 2021 05:38) (115/71 - 127/78)  BP(mean): 77 (20 Oct 2021 14:09) (77 - 77)  RR: 22 (21 Oct 2021 07:39) (15 - 22)  SpO2: 84% (21 Oct 2021 07:39) (84% - 93%)  CAPILLARY BLOOD GLUCOSE    I&O's Summary    20 Oct 2021 07:01  -  21 Oct 2021 07:00  --------------------------------------------------------  IN: 380 mL / OUT: 2350 mL / NET: -1970 mL    PHYSICAL EXAM:  GENERAL: NAD, obese, on HFNC, comfortable  HEAD:  Atraumatic, Normocephalic; lips slightly cyanotic   EYES: conjunctiva and sclera clear  NECK: Supple  CHEST/LUNG: decreased breath sounds b/l right>left; crackles and wheezing b/l  HEART: Regular rate and rhythm; No murmurs, rubs, or gallops  ABDOMEN: Soft, Nontender, Nondistended; Bowel sounds present  EXTREMITIES:  2+ Peripheral Pulses, clubbing in fingers b/l, 3+ pitting edema b/l  PSYCH: AAOx3  NEUROLOGY: non-focal    LABS:                        13.6   14.17 )-----------( 142      ( 20 Oct 2021 08:08 )             43.7     10-19    141  |  94<L>  |  23  ----------------------------<  118<H>  3.3<L>   |  36<H>  |  1.00    Ca    9.1      19 Oct 2021 12:24  Phos  2.9     10-20  Mg     2.20     10-20    TPro  6.8  /  Alb  4.0  /  TBili  0.9  /  DBili  x   /  AST  43<H>  /  ALT  42<H>  /  AlkPhos  151<H>  10-19

## 2021-10-21 NOTE — PROGRESS NOTE ADULT - PROBLEM SELECTOR PLAN 8
- elevated AST/ALT on admission  - monitor - elevated AST/ALT on admission possibly 2/2 to liver mets  - elevated alk phos possibly 2/2 bone mets  - monitor

## 2021-10-21 NOTE — PROGRESS NOTE ADULT - PROBLEM SELECTOR PLAN 9
- Lovenox for DVT prophylaxis  - Regular diet as tolerated  - GI proph  - Maintain O2 sat > 88%  - Aggressive diuresis  - Aspiration precautions - Lovenox for DVT prophylaxis  - Regular diet as tolerated  - GI proph  - Maintain O2 sat > 88%  - Aggressive diuresis furosemide BID  - Aspiration precautions - Lovenox for DVT prophylaxis  - Regular diet as tolerated  - Maintain O2 sat > 88%  - Aspiration precautions

## 2021-10-21 NOTE — PROGRESS NOTE ADULT - SUBJECTIVE AND OBJECTIVE BOX
CHIEF COMPLAINT: SOB    Interval Events: Patient had episode of desaturation overnight, currently feeling well in bed. HFNC titrated down to 40L/m at 100%. He otherwise denies complaints.    REVIEW OF SYSTEMS:  Constitutional: No fevers or chills.  Eyes: No itching or discharge from the eyes  ENT: No ear pain. No ear discharge. No nasal congestion.  CV: No chest pain. No palpitations. No lightheadedness or dizziness.   Resp: No dyspnea at rest. No cough.  GI: No nausea. No vomiting. No diarrhea.  MSK: No joint pain or pain in any extremities  Integumentary: No skin lesions. No pedal edema.  Neurological: No gross motor weakness. No sensory changes.  [x] All other systems negative  [ ] Unable to assess ROS because ________    OBJECTIVE:  ICU Vital Signs Last 24 Hrs  T(C): 37.1 (21 Oct 2021 05:38), Max: 37.1 (21 Oct 2021 05:38)  T(F): 98.7 (21 Oct 2021 05:38), Max: 98.7 (21 Oct 2021 05:38)  HR: 77 (21 Oct 2021 07:39) (77 - 99)  BP: 125/67 (21 Oct 2021 05:38) (115/71 - 127/78)  BP(mean): 77 (20 Oct 2021 14:09) (77 - 77)  ABP: --  ABP(mean): --  RR: 22 (21 Oct 2021 07:39) (15 - 22)  SpO2: 84% (21 Oct 2021 07:39) (84% - 93%)    10-20 @ 07:01  -  10-21 @ 07:00  --------------------------------------------------------  IN: 380 mL / OUT: 2350 mL / NET: -1970 mL    CAPILLARY BLOOD GLUCOSE    PHYSICAL EXAM:  General: Awake, alert, oriented X 3.   HEENT: Atraumatic, normocephalic.   Lymph Nodes: No palpable lymphadenopathy  Neck: No JVD. No carotid bruit.   Respiratory: Normal chest expansion. Bibasilar crackles.  Cardiovascular: S1 S2 normal. No murmurs, rubs or gallops.   Abdomen: Soft, non-tender, non-distended. No organomegaly.  Extremities: Warm to touch. Peripheral pulse palpable. No pedal edema.   Skin: No rashes or skin lesions  Neurological: Motor and sensory examination equal and normal in all four extremities.  Psychiatry: Appropriate mood and affect.    HOSPITAL MEDICATIONS:  MEDICATIONS  (STANDING):  budesonide 160 MICROgram(s)/formoterol 4.5 MICROgram(s) Inhaler 2 Puff(s) Inhalation two times a day  cyanocobalamin 1000 MICROGram(s) Oral daily  enoxaparin Injectable 40 milliGRAM(s) SubCutaneous daily  furosemide   Injectable 40 milliGRAM(s) IV Push daily  influenza   Vaccine 0.5 milliLiter(s) IntraMuscular once  multivitamin 1 Tablet(s) Oral daily  piperacillin/tazobactam IVPB.. 3.375 Gram(s) IV Intermittent every 8 hours  sodium chloride 0.65% Nasal 1 Spray(s) Both Nostrils every 4 hours    MEDICATIONS  (PRN):  albuterol/ipratropium for Nebulization 3 milliLiter(s) Nebulizer every 4 hours PRN Shortness of Breath and/or Wheezing  metoclopramide 10 milliGRAM(s) Oral every 6 hours PRN nausea  morphine   Solution 2.5 milliGRAM(s) Oral every 6 hours PRN dyspnea or pain  sodium chloride 0.65% Nasal 1 Spray(s) Both Nostrils once PRN Nasal Congestion      LABS:                        13.6   16.09 )-----------( 287      ( 21 Oct 2021 07:49 )             43.7     10-21    141  |  93<L>  |  25<H>  ----------------------------<  95  3.4<L>   |  35<H>  |  1.01    Ca    9.6      21 Oct 2021 07:49  Phos  2.6     10-21  Mg     2.20     10-21    TPro  6.7  /  Alb  3.8  /  TBili  1.2  /  DBili  x   /  AST  53<H>  /  ALT  58<H>  /  AlkPhos  159<H>  10-21              MICROBIOLOGY:     RADIOLOGY:  [ ] Reviewed and interpreted by me    Point of Care Ultrasound Findings:    PFT:    EKG:

## 2021-10-21 NOTE — PROGRESS NOTE ADULT - ASSESSMENT
61M NSCLC (on therapy), ILD with occupational exposure and smoking history, and chronic hypoxemic respiratory failure presenting with acute on chronic hypoxemia as well as volume overload. He has had a CTPA in the ED which is worse than a recent CT and is concerning for volume overload, possible infection, and most notably progression of cancer.    1. Acute on Chronic Hypoxemic Respiratory Failure - continue supplemental O2 with goal O2 sat > 88%  - Improved saturations on HFNC - would titrate down FiO2 with goal O2 sat > 88%  - Patient has 4L home O2 at baseline - he does not notice his hypoxia (asymptomatic)  - Can use NIPPV as needed - but does not require this at present  - Weaned FiO2 to symptoms, does not need to be increased for hypoxia    2. Postobstructive pneumonia - continue Zosyn  - aggressive chest PT - aerobika, bed percussion, and chest physiotherapy to help with airway clearance  - does not require bronchoscopy    3. Progressive NSCLC - patient recently started on Immunotherapy  - His current admission is not likely to be related to initiation of immunotherapy this past Wednesday  - Oncology Evaluation (Dr. Richmond Zheng)  - Patient's imaging is suggestive of liver, adrenal, and bone metastases    4. R/O Pulmonary Hypertension  - 2D echo with evidence of RV systolic dysfunction, likely in the setting of hypoxia  - Aggressive diuresis - would continue Lasix IV BID with close monitoring of I/O (please monitor urine output)  - No role for pulmonary vasodilator therapy  - If bicarb continues to rise, can consider dose of acetazolamide    5. Obstructive Lung Disease   - Patient has obstructive lung disease on PFTs   - Was on bronchodilator therapy at home - would use Symbicort and Spiriva while in the hospital  - Can decrease frequency of Duonebs and make PRN  - Incentive spirometer and Acapella  - Would hold off oral corticosteroids from a pulmonary perspective at this time    6. Gen  - DVT proph  - GI proph  - Maintain O2 sat > 88%  - Aggressive diuresis  - Aspiration precautions  - Palliative Care eval/GOC, appreciated.  - DNR/DNI. Please see separate goals of care note. [facility] form filled out, to be placed in chart.    Kevin Ferguson, PGY-6  Pulmonary and Critical Care Medicine  [phone]

## 2021-10-21 NOTE — PROGRESS NOTE ADULT - PROBLEM SELECTOR PLAN 1
hx of COPD, ILD, lung cancer s/p RLL resection on chemo, pulm HTN on 4L NC at home. CTA and U/S of LE ruled out DVT and PE. BNP of 6752 on admission.   - c/w HFNC and monitor pulse ox, sat goal of 88%  - s/p Vanc + CTX + azithromycin in ED   - Trop wnl   - CT chest remarkable for possible pulmonary malignancy, edema, effusion, and lymphadenopathy  - no new consolidations on CXR  - c/w furosemide and duonebs  - pulm recs appreciated  - Palliative consulted and started on morphine hx of COPD, ILD, lung cancer s/p RLL resection on chemo, pulm HTN on 4L NC at home. CTA and U/S of LE ruled out DVT and PE. BNP of 6752 on admission.   - c/w HFNC and monitor pulse ox, sat goal of 88%  - s/p Vanc + CTX + azithromycin in ED   - Trop wnl   - CT chest remarkable for possible pulmonary malignancy, edema, effusion, and lymphadenopathy  - no new consolidations on CXR  - c/w furosemide and duonebs  - pulm recs appreciated  - Palliative consulted and was started on morphine hx of COPD, ILD, lung cancer s/p RLL resection on chemo, pulm HTN on 4L NC at home. CTA and U/S of LE ruled out DVT and PE. BNP of 6752 on admission.   - likely 2/2 worsening metastatic lung cancer, obstructive pneumonia, pulmonary edema  - CTA chest on 10/16 showed: right hilar/infrahilar malignancy extending into the right lower lobe, occlusion of right lower lobe airways and postobstructive pneumonia the right lower and middle lobe, interstitial pulmonary edema/CHF, and small right pleural effusion.  - B-lines present in POCUS from 10/20   - c/w HFNC and monitor pulse ox, sat goal of 88%; has been on it since 10/16 and unable to wean down; goal is to titrate off as tolerated   - c/w furosemide 40mg IV BID  - pulm recs appreciated

## 2021-10-22 NOTE — PROGRESS NOTE ADULT - PROBLEM SELECTOR PLAN 3
- Diagnosed in 2020. s/p 6 cycles Carbo/Pemetrexed (Immunotherapy held given ILD) + 1 cycle pemetrexed maintenance with POD, s/p nab-paclitaxel/ramicirumab with POD, s/p gemcitabine with POD; Most recently started on 4th line pembrolizumab. C1 on 10/13/21  - CT chest  on admission showing diffuse hepatic metastases, known adrenal mets, right hilar/infrahilar malignancy extending into the right lower lobe and confluent with right paratracheal, right hilar, and subcarinal lymphadenopathy, presumably primary lung cancer. There is resulting occlusion of the right lower lobe airways and postobstructive pneumonia the entire right lower lobe and right middle lobe. Hydrostatic interstitial pulmonary edema/CHF. Small right pleural effusion. There is also left mediastinal and left hilar metastatic lymphadenopathy.  - follows Dr. Richmond Zheng outpatient  - onc following  - no current plans for inpatient treatment  - f/u with Dr. Zheng outpatient  - Onc recommends hospice at this time; Onc to discuss this with patient

## 2021-10-22 NOTE — PROGRESS NOTE ADULT - PROBLEM SELECTOR PLAN 8
- elevated AST/ALT on admission possibly 2/2 to liver mets  - elevated alk phos possibly 2/2 bone mets  - monitor

## 2021-10-22 NOTE — PROGRESS NOTE ADULT - SUBJECTIVE AND OBJECTIVE BOX
eKvin Maki MS3    Patient is a 61y old  Male who presents with a chief complaint of SOB (21 Oct 2021 10:13)    SUBJECTIVE / OVERNIGHT EVENTS: Overnight pt had desats to 80% and HFNC was increased to 50L and has been satting around 88% since. Pt denies any chest pain, SOB, n/v, f/c. Pt states he feels that his leg swelling is improving but still has some numbness and tingling in his toes.    MEDICATIONS  (STANDING):  budesonide 160 MICROgram(s)/formoterol 4.5 MICROgram(s) Inhaler 2 Puff(s) Inhalation two times a day  cyanocobalamin 1000 MICROGram(s) Oral daily  enoxaparin Injectable 40 milliGRAM(s) SubCutaneous daily  furosemide   Injectable 40 milliGRAM(s) IV Push every 12 hours  influenza   Vaccine 0.5 milliLiter(s) IntraMuscular once  multivitamin 1 Tablet(s) Oral daily  piperacillin/tazobactam IVPB.. 3.375 Gram(s) IV Intermittent every 8 hours  sodium chloride 0.65% Nasal 1 Spray(s) Both Nostrils every 4 hours  tiotropium 18 MICROgram(s) Capsule 1 Capsule(s) Inhalation daily    MEDICATIONS  (PRN):  albuterol/ipratropium for Nebulization 3 milliLiter(s) Nebulizer every 4 hours PRN Shortness of Breath and/or Wheezing  metoclopramide 10 milliGRAM(s) Oral every 6 hours PRN nausea  morphine   Solution 2.5 milliGRAM(s) Oral every 6 hours PRN dyspnea or pain  sodium chloride 0.65% Nasal 1 Spray(s) Both Nostrils once PRN Nasal Congestion      Vital Signs Last 24 Hrs  T(C): 36.9 (22 Oct 2021 06:20), Max: 37 (21 Oct 2021 21:00)  T(F): 98.4 (22 Oct 2021 06:20), Max: 98.6 (21 Oct 2021 21:00)  HR: 84 (22 Oct 2021 06:20) (70 - 99)  BP: 133/76 (22 Oct 2021 06:20) (110/60 - 133/76)  BP(mean): --  RR: 20 (22 Oct 2021 06:20) (17 - 23)  SpO2: 89% (22 Oct 2021 06:20) (82% - 95%)  CAPILLARY BLOOD GLUCOSE        I&O's Summary    21 Oct 2021 07:01  -  22 Oct 2021 07:00  --------------------------------------------------------  IN: 200 mL / OUT: 2500 mL / NET: -2300 mL        PHYSICAL EXAM:  GENERAL: NAD, obese  HEAD:  Atraumatic, Normocephalic  EYES: EOMI, conjunctiva and sclera clear  NECK: Supple  CHEST/LUNG: crackles and wheezing b/l; diminished breath sounds b/l  HEART: Regular rate and rhythm; No murmurs, rubs, or gallops  ABDOMEN: Soft, Nontender, Nondistended; Bowel sounds present  EXTREMITIES:  2+ Peripheral Pulses, clubbing in fingers b/l, 3+ pitting edema b/l  PSYCH: AAOx3  NEUROLOGY: non-focal    LABS:                        13.6   16.09 )-----------( 287      ( 21 Oct 2021 07:49 )             43.7     10-21    141  |  93<L>  |  25<H>  ----------------------------<  95  3.4<L>   |  35<H>  |  1.01    Ca    9.6      21 Oct 2021 07:49  Phos  2.6     10-21  Mg     2.20     10-21    TPro  6.7  /  Alb  3.8  /  TBili  1.2  /  DBili  x   /  AST  53<H>  /  ALT  58<H>  /  AlkPhos  159<H>  10-21

## 2021-10-22 NOTE — PROGRESS NOTE ADULT - ASSESSMENT
61M NSCLC (on therapy), ILD with occupational exposure and smoking history, and chronic hypoxemic respiratory failure presenting with acute on chronic hypoxemia and volume overload. He has had a CTPA in the ED which is worse than a recent CT neg for PE, however w worsening cancer, volume overload, possible pneumonia and an TTE showed reduced RV systolic function.

## 2021-10-22 NOTE — PROGRESS NOTE ADULT - SUBJECTIVE AND OBJECTIVE BOX
INTERVAL HPI/OVERNIGHT EVENTS:  Patient seen at bedside.      VITAL SIGNS:  T(F): 98.4 (10-22-21 @ 06:20)  HR: 88 (10-22-21 @ 09:34)  BP: 133/76 (10-22-21 @ 06:20)  RR: 22 (10-22-21 @ 09:34)  SpO2: 89% (10-22-21 @ 09:34)  Wt(kg): --    PHYSICAL EXAM:    Constitutional: NAD, resting in bed comfortably  Eyes: EOMI, sclera non-icteric  Neck: supple, no LAP  Respiratory: CTA b/l, good air entry b/l, no wheezing, rhonchi or crackels  Cardiovascular: RRR, normal S1S2, no M/R/G  Gastrointestinal: soft, NTND  Extremities: no edema  Neurological: AAOx3, non focal  Skin: Normal temperature    MEDICATIONS  (STANDING):  budesonide 160 MICROgram(s)/formoterol 4.5 MICROgram(s) Inhaler 2 Puff(s) Inhalation two times a day  cyanocobalamin 1000 MICROGram(s) Oral daily  enoxaparin Injectable 40 milliGRAM(s) SubCutaneous daily  furosemide   Injectable 40 milliGRAM(s) IV Push every 12 hours  influenza   Vaccine 0.5 milliLiter(s) IntraMuscular once  multivitamin 1 Tablet(s) Oral daily  piperacillin/tazobactam IVPB.. 3.375 Gram(s) IV Intermittent every 8 hours  potassium chloride    Tablet ER 40 milliEquivalent(s) Oral once  sodium chloride 0.65% Nasal 1 Spray(s) Both Nostrils every 4 hours  tiotropium 18 MICROgram(s) Capsule 1 Capsule(s) Inhalation daily    MEDICATIONS  (PRN):  albuterol/ipratropium for Nebulization 3 milliLiter(s) Nebulizer every 4 hours PRN Shortness of Breath and/or Wheezing  metoclopramide 10 milliGRAM(s) Oral every 6 hours PRN nausea  morphine   Solution 2.5 milliGRAM(s) Oral every 6 hours PRN dyspnea or pain  sodium chloride 0.65% Nasal 1 Spray(s) Both Nostrils once PRN Nasal Congestion      Allergies    No Known Allergies    Intolerances        LABS:                        13.9   15.72 )-----------( 227      ( 22 Oct 2021 09:28 )             45.4     10-22    141  |  94<L>  |  26<H>  ----------------------------<  150<H>  3.6   |  33<H>  |  0.95    Ca    9.4      22 Oct 2021 09:28  Phos  2.8     10-22  Mg     2.10     10-22    TPro  6.7  /  Alb  3.8  /  TBili  1.0  /  DBili  x   /  AST  64<H>  /  ALT  66<H>  /  AlkPhos  165<H>  10-22          RADIOLOGY & ADDITIONAL TESTS:  Studies reviewed.

## 2021-10-22 NOTE — PROGRESS NOTE ADULT - ASSESSMENT
62 yo M with PMH of metastatic NSCLC, s/p 6 cycles Carbo/Pemetrexed (Immunotherapy held given ILD) + 1 cycle pemetrexed maintenance with POD, s/p nab-paclitaxel/ramicirumab with POD, s/p gemcitabine with POD   on 4th line pembrolizumab (C1 10/13/21), COPD, ILD, pulmonary HTN, on 4L NC at home who p/w SOB x 1 week with imaging concerning for postobstructive pneumonia and pulmonary edema. Oncology has been consulted for further recommendations.    CT chest  on admission showing diffuse hepatic metastases, known adrenal mets, right hilar/infrahilar malignancy extending into the right lower lobe and confluent with right paratracheal, right hilar, and subcarinal lymphadenopathy, presumably primary lung cancer. There is resulting occlusion of the right lower lobe airways and postobstructive pneumonia the entire right lower lobe and right middle lobe. Hydrostatic interstitial pulmonary edema/CHF. Small right pleural effusion. There is also left mediastinal and left hilar metastatic lymphadenopathy.  Given ILD, there is always concern that pembrolizumab may be causing a pneumonitis. However, imaging findings are not consistent with pneumonitis, and it would be rare to see this develop so quickly as patient was only treated 3 days ago. More likely is that symptoms are due to pneumonia and fluid overload.      -Pulmonary consulted. Appreciate recommendations   -Agree with treatment for postobstructive pneumonia and CHF. Symptoms are improving  -Continue to titrate down O2 demand on HFNC if possible. Patient continues to have high requirements and has not been able to be weaned off HF.  - Appreciate Palliative Care consult for GOC and symptom management  -Patient with progression of disease and just recently started on 4th line treatment , limited options remain for further DMT and furthermore current performance status would not allow for disease modifying treatments as patient requires high flow O2. Patient may benefit from hospice services.  -Prognosis guarded, would continue to monitor respiratory symptoms  -Supportive care, pain control, Nutrition, PT, DVT ppx  -Outpatient oncology f/u    Will follow. Please do not hesitate to call back with questions.     Sravani Chris MD  Medical Oncology Attending  C: [phone] 60 yo M with PMH of metastatic NSCLC, s/p 6 cycles Carbo/Pemetrexed (Immunotherapy held given ILD) + 1 cycle pemetrexed maintenance with POD, s/p nab-paclitaxel/ramicirumab with POD, s/p gemcitabine with POD   on 4th line pembrolizumab (C1 10/13/21), COPD, ILD, pulmonary HTN, on 4L NC at home who p/w SOB x 1 week with imaging concerning for postobstructive pneumonia and pulmonary edema. Oncology has been consulted for further recommendations.    CT chest  on admission showing diffuse hepatic metastases, known adrenal mets, right hilar/infrahilar malignancy extending into the right lower lobe and confluent with right paratracheal, right hilar, and subcarinal lymphadenopathy, presumably primary lung cancer. There is resulting occlusion of the right lower lobe airways and postobstructive pneumonia the entire right lower lobe and right middle lobe. Hydrostatic interstitial pulmonary edema/CHF. Small right pleural effusion. There is also left mediastinal and left hilar metastatic lymphadenopathy.  Given ILD, there is always concern that pembrolizumab may be causing a pneumonitis. However, imaging findings are not consistent with pneumonitis, and it would be rare to see this develop so quickly as patient was only treated 3 days ago. More likely is that symptoms are due to pneumonia and fluid overload.      -Pulmonary consulted. Appreciate recommendations   -Please consider empiric treatment with high dose steroids, would recommend 1mg/kg of prednisone daily. PPI and blood sugar control while on high dose steroids.  -Agree with treatment for postobstructive pneumonia and CHF.   -Continue to titrate down O2 demand on HFNC if possible. Patient continues to have high requirements and has not been able to be weaned off HF.  - Appreciate Palliative Care consult for GOC and symptom management  -Patient with progression of disease and just recently started on 4th line treatment , limited options remain for further DMT and furthermore current performance status would not allow for disease modifying treatments as patient requires high flow O2. This was discussed with patient in detail. Patient hopes to continue to receive cancer treatment and is not accepting of hospice or supportive care options at this time. GOC discussed. Plan to readdress next week after trial of steroids.   -Prognosis guarded, would continue to monitor respiratory symptoms  -Supportive care, pain control, Nutrition, PT, DVT ppx  -Outpatient oncology f/u    Will follow. Please do not hesitate to call back with questions.     Sravani Chris MD  Medical Oncology Attending  C: [phone]

## 2021-10-22 NOTE — PROGRESS NOTE ADULT - PROBLEM SELECTOR PLAN 7
- hx of pulm HTN 2/2 lung pathologies  - Aggressive diuresis - Lasix IV BID with close monitoring of I/O  - No role for pulmonary vasodilator therapy  - f/u PA pressure results

## 2021-10-22 NOTE — PROGRESS NOTE ADULT - PROBLEM SELECTOR PLAN 4
- obstructive PFTs  - Was on bronchodilator therapy at home - would use Symbicort and Spiriva while in the hospital  - Incentive spirometer and Acapella  - Would hold off oral corticosteroids from a pulmonary perspective at this time

## 2021-10-22 NOTE — PROGRESS NOTE ADULT - PROBLEM SELECTOR PLAN 6
- CT showed worsening of ILD since 10/2021  - ILD could be worsened by pt's pembrolizumab  - continue to monitor  - respiratory treatments as above

## 2021-10-22 NOTE — PROGRESS NOTE ADULT - PROBLEM SELECTOR PLAN 5
- HFpEF; BNP of 6752 on admission  - EKG showed sinus tachycardia, right atrial enlargement, rightward axis, and nonspecific ST and T wave abnormalities  - EF 56% on echo (10/20), LV systolic wnl, Right ventricular enlargement with decreased RV systolic function  - echocardiogram pending for pulm artery pressure  - daily standing weights  - c/w furosemide IV BID

## 2021-10-22 NOTE — PROGRESS NOTE ADULT - SUBJECTIVE AND OBJECTIVE BOX
CHIEF COMPLAINT: SOB    Interval Events: No acute events. Patient feels well this AM. Still requiring 100% FiO2. He feels his edema has slightly improved.    REVIEW OF SYSTEMS:  Constitutional: No fevers or chills.   Eyes: No itching or discharge from the eyes  ENT: No ear pain. No ear discharge. No nasal congestion.   CV: No chest pain. No palpitations. No lightheadedness or dizziness.   Resp: No dyspnea at rest.   GI: No nausea. No vomiting. No diarrhea.  MSK: No joint pain or pain in any extremities  Integumentary: No skin lesions. +Pedal edema.  Neurological: No gross motor weakness. No sensory changes.  [x] All other systems negative  [ ] Unable to assess ROS because ________    OBJECTIVE:  ICU Vital Signs Last 24 Hrs  T(C): 36.9 (22 Oct 2021 06:20), Max: 37 (21 Oct 2021 21:00)  T(F): 98.4 (22 Oct 2021 06:20), Max: 98.6 (21 Oct 2021 21:00)  HR: 84 (22 Oct 2021 06:20) (70 - 99)  BP: 133/76 (22 Oct 2021 06:20) (110/60 - 133/76)  BP(mean): --  ABP: --  ABP(mean): --  RR: 20 (22 Oct 2021 06:20) (17 - 23)  SpO2: 89% (22 Oct 2021 06:20) (82% - 95%)        10-21 @ 07:01  -  10-22 @ 07:00  --------------------------------------------------------  IN: 200 mL / OUT: 2500 mL / NET: -2300 mL      CAPILLARY BLOOD GLUCOSE          PHYSICAL EXAM:  General: Awake, alert, oriented X 3.   HEENT: Atraumatic, normocephalic. HFNC in place.  Lymph Nodes: No palpable lymphadenopathy  Neck: Supple  Respiratory: Normal chest expansion. Bibasilar crackles.  Cardiovascular: S1 S2 normal. No murmurs, rubs or gallops.   Abdomen: Soft, non-tender, non-distended. No organomegaly.  Extremities: Warm to touch. Peripheral pulse palpable. +Pedal edema. +Clubbing.   Skin: No rashes or skin lesions  Neurological: Motor and sensory examination equal and normal in all four extremities.  Psychiatry: Appropriate mood and affect.    HOSPITAL MEDICATIONS:  MEDICATIONS  (STANDING):  budesonide 160 MICROgram(s)/formoterol 4.5 MICROgram(s) Inhaler 2 Puff(s) Inhalation two times a day  cyanocobalamin 1000 MICROGram(s) Oral daily  enoxaparin Injectable 40 milliGRAM(s) SubCutaneous daily  furosemide   Injectable 40 milliGRAM(s) IV Push every 12 hours  influenza   Vaccine 0.5 milliLiter(s) IntraMuscular once  multivitamin 1 Tablet(s) Oral daily  piperacillin/tazobactam IVPB.. 3.375 Gram(s) IV Intermittent every 8 hours  sodium chloride 0.65% Nasal 1 Spray(s) Both Nostrils every 4 hours  tiotropium 18 MICROgram(s) Capsule 1 Capsule(s) Inhalation daily    MEDICATIONS  (PRN):  albuterol/ipratropium for Nebulization 3 milliLiter(s) Nebulizer every 4 hours PRN Shortness of Breath and/or Wheezing  metoclopramide 10 milliGRAM(s) Oral every 6 hours PRN nausea  morphine   Solution 2.5 milliGRAM(s) Oral every 6 hours PRN dyspnea or pain  sodium chloride 0.65% Nasal 1 Spray(s) Both Nostrils once PRN Nasal Congestion      LABS:                        13.6   16.09 )-----------( 287      ( 21 Oct 2021 07:49 )             43.7     10-21    141  |  93<L>  |  25<H>  ----------------------------<  95  3.4<L>   |  35<H>  |  1.01    Ca    9.6      21 Oct 2021 07:49  Phos  2.6     10-21  Mg     2.20     10-21    TPro  6.7  /  Alb  3.8  /  TBili  1.2  /  DBili  x   /  AST  53<H>  /  ALT  58<H>  /  AlkPhos  159<H>  10-21              MICROBIOLOGY:     RADIOLOGY:  [ ] Reviewed and interpreted by me    Point of Care Ultrasound Findings:    PFT:    EKG:

## 2021-10-22 NOTE — PROGRESS NOTE ADULT - PROBLEM SELECTOR PLAN 9
- Lovenox for DVT prophylaxis  - Regular diet as tolerated  - Maintain O2 sat > 88%  - Aspiration precautions

## 2021-10-22 NOTE — PROGRESS NOTE ADULT - PROBLEM SELECTOR PLAN 1
hx of COPD, ILD, lung cancer s/p RLL resection on chemo, pulm HTN on 4L NC at home. CTA and U/S of LE ruled out DVT and PE. BNP of 6752 on admission.   - likely 2/2 worsening metastatic lung cancer, obstructive pneumonia, pulmonary edema  - CTA chest on 10/16 showed: right hilar/infrahilar malignancy extending into the right lower lobe, occlusion of right lower lobe airways and postobstructive pneumonia the right lower and middle lobe, interstitial pulmonary edema/CHF, and small right pleural effusion.  - B-lines present in POCUS from 10/20   - c/w HFNC and monitor pulse ox, sat goal of 88%; has been on it since 10/16 and unable to wean down; goal is to titrate off as tolerated   - c/w furosemide 40mg IV BID  - pulm recs appreciated

## 2021-10-22 NOTE — PROGRESS NOTE ADULT - ASSESSMENT
61M NSCLC (on therapy), ILD with occupational exposure and smoking history, and chronic hypoxemic respiratory failure presenting with acute on chronic hypoxemia as well as volume overload. He has had a CTPA in the ED which is worse than a recent CT and is concerning for volume overload, possible infection, and most notably progression of cancer.    Hypoxic Respiratory Failure  - Likely multifactorial, however most likely cause is patient's malignancy  - Continue diuresis, evidence of RV dysfunction likely in the setting of pHTN 2/2 hypoxemia  - Continue airway clearance therapy  - Goals of care discussion had with wife and patient, DNR/DNI with overall goal to go home  - Patient is currently requiring high amounts of oxygen. Outpatient HFNC can provide 40L/min at 40% FiO2  - Wean O2 as tolerated  - Follow up palliative care, oncology  - Poor prognosis overall    Kevin Ferguson, PGY-6  Pulmonary and Critical Care Medicine  [phone]

## 2021-10-22 NOTE — PROGRESS NOTE ADULT - PROBLEM SELECTOR PLAN 2
- 2/2 to lung cancer as shown on CTA chest  - Initially on vanc, Zosyn, and azithromycin; nasal MRSA negative - d/c vancomycin; urine legionella neg - azithryomycin d/c  - c/w  Zosyn  - sputum culture wnl  - aggressive chest PT - aerobika, bed percussion, and chest physiotherapy to help with airway clearance  - does not require bronchoscopy at this time per pulm

## 2021-10-23 NOTE — PROGRESS NOTE ADULT - PROBLEM SELECTOR PLAN 5
- HFpEF; BNP of 6752 on admission  - EKG showed sinus tachycardia, right atrial enlargement, rightward axis, and nonspecific ST and T wave abnormalities  - EF 56% on echo (10/20), LV systolic wnl, Right ventricular enlargement with decreased RV systolic function  - echocardiogram pending for pulm artery pressure  - daily standing weights  - c/w furosemide IV BID - HFpEF; BNP of 6752 on admission  - EKG showed sinus tachycardia, right atrial enlargement, rightward axis, and nonspecific ST and T wave abnormalities  - EF 56% on echo (10/20), LV systolic wnl, Right ventricular enlargement with decreased RV systolic function  - echocardiogram pending for pulm artery pressure  - daily standing weights  - c/w furosemide IV as above

## 2021-10-23 NOTE — PROGRESS NOTE ADULT - PROBLEM SELECTOR PLAN 7
- hx of pulm HTN 2/2 lung pathologies  - Aggressive diuresis - Lasix IV BID with close monitoring of I/O  - No role for pulmonary vasodilator therapy  - f/u PA pressure results - hx of pulm HTN 2/2 lung pathologies  - Aggressive diuresis - Lasix IV qD with close monitoring of I/O  - No role for pulmonary vasodilator therapy  - f/u PA pressure results

## 2021-10-23 NOTE — PROGRESS NOTE ADULT - SUBJECTIVE AND OBJECTIVE BOX
PROGRESS NOTE:   Authored by Dr. Mary Calvo MD (PGY-2). Pager [facility] [phone] / [facility] [id number]    Patient is a 61y old  Male who presents with a chief complaint of SOB (22 Oct 2021 10:36)      SUBJECTIVE / OVERNIGHT EVENTS:  No acute events overnight.     ADDITIONAL REVIEW OF SYSTEMS:  Patient denies fevers, chills, chest pain, shortness of breath, nausea, abdominal pain, diarrhea, constipation, dysuria, leg swelling, headache, light headedness.    MEDICATIONS  (STANDING):  budesonide 160 MICROgram(s)/formoterol 4.5 MICROgram(s) Inhaler 2 Puff(s) Inhalation two times a day  cyanocobalamin 1000 MICROGram(s) Oral daily  enoxaparin Injectable 40 milliGRAM(s) SubCutaneous daily  furosemide   Injectable 40 milliGRAM(s) IV Push every 12 hours  influenza   Vaccine 0.5 milliLiter(s) IntraMuscular once  multivitamin 1 Tablet(s) Oral daily  piperacillin/tazobactam IVPB.. 3.375 Gram(s) IV Intermittent every 8 hours  predniSONE   Tablet 50 milliGRAM(s) Oral two times a day  sodium chloride 0.65% Nasal 1 Spray(s) Both Nostrils every 4 hours  tiotropium 18 MICROgram(s) Capsule 1 Capsule(s) Inhalation daily    MEDICATIONS  (PRN):  albuterol/ipratropium for Nebulization 3 milliLiter(s) Nebulizer every 4 hours PRN Shortness of Breath and/or Wheezing  metoclopramide 10 milliGRAM(s) Oral every 6 hours PRN nausea  morphine   Solution 2.5 milliGRAM(s) Oral every 6 hours PRN dyspnea or pain  sodium chloride 0.65% Nasal 1 Spray(s) Both Nostrils once PRN Nasal Congestion      CAPILLARY BLOOD GLUCOSE        I&O's Summary    21 Oct 2021 07:01  -  22 Oct 2021 07:00  --------------------------------------------------------  IN: 200 mL / OUT: 2500 mL / NET: -2300 mL    22 Oct 2021 07:01  -  23 Oct 2021 06:52  --------------------------------------------------------  IN: 0 mL / OUT: 200 mL / NET: -200 mL        PHYSICAL EXAM:  Vital Signs Last 24 Hrs  T(C): 37.2 (23 Oct 2021 05:45), Max: 37.2 (23 Oct 2021 05:45)  T(F): 99 (23 Oct 2021 05:45), Max: 99 (23 Oct 2021 05:45)  HR: 86 (23 Oct 2021 05:45) (77 - 114)  BP: 120/66 (23 Oct 2021 05:45) (118/71 - 131/84)  BP(mean): --  RR: 20 (23 Oct 2021 05:45) (18 - 22)  SpO2: 88% (23 Oct 2021 05:45) (85% - 94%)    CONSTITUTIONAL: NAD, well-developed  RESPIRATORY: Normal respiratory effort; lungs are clear to auscultation bilaterally  CARDIOVASCULAR: Regular rate and rhythm, normal S1 and S2, no murmur/rub/gallop; No lower extremity edema; Peripheral pulses are 2+ bilaterally  ABDOMEN: Nontender to palpation, normoactive bowel sounds, no rebound/guarding; No hepatosplenomegaly  MUSCLOSKELETAL: no clubbing or cyanosis of digits; no joint swelling or tenderness to palpation  PSYCH: A+O to person, place, and time; affect appropriate    LABS:                        12.3   11.83 )-----------( 223      ( 23 Oct 2021 06:34 )             39.6     10-22    141  |  94<L>  |  26<H>  ----------------------------<  150<H>  3.6   |  33<H>  |  0.95    Ca    9.4      22 Oct 2021 09:28  Phos  2.8     10-22  Mg     2.10     10-22    TPro  6.7  /  Alb  3.8  /  TBili  1.0  /  DBili  x   /  AST  64<H>  /  ALT  66<H>  /  AlkPhos  165<H>  10-22                Tele Reviewed:    RADIOLOGY & ADDITIONAL TESTS:  Results Reviewed:   Imaging Personally Reviewed:  Electrocardiogram Personally Reviewed:     PROGRESS NOTE:   Authored by Dr. Mary Calvo MD (PGY-2). Pager [facility] [phone] / [facility] [phone]    Patient is a 61y old  Male who presents with a chief complaint of SOB (22 Oct 2021 10:36)      SUBJECTIVE / OVERNIGHT EVENTS:  No acute events overnight. Pt reports feeling well overall, he inquired if he can change lasix to once daily as he does not want to frequently wake up at night to urinate. Discussed giving 80mg all at once may affect his BP and make it unsafe to do so. Discussed will trial 40mg qD and uptitrate if respiratory status changes.    ADDITIONAL REVIEW OF SYSTEMS:  Patient denies fevers, chills, chest pain, shortness of breath, nausea, abdominal pain, diarrhea, constipation, dysuria, leg swelling, headache, light headedness.    MEDICATIONS  (STANDING):  budesonide 160 MICROgram(s)/formoterol 4.5 MICROgram(s) Inhaler 2 Puff(s) Inhalation two times a day  cyanocobalamin 1000 MICROGram(s) Oral daily  enoxaparin Injectable 40 milliGRAM(s) SubCutaneous daily  furosemide   Injectable 40 milliGRAM(s) IV Push every 12 hours  influenza   Vaccine 0.5 milliLiter(s) IntraMuscular once  multivitamin 1 Tablet(s) Oral daily  piperacillin/tazobactam IVPB.. 3.375 Gram(s) IV Intermittent every 8 hours  predniSONE   Tablet 50 milliGRAM(s) Oral two times a day  sodium chloride 0.65% Nasal 1 Spray(s) Both Nostrils every 4 hours  tiotropium 18 MICROgram(s) Capsule 1 Capsule(s) Inhalation daily    MEDICATIONS  (PRN):  albuterol/ipratropium for Nebulization 3 milliLiter(s) Nebulizer every 4 hours PRN Shortness of Breath and/or Wheezing  metoclopramide 10 milliGRAM(s) Oral every 6 hours PRN nausea  morphine   Solution 2.5 milliGRAM(s) Oral every 6 hours PRN dyspnea or pain  sodium chloride 0.65% Nasal 1 Spray(s) Both Nostrils once PRN Nasal Congestion      CAPILLARY BLOOD GLUCOSE        I&O's Summary    21 Oct 2021 07:01  -  22 Oct 2021 07:00  --------------------------------------------------------  IN: 200 mL / OUT: 2500 mL / NET: -2300 mL    22 Oct 2021 07:01  -  23 Oct 2021 06:52  --------------------------------------------------------  IN: 0 mL / OUT: 200 mL / NET: -200 mL        PHYSICAL EXAM:  Vital Signs Last 24 Hrs  T(C): 37.2 (23 Oct 2021 05:45), Max: 37.2 (23 Oct 2021 05:45)  T(F): 99 (23 Oct 2021 05:45), Max: 99 (23 Oct 2021 05:45)  HR: 86 (23 Oct 2021 05:45) (77 - 114)  BP: 120/66 (23 Oct 2021 05:45) (118/71 - 131/84)  BP(mean): --  RR: 20 (23 Oct 2021 05:45) (18 - 22)  SpO2: 88% (23 Oct 2021 05:45) (85% - 94%)    CONSTITUTIONAL: NAD, well-developed  RESPIRATORY: HFNC in place, Normal respiratory effort; reduced breath sounds on right upper and lower lung, no wheezes/crackles/rales  CARDIOVASCULAR: Regular rate and rhythm, normal S1 and S2, no murmur/rub/gallop; 2+ b/l lower extremity edema; Peripheral pulses are 2+ bilaterally  ABDOMEN: Nontender to palpation, normoactive bowel sounds, no rebound/guarding  MUSCLOSKELETAL: no clubbing or cyanosis of digits; no joint swelling or tenderness to palpation  PSYCH: A+O to person, place, and time; affect appropriate    LABS:                        12.3   11.83 )-----------( 223      ( 23 Oct 2021 06:34 )             39.6     10-22    141  |  94<L>  |  26<H>  ----------------------------<  150<H>  3.6   |  33<H>  |  0.95    Ca    9.4      22 Oct 2021 09:28  Phos  2.8     10-22  Mg     2.10     10-22    TPro  6.7  /  Alb  3.8  /  TBili  1.0  /  DBili  x   /  AST  64<H>  /  ALT  66<H>  /  AlkPhos  165<H>  10-22

## 2021-10-23 NOTE — PROGRESS NOTE ADULT - PROBLEM SELECTOR PLAN 1
hx of COPD, ILD, lung cancer s/p RLL resection on chemo, pulm HTN on 4L NC at home. CTA and U/S of LE ruled out DVT and PE. BNP of 6752 on admission.   - likely 2/2 worsening metastatic lung cancer, obstructive pneumonia, pulmonary edema  - CTA chest on 10/16 showed: right hilar/infrahilar malignancy extending into the right lower lobe, occlusion of right lower lobe airways and postobstructive pneumonia the right lower and middle lobe, interstitial pulmonary edema/CHF, and small right pleural effusion.  - B-lines present in POCUS from 10/20   - c/w HFNC and monitor pulse ox, sat goal of 88%; has been on it since 10/16 and unable to wean down; goal is to titrate off as tolerated   - c/w furosemide 40mg IV BID  - pulm recs appreciated hx of COPD, ILD, lung cancer s/p RLL resection on chemo, pulm HTN on 4L NC at home. CTA and U/S of LE ruled out DVT and PE. BNP of 6752 on admission.   - likely 2/2 worsening metastatic lung cancer, obstructive pneumonia, pulmonary edema  - CTA chest on 10/16 showed: right hilar/infrahilar malignancy extending into the right lower lobe, occlusion of right lower lobe airways and postobstructive pneumonia the right lower and middle lobe, interstitial pulmonary edema/CHF, and small right pleural effusion.  - B-lines present in POCUS from 10/20   - c/w HFNC and monitor pulse ox, sat goal of 88%; has been on it since 10/16 and unable to wean down; goal is to titrate off as tolerated   - c/w furosemide 40mg IV qD, will add back BID if desats   - pulm recs appreciated

## 2021-10-24 NOTE — DIETITIAN INITIAL EVALUATION ADULT. - OTHER INFO
Patient reports good appetite with >75% PO intake in-house. Denies GI distress (nausea/vomiting/diarrhea/constipation). States no difficulty chewing/swallowing. Endorses a usual body weight of 129kg, denies recent weight changes. Patient's weight trend this admission of 121kg (10/23), 122.6kg (10/20), 123.2kg (10/18) indicates patient with weight loss possibly secondary to diuresis. Patient amenable to addition of oral nutrition supplement to optimize PO intake.

## 2021-10-24 NOTE — DIETITIAN INITIAL EVALUATION ADULT. - BODY MASS INDEX
I have personally performed a face to face diagnostic evaluation on this patient.  I have reviewed the PA note and agree with the history, exam, and plan of care, except as noted. 35.7

## 2021-10-24 NOTE — PROGRESS NOTE ADULT - SUBJECTIVE AND OBJECTIVE BOX
Sandhya Caputo PGY2  pager [phone] or check resident tab for coverage    Patient is a 61y old  Male who presents with a chief complaint of SOB (23 Oct 2021 06:51)      SUBJECTIVE / OVERNIGHT EVENTS:    MEDICATIONS  (STANDING):  budesonide 160 MICROgram(s)/formoterol 4.5 MICROgram(s) Inhaler 2 Puff(s) Inhalation two times a day  cyanocobalamin 1000 MICROGram(s) Oral daily  enoxaparin Injectable 40 milliGRAM(s) SubCutaneous daily  furosemide   Injectable 40 milliGRAM(s) IV Push <User Schedule>  influenza   Vaccine 0.5 milliLiter(s) IntraMuscular once  multivitamin 1 Tablet(s) Oral daily  predniSONE   Tablet 50 milliGRAM(s) Oral two times a day  sodium chloride 0.65% Nasal 1 Spray(s) Both Nostrils every 4 hours  tiotropium 18 MICROgram(s) Capsule 1 Capsule(s) Inhalation daily    MEDICATIONS  (PRN):  albuterol/ipratropium for Nebulization 3 milliLiter(s) Nebulizer every 4 hours PRN Shortness of Breath and/or Wheezing  metoclopramide 10 milliGRAM(s) Oral every 6 hours PRN nausea  morphine   Solution 2.5 milliGRAM(s) Oral every 6 hours PRN dyspnea or pain  sodium chloride 0.65% Nasal 1 Spray(s) Both Nostrils once PRN Nasal Congestion      CAPILLARY BLOOD GLUCOSE        I&O's Summary    23 Oct 2021 07:01  -  24 Oct 2021 07:00  --------------------------------------------------------  IN: 0 mL / OUT: 550 mL / NET: -550 mL        Vital Signs Last 24 Hrs  T(C): 36.6 (24 Oct 2021 06:00), Max: 36.9 (24 Oct 2021 02:00)  T(F): 97.9 (24 Oct 2021 06:00), Max: 98.4 (24 Oct 2021 02:00)  HR: 85 (24 Oct 2021 07:05) (80 - 95)  BP: 122/72 (24 Oct 2021 06:00) (119/75 - 136/84)  BP(mean): --  RR: 20 (24 Oct 2021 07:05) (18 - 20)  SpO2: 91% (24 Oct 2021 07:05) (90% - 93%)    PHYSICAL EXAM:  GENERAL: no distress  PSYCH: A&O x3  HEAD: Atraumatic, Normocephalic  NECK: Supple, No JVD  CHEST/LUNG: clear to auscultation bilaterally  HEART: regular rate and rhythm, no murmurs  ABDOMEN: nontender to palpation, no rebound tenderness/guarding  EXTREMITIES: no edema on bilateral LE  NEUROLOGY: no focal neurologic deficit  SKIN: No rashes or lesions    LABS:                        12.3   11.83 )-----------( 223      ( 23 Oct 2021 06:34 )             39.6      10-23    142  |  96<L>  |  26<H>  ----------------------------<  117<H>  3.6   |  36<H>  |  0.92    Ca    9.7      23 Oct 2021 06:34  Phos  3.8     10-23  Mg     2.30     10-23    TPro  6.1  /  Alb  3.6  /  TBili  1.0  /  DBili  x   /  AST  49<H>  /  ALT  63<H>  /  AlkPhos  151<H>  10-23              RADIOLOGY & ADDITIONAL TESTS:    Imaging Personally Reviewed:    Consultant(s) Notes Reviewed:      Care Discussed with Consultants/Other Providers:

## 2021-10-24 NOTE — DIETITIAN INITIAL EVALUATION ADULT. - CHIEF COMPLAINT
The patient is a 61M NSCLC (on therapy), ILD with occupational exposure and smoking history, and chronic hypoxemic respiratory failure presenting with acute on chronic hypoxemia and volume overload. He has had a CTPA in the ED which is worse than a recent CT neg for PE, however w worsening cancer, volume overload, possible pneumonia and an TTE showed reduced RV systolic function.

## 2021-10-24 NOTE — PROGRESS NOTE ADULT - PROBLEM SELECTOR PLAN 7
- hx of pulm HTN 2/2 lung pathologies  - Aggressive diuresis - Lasix IV qD with close monitoring of I/O  - No role for pulmonary vasodilator therapy  - f/u PA pressure results

## 2021-10-24 NOTE — PROGRESS NOTE ADULT - PROBLEM SELECTOR PLAN 5
- HFpEF; BNP of 6752 on admission  - EKG showed sinus tachycardia, right atrial enlargement, rightward axis, and nonspecific ST and T wave abnormalities  - EF 56% on echo (10/20), LV systolic wnl, Right ventricular enlargement with decreased RV systolic function  - echocardiogram pending for pulm artery pressure  - daily standing weights  - c/w furosemide IV as above

## 2021-10-24 NOTE — DIETITIAN INITIAL EVALUATION ADULT. - ORAL INTAKE PTA/DIET HISTORY
Patient reports good PO intake PTA. Confirms NKFA. Per chart supplements with centrum silver and vitamin B12.

## 2021-10-24 NOTE — DIETITIAN INITIAL EVALUATION ADULT. - PERTINENT MEDS FT
MEDICATIONS  (STANDING):  budesonide 160 MICROgram(s)/formoterol 4.5 MICROgram(s) Inhaler 2 Puff(s) Inhalation two times a day  cyanocobalamin 1000 MICROGram(s) Oral daily  enoxaparin Injectable 40 milliGRAM(s) SubCutaneous daily  furosemide    Tablet 80 milliGRAM(s) Oral <User Schedule>  influenza   Vaccine 0.5 milliLiter(s) IntraMuscular once  multivitamin 1 Tablet(s) Oral daily  predniSONE   Tablet 50 milliGRAM(s) Oral two times a day  sodium chloride 0.65% Nasal 1 Spray(s) Both Nostrils every 4 hours  tiotropium 18 MICROgram(s) Capsule 1 Capsule(s) Inhalation daily    MEDICATIONS  (PRN):  albuterol/ipratropium for Nebulization 3 milliLiter(s) Nebulizer every 4 hours PRN Shortness of Breath and/or Wheezing  metoclopramide 10 milliGRAM(s) Oral every 6 hours PRN nausea  morphine   Solution 2.5 milliGRAM(s) Oral every 6 hours PRN dyspnea or pain  sodium chloride 0.65% Nasal 1 Spray(s) Both Nostrils once PRN Nasal Congestion

## 2021-10-24 NOTE — PROGRESS NOTE ADULT - PROBLEM SELECTOR PLAN 1
hx of COPD, ILD, lung cancer s/p RLL resection on chemo, pulm HTN on 4L NC at home. CTA and U/S of LE ruled out DVT and PE. BNP of 6752 on admission.   - likely 2/2 worsening metastatic lung cancer, obstructive pneumonia, pulmonary edema  - CTA chest on 10/16 showed: right hilar/infrahilar malignancy extending into the right lower lobe, occlusion of right lower lobe airways and postobstructive pneumonia the right lower and middle lobe, interstitial pulmonary edema/CHF, and small right pleural effusion.  - B-lines present in POCUS from 10/20   - c/w HFNC and monitor pulse ox, sat goal of 88%; has been on it since 10/16 and unable to wean down; goal is to titrate off as tolerated   - c/w furosemide 40mg IV qD, will add back BID if desats   - pulm recs appreciated

## 2021-10-25 NOTE — PROGRESS NOTE ADULT - PROBLEM SELECTOR PLAN 3
Patient's goals are to return home. Wife and patient understand that he can go home with hospice services if [place] settings can be weaned to 40LPM and 40% FiO2.   Palliative will continue to assist with sx management.   GOC- DNR/DNI, MOLST completed by primary team     Thank you for allowing us to participate in your patient's care. Patient remains asymptomatic. Palliative team signing off at this time. Please page [phone] for any q's or c's.     Radha Massey D.O.   Palliative Medicine. Patient's goals are to return home. Wife and patient understand that he can go home with hospice services if [place] settings can be weaned to 40LPM and 40% FiO2.   Palliative will continue to assist with sx management.   GOC- DNR/DNI, MOLST completed by primary team     Thank you for allowing us to participate in your patient's care. Patient remains asymptomatic. Please page [phone] for any q's or c's.     Radha Massey D.O.   Palliative Medicine.

## 2021-10-25 NOTE — PROGRESS NOTE ADULT - ASSESSMENT
61M NSCLC (on therapy), ILD with occupational exposure and smoking history, and chronic hypoxemic respiratory failure presenting with acute on chronic hypoxemia as well as volume overload. He has had a CTPA in the ED which is worse than a recent CT and is concerning for volume overload, possible infection, and most notably progression of cancer.    Hypoxic Respiratory Failure  - Likely multifactorial, however most likely cause is patient's malignancy  - Continue diuresis, evidence of RV dysfunction likely in the setting of pHTN 2/2 hypoxemia  - Continue airway clearance therapy  - Continue prednisone 1mg/kg  - Goals of care discussion had with wife and patient, DNR/DNI with overall goal to go home  - Patient is currently requiring high amounts of oxygen. Outpatient HFNC can provide 40L/min at 40% FiO2  - Wean O2 as tolerated  - Follow up palliative care, oncology  - Poor prognosis overall, patient still requiring high FiO2 despite interventions    Kevin Ferguson, PGY-6  Pulmonary and Critical Care Medicine  [phone]

## 2021-10-25 NOTE — PROGRESS NOTE ADULT - SUBJECTIVE AND OBJECTIVE BOX
INTERVAL HPI/OVERNIGHT EVENTS:  Patient seen at bedside.  Feels well overall but O2 requirements remain very high, 100% at 50 lit.       VITAL SIGNS:  T(F): 98.4 (10-25-21 @ 12:31)  HR: 122 (10-25-21 @ 12:32)  BP: 119/81 (10-25-21 @ 12:31)  RR: 20 (10-25-21 @ 12:32)  SpO2: 93% (10-25-21 @ 12:32)  Wt(kg): --    PHYSICAL EXAM:    Constitutional: NAD, resting in bed comfortably  Eyes: EOMI, sclera non-icteric  Neck: supple, no LAP  Respiratory: CTA b/l, good air entry b/l, no wheezing, rhonchi or crackels  Cardiovascular: RRR, normal S1S2, no M/R/G  Gastrointestinal: soft, NTND  Extremities: no edema  Neurological: AAOx3, non focal  Skin: Normal temperature    MEDICATIONS  (STANDING):  albuterol/ipratropium for Nebulization 3 milliLiter(s) Nebulizer every 4 hours  budesonide 160 MICROgram(s)/formoterol 4.5 MICROgram(s) Inhaler 2 Puff(s) Inhalation two times a day  cyanocobalamin 1000 MICROGram(s) Oral daily  enoxaparin Injectable 40 milliGRAM(s) SubCutaneous daily  furosemide   Injectable 40 milliGRAM(s) IV Push <User Schedule>  influenza   Vaccine 0.5 milliLiter(s) IntraMuscular once  morphine   Solution 2.5 milliGRAM(s) Oral every 12 hours  multivitamin 1 Tablet(s) Oral daily  predniSONE   Tablet 50 milliGRAM(s) Oral two times a day  sodium chloride 0.65% Nasal 1 Spray(s) Both Nostrils every 4 hours  tiotropium 18 MICROgram(s) Capsule 1 Capsule(s) Inhalation daily    MEDICATIONS  (PRN):  metoclopramide 10 milliGRAM(s) Oral every 6 hours PRN nausea  morphine   Solution 2.5 milliGRAM(s) Oral every 6 hours PRN dyspnea or pain  sodium chloride 0.65% Nasal 1 Spray(s) Both Nostrils once PRN Nasal Congestion      Allergies    No Known Allergies    Intolerances        LABS:                        13.6   15.90 )-----------( 240      ( 25 Oct 2021 07:13 )             42.3     10-25    141  |  95<L>  |  35<H>  ----------------------------<  106<H>  3.7   |  33<H>  |  0.93    Ca    10.0      25 Oct 2021 07:13  Phos  3.8     10-25  Mg     2.30     10-25    TPro  6.4  /  Alb  3.8  /  TBili  0.8  /  DBili  x   /  AST  59<H>  /  ALT  84<H>  /  AlkPhos  168<H>  10-25          RADIOLOGY & ADDITIONAL TESTS:  Studies reviewed.

## 2021-10-25 NOTE — PROGRESS NOTE ADULT - ASSESSMENT
61M NSCLC (on therapy), ILD with occupational exposure and smoking history, and chronic hypoxemic respiratory failure presenting with acute on chronic hypoxemia and volume overload. He has had a CTPA in the ED which is worse than a recent CT neg for PE, however w worsening cancer, volume overload, possible pneumonia and an TTE showed reduced RV systolic function. 61M NSCLC (on therapy), ILD with occupational exposure and smoking history, and chronic hypoxemic respiratory failure presenting with acute on chronic hypoxemia and volume overload. He has had a CTPA in the ED which is worse than a recent CT neg for PE, however w worsening cancer, volume overload, possible pneumonia and an TTE showed reduced RV systolic function. Now on max HFNC.

## 2021-10-25 NOTE — PROGRESS NOTE ADULT - PROBLEM SELECTOR PLAN 1
hx of COPD, ILD, lung cancer s/p RLL resection on chemo, pulm HTN on 4L NC at home. CTA and U/S of LE ruled out DVT and PE. BNP of 6752 on admission.   - likely 2/2 worsening metastatic lung cancer, obstructive pneumonia, pulmonary edema  - CTA chest on 10/16 showed: right hilar/infrahilar malignancy extending into the right lower lobe, occlusion of right lower lobe airways and postobstructive pneumonia the right lower and middle lobe, interstitial pulmonary edema/CHF, and small right pleural effusion.  - B-lines present in POCUS from 10/20   - c/w HFNC and monitor pulse ox, sat goal of 88%; has been on it since 10/16 and unable to wean down; goal is to titrate off as tolerated   - c/w furosemide 40mg IV qD, will add back BID if desats   - pulm recs appreciated hx of COPD, ILD, lung cancer s/p RLL resection on chemo, pulm HTN on 4L NC at home. CTA and U/S of LE ruled out DVT and PE. BNP of 6752 on admission.   - likely 2/2 worsening metastatic lung cancer, obstructive pneumonia, pulmonary edema  - CTA chest on 10/16 showed: right hilar/infrahilar malignancy extending into the right lower lobe, occlusion of right lower lobe airways and postobstructive pneumonia the right lower and middle lobe, interstitial pulmonary edema/CHF, and small right pleural effusion.  - B-lines present in POCUS from 10/20   - c/w HFNC and monitor pulse ox, sat goal of 88%; has been on it since 10/16 and unable to wean down; goal is to titrate off as tolerated   - c/w furosemide 40mg IV BID will add back BID if desats   - pulm recs appreciated hx of COPD, ILD, lung cancer s/p RLL resection on chemo, pulm HTN on 4L NC at home. CTA and U/S of LE ruled out DVT and PE. BNP of 6752 on admission.   - likely 2/2 worsening metastatic lung cancer, obstructive pneumonia, pulmonary edema  - CTA chest on 10/16 showed: right hilar/infrahilar malignancy extending into the right lower lobe, occlusion of right lower lobe airways and postobstructive pneumonia the right lower and middle lobe, interstitial pulmonary edema/CHF, and small right pleural effusion.  - B-lines present in POCUS from 10/20   - c/w HFNC and monitor pulse ox, sat goal of 88%; has been on it since 10/16 and unable to wean down; goal is to titrate off as tolerated   - c/w furosemide 40mg IV BID   - pulm recs appreciated hx of COPD, ILD, lung cancer s/p RLL resection on chemo, pulm HTN on 4L NC at home. CTA and U/S of LE ruled out DVT and PE. BNP of 6752 on admission.   - likely 2/2 worsening metastatic lung cancer, obstructive pneumonia, pulmonary edema  - CTA chest on 10/16 showed: right hilar/infrahilar malignancy extending into the right lower lobe, occlusion of right lower lobe airways and postobstructive pneumonia the right lower and middle lobe, interstitial pulmonary edema/CHF, and small right pleural effusion.  - B-lines present in POCUS from 10/20   - c/w HFNC and monitor pulse ox, sat goal liberalized to 80% given asymptomatic and chronically hypoxic; has been on HFNC since 10/16 and unable to wean down; goal is to titrate off as tolerated   - c/w furosemide 40mg IV BID   - pulm recs appreciated

## 2021-10-25 NOTE — PROGRESS NOTE ADULT - PROBLEM SELECTOR PLAN 3
- Diagnosed in 2020. s/p 6 cycles Carbo/Pemetrexed (Immunotherapy held given ILD) + 1 cycle pemetrexed maintenance with POD, s/p nab-paclitaxel/ramicirumab with POD, s/p gemcitabine with POD; Most recently started on 4th line pembrolizumab. C1 on 10/13/21  - CT chest  on admission showing diffuse hepatic metastases, known adrenal mets, right hilar/infrahilar malignancy extending into the right lower lobe and confluent with right paratracheal, right hilar, and subcarinal lymphadenopathy, presumably primary lung cancer. There is resulting occlusion of the right lower lobe airways and postobstructive pneumonia the entire right lower lobe and right middle lobe. Hydrostatic interstitial pulmonary edema/CHF. Small right pleural effusion. There is also left mediastinal and left hilar metastatic lymphadenopathy.  - follows Dr. Richmond Zheng outpatient  - onc following  - no current plans for inpatient treatment  - f/u with Dr. Zheng outpatient  - Onc recommends hospice at this time; Onc to discuss this with patient - Diagnosed in 2020. s/p 6 cycles Carbo/Pemetrexed (Immunotherapy held given ILD) + 1 cycle pemetrexed maintenance with POD, s/p nab-paclitaxel/ramicirumab with POD, s/p gemcitabine with POD; Most recently started on 4th line pembrolizumab. C1 on 10/13/21  - CT chest  on admission showing diffuse hepatic metastases, known adrenal mets, right hilar/infrahilar malignancy extending into the right lower lobe and confluent with right paratracheal, right hilar, and subcarinal lymphadenopathy, presumably primary lung cancer. There is resulting occlusion of the right lower lobe airways and postobstructive pneumonia the entire right lower lobe and right middle lobe. Hydrostatic interstitial pulmonary edema/CHF. Small right pleural effusion. There is also left mediastinal and left hilar metastatic lymphadenopathy.  - onc following  - no current plans for inpatient treatment  - f/u with Dr. Richmond Zheng outpatient  - Onc recommends hospice at this time; Onc to discuss this with patient

## 2021-10-25 NOTE — PROGRESS NOTE ADULT - PROBLEM SELECTOR PLAN 2
- 2/2 to lung cancer as shown on CTA chest  - Initially on vanc, Zosyn, and azithromycin; nasal MRSA negative - d/c vancomycin; urine legionella neg - azithryomycin d/c  - c/w  Zosyn  - sputum culture wnl  - aggressive chest PT - aerobika, bed percussion, and chest physiotherapy to help with airway clearance  - does not require bronchoscopy at this time per pulm - 2/2 to lung cancer as shown on CTA chest  - Initially on vanc, Zosyn, and azithromycin; nasal MRSA negative - d/c vancomycin; urine legionella neg - azithryomycin d/c  - s/p 7d  Zosyn (completed 10/23)  - sputum culture wnl  - aggressive chest PT - aerobika, bed percussion, and chest physiotherapy to help with airway clearance  - does not require bronchoscopy at this time per pulm

## 2021-10-25 NOTE — PROGRESS NOTE ADULT - PROBLEM SELECTOR PLAN 1
Dyspnea 2/2 Post obstructive pna vs. pulmonary edema in setting of metastatic NSCLC  > Patient remains asymptomatic. After family discussion with patient, he is agreeable to trial morphine 2.5mg PO bid. Will continue 2.5mg PO morphine q6hr prn breakthrough dyspnea   > continue steroids per primary team   > Pulm recs appreciated: taper HFNC, continue nebs  > Continue diuresis   > At home, patient on 4L NC O2.  > please ensure patient is on bowel regimen while on opioids.

## 2021-10-25 NOTE — PROGRESS NOTE ADULT - ASSESSMENT
60 y/o M with history of NSCLC s/p RLL resection on chemo, COPD, ILD, pulmonary HTN, on 4L NC at home presents with SOB x 1 week. On admission the patient was tachypneic and tachycardic but afebrile. PE notable for wheezing, crackles and ronchi. Labs on admission remarkable for an elevated WBC, elevated BNP, and transaminitis. LE U/S ruled out evidence of DVT and CTA ruled out evidence of PE. DDx includes HF exacerbation, pulmonary infection, exacerbation 2/2 pt's lung cancer, worsening ILD 2/2 pembrolizumab, or COPD exacerbation. Palliative consulted for assistance with sx management.

## 2021-10-25 NOTE — PROGRESS NOTE ADULT - ASSESSMENT
62 yo M with PMH of metastatic NSCLC, s/p 6 cycles Carbo/Pemetrexed (Immunotherapy held given ILD) + 1 cycle pemetrexed maintenance with POD, s/p nab-paclitaxel/ramicirumab with POD, s/p gemcitabine with POD   on 4th line pembrolizumab (C1 10/13/21), COPD, ILD, pulmonary HTN, on 4L NC at home who p/w SOB x 1 week with imaging concerning for postobstructive pneumonia and pulmonary edema. Oncology has been consulted for further recommendations.    CT chest  on admission showing diffuse hepatic metastases, known adrenal mets, right hilar/infrahilar malignancy extending into the right lower lobe and confluent with right paratracheal, right hilar, and subcarinal lymphadenopathy, presumably primary lung cancer. There is resulting occlusion of the right lower lobe airways and postobstructive pneumonia the entire right lower lobe and right middle lobe. Hydrostatic interstitial pulmonary edema/CHF. Small right pleural effusion. There is also left mediastinal and left hilar metastatic lymphadenopathy.  Given ILD, there is always concern that pembrolizumab may be causing a pneumonitis. However, imaging findings are not consistent with pneumonitis, and it would be rare to see this develop so quickly as patient was only treated 3 days ago. More likely is that symptoms are due to pneumonia and fluid overload.    Meeting was held at bedside with patient and his wife, pal care team, primary medicine team and oncology. Discussed that he is not a candidate for treatment at this time given very high supplemental oxygen requirements, he is not able to be discharged home given the high O2 requirements.   Patient would like to continue to try to taper down his supplemental O2.    -Pulmonary consulted. Appreciate recommendations   -C/w 1mg/kg of prednisone daily. PPI and blood sugar control while on high dose steroids.  -Agree with treatment for postobstructive pneumonia and CHF.   -Continue to titrate down O2 demand on HFNC if possible. Patient continues to have high requirements and has not been able to be weaned off HF.  - Appreciate Palliative Care consult for GOC and symptom management  -Patient with progression of disease and just recently started on 4th line treatment , limited options remain for further DMT and furthermore current performance status would not allow for disease modifying treatments as patient requires high flow O2. This was discussed with patient in detail. Patient hopes to continue to receive cancer treatment and is not accepting of hospice or supportive care options at this time. GOC discussed. Will follow.  -Prognosis guarded, would continue to monitor respiratory symptoms  -Supportive care, pain control, Nutrition, PT, DVT ppx  -Outpatient oncology f/u    Will follow. Please do not hesitate to call back with questions.     Sravani Chris MD  Medical Oncology Attending  C: [phone]

## 2021-10-25 NOTE — PROGRESS NOTE ADULT - PROBLEM SELECTOR PLAN 2
Onc care at [facility] with Dr. Zheng   s/p RLL resection and chemotherapy, Last chemo on 10/13  > Heme/onc recs appreciated.

## 2021-10-25 NOTE — PROGRESS NOTE ADULT - SUBJECTIVE AND OBJECTIVE BOX
[facility]  Radha Massey, Palliative Care Attending  Contact Info: Page [phone] (including Nights/Weekends), message on Microsoft Teams (Radha Massey), or leave  at Palliative Office [phone] (non-urgent)     SUBJECTIVE AND OBJECTIVE: Palliative team re-consulted for assistance with sx management and further goc as patient has been unable to be weaned off of HFNC. Patient seen and examined sitting up in bed. He continues to deny ROS including pain and sob. We had IDT discussion with patient and his wife, Annie, at bedside. Patient understands that he is not a candidate for further DMT at this time due to inability to wean off HFNC. We discussed starting ATC opioids to decrease his work of breathing in hopes that HFNC can be weaned to 40LPM at 40% FiO2. His goal is to return home.     INTERVAL HPI/OVERNIGHT EVENTS:  > Chart reviewed. Primary team has been having difficulty weaning patient off HFNC despite steroids, diuresis, abx.     DNR on chart:   Allergies    No Known Allergies    Intolerances    MEDICATIONS  (STANDING):  albuterol/ipratropium for Nebulization 3 milliLiter(s) Nebulizer every 4 hours  budesonide 160 MICROgram(s)/formoterol 4.5 MICROgram(s) Inhaler 2 Puff(s) Inhalation two times a day  cyanocobalamin 1000 MICROGram(s) Oral daily  enoxaparin Injectable 40 milliGRAM(s) SubCutaneous daily  furosemide   Injectable 40 milliGRAM(s) IV Push <User Schedule>  influenza   Vaccine 0.5 milliLiter(s) IntraMuscular once  morphine   Solution 2.5 milliGRAM(s) Oral every 12 hours  multivitamin 1 Tablet(s) Oral daily  predniSONE   Tablet 50 milliGRAM(s) Oral two times a day  sodium chloride 0.65% Nasal 1 Spray(s) Both Nostrils every 4 hours  tiotropium 18 MICROgram(s) Capsule 1 Capsule(s) Inhalation daily    MEDICATIONS  (PRN):  metoclopramide 10 milliGRAM(s) Oral every 6 hours PRN nausea  morphine   Solution 2.5 milliGRAM(s) Oral every 6 hours PRN dyspnea or pain  sodium chloride 0.65% Nasal 1 Spray(s) Both Nostrils once PRN Nasal Congestion      ITEMS UNCHECKED ARE NOT PRESENT    PRESENT SYMPTOMS: [ ]Unable to obtain due to poor mentation   Source if other than patient:  [ ]Family   [ ]Team     Pain:  [ ]yes [x ]no  QOL impact -   Location -                    Aggravating factors -  Quality -  Radiation -  Timing-  Severity (0-10 scale):  Minimal acceptable level (0-10 scale):     Dyspnea:                           [ ]Mild [ ]Moderate [ ]Severe  Anxiety:                             [ ]Mild [ ]Moderate [ ]Severe  Fatigue:                             [ ]Mild [ ]Moderate [ ]Severe  Nausea:                             [ ]Mild [ ]Moderate [ ]Severe  Loss of appetite:              [ ]Mild [ ]Moderate [ ]Severe  Constipation:                    [ ]Mild [ ]Moderate [ ]Severe    PAIN AD Score:	  http://geriatrictoolkit.[facility]/cog/painad.pdf (Ctrl + left click to view)    Other Symptoms:  [ x]All other review of systems negative     Palliative Performance Status Version 2:         %      http://npcrc.org/files/news/palliative_performance_scale_ppsv2.pdf  PHYSICAL EXAM:  Vital Signs Last 24 Hrs  T(C): 36.9 (25 Oct 2021 12:31), Max: 37.1 (25 Oct 2021 05:00)  T(F): 98.4 (25 Oct 2021 12:31), Max: 98.8 (25 Oct 2021 05:00)  HR: 122 (25 Oct 2021 12:32) (86 - 125)  BP: 119/81 (25 Oct 2021 12:31) (119/81 - 132/82)  BP(mean): --  RR: 20 (25 Oct 2021 12:32) (18 - 21)  SpO2: 93% (25 Oct 2021 12:32) (84% - 95%) I&O's Summary    24 Oct 2021 07:01  -  25 Oct 2021 07:00  --------------------------------------------------------  IN: 0 mL / OUT: 900 mL / NET: -900 mL       GENERAL:  [ x]Alert  [x ]Oriented x3   [ ]Lethargic  [ ]Cachexia  [ ]Unarousable  [x ]Verbal  [ ]Non-Verbal  Behavioral:   [ ] Anxiety  [ ] Delirium [ ] Agitation [x ] Other  HEENT:  [ ]Normal   [ ]Dry mouth   [ ]ET Tube/Trach  [ ]Oral lesions  PULMONARY: HFNC   [x ]Clear [ ]Tachypnea  [ ]Audible excessive secretions   [ ]Rhonchi        [ ]Right [ ]Left [ ]Bilateral  [ ]Crackles        [ ]Right [ ]Left [ ]Bilateral  [ ]Wheezing     [ ]Right [ ]Left [ ]Bilateral  [ ]Diminished breath sounds [ ]right [ ]left [ ]bilateral  CARDIOVASCULAR:    [x ]Regular [ ]Irregular [ ]Tachy  [ ]Randy [ ]Murmur [ ]Other  GASTROINTESTINAL:  [x ]Soft  [ ]Distended   [ ]+BS  [ ]Non tender [ ]Tender  [ ]PEG [ ]OGT/ NGT  Last BM: 10/24  GENITOURINARY:  [x ]Normal [ ] Incontinent   [ ]Oliguria/Anuria   [ ]Haro  MUSCULOSKELETAL:   [ ]Normal   [ ]Weakness  [ ]Bed/Wheelchair bound [ x]Edema- b/l lower extremity edema   NEUROLOGIC:   [ x]No focal deficits  [ ]Cognitive impairment  [ ]Dysphagia [ ]Dysarthria [ ]Paresis [ ]Other   SKIN: Hyperpigmentaiton noted on anterior left leg   [ ]Normal    [ ]Rash  [ ]Pressure ulcer(s)       Present on admission [ ]y [ ]n    CRITICAL CARE:  [ ] Shock Present  [ ]Septic [ ]Cardiogenic [ ]Neurologic [ ]Hypovolemic  [ ]  Vasopressors [ ]  Inotropes   [ ]Respiratory failure present [ ]Mechanical ventilation [ ]Non-invasive ventilatory support [ ]High flow  [ ]Acute  [ ]Chronic [ ]Hypoxic  [ ]Hypercarbic [ ]Other  [ ]Other organ failure     LABS:                        13.6   15.90 )-----------( 240      ( 25 Oct 2021 07:13 )             42.3   10-25    141  |  95<L>  |  35<H>  ----------------------------<  106<H>  3.7   |  33<H>  |  0.93    Ca    10.0      25 Oct 2021 07:13  Phos  3.8     10-25  Mg     2.30     10-25    TPro  6.4  /  Alb  3.8  /  TBili  0.8  /  D< from: Xray Chest 1 View- PORTABLE-Routine (Xray Chest 1 View-   Bili  x   /  AST  59<H>  /  ALT  84<H>  /  AlkPhos  168<H>  10-25      RADIOLOGY & ADDITIONAL STUDIES:   PORTABLE-Routine .) (10.24.21 @ 11:43) >  IMPRESSION:  Stable cardiomegaly.  Persistent bilateral pulmonary edema/CHF changes.  Right hilar/right infrahilar mass better seen on CT angio of chest.  Right middle and right lower lobe pneumonia.  Bilateral pleural effusions right more than left.  No pneumothorax.    < end of copied text >    Protein Calorie Malnutrition Present: [ ]mild [ ]moderate [ ]severe [ ]underweight [ ]morbid obesity  https://www.andeal.org/vault/2440/web/files/ONC/Table_Clinical%20Characteristics%20to%20Document%20Malnutrition-White%20JV%20et%20al%472378.pdf    Height (cm): 177.8 (10-15-21 @ 19:14), 177 (10-04-21 @ 19:57), 177 (08-11-21 @ 09:30)  Weight (kg): 113 (10-17-21 @ 11:59), 130 (10-04-21 @ 19:57), 131 (08-11-21 @ 09:30)  BMI (kg/m2): 35.7 (10-17-21 @ 11:59), 41.1 (10-15-21 @ 19:14), 41.5 (10-04-21 @ 19:57)    [ ]PPSV2 < or = 30%  [ ]significant weight loss [ ]poor nutritional intake [ ]anasarca    [ ]Artificial Nutrition    REFERRALS:   [ ]Chaplaincy  [ ]Hospice  [ ]Child Life  [x ]Social Work  [ ]Case management [ ]Holistic Therapy

## 2021-10-25 NOTE — PROGRESS NOTE ADULT - PROBLEM SELECTOR PLAN 4
- obstructive PFTs  - Was on bronchodilator therapy at home - would use Symbicort and Spiriva while in the hospital  - Incentive spirometer and Acapella  - Would hold off oral corticosteroids from a pulmonary perspective at this time - obstructive PFTs  - Was on bronchodilator therapy at home - would use Symbicort and Spiriva while in the hospital  - Incentive spirometer and Acapella  - Prednisone 50mg PO BID

## 2021-10-25 NOTE — PROGRESS NOTE ADULT - SUBJECTIVE AND OBJECTIVE BOX
*********************  Sarah Edouard  PGY-1, Psychiatry  [id number]  *********************    MAKENZIE NANCE  61y  Male      Patient is a 61y old  Male who presents with a chief complaint of SOB (24 Oct 2021 14:41)  NOTE IN PROGRESS    INTERVAL HPI/OVERNIGHT EVENTS:      SUBJECTIVE      OBJECTIVE    Vital Signs Last 24 Hrs  T(C): 37.1 (25 Oct 2021 05:00), Max: 37.1 (25 Oct 2021 05:00)  T(F): 98.8 (25 Oct 2021 05:00), Max: 98.8 (25 Oct 2021 05:00)  HR: 91 (25 Oct 2021 05:00) (83 - 95)  BP: 126/61 (25 Oct 2021 05:00) (119/71 - 132/82)  BP(mean): --  RR: 18 (25 Oct 2021 05:00) (18 - 21)  SpO2: 93% (25 Oct 2021 05:00) (84% - 94%)    PHYSICAL EXAM:  GENERAL: NAD, well-groomed, well-developed  HEAD:  Atraumatic, Normocephalic  EYES: EOMI, PERRLA, conjunctiva and sclera clear  ENMT: No tonsillar erythema, exudates, or enlargement; Moist mucous membranes, Good dentition, No lesions  NECK: Supple, No JVD, Normal thyroid  NERVOUS SYSTEM:  Alert & Oriented X3, Good concentration; Motor Strength 5/5 B/L upper and lower extremities; DTRs 2+ intact and symmetric  CHEST/LUNG: Clear to auscultation bilaterally; No rales, rhonchi, wheezing, or rubs  HEART: Regular rate and rhythm; No murmurs, rubs, or gallops  ABDOMEN: Soft, Nontender, Nondistended; Bowel sounds present  EXTREMITIES:  2+ Peripheral Pulses, No clubbing, cyanosis, or edema  LYMPH: No lymphadenopathy noted  SKIN: No rashes or lesions      LABS:                RADIOLOGY & ADDITIONAL TESTS:    Imaging Personally Reviewed:  [ ] YES  [ ] NO        MEDICATIONS  albuterol/ipratropium for Nebulization 3 milliLiter(s) Nebulizer every 4 hours  budesonide 160 MICROgram(s)/formoterol 4.5 MICROgram(s) Inhaler 2 Puff(s) Inhalation two times a day  cyanocobalamin 1000 MICROGram(s) Oral daily  enoxaparin Injectable 40 milliGRAM(s) SubCutaneous daily  furosemide   Injectable 40 milliGRAM(s) IV Push <User Schedule>  influenza   Vaccine 0.5 milliLiter(s) IntraMuscular once  metoclopramide 10 milliGRAM(s) Oral every 6 hours PRN  morphine   Solution 2.5 milliGRAM(s) Oral every 6 hours PRN  multivitamin 1 Tablet(s) Oral daily  predniSONE   Tablet 50 milliGRAM(s) Oral two times a day  sodium chloride 0.65% Nasal 1 Spray(s) Both Nostrils every 4 hours  sodium chloride 0.65% Nasal 1 Spray(s) Both Nostrils once PRN  tiotropium 18 MICROgram(s) Capsule 1 Capsule(s) Inhalation daily      ALLERGIES  No Known Allergies      Consultant(s) Notes Reviewed:  [x ] YES  [ ] NO  Care Discussed with Consultants/Other Providers [ x] YES  [ ] NO      HEALTH ISSUES - PROBLEM Dx:  Shortness of breath    Lung cancer    Transaminitis    Prophylactic measure    Heart failure    Pneumonia    ILD (interstitial lung disease)    Obstructive lung disease    Pulmonary hypertension    Encounter for palliative care    Acute respiratory failure with hypoxia    Obstructive pneumonia             *********************  Sarah Edouard  PGY-1, Psychiatry  [id number]  *********************    MAKENZIE NANCE  61y  Male      Patient is a 61y old  Male who presents with a chief complaint of SOB (24 Oct 2021 14:41)  NOTE IN PROGRESS    INTERVAL HPI/OVERNIGHT EVENTS:      SUBJECTIVE      OBJECTIVE    Vital Signs Last 24 Hrs  T(C): 37.1 (25 Oct 2021 05:00), Max: 37.1 (25 Oct 2021 05:00)  T(F): 98.8 (25 Oct 2021 05:00), Max: 98.8 (25 Oct 2021 05:00)  HR: 91 (25 Oct 2021 05:00) (83 - 95)  BP: 126/61 (25 Oct 2021 05:00) (119/71 - 132/82)  BP(mean): --  RR: 18 (25 Oct 2021 05:00) (18 - 21)  SpO2: 93% (25 Oct 2021 05:00) (84% - 94%)    PHYSICAL EXAM:  GENERAL: NAD, well-groomed, well-developed  HEAD:  Atraumatic, Normocephalic  EYES: EOMI, PERRLA, conjunctiva and sclera clear  ENMT: No tonsillar erythema, exudates, or enlargement; Moist mucous membranes, Good dentition, No lesions  NECK: Supple, No JVD, Normal thyroid  NERVOUS SYSTEM:  Alert & Oriented X3, Good concentration; Motor Strength 5/5 B/L upper and lower extremities; DTRs 2+ intact and symmetric  CHEST/LUNG: Clear to auscultation bilaterally; No rales, rhonchi, wheezing, or rubs  HEART: Regular rate and rhythm; No murmurs, rubs, or gallops  ABDOMEN: Soft, Nontender, Nondistended; Bowel sounds present  EXTREMITIES:  2+ Peripheral Pulses, No clubbing, cyanosis, or edema  LYMPH: No lymphadenopathy noted  SKIN: No rashes or lesions      LABS:                RADIOLOGY & ADDITIONAL TESTS:    Imaging Personally Reviewed:  [ ] YES  [ ] NO        MEDICATIONS  albuterol/ipratropium for Nebulization 3 milliLiter(s) Nebulizer every 4 hours  budesonide 160 MICROgram(s)/formoterol 4.5 MICROgram(s) Inhaler 2 Puff(s) Inhalation two times a day  cyanocobalamin 1000 MICROGram(s) Oral daily  enoxaparin Injectable 40 milliGRAM(s) SubCutaneous daily  furosemide   Injectable 40 milliGRAM(s) IV Push <User Schedule>  influenza   Vaccine 0.5 milliLiter(s) IntraMuscular once  metoclopramide 10 milliGRAM(s) Oral every 6 hours PRN  morphine   Solution 2.5 milliGRAM(s) Oral every 6 hours PRN  multivitamin 1 Tablet(s) Oral daily  predniSONE   Tablet 50 milliGRAM(s) Oral two times a day  sodium chloride 0.65% Nasal 1 Spray(s) Both Nostrils every 4 hours  sodium chloride 0.65% Nasal 1 Spray(s) Both Nostrils once PRN  tiotropium 18 MICROgram(s) Capsule 1 Capsule(s) Inhalation daily      ALLERGIES  No Known Allergies      Consultant(s) Notes Reviewed:  [x ] YES  [ ] NO  Care Discussed with Consultants/Other Providers [ x] YES  [ ] NO      HEALTH ISSUES - PROBLEM Dx:  Shortness of breath    Lung cancer    Transaminitis    Prophylactic measure    Heart failure    Pneumonia    ILD (interstitial lung disease)    Obstructive lung disease    Pulmonary hypertension    Encounter for palliative care    Acute respiratory failure with hypoxia    Obstructive pneumonia                 *********************  Sarah Edouard  PGY-1, Psychiatry  [id number]  *********************    MAKENZIE NANCE  61y  Male      Patient is a 61y old  Male who presents with a chief complaint of SOB (24 Oct 2021 14:41)  NOTE IN PROGRESS    SUBJECTIVE/OVERNIGHT EVENTS:  Slept well, desires to remove HFNC and go home but is understanding of limitations. Voiding well, requesting adjustment to lasix timing, informed of new timing 9:45/17:30. Denies CP. Work of breathing unchanged from prior. Received RT this am.      OBJECTIVE    Vital Signs Last 24 Hrs  T(C): 37.1 (25 Oct 2021 05:00), Max: 37.1 (25 Oct 2021 05:00)  T(F): 98.8 (25 Oct 2021 05:00), Max: 98.8 (25 Oct 2021 05:00)  HR: 91 (25 Oct 2021 05:00) (83 - 95)  BP: 126/61 (25 Oct 2021 05:00) (119/71 - 132/82)  BP(mean): --  RR: 18 (25 Oct 2021 05:00) (18 - 21)  SpO2: 93% (25 Oct 2021 05:00) (84% - 94%)    PHYSICAL EXAM:  GENERAL: NAD, well-groomed, well-developed  HEAD:  Atraumatic, Normocephalic  EYES: EOMI, PERRLA, conjunctiva and sclera clear  ENMT: No tonsillar erythema, exudates, or enlargement; Moist mucous membranes, Good dentition, No lesions  NECK: Supple, No JVD, Normal thyroid  NERVOUS SYSTEM:  Alert & Oriented X3, Good concentration; Motor Strength 5/5 B/L upper and lower extremities; DTRs 2+ intact and symmetric  CHEST/LUNG: Clear to auscultation bilaterally; No rales, rhonchi, wheezing, or rubs  HEART: Regular rate and rhythm; No murmurs, rubs, or gallops  ABDOMEN: Soft, Nontender, Nondistended; Bowel sounds present  EXTREMITIES:  2+ Peripheral Pulses, No clubbing, cyanosis, or edema  LYMPH: No lymphadenopathy noted  SKIN: No rashes or lesions      LABS:                          13.6   15.90 )-----------( 240      ( 25 Oct 2021 07:13 )             42.3   10-25    141  |  95<L>  |  35<H>  ----------------------------<  106<H>  3.7   |  33<H>  |  0.93    Ca    10.0      25 Oct 2021 07:13  Phos  3.8     10-25  Mg     2.30     10-25    TPro  6.4  /  Alb  3.8  /  TBili  0.8  /  DBili  x   /  AST  59<H>  /  ALT  84<H>  /  AlkPhos  168<H>  10-25              RADIOLOGY & ADDITIONAL TESTS:    Imaging Personally Reviewed:  [ ] YES  [ ] NO        MEDICATIONS  albuterol/ipratropium for Nebulization 3 milliLiter(s) Nebulizer every 4 hours  budesonide 160 MICROgram(s)/formoterol 4.5 MICROgram(s) Inhaler 2 Puff(s) Inhalation two times a day  cyanocobalamin 1000 MICROGram(s) Oral daily  enoxaparin Injectable 40 milliGRAM(s) SubCutaneous daily  furosemide   Injectable 40 milliGRAM(s) IV Push <User Schedule>  influenza   Vaccine 0.5 milliLiter(s) IntraMuscular once  metoclopramide 10 milliGRAM(s) Oral every 6 hours PRN  morphine   Solution 2.5 milliGRAM(s) Oral every 6 hours PRN  multivitamin 1 Tablet(s) Oral daily  predniSONE   Tablet 50 milliGRAM(s) Oral two times a day  sodium chloride 0.65% Nasal 1 Spray(s) Both Nostrils every 4 hours  sodium chloride 0.65% Nasal 1 Spray(s) Both Nostrils once PRN  tiotropium 18 MICROgram(s) Capsule 1 Capsule(s) Inhalation daily      ALLERGIES  No Known Allergies      Consultant(s) Notes Reviewed:  [x ] YES  [ ] NO  Care Discussed with Consultants/Other Providers [ x] YES  [ ] NO      HEALTH ISSUES - PROBLEM Dx:  Shortness of breath    Lung cancer    Transaminitis    Prophylactic measure    Heart failure    Pneumonia    ILD (interstitial lung disease)    Obstructive lung disease    Pulmonary hypertension    Encounter for palliative care    Acute respiratory failure with hypoxia    Obstructive pneumonia                 *********************  Sarah Edouard  PGY-1, Psychiatry  [id number]  *********************    MAKENZIE NANCE  61y  Male      Patient is a 61y old  Male who presents with a chief complaint of SOB (24 Oct 2021 14:41)  NOTE IN PROGRESS    SUBJECTIVE/OVERNIGHT EVENTS:  Slept well, desires to remove HFNC and go home but is understanding of limitations. Voiding well, requesting adjustment to lasix timing, informed of new timing 9:45/17:30. Denies CP. Work of breathing unchanged from prior. Received RT this am.      OBJECTIVE    Vital Signs Last 24 Hrs  T(C): 37.1 (25 Oct 2021 05:00), Max: 37.1 (25 Oct 2021 05:00)  T(F): 98.8 (25 Oct 2021 05:00), Max: 98.8 (25 Oct 2021 05:00)  HR: 91 (25 Oct 2021 05:00) (83 - 95)  BP: 126/61 (25 Oct 2021 05:00) (119/71 - 132/82)  BP(mean): --  RR: 18 (25 Oct 2021 05:00) (18 - 21)  SpO2: 93% (25 Oct 2021 05:00) (84% - 94%)    PHYSICAL EXAM:  GENERAL: NAD, well-groomed, well-developed  HEAD:  Atraumatic, Normocephalic  EYES: EOMI, PERRLA, conjunctiva and sclera clear  ENMT: No tonsillar erythema, exudates, or enlargement; Moist mucous membranes, Good dentition, No lesions  NECK: Supple, No JVD, Normal thyroid  NERVOUS SYSTEM:  Alert & Oriented X3, Good concentration; Motor Strength 5/5 B/L upper and lower extremities; DTRs 2+ intact and symmetric  CHEST/LUNG: HFNC in place, wheezes in all lung fields  HEART: Regular rate and rhythm; No murmurs, rubs, or gallops  ABDOMEN: Soft, Nontender, Nondistended; Bowel sounds present  EXTREMITIES:  2+ Peripheral Pulses, No clubbing, cyanosis, or edema  LYMPH: No lymphadenopathy noted  SKIN: No rashes or lesions      LABS:                          13.6   15.90 )-----------( 240      ( 25 Oct 2021 07:13 )             42.3   10-25    141  |  95<L>  |  35<H>  ----------------------------<  106<H>  3.7   |  33<H>  |  0.93    Ca    10.0      25 Oct 2021 07:13  Phos  3.8     10-25  Mg     2.30     10-25    TPro  6.4  /  Alb  3.8  /  TBili  0.8  /  DBili  x   /  AST  59<H>  /  ALT  84<H>  /  AlkPhos  168<H>  10-25              RADIOLOGY & ADDITIONAL TESTS:    Imaging Personally Reviewed:  [ ] YES  [ ] NO        MEDICATIONS  albuterol/ipratropium for Nebulization 3 milliLiter(s) Nebulizer every 4 hours  budesonide 160 MICROgram(s)/formoterol 4.5 MICROgram(s) Inhaler 2 Puff(s) Inhalation two times a day  cyanocobalamin 1000 MICROGram(s) Oral daily  enoxaparin Injectable 40 milliGRAM(s) SubCutaneous daily  furosemide   Injectable 40 milliGRAM(s) IV Push <User Schedule>  influenza   Vaccine 0.5 milliLiter(s) IntraMuscular once  metoclopramide 10 milliGRAM(s) Oral every 6 hours PRN  morphine   Solution 2.5 milliGRAM(s) Oral every 6 hours PRN  multivitamin 1 Tablet(s) Oral daily  predniSONE   Tablet 50 milliGRAM(s) Oral two times a day  sodium chloride 0.65% Nasal 1 Spray(s) Both Nostrils every 4 hours  sodium chloride 0.65% Nasal 1 Spray(s) Both Nostrils once PRN  tiotropium 18 MICROgram(s) Capsule 1 Capsule(s) Inhalation daily      ALLERGIES  No Known Allergies      Consultant(s) Notes Reviewed:  [x ] YES  [ ] NO  Care Discussed with Consultants/Other Providers [ x] YES  [ ] NO      HEALTH ISSUES - PROBLEM Dx:  Shortness of breath    Lung cancer    Transaminitis    Prophylactic measure    Heart failure    Pneumonia    ILD (interstitial lung disease)    Obstructive lung disease    Pulmonary hypertension    Encounter for palliative care    Acute respiratory failure with hypoxia    Obstructive pneumonia                 *********************  Sarah Edouard  PGY-1, Psychiatry  [id number]  *********************    MAKENZIE NANCE  61y  Male      Patient is a 61y old  Male who presents with a chief complaint of SOB (24 Oct 2021 14:41)    SUBJECTIVE/OVERNIGHT EVENTS:  Slept well, desires to remove HFNC and go home but is understanding of limitations. Voiding well, requesting adjustment to lasix timing, informed of new timing 9:45/17:30. No cough. Denies CP. Work of breathing unchanged from prior. Received RT this am.  HFNC 55L 100%      OBJECTIVE    Vital Signs Last 24 Hrs  T(C): 37.1 (25 Oct 2021 05:00), Max: 37.1 (25 Oct 2021 05:00)  T(F): 98.8 (25 Oct 2021 05:00), Max: 98.8 (25 Oct 2021 05:00)  HR: 91 (25 Oct 2021 05:00) (83 - 95)  BP: 126/61 (25 Oct 2021 05:00) (119/71 - 132/82)  BP(mean): --  RR: 18 (25 Oct 2021 05:00) (18 - 21)  SpO2: 93% (25 Oct 2021 05:00) (84% - 94%)    PHYSICAL EXAM:  GENERAL: NAD, obese older man with HFNC in place, using incentive spirometer  HEAD:  Atraumatic, Normocephalic  EYES: EOMI, PERRLA, conjunctiva and sclera clear  ENMT: No tonsillar erythema, exudates, or enlargement; Moist mucous membranes  NERVOUS SYSTEM:  Alert & Oriented X3  CHEST/LUNG: HFNC in place, wheezes in all lung fields BL  HEART: Regular rate and rhythm; No murmurs, rubs, or gallops  ABDOMEN: Soft, Nontender, Nondistended; Bowel sounds present  EXTREMITIES:  2+ Peripheral Pulses, clubbing present BL, 2+ pitting LE edema BL  LYMPH: No lymphadenopathy noted  SKIN: No rashes or lesions      LABS:                          13.6   15.90 )-----------( 240      ( 25 Oct 2021 07:13 )             42.3   10-25    141  |  95<L>  |  35<H>  ----------------------------<  106<H>  3.7   |  33<H>  |  0.93    Ca    10.0      25 Oct 2021 07:13  Phos  3.8     10-25  Mg     2.30     10-25    TPro  6.4  /  Alb  3.8  /  TBili  0.8  /  DBili  x   /  AST  59<H>  /  ALT  84<H>  /  AlkPhos  168<H>  10-25        RADIOLOGY & ADDITIONAL TESTS:  EXAM:  XR CHEST PORTABLE ROUTINE 1V    PROCEDURE DATE:  Oct 24 2021   IMPRESSION:  Stable cardiomegaly.  Persistent bilateral pulmonary edema/CHF changes.  Right hilar/right infrahilar mass better seen on CT angio of chest.  Right middle and right lower lobe pneumonia.  Bilateral pleural effusions right more than left.  No pneumothorax.    Imaging Personally Reviewed:  [X ] YES  [ ] NO        MEDICATIONS  albuterol/ipratropium for Nebulization 3 milliLiter(s) Nebulizer every 4 hours  budesonide 160 MICROgram(s)/formoterol 4.5 MICROgram(s) Inhaler 2 Puff(s) Inhalation two times a day  cyanocobalamin 1000 MICROGram(s) Oral daily  enoxaparin Injectable 40 milliGRAM(s) SubCutaneous daily  furosemide   Injectable 40 milliGRAM(s) IV Push <User Schedule>  influenza   Vaccine 0.5 milliLiter(s) IntraMuscular once  metoclopramide 10 milliGRAM(s) Oral every 6 hours PRN  morphine   Solution 2.5 milliGRAM(s) Oral every 6 hours PRN  multivitamin 1 Tablet(s) Oral daily  predniSONE   Tablet 50 milliGRAM(s) Oral two times a day  sodium chloride 0.65% Nasal 1 Spray(s) Both Nostrils every 4 hours  sodium chloride 0.65% Nasal 1 Spray(s) Both Nostrils once PRN  tiotropium 18 MICROgram(s) Capsule 1 Capsule(s) Inhalation daily      ALLERGIES  No Known Allergies      Consultant(s) Notes Reviewed:  [x ] YES  [ ] NO  Care Discussed with Consultants/Other Providers [ x] YES  [ ] NO      HEALTH ISSUES - PROBLEM Dx:  Shortness of breath    Lung cancer    Transaminitis    Prophylactic measure    Heart failure    Pneumonia    ILD (interstitial lung disease)    Obstructive lung disease    Pulmonary hypertension    Encounter for palliative care    Acute respiratory failure with hypoxia    Obstructive pneumonia                 *********************  Sarah Edouard  PGY-1, Psychiatry  [id number]  *********************    MAKENZIE NANCE  61y  Male      Patient is a 61y old  Male who presents with a chief complaint of SOB (24 Oct 2021 14:41)    SUBJECTIVE/OVERNIGHT EVENTS:  Slept well, desires to remove HFNC and go home but is understanding of limitations. Voiding well, requesting adjustment to lasix timing, informed of new timing 9:45/17:30. No cough. Denies CP. Work of breathing unchanged from prior. Received RT this am.  HFNC 55L 100%        OBJECTIVE    Vital Signs Last 24 Hrs  T(C): 37.1 (25 Oct 2021 05:00), Max: 37.1 (25 Oct 2021 05:00)  T(F): 98.8 (25 Oct 2021 05:00), Max: 98.8 (25 Oct 2021 05:00)  HR: 91 (25 Oct 2021 05:00) (83 - 95)  BP: 126/61 (25 Oct 2021 05:00) (119/71 - 132/82)  BP(mean): --  RR: 18 (25 Oct 2021 05:00) (18 - 21)  SpO2: 93% (25 Oct 2021 05:00) (84% - 94%)    PHYSICAL EXAM:  GENERAL: NAD, obese older man with HFNC in place, using incentive spirometer  HEAD:  Atraumatic, Normocephalic  EYES: EOMI, PERRLA, conjunctiva and sclera clear  ENMT: No tonsillar erythema, exudates, or enlargement; Moist mucous membranes  NERVOUS SYSTEM:  Alert & Oriented X3  CHEST/LUNG: HFNC in place, wheezes in all lung fields BL  HEART: Regular rate and rhythm; No murmurs, rubs, or gallops  ABDOMEN: Soft, Nontender, Nondistended; Bowel sounds present  EXTREMITIES:  2+ Peripheral Pulses, clubbing present BL, 2+ pitting LE edema BL  LYMPH: No lymphadenopathy noted  SKIN: No rashes or lesions      LABS:                          13.6   15.90 )-----------( 240      ( 25 Oct 2021 07:13 )             42.3   10-25    141  |  95<L>  |  35<H>  ----------------------------<  106<H>  3.7   |  33<H>  |  0.93    Ca    10.0      25 Oct 2021 07:13  Phos  3.8     10-25  Mg     2.30     10-25    TPro  6.4  /  Alb  3.8  /  TBili  0.8  /  DBili  x   /  AST  59<H>  /  ALT  84<H>  /  AlkPhos  168<H>  10-25        RADIOLOGY & ADDITIONAL TESTS:  EXAM:  XR CHEST PORTABLE ROUTINE 1V    PROCEDURE DATE:  Oct 24 2021   IMPRESSION:  Stable cardiomegaly.  Persistent bilateral pulmonary edema/CHF changes.  Right hilar/right infrahilar mass better seen on CT angio of chest.  Right middle and right lower lobe pneumonia.  Bilateral pleural effusions right more than left.  No pneumothorax.    Imaging Personally Reviewed:  [X ] YES  [ ] NO        MEDICATIONS  albuterol/ipratropium for Nebulization 3 milliLiter(s) Nebulizer every 4 hours  budesonide 160 MICROgram(s)/formoterol 4.5 MICROgram(s) Inhaler 2 Puff(s) Inhalation two times a day  cyanocobalamin 1000 MICROGram(s) Oral daily  enoxaparin Injectable 40 milliGRAM(s) SubCutaneous daily  furosemide   Injectable 40 milliGRAM(s) IV Push <User Schedule>  influenza   Vaccine 0.5 milliLiter(s) IntraMuscular once  metoclopramide 10 milliGRAM(s) Oral every 6 hours PRN  morphine   Solution 2.5 milliGRAM(s) Oral every 6 hours PRN  multivitamin 1 Tablet(s) Oral daily  predniSONE   Tablet 50 milliGRAM(s) Oral two times a day  sodium chloride 0.65% Nasal 1 Spray(s) Both Nostrils every 4 hours  sodium chloride 0.65% Nasal 1 Spray(s) Both Nostrils once PRN  tiotropium 18 MICROgram(s) Capsule 1 Capsule(s) Inhalation daily      ALLERGIES  No Known Allergies      Consultant(s) Notes Reviewed:  [x ] YES  [ ] NO  Care Discussed with Consultants/Other Providers [ x] YES  [ ] NO      HEALTH ISSUES - PROBLEM Dx:  Shortness of breath    Lung cancer    Transaminitis    Prophylactic measure    Heart failure    Pneumonia    ILD (interstitial lung disease)    Obstructive lung disease    Pulmonary hypertension    Encounter for palliative care    Acute respiratory failure with hypoxia    Obstructive pneumonia

## 2021-10-26 NOTE — PROGRESS NOTE ADULT - PROBLEM SELECTOR PLAN 1
Discussed with patient. She initially made an appointment for her gastrointestinal symptoms but she feels much better. She feels she does not need to be seen. Will cancel appointment.   She does wish me to put in a celiac disease screen and I will comply hx of COPD, ILD, lung cancer s/p RLL resection on chemo, pulm HTN on 4L NC at home. CTA and U/S of LE ruled out DVT and PE. BNP of 6752 on admission.   - likely 2/2 worsening metastatic lung cancer, obstructive pneumonia, pulmonary edema  - CTA chest on 10/16 showed: right hilar/infrahilar malignancy extending into the right lower lobe, occlusion of right lower lobe airways and postobstructive pneumonia the right lower and middle lobe, interstitial pulmonary edema/CHF, and small right pleural effusion.  - B-lines present in POCUS from 10/20   - c/w HFNC and monitor pulse ox, sat goal liberalized to 80% given asymptomatic and chronically hypoxic; has been on HFNC since 10/16 and unable to wean down; goal is to titrate off as tolerated   - c/w furosemide 40mg IV BID   - pulm recs appreciated hx of COPD, ILD, lung cancer s/p RLL resection on chemo, pulm HTN on 4L NC at home. CTA and U/S of LE ruled out DVT and PE. BNP of 6752 on admission.   - likely 2/2 worsening metastatic lung cancer, obstructive pneumonia, pulmonary edema  - CTA chest on 10/16 showed: right hilar/infrahilar malignancy extending into the right lower lobe, occlusion of right lower lobe airways and postobstructive pneumonia the right lower and middle lobe, interstitial pulmonary edema/CHF, and small right pleural effusion.  - B-lines present in POCUS from 10/20   - c/w HFNC and monitor pulse ox, sat goal liberalized to 80% given asymptomatic and chronically hypoxic; has been on HFNC since 10/16 and unable to wean down; goal is to titrate off as tolerated   - c/w furosemide 40mg IV BID   - pulm recs appreciated  - plan to transfer to [facility] - likely 2/2 worsening metastatic lung cancer, obstructive pneumonia, pulmonary edema  hx of COPD, ILD, lung cancer s/p RLL resection on chemo, pulm HTN on 4L NC at home. CTA and U/S of LE ruled out DVT and PE. BNP of 6752 on admission.   - CTA chest on 10/16 showed: right hilar/infrahilar malignancy extending into the right lower lobe, occlusion of right lower lobe airways and postobstructive pneumonia the right lower and middle lobe, interstitial pulmonary edema/CHF, and small right pleural effusion.  - B-lines present in POCUS from 10/20   - c/w HFNC and monitor pulse ox, sat goal liberalized to 80% given asymptomatic and chronically hypoxic; has been on HFNC since 10/16 and unable to wean down; goal is to titrate off as tolerated   - c/w furosemide 40mg IV BID   - pulm recs appreciated  - plan to transfer to [facility]

## 2021-10-26 NOTE — PROGRESS NOTE ADULT - PROBLEM SELECTOR PLAN 3
- Diagnosed in 2020. s/p 6 cycles Carbo/Pemetrexed (Immunotherapy held given ILD) + 1 cycle pemetrexed maintenance with POD, s/p nab-paclitaxel/ramicirumab with POD, s/p gemcitabine with POD; Most recently started on 4th line pembrolizumab. C1 on 10/13/21  - CT chest  on admission showing diffuse hepatic metastases, known adrenal mets, right hilar/infrahilar malignancy extending into the right lower lobe and confluent with right paratracheal, right hilar, and subcarinal lymphadenopathy, presumably primary lung cancer. There is resulting occlusion of the right lower lobe airways and postobstructive pneumonia the entire right lower lobe and right middle lobe. Hydrostatic interstitial pulmonary edema/CHF. Small right pleural effusion. There is also left mediastinal and left hilar metastatic lymphadenopathy.  - onc following  - no current plans for inpatient treatment  - f/u with Dr. Richmond Zheng outpatient  - Onc recommends hospice at this time; Onc to discuss this with patient - Diagnosed in 2020. s/p 6 cycles Carbo/Pemetrexed (Immunotherapy held given ILD) + 1 cycle pemetrexed maintenance with POD, s/p nab-paclitaxel/ramicirumab with POD, s/p gemcitabine with POD; Most recently started on 4th line pembrolizumab. C1 on 10/13/21  - CT chest  on admission showing diffuse hepatic metastases, known adrenal mets, right hilar/infrahilar malignancy extending into the right lower lobe and confluent with right paratracheal, right hilar, and subcarinal lymphadenopathy, presumably primary lung cancer. There is resulting occlusion of the right lower lobe airways and postobstructive pneumonia the entire right lower lobe and right middle lobe. Hydrostatic interstitial pulmonary edema/CHF. Small right pleural effusion. There is also left mediastinal and left hilar metastatic lymphadenopathy.  - onc following  - no current plans for inpatient treatment  - f/u with Dr. Richmond Zheng outpatient  - patient with progression of disease and just recently started on 4th line treatment , limited options remain for further DMT and furthermore current performance status would not allow for disease modifying treatments as patient requires high flow O2. This was discussed with patient in detail. Patient hopes to continue to receive cancer treatment and is not accepting of hospice or supportive care options at this time.

## 2021-10-26 NOTE — PROGRESS NOTE ADULT - SUBJECTIVE AND OBJECTIVE BOX
CHIEF COMPLAINT: SOB    Interval Events: Patient feels well this afternoon, no complaints.    REVIEW OF SYSTEMS:  Constitutional: No fevers or chills.   Eyes: No itching or discharge from the eyes  ENT: No ear pain. No ear discharge. No nasal congestion.   CV: No chest pain. No palpitations. No lightheadedness or dizziness.   Resp: No dyspnea at rest. No cough.  GI: No nausea. No vomiting. No diarrhea.  MSK: No joint pain or pain in any extremities  Integumentary: No skin lesions. +Pedal edema.  Neurological: No gross motor weakness. No sensory changes.  [x] All other systems negative  [ ] Unable to assess ROS because ________    OBJECTIVE:  ICU Vital Signs Last 24 Hrs  T(C): 36.6 (26 Oct 2021 16:06), Max: 36.8 (26 Oct 2021 01:00)  T(F): 97.8 (26 Oct 2021 16:06), Max: 98.2 (26 Oct 2021 01:00)  HR: 130 (26 Oct 2021 16:06) (90 - 130)  BP: 124/91 (26 Oct 2021 16:06) (121/79 - 135/72)  BP(mean): --  ABP: --  ABP(mean): --  RR: 20 (26 Oct 2021 16:06) (20 - 20)  SpO2: 82% (26 Oct 2021 16:06) (82% - 97%)        10-25 @ 07:01  -  10-26 @ 07:00  --------------------------------------------------------  IN: 0 mL / OUT: 800 mL / NET: -800 mL    10-26 @ 07:01  -  10-26 @ 16:55  --------------------------------------------------------  IN: 0 mL / OUT: 600 mL / NET: -600 mL      CAPILLARY BLOOD GLUCOSE          PHYSICAL EXAM:  General: Awake, alert, oriented X 3.   HEENT: Atraumatic, normocephalic.   Lymph Nodes: No palpable lymphadenopathy  Neck: No JVD. No carotid bruit.   Respiratory: Normal chest expansion. Bibasilar crackles, some wheezing.  Cardiovascular: S1 S2 normal. No murmurs, rubs or gallops.   Abdomen: Soft, non-tender, non-distended. No organomegaly.  Extremities: Warm to touch. Peripheral pulse palpable. +Pedal edema.   Skin: No rashes or skin lesions  Neurological: Motor and sensory examination equal and normal in all four extremities.  Psychiatry: Appropriate mood and affect.    HOSPITAL MEDICATIONS:  MEDICATIONS  (STANDING):  albuterol/ipratropium for Nebulization 3 milliLiter(s) Nebulizer every 4 hours  budesonide 160 MICROgram(s)/formoterol 4.5 MICROgram(s) Inhaler 2 Puff(s) Inhalation two times a day  cyanocobalamin 1000 MICROGram(s) Oral daily  enoxaparin Injectable 40 milliGRAM(s) SubCutaneous daily  furosemide   Injectable 40 milliGRAM(s) IV Push <User Schedule>  influenza   Vaccine 0.5 milliLiter(s) IntraMuscular once  morphine   Solution 2.5 milliGRAM(s) Oral <User Schedule>  multivitamin 1 Tablet(s) Oral daily  pantoprazole    Tablet 40 milliGRAM(s) Oral before breakfast  predniSONE   Tablet 50 milliGRAM(s) Oral two times a day  sodium chloride 0.65% Nasal 1 Spray(s) Both Nostrils every 4 hours  tiotropium 18 MICROgram(s) Capsule 1 Capsule(s) Inhalation daily    MEDICATIONS  (PRN):  metoclopramide 10 milliGRAM(s) Oral every 6 hours PRN nausea  morphine   Solution 2.5 milliGRAM(s) Oral every 6 hours PRN dyspnea or pain  sodium chloride 0.65% Nasal 1 Spray(s) Both Nostrils once PRN Nasal Congestion      LABS:                        13.6   18.37 )-----------( 257      ( 26 Oct 2021 12:34 )             44.6     10-26    142  |  94<L>  |  37<H>  ----------------------------<  116<H>  3.2<L>   |  35<H>  |  0.96    Ca    9.5      26 Oct 2021 12:34  Phos  3.2     10-26  Mg     2.20     10-26    TPro  7.0  /  Alb  4.0  /  TBili  0.9  /  DBili  x   /  AST  66<H>  /  ALT  104<H>  /  AlkPhos  172<H>  10-26              MICROBIOLOGY:     RADIOLOGY:  [ ] Reviewed and interpreted by me    Point of Care Ultrasound Findings:    PFT:    EKG:

## 2021-10-26 NOTE — PROGRESS NOTE ADULT - SUBJECTIVE AND OBJECTIVE BOX
*********************  Sarah Edouard  PGY-1, Psychiatry  [id number]  *********************    MAKENZIE NANCE  61y  Male      Patient is a 61y old  Male who presents with a chief complaint of SOB (25 Oct 2021 13:52)      SUBJECTIVE/OVERNIGHT EVENTS:    INCOMPLETE NOTE IN PROGRESS    OBJECTIVE    Vital Signs Last 24 Hrs  T(C): 36.8 (26 Oct 2021 05:00), Max: 36.9 (25 Oct 2021 12:31)  T(F): 98.2 (26 Oct 2021 05:00), Max: 98.4 (25 Oct 2021 12:31)  HR: 96 (26 Oct 2021 05:00) (90 - 125)  BP: 135/72 (26 Oct 2021 05:00) (119/81 - 135/72)  BP(mean): --  RR: 20 (26 Oct 2021 05:00) (20 - 22)  SpO2: 91% (26 Oct 2021 05:00) (85% - 97%)    PHYSICAL EXAM:  GENERAL: NAD, well-groomed, well-developed  HEAD:  Atraumatic, Normocephalic  EYES: EOMI, PERRLA, conjunctiva and sclera clear  ENMT: No tonsillar erythema, exudates, or enlargement; Moist mucous membranes, Good dentition, No lesions  NECK: Supple, No JVD, Normal thyroid  NERVOUS SYSTEM:  Alert & Oriented X3, Good concentration; Motor Strength 5/5 B/L upper and lower extremities; DTRs 2+ intact and symmetric  CHEST/LUNG: Clear to auscultation bilaterally; No rales, rhonchi, wheezing, or rubs  HEART: Regular rate and rhythm; No murmurs, rubs, or gallops  ABDOMEN: Soft, Nontender, Nondistended; Bowel sounds present  EXTREMITIES:  2+ Peripheral Pulses, No clubbing, cyanosis, or edema  LYMPH: No lymphadenopathy noted  SKIN: No rashes or lesions      LABS:                RADIOLOGY & ADDITIONAL TESTS:    Imaging Personally Reviewed:  [ ] YES  [ ] NO        MEDICATIONS  albuterol/ipratropium for Nebulization 3 milliLiter(s) Nebulizer every 4 hours  budesonide 160 MICROgram(s)/formoterol 4.5 MICROgram(s) Inhaler 2 Puff(s) Inhalation two times a day  cyanocobalamin 1000 MICROGram(s) Oral daily  enoxaparin Injectable 40 milliGRAM(s) SubCutaneous daily  furosemide   Injectable 40 milliGRAM(s) IV Push <User Schedule>  influenza   Vaccine 0.5 milliLiter(s) IntraMuscular once  metoclopramide 10 milliGRAM(s) Oral every 6 hours PRN  morphine   Solution 2.5 milliGRAM(s) Oral every 12 hours  multivitamin 1 Tablet(s) Oral daily  pantoprazole    Tablet 40 milliGRAM(s) Oral before breakfast  predniSONE   Tablet 50 milliGRAM(s) Oral two times a day  sodium chloride 0.65% Nasal 1 Spray(s) Both Nostrils every 4 hours  sodium chloride 0.65% Nasal 1 Spray(s) Both Nostrils once PRN  tiotropium 18 MICROgram(s) Capsule 1 Capsule(s) Inhalation daily      ALLERGIES  No Known Allergies      Consultant(s) Notes Reviewed:  [x ] YES  [ ] NO  Care Discussed with Consultants/Other Providers [ x] YES  [ ] NO      HEALTH ISSUES - PROBLEM Dx:  Shortness of breath    Lung cancer    Transaminitis    Prophylactic measure    Heart failure    Pneumonia    ILD (interstitial lung disease)    Obstructive lung disease    Pulmonary hypertension    Encounter for palliative care    Acute respiratory failure with hypoxia    Obstructive pneumonia             *********************  Sarah Edouard  PGY-1, Psychiatry  [id number]  *********************    MAKENZIE NANCE  61y  Male      Patient is a 61y old  Male who presents with a chief complaint of SOB (25 Oct 2021 13:52)      SUBJECTIVE/OVERNIGHT EVENTS:  HFNC reduced to 80% with goal O2 sat >80% per pulm given patient chronically hypoxic   Plan to transfer to [facility] for continued management of HFNC weaning  Adjusted lasix dosing schedule much better for patient comfort  Tachycardic overnight to 125, denies pain, somewhat anxious.  Some cough this morning.  Denies CP, subjective SOB, n/v/d. Voiding well.    OBJECTIVE    Vital Signs Last 24 Hrs  T(C): 36.8 (26 Oct 2021 05:00), Max: 36.9 (25 Oct 2021 12:31)  T(F): 98.2 (26 Oct 2021 05:00), Max: 98.4 (25 Oct 2021 12:31)  HR: 96 (26 Oct 2021 05:00) (90 - 125)  BP: 135/72 (26 Oct 2021 05:00) (119/81 - 135/72)  BP(mean): --  RR: 20 (26 Oct 2021 05:00) (20 - 22)  SpO2: 91% (26 Oct 2021 05:00) (85% - 97%)    PHYSICAL EXAM:  GENERAL: NAD, obese older man with HFNC in place, using incentive spirometer  HEAD:  Atraumatic, Normocephalic  EYES: EOMI, PERRLA, conjunctiva and sclera clear  ENMT: No tonsillar erythema, exudates, or enlargement; Moist mucous membranes.  NERVOUS SYSTEM:  Alert & Oriented X3  CHEST/LUNG: HFNC in place, wheezes in all lung fields BL  HEART: Regular rate and rhythm; No murmurs, rubs, or gallops  ABDOMEN: Soft, Nontender, Nondistended; Bowel sounds present  EXTREMITIES:  2+ Peripheral Pulses, clubbing present BL, 2+ pitting LE edema BL  LYMPH: No lymphadenopathy noted  SKIN: No rashes or lesions        LABS:  pending - note incomplete    RADIOLOGY & ADDITIONAL TESTS:  none new  Imaging Personally Reviewed:  [x ] YES  [ ] NO        MEDICATIONS  albuterol/ipratropium for Nebulization 3 milliLiter(s) Nebulizer every 4 hours  budesonide 160 MICROgram(s)/formoterol 4.5 MICROgram(s) Inhaler 2 Puff(s) Inhalation two times a day  cyanocobalamin 1000 MICROGram(s) Oral daily  enoxaparin Injectable 40 milliGRAM(s) SubCutaneous daily  furosemide   Injectable 40 milliGRAM(s) IV Push <User Schedule>  influenza   Vaccine 0.5 milliLiter(s) IntraMuscular once  metoclopramide 10 milliGRAM(s) Oral every 6 hours PRN  morphine   Solution 2.5 milliGRAM(s) Oral every 12 hours  multivitamin 1 Tablet(s) Oral daily  pantoprazole    Tablet 40 milliGRAM(s) Oral before breakfast  predniSONE   Tablet 50 milliGRAM(s) Oral two times a day  sodium chloride 0.65% Nasal 1 Spray(s) Both Nostrils every 4 hours  sodium chloride 0.65% Nasal 1 Spray(s) Both Nostrils once PRN  tiotropium 18 MICROgram(s) Capsule 1 Capsule(s) Inhalation daily      ALLERGIES  No Known Allergies      Consultant(s) Notes Reviewed:  [x ] YES  [ ] NO  Care Discussed with Consultants/Other Providers [ x] YES  [ ] NO      HEALTH ISSUES - PROBLEM Dx:  Shortness of breath    Lung cancer    Transaminitis    Prophylactic measure    Heart failure    Pneumonia    ILD (interstitial lung disease)    Obstructive lung disease    Pulmonary hypertension    Encounter for palliative care    Acute respiratory failure with hypoxia    Obstructive pneumonia             *********************  Sarah Edouard  PGY-1, Psychiatry  [id number]  *********************    MAKENZIE NANCE  61y  Male      Patient is a 61y old  Male who presents with a chief complaint of SOB (25 Oct 2021 13:52)      SUBJECTIVE/OVERNIGHT EVENTS:  HFNC reduced to 80% with goal O2 sat >80% per pulm given patient chronically hypoxic   Plan to transfer to [facility] for continued management of HFNC weaning  Adjusted lasix dosing schedule much better for patient comfort  Tachycardic overnight to 125, denies pain, somewhat anxious.  Some cough this morning.  Denies CP, subjective SOB, n/v/d. Voiding well.    OBJECTIVE    Vital Signs Last 24 Hrs  T(C): 36.8 (26 Oct 2021 05:00), Max: 36.9 (25 Oct 2021 12:31)  T(F): 98.2 (26 Oct 2021 05:00), Max: 98.4 (25 Oct 2021 12:31)  HR: 96 (26 Oct 2021 05:00) (90 - 125)  BP: 135/72 (26 Oct 2021 05:00) (119/81 - 135/72)  BP(mean): --  RR: 20 (26 Oct 2021 05:00) (20 - 22)  SpO2: 91% (26 Oct 2021 05:00) (85% - 97%)    PHYSICAL EXAM:  GENERAL: NAD, obese older man with HFNC in place, using incentive spirometer  HEAD:  Atraumatic, Normocephalic  EYES: EOMI, PERRLA, conjunctiva and sclera clear  ENMT: No tonsillar erythema, exudates, or enlargement; Moist mucous membranes.  NERVOUS SYSTEM:  Alert & Oriented X3  CHEST/LUNG: HFNC in place, wheezes in all lung fields BL  HEART: Regular rate and rhythm; No murmurs, rubs, or gallops  ABDOMEN: Soft, Nontender, Nondistended; Bowel sounds present  EXTREMITIES:  2+ Peripheral Pulses, clubbing present BL, 2+ pitting LE edema BL  LYMPH: No lymphadenopathy noted  SKIN: No rashes or lesions        LABS:                        13.6   18.37 )-----------( 257      ( 26 Oct 2021 12:34 )             44.6   10-26    142  |  94<L>  |  37<H>  ----------------------------<  116<H>  3.2<L>   |  35<H>  |  0.96    Ca    9.5      26 Oct 2021 12:34  Phos  3.2     10-26  Mg     2.20     10-26    TPro  7.0  /  Alb  4.0  /  TBili  0.9  /  DBili  x   /  AST  66<H>  /  ALT  104<H>  /  AlkPhos  172<H>  10-26      RADIOLOGY & ADDITIONAL TESTS:  none new  Imaging Personally Reviewed:  [x ] YES  [ ] NO        MEDICATIONS  albuterol/ipratropium for Nebulization 3 milliLiter(s) Nebulizer every 4 hours  budesonide 160 MICROgram(s)/formoterol 4.5 MICROgram(s) Inhaler 2 Puff(s) Inhalation two times a day  cyanocobalamin 1000 MICROGram(s) Oral daily  enoxaparin Injectable 40 milliGRAM(s) SubCutaneous daily  furosemide   Injectable 40 milliGRAM(s) IV Push <User Schedule>  influenza   Vaccine 0.5 milliLiter(s) IntraMuscular once  metoclopramide 10 milliGRAM(s) Oral every 6 hours PRN  morphine   Solution 2.5 milliGRAM(s) Oral every 12 hours  multivitamin 1 Tablet(s) Oral daily  pantoprazole    Tablet 40 milliGRAM(s) Oral before breakfast  predniSONE   Tablet 50 milliGRAM(s) Oral two times a day  sodium chloride 0.65% Nasal 1 Spray(s) Both Nostrils every 4 hours  sodium chloride 0.65% Nasal 1 Spray(s) Both Nostrils once PRN  tiotropium 18 MICROgram(s) Capsule 1 Capsule(s) Inhalation daily      ALLERGIES  No Known Allergies      Consultant(s) Notes Reviewed:  [x ] YES  [ ] NO  Care Discussed with Consultants/Other Providers [ x] YES  [ ] NO      HEALTH ISSUES - PROBLEM Dx:  Shortness of breath    Lung cancer    Transaminitis    Prophylactic measure    Heart failure    Pneumonia    ILD (interstitial lung disease)    Obstructive lung disease    Pulmonary hypertension    Encounter for palliative care    Acute respiratory failure with hypoxia    Obstructive pneumonia

## 2021-10-26 NOTE — PROGRESS NOTE ADULT - ASSESSMENT
62 y/o M with history of NSCLC s/p RLL resection on chemo, COPD, ILD, pulmonary HTN, on 4L NC at home presents with SOB x 1 week. On admission the patient was tachypneic and tachycardic but afebrile. PE notable for wheezing, crackles and ronchi. Labs on admission remarkable for an elevated WBC, elevated BNP, and transaminitis. LE U/S ruled out evidence of DVT and CTA ruled out evidence of PE. DDx includes HF exacerbation, pulmonary infection, exacerbation 2/2 pt's lung cancer, worsening ILD 2/2 pembrolizumab, or COPD exacerbation. Palliative consulted for assistance with sx management.

## 2021-10-26 NOTE — PROGRESS NOTE ADULT - SUBJECTIVE AND OBJECTIVE BOX
[facility]  Radha Massey, Palliative Care Attending  Contact Info: Page [phone] (including Nights/Weekends), message on Microsoft Teams (Radha Massey), or leave  at Palliative Office [phone] (non-urgent)     SUBJECTIVE AND OBJECTIVE: Patient seen with daughter, Alexus (RN at [employer]), at bedside. He denies sob and pain, no adverse effects from ATC morphine. He stated he was being moved to [facility]CU. He was agreeable to adding an additional dose of morphine to help get him to his goal HF settings at 40LPM @40% FIO2     INTERVAL HPI/OVERNIGHT EVENTS:  > Chart reviewed. Flow sheets reviewed. He seemed to be saturating appropriately at 80% FiO2. Goal remains to go home and decrease settings to 40lpm at 40% FiO2.     DNR on chart:   Allergies    No Known Allergies    Intolerances    MEDICATIONS  (STANDING):  albuterol/ipratropium for Nebulization 3 milliLiter(s) Nebulizer every 4 hours  budesonide 160 MICROgram(s)/formoterol 4.5 MICROgram(s) Inhaler 2 Puff(s) Inhalation two times a day  cyanocobalamin 1000 MICROGram(s) Oral daily  enoxaparin Injectable 40 milliGRAM(s) SubCutaneous daily  furosemide   Injectable 40 milliGRAM(s) IV Push <User Schedule>  influenza   Vaccine 0.5 milliLiter(s) IntraMuscular once  morphine   Solution 2.5 milliGRAM(s) Oral <User Schedule>  multivitamin 1 Tablet(s) Oral daily  pantoprazole    Tablet 40 milliGRAM(s) Oral before breakfast  potassium chloride    Tablet ER 40 milliEquivalent(s) Oral once  predniSONE   Tablet 50 milliGRAM(s) Oral two times a day  sodium chloride 0.65% Nasal 1 Spray(s) Both Nostrils every 4 hours  tiotropium 18 MICROgram(s) Capsule 1 Capsule(s) Inhalation daily    MEDICATIONS  (PRN):  metoclopramide 10 milliGRAM(s) Oral every 6 hours PRN nausea  morphine   Solution 2.5 milliGRAM(s) Oral every 6 hours PRN dyspnea or pain  sodium chloride 0.65% Nasal 1 Spray(s) Both Nostrils once PRN Nasal Congestion      ITEMS UNCHECKED ARE NOT PRESENT    PRESENT SYMPTOMS: [ ]Unable to obtain due to poor mentation   Source if other than patient:  [ ]Family   [ ]Team     Pain:  [ ]yes [x ]no  QOL impact -   Location -                    Aggravating factors -  Quality -  Radiation -  Timing-  Severity (0-10 scale):  Minimal acceptable level (0-10 scale):     Dyspnea:                           [ ]Mild [ ]Moderate [ ]Severe  Anxiety:                             [ ]Mild [ ]Moderate [ ]Severe  Fatigue:                             [ ]Mild [ ]Moderate [ ]Severe  Nausea:                             [ ]Mild [ ]Moderate [ ]Severe  Loss of appetite:              [ ]Mild [ ]Moderate [ ]Severe  Constipation:                    [ ]Mild [ ]Moderate [ ]Severe    PAIN AD Score:	  http://geriatrictoolkit.[facility]/cog/painad.pdf (Ctrl + left click to view)    Other Symptoms:  [ ]All other review of systems negative     Palliative Performance Status Version 2:  80       %      http://npcrc.org/files/news/palliative_performance_scale_ppsv2.pdf  PHYSICAL EXAM:  Vital Signs Last 24 Hrs  T(C): 36.6 (26 Oct 2021 09:45), Max: 36.8 (26 Oct 2021 01:00)  T(F): 97.8 (26 Oct 2021 09:45), Max: 98.2 (26 Oct 2021 01:00)  HR: 98 (26 Oct 2021 09:45) (90 - 125)  BP: 128/85 (26 Oct 2021 09:45) (121/79 - 135/72)  BP(mean): --  RR: 20 (26 Oct 2021 11:37) (20 - 22)  SpO2: 92% (26 Oct 2021 11:37) (85% - 97%) I&O's Summary    25 Oct 2021 07:01  -  26 Oct 2021 07:00  --------------------------------------------------------  IN: 0 mL / OUT: 800 mL / NET: -800 mL       GENERAL:  [ x]Alert  [x ]Oriented x3   [ ]Lethargic  [ ]Cachexia  [ ]Unarousable  [x ]Verbal  [ ]Non-Verbal  Behavioral:   [ ] Anxiety  [ ] Delirium [ ] Agitation [x ] Other  HEENT:  [ ]Normal   [ ]Dry mouth   [ ]ET Tube/Trach  [ ]Oral lesions  PULMONARY: HFNC   [ ]Clear [ ]Tachypnea  [ ]Audible excessive secretions   [x ]Rhonchi        [ x]Right [ ]Left [ ]Bilateral  [ ]Crackles        [ ]Right [ ]Left [ ]Bilateral  [ ]Wheezing     [ ]Right [ ]Left [ ]Bilateral  [ ]Diminished breath sounds [ ]right [ ]left [ ]bilateral  CARDIOVASCULAR:    [x ]Regular [ ]Irregular [ ]Tachy  [ ]Randy [ ]Murmur [ ]Other  GASTROINTESTINAL:  [x ]Soft  [ ]Distended   [ ]+BS  [ ]Non tender [ ]Tender  [ ]PEG [ ]OGT/ NGT  Last BM: 10/25  GENITOURINARY:  [x ]Normal [ ] Incontinent   [ ]Oliguria/Anuria   [ ]Haro  MUSCULOSKELETAL:   [ ]Normal   [ ]Weakness  [ ]Bed/Wheelchair bound [ x]Edema- b/l lower extremity edema, which is improving   NEUROLOGIC:   [ x]No focal deficits  [ ]Cognitive impairment  [ ]Dysphagia [ ]Dysarthria [ ]Paresis [ ]Other   SKIN: Hyperpigmentaiton noted on anterior left leg   [ ]Normal    [ ]Rash  [ ]Pressure ulcer(s)       Present on admission [ ]y [ ]n    CRITICAL CARE:  [ ] Shock Present  [ ]Septic [ ]Cardiogenic [ ]Neurologic [ ]Hypovolemic  [ ]  Vasopressors [ ]  Inotropes   [ ]Respiratory failure present [ ]Mechanical ventilation [ ]Non-invasive ventilatory support [ ]High flow  [ ]Acute  [ ]Chronic [ ]Hypoxic  [ ]Hypercarbic [ ]Other  [ ]Other organ failure     LABS:                        13.6   18.37 )-----------( 257      ( 26 Oct 2021 12:34 )             44.6   10-26    142  |  94<L>  |  37<H>  ----------------------------<  116<H>  3.2<L>   |  35<H>  |  0.96    Ca    9.5      26 Oct 2021 12:34  Phos  3.2     10-26  Mg     2.20     10-26    TPro  7.0  /  Alb  4.0  /  TBili  0.9  /  DBili  x   /  AST  66<H>  /  ALT  104<H>  /  AlkPhos  172<H>  10-26        RADIOLOGY & ADDITIONAL STUDIES: n/a    Protein Calorie Malnutrition Present: [ ]mild [ ]moderate [ ]severe [ ]underweight [ ]morbid obesity  https://www.andeal.org/vault/2440/web/files/ONC/Table_Clinical%20Characteristics%20to%20Document%20Malnutrition-White%20JV%20et%20al%202012.pdf    Height (cm): 177.8 (10-15-21 @ 19:14), 177 (10-04-21 @ 19:57), 177 (08-11-21 @ 09:30)  Weight (kg): 113 (10-17-21 @ 11:59), 130 (10-04-21 @ 19:57), 131 (08-11-21 @ 09:30)  BMI (kg/m2): 35.7 (10-17-21 @ 11:59), 41.1 (10-15-21 @ 19:14), 41.5 (10-04-21 @ 19:57)    [ ]PPSV2 < or = 30%  [ ]significant weight loss [ ]poor nutritional intake [ ]anasarca    [ ]Artificial Nutrition    REFERRALS:   [ ]Chaplaincy  [ ]Hospice  [ ]Child Life  [ x]Social Work  [ ]Case management [ ]Holistic Therapy

## 2021-10-26 NOTE — PROGRESS NOTE ADULT - PROBLEM SELECTOR PLAN 5
- HFpEF; BNP of 6752 on admission  - EKG showed sinus tachycardia, right atrial enlargement, rightward axis, and nonspecific ST and T wave abnormalities  - EF 56% on echo (10/20), LV systolic wnl, Right ventricular enlargement with decreased RV systolic function  - echocardiogram pending for pulm artery pressure  - daily standing weights  - c/w furosemide IV as above - HFpEF; BNP of 6752 on admission  - EKG showed sinus tachycardia, right atrial enlargement, rightward axis, and nonspecific ST and T wave abnormalities  - EF 56% on echo (10/20), LV systolic wnl, Right ventricular enlargement with decreased RV systolic function  - echocardiogram pending for pulm artery pressure  - daily standing weights  - c/w furosemide IV as above  - monitor HCO3

## 2021-10-26 NOTE — PROGRESS NOTE ADULT - PROBLEM SELECTOR PLAN 4
- obstructive PFTs  - Was on bronchodilator therapy at home - would use Symbicort and Spiriva while in the hospital  - Incentive spirometer and Acapella  - Prednisone 50mg PO BID

## 2021-10-26 NOTE — PROGRESS NOTE ADULT - PROBLEM SELECTOR PLAN 2
Onc care at [facility] with Dr. Zheng   s/p RLL resection and chemotherapy, Last chemo on 10/13  > Heme/onc recs appreciated.

## 2021-10-26 NOTE — PROGRESS NOTE ADULT - PROBLEM SELECTOR PLAN 3
Patient's goals are to return home. Wife and patient understand that he can go home with hospice services if Hi-matrina settings can be weaned to 40LPM and 40% FiO2.   Palliative will continue to assist with sx management.   GOC- DNR/DNI, MOLST completed by primary team     Thank you for allowing us to participate in your patient's care. We will continue to follow. Please page [phone] for any q's or c's.     Radha Massey D.O.   Palliative Medicine.

## 2021-10-26 NOTE — PROGRESS NOTE ADULT - ASSESSMENT
61M NSCLC (on therapy), ILD with occupational exposure and smoking history, and chronic hypoxemic respiratory failure presenting with acute on chronic hypoxemia and volume overload. He has had a CTPA in the ED which is worse than a recent CT neg for PE, however w worsening cancer, volume overload, possible pneumonia and an TTE showed reduced RV systolic function. Now on max HFNC.

## 2021-10-26 NOTE — PROGRESS NOTE ADULT - PROBLEM SELECTOR PLAN 2
- 2/2 to lung cancer as shown on CTA chest  - Initially on vanc, Zosyn, and azithromycin; nasal MRSA negative - d/c vancomycin; urine legionella neg - azithryomycin d/c  - s/p 7d  Zosyn (completed 10/23)  - sputum culture wnl  - aggressive chest PT - aerobika, bed percussion, and chest physiotherapy to help with airway clearance  - does not require bronchoscopy at this time per pulm

## 2021-10-26 NOTE — PROGRESS NOTE ADULT - PROBLEM SELECTOR PLAN 1
Dyspnea 2/2 Post obstructive pna vs. pulmonary edema in setting of metastatic NSCLC  > Patient remains asymptomatic. Will increase morphine to 2.5mg TID.   > continue 2.5mg PO morphine q6hr prn breakthrough dyspnea   > continue steroids per primary team   > Pulm recs appreciated: taper HFNC, continue nebs  > Continue diuresis   > Patient moving to RCU. Goal is to decrease HiFlo to 40lpm and 40%FiO2.   > At home, patient on 4L NC O2.  > please ensure patient is on bowel regimen while on opioids.

## 2021-10-26 NOTE — PROGRESS NOTE ADULT - ASSESSMENT
61M NSCLC (on therapy), ILD with occupational exposure and smoking history, and chronic hypoxemic respiratory failure presenting with acute on chronic hypoxemia as well as volume overload. He has had a CTPA in the ED which is worse than a recent CT and is concerning for volume overload, possible infection, and most notably progression of cancer.    Hypoxic Respiratory Failure  - Likely multifactorial, however most likely cause is patient's malignancy  - Continue diuresis, evidence of RV dysfunction likely in the setting of pHTN 2/2 hypoxemia  - Continue airway clearance therapy  - Continue prednisone 1mg/kg  - Goals of care discussion had with wife and patient, DNR/DNI with overall goal to go home  - Patient is currently requiring high amounts of oxygen. Outpatient HFNC can provide 40L/min at 40% FiO2  - Wean O2 as tolerated  - Follow up palliative care, oncology  - Poor prognosis overall, patient still requiring high FiO2 despite interventions  - Transferred to [facility], consult team will sign off    Kevin Ferguson, PGY-6  Pulmonary and Critical Care Medicine  [id number]

## 2021-10-26 NOTE — PROGRESS NOTE ADULT - PROBLEM SELECTOR PLAN 7
- hx of pulm HTN 2/2 lung pathologies  - Aggressive diuresis - Lasix IV qD with close monitoring of I/O  - No role for pulmonary vasodilator therapy  - f/u PA pressure results - hx of pulm HTN 2/2 lung pathologies  - Aggressive diuresis - Lasix IV qD with close monitoring of I/O  - No role for pulmonary vasodilator therapy

## 2021-10-26 NOTE — PROGRESS NOTE ADULT - PROBLEM SELECTOR PLAN 6
- CT showed worsening of ILD since 10/2021  - ILD could be worsened by pt's pembrolizumab  - continue to monitor  - respiratory treatments as above - CT showed worsening of ILD since 10/2021  - ILD could be worsened by pt's pembrolizumab but imaging not supportive  - continue to monitor  - respiratory treatments as above

## 2021-10-27 NOTE — PROGRESS NOTE ADULT - PROBLEM SELECTOR PLAN 5
- HFpEF; BNP of 6752 on admission  - EKG showed sinus tachycardia, right atrial enlargement, rightward axis, and nonspecific ST and T wave abnormalities  - EF 56% on echo (10/20), LV systolic wnl, Right ventricular enlargement with decreased RV systolic function  - echocardiogram pending for pulm artery pressure  - daily standing weights  - c/w furosemide IV as above  - monitor HCO3 ·  Plan: - HFpEF; BNP of 6752 on admission  - EKG showed sinus tachycardia, right atrial enlargement, rightward axis, and nonspecific ST and T wave abnormalities  - EF 56% on echo (10/20), LV systolic wnl, Right ventricular enlargement with decreased RV systolic function  - has been getting lasix 40mg BID here

## 2021-10-27 NOTE — PROGRESS NOTE ADULT - SUBJECTIVE AND OBJECTIVE BOX
CHIEF COMPLAINT: Patient is a 61y old Male who presents with a chief complaint of SOB.    Interval Events:    REVIEW OF SYSTEMS:  [ ] All other systems negative  [ ] Unable to assess ROS because ________    OBJECTIVE:  ICU Vital Signs Last 24 Hrs  T(C): 36 (27 Oct 2021 04:32), Max: 36.8 (26 Oct 2021 13:45)  T(F): 96.8 (27 Oct 2021 04:32), Max: 98.2 (26 Oct 2021 13:45)  HR: 86 (27 Oct 2021 04:32) (86 - 130)  BP: 118/77 (27 Oct 2021 04:32) (106/66 - 128/85)  RR: 20 (27 Oct 2021 04:32) (18 - 20)  SpO2: 87% (27 Oct 2021 04:32) (82% - 93%)      10-26 @ 07:01  -  10-27 @ 07:00  --------------------------------------------------------  IN: 480 mL / OUT: 1400 mL / NET: -920 mL      CAPILLARY BLOOD GLUCOSE      HOSPITAL MEDICATIONS:  MEDICATIONS  (STANDING):  albuterol/ipratropium for Nebulization 3 milliLiter(s) Nebulizer every 4 hours  budesonide 160 MICROgram(s)/formoterol 4.5 MICROgram(s) Inhaler 2 Puff(s) Inhalation two times a day  cyanocobalamin 1000 MICROGram(s) Oral daily  enoxaparin Injectable 40 milliGRAM(s) SubCutaneous daily  furosemide   Injectable 40 milliGRAM(s) IV Push <User Schedule>  influenza   Vaccine 0.5 milliLiter(s) IntraMuscular once  morphine   Solution 2.5 milliGRAM(s) Oral <User Schedule>  multivitamin 1 Tablet(s) Oral daily  pantoprazole    Tablet 40 milliGRAM(s) Oral before breakfast  predniSONE   Tablet 50 milliGRAM(s) Oral daily  sodium chloride 0.65% Nasal 1 Spray(s) Both Nostrils every 4 hours  tiotropium 18 MICROgram(s) Capsule 1 Capsule(s) Inhalation daily    MEDICATIONS  (PRN):  metoclopramide 10 milliGRAM(s) Oral every 6 hours PRN nausea  morphine   Solution 2.5 milliGRAM(s) Oral every 6 hours PRN dyspnea or pain  sodium chloride 0.65% Nasal 1 Spray(s) Both Nostrils once PRN Nasal Congestion      LABS:                       MICROBIOLOGY:     RADIOLOGY:  [ ] Reviewed and interpreted by me    PULMONARY FUNCTION TESTS:    EKG: CHIEF COMPLAINT: Patient is a 61y old Male who presents with a chief complaint of SOB.    Interval Events: Tolerating HFNC throughout the night.    REVIEW OF SYSTEMS:  States breathing is improved. Denies fever, CP, abd pain.   [x] All other systems negative    OBJECTIVE:  ICU Vital Signs Last 24 Hrs  T(C): 36 (27 Oct 2021 04:32), Max: 36.8 (26 Oct 2021 13:45)  T(F): 96.8 (27 Oct 2021 04:32), Max: 98.2 (26 Oct 2021 13:45)  HR: 86 (27 Oct 2021 04:32) (86 - 130)  BP: 118/77 (27 Oct 2021 04:32) (106/66 - 128/85)  RR: 20 (27 Oct 2021 04:32) (18 - 20)  SpO2: 87% (27 Oct 2021 04:32) (82% - 93%)      10-26 @ 07:01  -  10-27 @ 07:00  --------------------------------------------------------  IN: 480 mL / OUT: 1400 mL / NET: -920 mL      CAPILLARY BLOOD GLUCOSE      HOSPITAL MEDICATIONS:  MEDICATIONS  (STANDING):  albuterol/ipratropium for Nebulization 3 milliLiter(s) Nebulizer every 4 hours  budesonide 160 MICROgram(s)/formoterol 4.5 MICROgram(s) Inhaler 2 Puff(s) Inhalation two times a day  cyanocobalamin 1000 MICROGram(s) Oral daily  enoxaparin Injectable 40 milliGRAM(s) SubCutaneous daily  furosemide   Injectable 40 milliGRAM(s) IV Push <User Schedule>  influenza   Vaccine 0.5 milliLiter(s) IntraMuscular once  morphine   Solution 2.5 milliGRAM(s) Oral <User Schedule>  multivitamin 1 Tablet(s) Oral daily  pantoprazole    Tablet 40 milliGRAM(s) Oral before breakfast  predniSONE   Tablet 50 milliGRAM(s) Oral daily  sodium chloride 0.65% Nasal 1 Spray(s) Both Nostrils every 4 hours  tiotropium 18 MICROgram(s) Capsule 1 Capsule(s) Inhalation daily    MEDICATIONS  (PRN):  metoclopramide 10 milliGRAM(s) Oral every 6 hours PRN nausea  morphine   Solution 2.5 milliGRAM(s) Oral every 6 hours PRN dyspnea or pain  sodium chloride 0.65% Nasal 1 Spray(s) Both Nostrils once PRN Nasal Congestion      LABS:                       MICROBIOLOGY:     RADIOLOGY:  [ ] Reviewed and interpreted by me    PULMONARY FUNCTION TESTS:    EKG:

## 2021-10-27 NOTE — PROGRESS NOTE ADULT - PROBLEM SELECTOR PLAN 2
- 2/2 to lung cancer as shown on CTA chest  - Initially on vanc, Zosyn, and azithromycin; nasal MRSA negative - d/c vancomycin; urine legionella neg - azithryomycin d/c  - s/p 7d  Zosyn (completed 10/23)  - sputum culture wnl  - aggressive chest PT - aerobika, bed percussion, and chest physiotherapy to help with airway clearance  - does not require bronchoscopy at this time per pulm ·  Problem: Obstructive pneumonia.   ·  Plan: - 2/2 to lung cancer as shown on CTA chest  - Initially on vanc, Zosyn, and azithromycin; nasal MRSA negative - d/c vancomycin; urine legionella neg - azithryomycin d/c; now s/p 7d  Zosyn (completed 10/23)  - sputum culture wnl  - has been receiving aggressive chest PT - aerobika, bed percussion, and chest physiotherapy to help with airway clearance

## 2021-10-27 NOTE — PHYSICAL THERAPY INITIAL EVALUATION ADULT - PERTINENT HX OF CURRENT PROBLEM, REHAB EVAL
This is a 61M NSCLC and chronic hypoxemic respiratory failure presenting with acute on chronic hypoxemia and volume overload.

## 2021-10-27 NOTE — PROGRESS NOTE ADULT - ASSESSMENT
61M NSCLC (on therapy), ILD with occupational exposure and smoking history, and chronic hypoxemic respiratory failure presenting with acute on chronic hypoxemia and volume overload. He has had a CTPA in the ED which is worse than a recent CT neg for PE, however w worsening cancer, volume overload, possible pneumonia and an TTE showed reduced RV systolic function. Now on max HFNC. 61M NSCLC (on therapy), ILD with occupational exposure and smoking history, and chronic hypoxemic respiratory failure presenting with acute on chronic hypoxemia and volume overload. He has had a CTPA in the ED which is worse than a recent CT neg for PE, however w worsening cancer, volume overload, possible pneumonia and an TTE showed reduced RV systolic function. On HFNC 40L at 80%. Given patient is chronically hypoxic and consistent with goals of care, with goal O2 sats >80% as long as patient is asymptomatic. Remainder of care per pulm. 61M NSCLC (on therapy), ILD with occupational exposure and smoking history, and chronic hypoxemic respiratory failure presenting with acute on chronic hypoxemia and volume overload. He has had a CTPA in the ED which is worse than a recent CT neg for PE, however w worsening cancer, volume overload, possible pneumonia and an TTE showed reduced RV systolic function. On HFNC 40L at 80%. Given patient is chronically hypoxic and consistent with goals of care, with goal O2 sats >80% as long as patient is asymptomatic. Remainder of care per pulm.    Our plan and history of care is included below for reference, but plan moving forward is per primary team (pulm).

## 2021-10-27 NOTE — PROGRESS NOTE ADULT - PROBLEM SELECTOR PLAN 1
- likely 2/2 worsening metastatic lung cancer, obstructive pneumonia, pulmonary edema  hx of COPD, ILD, lung cancer s/p RLL resection on chemo, pulm HTN on 4L NC at home. CTA and U/S of LE ruled out DVT and PE. BNP of 6752 on admission.   - CTA chest on 10/16 showed: right hilar/infrahilar malignancy extending into the right lower lobe, occlusion of right lower lobe airways and postobstructive pneumonia the right lower and middle lobe, interstitial pulmonary edema/CHF, and small right pleural effusion.  - B-lines present in POCUS from 10/20   - c/w HFNC and monitor pulse ox, sat goal liberalized to 80% given asymptomatic and chronically hypoxic; has been on HFNC since 10/16 and unable to wean down; goal is to titrate off as tolerated   - c/w furosemide 40mg IV BID   - pulm recs appreciated  - plan to transfer to [facility] ·  Plan: - likely 2/2 worsening metastatic lung cancer, obstructive pneumonia, pulmonary edema  hx of COPD, ILD, lung cancer s/p RLL resection on chemo, pulm HTN on 4L NC at home. CTA and U/S of LE ruled out DVT and PE. BNP of 6752 on admission.   - CTA chest on 10/16 showed: right hilar/infrahilar malignancy extending into the right lower lobe, occlusion of right lower lobe airways and postobstructive pneumonia the right lower and middle lobe, interstitial pulmonary edema/CHF, and small right pleural effusion.  - B-lines present in POCUS from 10/20   - has been on HFNC since 10/16 and unable to wean down; goal is to titrate off as tolerated   - has been receiving furosemide 40mg IV BID

## 2021-10-27 NOTE — PROGRESS NOTE ADULT - PROBLEM SELECTOR PROBLEM 7
Pulmonary hypertension

## 2021-10-27 NOTE — PROGRESS NOTE ADULT - PROBLEM SELECTOR PLAN 6
- CT showed worsening of ILD since 10/2021  - ILD could be worsened by pt's pembrolizumab but imaging not supportive  - continue to monitor  - respiratory treatments as above ·  Plan: - CT showed worsening of ILD since 10/2021  - ILD could be worsened by pt's pembrolizumab but imaging not supportive

## 2021-10-27 NOTE — PROGRESS NOTE ADULT - PROBLEM SELECTOR PLAN 4
- obstructive PFTs  - Was on bronchodilator therapy at home - would use Symbicort and Spiriva while in the hospital  - Incentive spirometer and Acapella  - Prednisone 50mg PO qd ·  Plan: - obstructive PFTs  - Was on bronchodilator therapy at home - has been using Symbicort and Spiriva while in the hospital  - Incentive spirometer and Acapella  - Had been receiving Prednisone 50mg PO BID.

## 2021-10-27 NOTE — PHYSICAL THERAPY INITIAL EVALUATION ADULT - GENERAL OBSERVATIONS, REHAB EVAL
Patient received seated at bedside ,(+) moderate swelling of lower extremities (+) HFNC , (+) tele monitor ,SpO2 89% fluctuating down to 84% and tachycardic with activities without any SOB., PA made aware.

## 2021-10-27 NOTE — PROGRESS NOTE ADULT - PROBLEM SELECTOR PROBLEM 5
Heart failure

## 2021-10-27 NOTE — PROGRESS NOTE ADULT - PROBLEM SELECTOR PLAN 3
- Diagnosed in 2020. s/p 6 cycles Carbo/Pemetrexed (Immunotherapy held given ILD) + 1 cycle pemetrexed maintenance with POD, s/p nab-paclitaxel/ramicirumab with POD, s/p gemcitabine with POD; Most recently started on 4th line pembrolizumab. C1 on 10/13/21  - CT chest  on admission showing diffuse hepatic metastases, known adrenal mets, right hilar/infrahilar malignancy extending into the right lower lobe and confluent with right paratracheal, right hilar, and subcarinal lymphadenopathy, presumably primary lung cancer. There is resulting occlusion of the right lower lobe airways and postobstructive pneumonia the entire right lower lobe and right middle lobe. Hydrostatic interstitial pulmonary edema/CHF. Small right pleural effusion. There is also left mediastinal and left hilar metastatic lymphadenopathy.  - onc following  - no current plans for inpatient treatment  - f/u with Dr. Richmond Zheng outpatient  - patient with progression of disease and just recently started on 4th line treatment , limited options remain for further DMT and furthermore current performance status would not allow for disease modifying treatments as patient requires high flow O2. This was discussed with patient in detail. Patient hopes to continue to receive cancer treatment and is not accepting of hospice or supportive care options at this time. ·  Plan: - Diagnosed in 2020. s/p 6 cycles Carbo/Pemetrexed (Immunotherapy held given ILD) + 1 cycle pemetrexed maintenance with POD, s/p nab-paclitaxel/ramicirumab with POD, s/p gemcitabine with POD; Most recently started on 4th line pembrolizumab. C1 on 10/13/21  - CT chest  on admission showing diffuse hepatic metastases, known adrenal mets, right hilar/infrahilar malignancy extending into the right lower lobe and confluent with right paratracheal, right hilar, and subcarinal lymphadenopathy, presumably primary lung cancer. There is resulting occlusion of the right lower lobe airways and postobstructive pneumonia the entire right lower lobe and right middle lobe. Hydrostatic interstitial pulmonary edema/CHF. Small right pleural effusion. There is also left mediastinal and left hilar metastatic lymphadenopathy.  - patient with progression of disease and just recently started on 4th line treatment , limited options remain for further DMT and furthermore current performance status would not allow for disease modifying treatments as patient requires high flow O2. This was discussed with patient in detail. Patient hopes to continue to receive cancer treatment and is not accepting of hospice or supportive care options at this time.  - follows with Dr. Richmond Zheng outpatient

## 2021-10-27 NOTE — PROGRESS NOTE ADULT - PROBLEM SELECTOR PROBLEM 6
ILD (interstitial lung disease)

## 2021-10-27 NOTE — PROGRESS NOTE ADULT - PROBLEM SELECTOR PROBLEM 8
Transaminitis

## 2021-10-27 NOTE — PROGRESS NOTE ADULT - PROBLEM SELECTOR PLAN 8
- elevated AST/ALT on admission possibly 2/2 to liver mets  - elevated alk phos possibly 2/2 bone mets  - monitor - elevated AST/ALT on admission possibly 2/2 to liver mets  - elevated alk phos possibly 2/2 bone mets

## 2021-10-27 NOTE — PROGRESS NOTE ADULT - ASSESSMENT
61M NSCLC (on therapy), ILD with occupational exposure and smoking history, and chronic hypoxemic respiratory failure presenting with acute on chronic hypoxemia as well as volume overload. He has had a CTPA in the ED which is worse than a recent CT and is concerning for volume overload, possible infection, and most notably progression of cancer.    Hypoxic Respiratory Failure  - Likely multifactorial, however most likely cause is patient's malignancy  - Continue diuresis, evidence of RV dysfunction likely in the setting of pHTN 2/2 hypoxemia  - Continue airway clearance therapy  - Continue prednisone 1mg/kg  - Goals of care discussion had with wife and patient, DNR/DNI with overall goal to go home  - Patient is currently requiring high amounts of oxygen. Outpatient HFNC can provide 40L/min at 40% FiO2  - Wean O2 as tolerated  - Follow up palliative care, oncology  - Poor prognosis overall, patient still requiring high FiO2 despite interventions  - Transferred to [facility], consult team will sign off    Kevin Ferguson, PGY-6  Pulmonary and Critical Care Medicine  [id number] 61M NSCLC (on therapy), ILD with occupational exposure and smoking history, and chronic hypoxemic respiratory failure presenting with acute on chronic hypoxemia as well as volume overload. He has had a CTPA in the ED which is worse than a recent CT and is concerning for volume overload, possible infection, and most notably progression of cancer.    #Neuro  A&Ox4  At baseline    #Respiratory  Hypoxic Respiratory Failure  - Likely multifactorial, however most likely cause is patient's malignancy NSCLC  - Continue diuresis, evidence of RV dysfunction likely in the setting of pHTN 2/2 hypoxemia  - Continue airway clearance therapy  - Continue prednisone 40mg qd  - Goals of care discussion had with wife and patient, DNR/DNI with overall goal to go home  - Poor prognosis overall, patient still requiring high FiO2 despite interventions  - Wean O2 as tolerated; still requiring HFNC 50L/80%    #CV  -BP remains stable  -Sinus tach at times; likely 2/2 SOB  HFpEF; BNP of 6752 on admission  - EKG showed sinus tachycardia, right atrial enlargement, rightward axis, and nonspecific ST and T wave abnormalities  - EF 56% on echo (10/20), LV systolic wnl, Right ventricular enlargement with decreased RV systolic function  - On IV lasix 40 IV BID- still fluid overloaded; LEs remain edematous    #GI  -Transaminitis- No evidence of abd pain; possibly 2/2 to liver mets  - will continue to trend for now  - Regular diet    #ID  - Leukocytosis likely 2/2 prednisone    #DVT PPX: Lovenox qd  #Ethics- DNR/DNI  #Dispo-DNR/DNI with overall goal to go home

## 2021-10-27 NOTE — PROGRESS NOTE ADULT - SUBJECTIVE AND OBJECTIVE BOX
*********************  Sarah Edouard  PGY-1, Psychiatry  [id number]  *********************    MAKENZIE NANCE  61y  Male      Patient is a 61y old  Male who presents with a chief complaint of SOB (26 Oct 2021 16:55)      SUBJECTIVE/OVERNIGHT EVENTS:  INCOMPLETE NOTE IN PROGRESS      OBJECTIVE    Vital Signs Last 24 Hrs  T(C): 36 (27 Oct 2021 04:32), Max: 36.8 (26 Oct 2021 13:45)  T(F): 96.8 (27 Oct 2021 04:32), Max: 98.2 (26 Oct 2021 13:45)  HR: 86 (27 Oct 2021 04:32) (86 - 130)  BP: 118/77 (27 Oct 2021 04:32) (106/66 - 128/85)  BP(mean): --  RR: 20 (27 Oct 2021 04:32) (18 - 20)  SpO2: 87% (27 Oct 2021 04:32) (82% - 93%)    PHYSICAL EXAM:  GENERAL: NAD, well-groomed, well-developed  HEAD:  Atraumatic, Normocephalic  EYES: EOMI, PERRLA, conjunctiva and sclera clear  ENMT: No tonsillar erythema, exudates, or enlargement; Moist mucous membranes, Good dentition, No lesions  NECK: Supple, No JVD, Normal thyroid  NERVOUS SYSTEM:  Alert & Oriented X3, Good concentration; Motor Strength 5/5 B/L upper and lower extremities; DTRs 2+ intact and symmetric  CHEST/LUNG: Clear to auscultation bilaterally; No rales, rhonchi, wheezing, or rubs  HEART: Regular rate and rhythm; No murmurs, rubs, or gallops  ABDOMEN: Soft, Nontender, Nondistended; Bowel sounds present  EXTREMITIES:  2+ Peripheral Pulses, No clubbing, cyanosis, or edema  LYMPH: No lymphadenopathy noted  SKIN: No rashes or lesions      LABS:                RADIOLOGY & ADDITIONAL TESTS:    Imaging Personally Reviewed:  [ ] YES  [ ] NO        MEDICATIONS  albuterol/ipratropium for Nebulization 3 milliLiter(s) Nebulizer every 4 hours  budesonide 160 MICROgram(s)/formoterol 4.5 MICROgram(s) Inhaler 2 Puff(s) Inhalation two times a day  cyanocobalamin 1000 MICROGram(s) Oral daily  enoxaparin Injectable 40 milliGRAM(s) SubCutaneous daily  furosemide   Injectable 40 milliGRAM(s) IV Push <User Schedule>  influenza   Vaccine 0.5 milliLiter(s) IntraMuscular once  metoclopramide 10 milliGRAM(s) Oral every 6 hours PRN  morphine   Solution 2.5 milliGRAM(s) Oral every 6 hours PRN  morphine   Solution 2.5 milliGRAM(s) Oral <User Schedule>  multivitamin 1 Tablet(s) Oral daily  pantoprazole    Tablet 40 milliGRAM(s) Oral before breakfast  predniSONE   Tablet 50 milliGRAM(s) Oral daily  sodium chloride 0.65% Nasal 1 Spray(s) Both Nostrils once PRN  sodium chloride 0.65% Nasal 1 Spray(s) Both Nostrils every 4 hours  tiotropium 18 MICROgram(s) Capsule 1 Capsule(s) Inhalation daily      ALLERGIES  No Known Allergies      Consultant(s) Notes Reviewed:  [x ] YES  [ ] NO  Care Discussed with Consultants/Other Providers [ x] YES  [ ] NO      HEALTH ISSUES - PROBLEM Dx:  Acute respiratory failure with hypoxia    Obstructive pneumonia    Lung cancer    Obstructive lung disease    Heart failure    ILD (interstitial lung disease)    Pulmonary hypertension    Transaminitis    Prophylactic measure    Shortness of breath    Pneumonia    Encounter for palliative care    Hypophosphatemia             MAKENZIE NANCE  61y  Male      Patient is a 61y old  Male who presents with a chief complaint of SOB (26 Oct 2021 16:55)      SUBJECTIVE/OVERNIGHT EVENTS:  Patient transferred to RCU on pulmonary service with pulm as primary team. This medicine team will sign off. Thank you for the opportunity to participate in this patient's care and do not hesitate to reach out to Team 5 with any questions regarding his course while here.      OBJECTIVE  Vital Signs Last 24 Hrs  T(C): 36 (27 Oct 2021 04:32), Max: 36.8 (26 Oct 2021 13:45)  T(F): 96.8 (27 Oct 2021 04:32), Max: 98.2 (26 Oct 2021 13:45)  HR: 86 (27 Oct 2021 04:32) (86 - 130)  BP: 118/77 (27 Oct 2021 04:32) (106/66 - 128/85)  BP(mean): --  RR: 20 (27 Oct 2021 04:32) (18 - 20)  SpO2: 87% (27 Oct 2021 04:32) (82% - 93%)

## 2021-10-27 NOTE — PROGRESS NOTE ADULT - PROBLEM SELECTOR PLAN 7
- hx of pulm HTN 2/2 lung pathologies  - Aggressive diuresis - Lasix IV qD with close monitoring of I/O  - No role for pulmonary vasodilator therapy ·  Plan: - hx of pulm HTN 2/2 lung pathologies  - has been getting lasix 40mg BID here

## 2021-10-27 NOTE — PROGRESS NOTE ADULT - PROBLEM SELECTOR PLAN 9
- Lovenox for DVT prophylaxis  - Regular diet as tolerated  - Maintain O2 sat > 88%  - Aspiration precautions -  Lovenox for DVT prophylaxis  - Regular diet as tolerated  - Maintain O2 sat > 80%  - Aspiration precautions.

## 2021-10-28 NOTE — PROGRESS NOTE ADULT - SUBJECTIVE AND OBJECTIVE BOX
CHIEF COMPLAINT: Patient is a 61y old  Male who presents with a chief complaint of SOB (27 Oct 2021 07:08)      Interval Events: Seen at bedside HFNC weaning in progress     REVIEW OF SYSTEMS:  Constitutional:   Eyes:  ENT:  CV:  Resp:  GI:  :  MSK:  Integumentary:  Neurological:  Psychiatric:  Endocrine:  Hematologic/Lymphatic:  Allergic/Immunologic:  [ ] All other systems negative  __    OBJECTIVE:  ICU Vital Signs Last 24 Hrs  T(C): 36.1 (28 Oct 2021 04:00), Max: 36.7 (27 Oct 2021 12:25)  T(F): 97 (28 Oct 2021 04:00), Max: 98.1 (27 Oct 2021 12:25)  HR: 102 (28 Oct 2021 04:00) (92 - 123)  BP: 135/83 (28 Oct 2021 04:00) (115/81 - 135/83)  BP(mean): 97 (28 Oct 2021 04:00) (89 - 106)  ABP: --  ABP(mean): --  RR: 18 (28 Oct 2021 04:00) (17 - 20)  SpO2: 86% (28 Oct 2021 04:00) (85% - 88%)        10-27 @ 07:01  -  10-28 @ 07:00  --------------------------------------------------------  IN: 300 mL / OUT: 1750 mL / NET: -1450 mL      CAPILLARY BLOOD GLUCOSE          PHYSICAL EXAM:  General:   HEENT:   Lymph Nodes:  Neck:   Respiratory:   Cardiovascular:   Abdomen:   Extremities:   Skin:   Neurological:  Psychiatry:    HOSPITAL MEDICATIONS:  MEDICATIONS  (STANDING):  albuterol/ipratropium for Nebulization 3 milliLiter(s) Nebulizer every 4 hours  budesonide 160 MICROgram(s)/formoterol 4.5 MICROgram(s) Inhaler 2 Puff(s) Inhalation two times a day  cyanocobalamin 1000 MICROGram(s) Oral daily  enoxaparin Injectable 40 milliGRAM(s) SubCutaneous daily  furosemide   Injectable 40 milliGRAM(s) IV Push <User Schedule>  influenza   Vaccine 0.5 milliLiter(s) IntraMuscular once  morphine   Solution 2.5 milliGRAM(s) Oral <User Schedule>  multivitamin 1 Tablet(s) Oral daily  pantoprazole    Tablet 40 milliGRAM(s) Oral before breakfast  predniSONE   Tablet 50 milliGRAM(s) Oral daily  sodium chloride 0.65% Nasal 1 Spray(s) Both Nostrils every 4 hours  tiotropium 18 MICROgram(s) Capsule 1 Capsule(s) Inhalation daily    MEDICATIONS  (PRN):  metoclopramide 10 milliGRAM(s) Oral every 6 hours PRN nausea  morphine   Solution 2.5 milliGRAM(s) Oral every 6 hours PRN dyspnea or pain  sodium chloride 0.65% Nasal 1 Spray(s) Both Nostrils once PRN Nasal Congestion      LABS:                        13.5   19.55 )-----------( 235      ( 28 Oct 2021 06:21 )             43.7     10-28    142  |  94<L>  |  38<H>  ----------------------------<  92  3.5   |  36<H>  |  0.99    Ca    9.8      28 Oct 2021 06:21  Phos  3.2     10-28  Mg     2.30     10-28    TPro  6.3  /  Alb  3.7  /  TBili  1.0  /  DBili  x   /  AST  68<H>  /  ALT  116<H>  /  AlkPhos  171<H>  10-28        Arterial Blood Gas:  10-27 @ 19:13  7.49/47/82/36/96.5/10.9  ABG lactate: --  Arterial Blood Gas:  10-26 @ 19:44  7.49/50/60/38/89.8/12.7  ABG lactate: --        MICROBIOLOGY:     RADIOLOGY:  [ ] Reviewed and interpreted by me    PULMONARY FUNCTION TESTS:    EKG: CHIEF COMPLAINT: Patient is a 61y old  Male who presents with a chief complaint of SOB (27 Oct 2021 07:08)      Interval Events: Seen at bedside HFNC weaning in progress but unable to decrease liter flow/percent 02    REVIEW OF SYSTEMS:  Constitutional: Denies pain   ENT: Denies   CV: Denies   Resp: + WILKINS  GI: Denies   MSK: Weak   [ ] All other systems negative  __    OBJECTIVE:  ICU Vital Signs Last 24 Hrs  T(C): 36.1 (28 Oct 2021 04:00), Max: 36.7 (27 Oct 2021 12:25)  T(F): 97 (28 Oct 2021 04:00), Max: 98.1 (27 Oct 2021 12:25)  HR: 102 (28 Oct 2021 04:00) (92 - 123)  BP: 135/83 (28 Oct 2021 04:00) (115/81 - 135/83)  BP(mean): 97 (28 Oct 2021 04:00) (89 - 106)  ABP: --  ABP(mean): --  RR: 18 (28 Oct 2021 04:00) (17 - 20)  SpO2: 86% (28 Oct 2021 04:00) (85% - 88%)        10-27 @ 07:01  -  10-28 @ 07:00  --------------------------------------------------------  IN: 300 mL / OUT: 1750 mL / NET: -1450 mL      CAPILLARY BLOOD GLUCOSE                                                                                                 HOSPITAL MEDICATIONS:  MEDICATIONS  (STANDING):  albuterol/ipratropium for Nebulization 3 milliLiter(s) Nebulizer every 4 hours  budesonide 160 MICROgram(s)/formoterol 4.5 MICROgram(s) Inhaler 2 Puff(s) Inhalation two times a day  cyanocobalamin 1000 MICROGram(s) Oral daily  enoxaparin Injectable 40 milliGRAM(s) SubCutaneous daily  furosemide   Injectable 40 milliGRAM(s) IV Push <User Schedule>  influenza   Vaccine 0.5 milliLiter(s) IntraMuscular once  morphine   Solution 2.5 milliGRAM(s) Oral <User Schedule>  multivitamin 1 Tablet(s) Oral daily  pantoprazole    Tablet 40 milliGRAM(s) Oral before breakfast  predniSONE   Tablet 50 milliGRAM(s) Oral daily  sodium chloride 0.65% Nasal 1 Spray(s) Both Nostrils every 4 hours  tiotropium 18 MICROgram(s) Capsule 1 Capsule(s) Inhalation daily    MEDICATIONS  (PRN):  metoclopramide 10 milliGRAM(s) Oral every 6 hours PRN nausea  morphine   Solution 2.5 milliGRAM(s) Oral every 6 hours PRN dyspnea or pain  sodium chloride 0.65% Nasal 1 Spray(s) Both Nostrils once PRN Nasal Congestion      LABS:                        13.5   19.55 )-----------( 235      ( 28 Oct 2021 06:21 )             43.7     10-28    142  |  94<L>  |  38<H>  ----------------------------<  92  3.5   |  36<H>  |  0.99    Ca    9.8      28 Oct 2021 06:21  Phos  3.2     10-28  Mg     2.30     10-28    TPro  6.3  /  Alb  3.7  /  TBili  1.0  /  DBili  x   /  AST  68<H>  /  ALT  116<H>  /  AlkPhos  171<H>  10-28        Arterial Blood Gas:  10-27 @ 19:13  7.49/47/82/36/96.5/10.9  ABG lactate: --  Arterial Blood Gas:  10-26 @ 19:44  7.49/50/60/38/89.8/12.7  ABG lactate: --        MICROBIOLOGY:     RADIOLOGY:  [ ] Reviewed and interpreted by me    PULMONARY FUNCTION TESTS:    EKG:

## 2021-10-28 NOTE — PROGRESS NOTE ADULT - ASSESSMENT
61M NSCLC (on therapy), ILD with occupational exposure and smoking history, and chronic hypoxemic respiratory failure presenting with acute on chronic hypoxemia as well as volume overload. He has had a CTPA in the ED which is worse than a recent CT and is concerning for volume overload, possible infection, and most notably progression of cancer.    #Neuro  A&Ox4  At baseline    #Respiratory  Hypoxic Respiratory Failure  - Likely multifactorial, however most likely cause is patient's malignancy NSCLC  - Continue diuresis, evidence of RV dysfunction likely in the setting of pHTN 2/2 hypoxemia  - Continue airway clearance therapy  - Continue prednisone 40mg qd  - Goals of care discussion had with wife and patient, DNR/DNI with overall goal to go home  - Poor prognosis overall, patient still requiring high FiO2 despite interventions  - Wean O2 as tolerated; still requiring HFNC 50L/80%    #CV  -BP remains stable  -Sinus tach at times; likely 2/2 SOB  HFpEF; BNP of 6752 on admission  - EKG showed sinus tachycardia, right atrial enlargement, rightward axis, and nonspecific ST and T wave abnormalities  - EF 56% on echo (10/20), LV systolic wnl, Right ventricular enlargement with decreased RV systolic function  - On IV lasix 40 IV BID- still fluid overloaded; LEs remain edematous    #GI  -Transaminitis- No evidence of abd pain; possibly 2/2 to liver mets  - will continue to trend for now  - Regular diet    #ID  - Leukocytosis likely 2/2 prednisone    #DVT PPX: Lovenox qd  #Ethics- DNR/DNI  #Dispo-DNR/DNI with overall goal to go home   61M NSCLC (on therapy), ILD with occupational exposure and smoking history, and chronic hypoxemic respiratory failure presenting with acute on chronic hypoxemia as well as volume overload. He has had a CTPA in the ED which is worse than a recent CT and is concerning for volume overload, possible infection, and most notably progression of cancer.    #Neuro  A&Ox4  At baseline    #Respiratory  Hypoxic Respiratory Failure  - Likely multifactorial, however most likely cause is patient's malignancy NSCLC  - Continue diuresis, evidence of RV dysfunction likely in the setting of pHTN 2/2 hypoxemia  - Continue airway clearance therapy  - Continue prednisone 40mg qd  - Goals of care discussion had with wife and patient, DNR/DNI with overall goal to go home  - Poor prognosis overall, patient still requiring high FiO2 despite interventions  - Wean O2 as tolerated; still requiring HFNC 80L/80%    #CV  -BP remains stable  -Sinus tach at times; likely 2/2 SOB  HFpEF; BNP of 6752 on admission  - EKG showed sinus tachycardia, right atrial enlargement, rightward axis, and nonspecific ST and T wave abnormalities  - EF 56% on echo (10/20), LV systolic wnl, Right ventricular enlargement with decreased RV systolic function  - On IV lasix 40 IV BID- still fluid overloaded; LEs remain edematous    #GI  -Transaminitis- No evidence of abd pain; possibly 2/2 to liver mets  - will continue to trend for now  - Regular diet    #ID  - Leukocytosis likely 2/2 prednisone    #DVT PPX: Lovenox qd  #Ethics- DNR/DNI  #Dispo-DNR/DNI with overall goal to go home if HFNC can be tapered to decreased settings

## 2021-10-28 NOTE — CONSULT NOTE ADULT - SUBJECTIVE AND OBJECTIVE BOX
HPI:  60 yo M with PMH of lung cancer, NSCLC s/p RLL resection on chemo, last given 10/13/21, COPD, ILD, pulmonary HTN, on 4L NC at home presents with SOB x 1 week.    Consult requested by pulmonary.     Patient states that he first experienced increasing difficulty breathing 2 weeks ago as he began to sleep with more pillows at night. He went from 1 pillow to a wedge and 2 pillows. One week ago he experienced a pulmonary infection with green sputum when he coughed and that was when his SOB significantly worsened. He received antibiotics which significantly improved his SOB. However, over the last week his leg swelling has worsened and he is experiencing tingling in his legs and increasing difficulty ambulating which brought him into the ED. Pt has a little cough currently but denies any sputum. Denies fever, chills, chest pain, abdominal pain, nausea, vomiting, changes in bowel habits, or urinary symptoms.    His imaging work up shows a RLL occlusion but it's not clear if this is infection vs tumor progression.  He is also on high flow oxygen, and sitting upright with an O2 sat of 83%.       < from: CT Angio Chest PE Protocol w/ IV Cont (10.16.21 @ 02:00) >  IMPRESSION:  1. Diffuse hepatic metastases. Hepatic cirrhosis.  2. Incompletely visualized isodense 2.5 cm left adrenal mass is suspicious for  a metastasis.  3. There is a right hilar/infrahilar malignancy extending into the right lower  lobe and confluent with right paratracheal, right hilar, and subcarinal  lymphadenopathy, presumably primary lung cancer. There is resulting occlusion  of the right lower lobe airways and postobstructive pneumonia the entire right  lower lobe and right middle lobe.  4. Chronic T4 vertebral body compression deformity. Underlying neoplastic  lesion not excluded.  5. Hydrostatic interstitial pulmonary edema/CHF.  6. Small right pleural effusion.  7 There is also left mediastinal and left hilar metastatic lymphadenopathy.  .        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    No significant past surgical history    Status post partial lobectomy of lung        FAMILY HISTORY:  FH: HTN (hypertension) (Father)    FH: heart attack (Father)        MEDICATIONS  (STANDING):  albuterol/ipratropium for Nebulization 3 milliLiter(s) Nebulizer every 4 hours  budesonide 160 MICROgram(s)/formoterol 4.5 MICROgram(s) Inhaler 2 Puff(s) Inhalation two times a day  cyanocobalamin 1000 MICROGram(s) Oral daily  enoxaparin Injectable 40 milliGRAM(s) SubCutaneous daily  furosemide   Injectable 40 milliGRAM(s) IV Push <User Schedule>  influenza   Vaccine 0.5 milliLiter(s) IntraMuscular once  morphine   Solution 2.5 milliGRAM(s) Oral <User Schedule>  multivitamin 1 Tablet(s) Oral daily  pantoprazole    Tablet 40 milliGRAM(s) Oral before breakfast  predniSONE   Tablet 50 milliGRAM(s) Oral daily  sodium chloride 0.65% Nasal 1 Spray(s) Both Nostrils every 4 hours  tiotropium 18 MICROgram(s) Capsule 1 Capsule(s) Inhalation daily    MEDICATIONS  (PRN):  metoclopramide 10 milliGRAM(s) Oral every 6 hours PRN nausea  morphine   Solution 2.5 milliGRAM(s) Oral every 6 hours PRN dyspnea or pain  sodium chloride 0.65% Nasal 1 Spray(s) Both Nostrils once PRN Nasal Congestion      PHYSICAL EXAM  Vital Signs Last 24 Hrs  T(C): 36.1 (28 Oct 2021 04:00), Max: 36.7 (27 Oct 2021 17:00)  T(F): 97 (28 Oct 2021 04:00), Max: 98 (27 Oct 2021 17:00)  HR: 116 (28 Oct 2021 10:01) (92 - 123)  BP: 124/78 (28 Oct 2021 10:01) (115/81 - 135/83)  BP(mean): 97 (28 Oct 2021 04:00) (89 - 98)  RR: 21 (28 Oct 2021 10:01) (17 - 21)  SpO2: 86% (28 Oct 2021 10:01) (85% - 88%)    General: Well nourished, well developed, no acute distress  HEENT: NC/AT; EOMI, PERRL, sclera nonicteric; external ears normal; no rhinorrhea or epistaxis; mucous membranes moist; oropharynx clear and without erythema  CV: NR, RR; no appreciable r/m/g  Lungs: CTAB, no increased work of breathing  Abdomen: Bowel sounds present; soft, NTND  MSK: Vertebral spine non-tender to palpation  Neuro: AAOx3; cranial nerves II-XII intact; strength 5/5 in upper and lower extremities; sensation to light touch in tact bilaterally.  Psych: Full affect; mood congruent  Skin: no visible rashes on limited examination    IMAGING/LABS/PATHOLOGY: I have personally reviewed the relevant labs, pathology, and imaging as noted in the HPI.  In addition,                          13.5   19.55 )-----------( 235      ( 28 Oct 2021 06:21 )             43.7     10-28    142  |  94<L>  |  38<H>  ----------------------------<  92  3.5   |  36<H>  |  0.99    Ca    9.8      28 Oct 2021 06:21  Phos  3.2     10-28  Mg     2.30     10-28    TPro  6.3  /  Alb  3.7  /  TBili  1.0  /  DBili  x   /  AST  68<H>  /  ALT  116<H>  /  AlkPhos  171<H>  10-28        ASSESSMENT/PLAN    MAKENZIE NANCE is a 61y man with h/o NSCLC p/w dyspnea, on home oxygen, h/o COPD also, now on high flow oxygen at [facility] and O2 sat is 83%.  CT scan shows RLL lymphadenopathy and hilar involvement  which also cannot be ruled out as infection vs tumor progression.  He has been on 4 lines of chemotherapy and last given 10/13/21, not a candidate to be on chemotherapy now due to the high oxygen demands.  I.R consult appropriate to discern if biopsy is possible to determine infection v tumor progression.  will d/w Dr. Marte any possible role for palliative RT.   Also, any consideration of RT would require Mr. SAMS lay flat for at least 20 minutes.      We discussed the use of palliative radiation in this setting, namely to improve quality of life through the reduction of symptoms.  We talked about the risks, benefits, acute and long term side effects, as well as expected treatment outcomes.  He/She was given the opportunity to ask questions, which were answered to his/her apparent satisfaction.  He/She provided written consent to proceed with radiation therapy. We will arrange for inpatient/outpatient treatment. HPI:  62 yo M with PMH of lung cancer, NSCLC s/p RLL resection on chemo, last given 10/13/21, COPD, ILD, pulmonary HTN, on 4L NC at home presents with SOB x 1 week.    Consult requested by pulmonary.     Patient states that he first experienced increasing difficulty breathing 2 weeks ago as he began to sleep with more pillows at night. He went from 1 pillow to a wedge and 2 pillows. One week ago he experienced a pulmonary infection with green sputum when he coughed and that was when his SOB significantly worsened. He received antibiotics which significantly improved his SOB. However, over the last week his leg swelling has worsened and he is experiencing tingling in his legs and increasing difficulty ambulating which brought him into the ED. Pt has a little cough currently but denies any sputum now.   Denies fever, chills, chest pain, abdominal pain, nausea, vomiting, changes in bowel habits, or urinary symptoms.    His imaging work up shows a RLL occlusion but it's not clear if this is infection vs tumor progression.  He is also on high flow oxygen, and sitting upright with an O2 sat of 83%.   Visibly has dyspnea at rest. Was able to sit up, talk on the phone,  and a.o. x4.   Unclear if he can lay flat but he did have CT scan done as below.         < from: CT Angio Chest PE Protocol w/ IV Cont (10.16.21 @ 02:00) >  IMPRESSION:  1. Diffuse hepatic metastases. Hepatic cirrhosis.  2. Incompletely visualized isodense 2.5 cm left adrenal mass is suspicious for  a metastasis.  3. There is a right hilar/infrahilar malignancy extending into the right lower  lobe and confluent with right paratracheal, right hilar, and subcarinal  lymphadenopathy, presumably primary lung cancer. There is resulting occlusion  of the right lower lobe airways and postobstructive pneumonia the entire right  lower lobe and right middle lobe.  4. Chronic T4 vertebral body compression deformity. Underlying neoplastic  lesion not excluded.  5. Hydrostatic interstitial pulmonary edema/CHF.  6. Small right pleural effusion.  7 There is also left mediastinal and left hilar metastatic lymphadenopathy.  .        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    No significant past surgical history    Status post partial lobectomy of lung        FAMILY HISTORY:  FH: HTN (hypertension) (Father)    FH: heart attack (Father)        MEDICATIONS  (STANDING):  albuterol/ipratropium for Nebulization 3 milliLiter(s) Nebulizer every 4 hours  budesonide 160 MICROgram(s)/formoterol 4.5 MICROgram(s) Inhaler 2 Puff(s) Inhalation two times a day  cyanocobalamin 1000 MICROGram(s) Oral daily  enoxaparin Injectable 40 milliGRAM(s) SubCutaneous daily  furosemide   Injectable 40 milliGRAM(s) IV Push <User Schedule>  influenza   Vaccine 0.5 milliLiter(s) IntraMuscular once  morphine   Solution 2.5 milliGRAM(s) Oral <User Schedule>  multivitamin 1 Tablet(s) Oral daily  pantoprazole    Tablet 40 milliGRAM(s) Oral before breakfast  predniSONE   Tablet 50 milliGRAM(s) Oral daily  sodium chloride 0.65% Nasal 1 Spray(s) Both Nostrils every 4 hours  tiotropium 18 MICROgram(s) Capsule 1 Capsule(s) Inhalation daily    MEDICATIONS  (PRN):  metoclopramide 10 milliGRAM(s) Oral every 6 hours PRN nausea  morphine   Solution 2.5 milliGRAM(s) Oral every 6 hours PRN dyspnea or pain  sodium chloride 0.65% Nasal 1 Spray(s) Both Nostrils once PRN Nasal Congestion      PHYSICAL EXAM  KPS 50  Vital Signs Last 24 Hrs  T(C): 36.1 (28 Oct 2021 04:00), Max: 36.7 (27 Oct 2021 17:00)  T(F): 97 (28 Oct 2021 04:00), Max: 98 (27 Oct 2021 17:00)  HR: 116 (28 Oct 2021 10:01) (92 - 123)  BP: 124/78 (28 Oct 2021 10:01) (115/81 - 135/83)  BP(mean): 97 (28 Oct 2021 04:00) (89 - 98)  RR: 21 (28 Oct 2021 10:01) (17 - 21)  SpO2: 86% (28 Oct 2021 10:01) (85% - 88%)    General: obese, on high flow oxygen, sitting upright  HEENT: NC/AT; EOMI, PERRL, sclera nonicteric; external ears normal; no rhinorrhea or epistaxis; mucous membranes moist; oropharynx clear and without erythema  CV: +tachycardia over 120's, no appreciable r/m/g  Lungs: CTAB, +dyspnea at rest, sitting upright, no wheezes heard now.  Abdomen: Bowel sounds present; soft, NTND  Neuro: AAOx3; cranial nerves II-XII intact; strength 5/5 in upper and lower extremities; sensation to light touch in tact bilaterally.  ++ bilateral leg/foot edema.        IMAGING/LABS/PATHOLOGY: I have personally reviewed the relevant labs, pathology, and imaging as noted in the HPI.  In addition,                          13.5   19.55 )-----------( 235      ( 28 Oct 2021 06:21 )             43.7     10-28    142  |  94<L>  |  38<H>  ----------------------------<  92  3.5   |  36<H>  |  0.99    Ca    9.8      28 Oct 2021 06:21  Phos  3.2     10-28  Mg     2.30     10-28    TPro  6.3  /  Alb  3.7  /  TBili  1.0  /  DBili  x   /  AST  68<H>  /  ALT  116<H>  /  AlkPhos  171<H>  10-28        ASSESSMENT/PLAN    MAKENZIE NANCE is a 61y man with h/o NSCLC p/w dyspnea, on home oxygen, h/o COPD also, now on high flow oxygen at [facility] and O2 sat is 83%.  CT scan shows RLL lymphadenopathy and hilar involvement  which also cannot be ruled out as infection vs tumor progression.  He has been on 4 lines of chemotherapy and last given 10/13/21, not a candidate to be on chemotherapy now due to the high oxygen demands.  I.R consult appropriate to discern if biopsy is possible to determine infection v tumor progression.  will d/w Dr. Marte any possible role for palliative RT.   Also, any consideration of RT would require Mr. SAMS lay flat for at least 20 minutes.    After d/w Dr. Marte, would hold consideration of RT until his O2 sat improves and high flow oxygen demands decrease,  will also await I.R. consult to see if tumor vs infection can be determined via biopsy.

## 2021-10-29 NOTE — PROGRESS NOTE ADULT - ASSESSMENT
61M NSCLC (on therapy), ILD with occupational exposure and smoking history, and chronic hypoxemic respiratory failure presenting with acute on chronic hypoxemia as well as volume overload. He has had a CTPA in the ED which is worse than a recent CT and is concerning for volume overload, possible infection, and most notably progression of cancer.    #Neuro  A&Ox4  At baseline    #Respiratory  Hypoxic Respiratory Failure  - Likely multifactorial, however most likely cause is patient's malignancy NSCLC  - Continue diuresis, evidence of RV dysfunction likely in the setting of pHTN 2/2 hypoxemia  - Continue airway clearance therapy  - Continue prednisone 40mg qd  - Goals of care discussion had with wife and patient, DNR/DNI with overall goal to go home  - Poor prognosis overall, patient still requiring high FiO2 despite interventions  - Wean O2 as tolerated; still requiring HFNC 80L/80%  - Radiation/Onc consult ordered - will need to see if pt can tolerate lying flat for radiation also requesting IR eval for biopsy infection vs mets in R lung   -Hold off on bx for now   -Pt tolerating Lie 2000 for short periods -continue trials over next week     #CV  -BP remains stable  -Sinus tach at times; likely 2/2 SOB  HFpEF; BNP of 6752 on admission  - EKG showed sinus tachycardia, right atrial enlargement, rightward axis, and nonspecific ST and T wave abnormalities  - EF 56% on echo (10/20), LV systolic wnl, Right ventricular enlargement with decreased RV systolic function  - On IV lasix 40 IV BID- still fluid overloaded; LEs remain edematous    #GI  -Transaminitis- No evidence of abd pain; possibly 2/2 to liver mets  - will continue to trend for now  - Regular diet    #ID  - Leukocytosis likely 2/2 prednisone  - No fevers     #DVT PPX: Lovenox qd  #Ethics- DNR/DNI  #Dispo-DNR/DNI with overall goal to go home if HFNC can be tapered to decreased settings

## 2021-10-29 NOTE — PROGRESS NOTE ADULT - SUBJECTIVE AND OBJECTIVE BOX
CHIEF COMPLAINT: Patient is a 61y old  Male who presents with a chief complaint of SOB (28 Oct 2021 12:46)        Interval Events: Pt seen at bedside with team     REVIEW OF SYSTEMS:  Constitutional:   Eyes:  ENT:  CV:  Resp:  GI:  :  MSK:  Integumentary:  Neurological:  Psychiatric:  Endocrine:  Hematologic/Lymphatic:  Allergic/Immunologic:  [ ] All other systems negative      OBJECTIVE:  ICU Vital Signs Last 24 Hrs  T(C): 36.2 (29 Oct 2021 05:30), Max: 36.7 (28 Oct 2021 22:17)  T(F): 97.2 (29 Oct 2021 05:30), Max: 98.1 (28 Oct 2021 22:17)  HR: 113 (29 Oct 2021 07:25) (102 - 118)  BP: 112/73 (29 Oct 2021 05:30) (112/73 - 131/83)  BP(mean): --  ABP: --  ABP(mean): --  RR: 23 (29 Oct 2021 07:25) (18 - 23)  SpO2: 95% (29 Oct 2021 07:25) (83% - 95%)        10-28 @ 07:01  -  10-29 @ 07:00  --------------------------------------------------------  IN: 1200 mL / OUT: 1000 mL / NET: 200 mL      CAPILLARY BLOOD GLUCOSE          PHYSICAL EXAM:  General:   HEENT:   Lymph Nodes:  Neck:   Respiratory:   Cardiovascular:   Abdomen:   Extremities:   Skin:   Neurological:  Psychiatry:    HOSPITAL MEDICATIONS:  MEDICATIONS  (STANDING):  albuterol/ipratropium for Nebulization 3 milliLiter(s) Nebulizer every 4 hours  budesonide 160 MICROgram(s)/formoterol 4.5 MICROgram(s) Inhaler 2 Puff(s) Inhalation two times a day  cyanocobalamin 1000 MICROGram(s) Oral daily  enoxaparin Injectable 40 milliGRAM(s) SubCutaneous daily  furosemide   Injectable 40 milliGRAM(s) IV Push <User Schedule>  influenza   Vaccine 0.5 milliLiter(s) IntraMuscular once  morphine   Solution 2.5 milliGRAM(s) Oral <User Schedule>  multivitamin 1 Tablet(s) Oral daily  pantoprazole    Tablet 40 milliGRAM(s) Oral before breakfast  predniSONE   Tablet 50 milliGRAM(s) Oral daily  sodium chloride 0.65% Nasal 1 Spray(s) Both Nostrils every 4 hours  tiotropium 18 MICROgram(s) Capsule 1 Capsule(s) Inhalation daily    MEDICATIONS  (PRN):  metoclopramide 10 milliGRAM(s) Oral every 6 hours PRN nausea  morphine   Solution 2.5 milliGRAM(s) Oral every 6 hours PRN dyspnea or pain  sodium chloride 0.65% Nasal 1 Spray(s) Both Nostrils once PRN Nasal Congestion      LABS:                        13.1   19.50 )-----------( 223      ( 29 Oct 2021 07:10 )             42.7     10-29    142  |  95<L>  |  38<H>  ----------------------------<  94  3.2<L>   |  36<H>  |  1.04    Ca    9.7      29 Oct 2021 07:10  Phos  3.2     10-28  Mg     2.30     10-28    TPro  5.8<L>  /  Alb  3.5  /  TBili  0.9  /  DBili  x   /  AST  66<H>  /  ALT  119<H>  /  AlkPhos  170<H>  10-29        Arterial Blood Gas:  10-27 @ 19:13  7.49/47/82/36/96.5/10.9  ABG lactate: --        MICROBIOLOGY:     RADIOLOGY:  [ ] Reviewed and interpreted by me    PULMONARY FUNCTION TESTS:    EKG:

## 2021-10-29 NOTE — PROGRESS NOTE ADULT - PROBLEM SELECTOR PLAN 1
Dyspnea 2/2 Post obstructive pna vs. pulmonary edema in setting of metastatic NSCLC  > Patient remains asymptomatic from hypoxia.   > Continue morphine to 2.5mg TID.   > continue 2.5mg PO morphine q6hr prn breakthrough dyspnea   > continue steroids per primary team   > Pulm recs appreciated: taper HFNC, continue nebs  > Continue diuresis   > Goal is to decrease HiFlo to 40lpm and 40%FiO2. Patient trialed portable HFNC in RCU, which he reports tolerating well.   > please ensure patient is on bowel regimen while on opioids.

## 2021-10-29 NOTE — PROGRESS NOTE ADULT - PROBLEM SELECTOR PLAN 2
Onc care at [facility] with Dr. Zheng   s/p RLL resection and chemotherapy, Last chemo on 10/13  > Heme/onc recs appreciated.  > rad/onc recs appreciated

## 2021-10-29 NOTE — PROGRESS NOTE ADULT - ASSESSMENT
61M NSCLC (on therapy), ILD with occupational exposure and smoking history, and chronic hypoxemic respiratory failure presenting with acute on chronic hypoxemia as well as volume overload. He has had a CTPA in the ED which is worse than a recent CT and is concerning for volume overload, possible infection, and most notably progression of cancer.    #Neuro  A&Ox4  At baseline    #Respiratory  Hypoxic Respiratory Failure  - Likely multifactorial, however most likely cause is patient's malignancy NSCLC  - Continue diuresis, evidence of RV dysfunction likely in the setting of pHTN 2/2 hypoxemia  - Continue airway clearance therapy  - Continue prednisone 40mg qd  - Goals of care discussion had with wife and patient, DNR/DNI with overall goal to go home  - Poor prognosis overall, patient still requiring high FiO2 despite interventions  - Wean O2 as tolerated; still requiring HFNC 80L/80%  - Radiation/Onc consult ordered - will need to see if pt can tolerate lying flat for radiation also requesting IR eval for biopsy infection vs mets in R lung     #CV  -BP remains stable  -Sinus tach at times; likely 2/2 SOB  HFpEF; BNP of 6752 on admission  - EKG showed sinus tachycardia, right atrial enlargement, rightward axis, and nonspecific ST and T wave abnormalities  - EF 56% on echo (10/20), LV systolic wnl, Right ventricular enlargement with decreased RV systolic function  - On IV lasix 40 IV BID- still fluid overloaded; LEs remain edematous    #GI  -Transaminitis- No evidence of abd pain; possibly 2/2 to liver mets  - will continue to trend for now  - Regular diet    #ID  - Leukocytosis likely 2/2 prednisone    #DVT PPX: Lovenox qd  #Ethics- DNR/DNI  #Dispo-DNR/DNI with overall goal to go home if HFNC can be tapered to decreased settings

## 2021-10-29 NOTE — PROGRESS NOTE ADULT - SUBJECTIVE AND OBJECTIVE BOX
INTERVAL HPI/OVERNIGHT EVENTS:  Patient seen at bedside.  Patient eating lunch  No acute complaints, states to doing fine      VITAL SIGNS:  T(F): 97.2 (10-29-21 @ 05:30)  HR: 113 (10-29-21 @ 07:25)  BP: 112/73 (10-29-21 @ 05:30)  RR: 25 (10-29-21 @ 11:18)  SpO2: 85% (10-29-21 @ 11:18)  Wt(kg): --    PHYSICAL EXAM:    Constitutional: NAD,sitting onbed comfortably  Eyes: EOMI, sclera non-icteric  Neck: supple, no LAD  Respiratory: good air entry b/l,   Cardiovascular: RRR,  Gastrointestinal: soft, NTND  Extremities: no edema  Neurological: AAOx3,      MEDICATIONS  (STANDING):  albuterol/ipratropium for Nebulization 3 milliLiter(s) Nebulizer every 4 hours  budesonide 160 MICROgram(s)/formoterol 4.5 MICROgram(s) Inhaler 2 Puff(s) Inhalation two times a day  cyanocobalamin 1000 MICROGram(s) Oral daily  enoxaparin Injectable 40 milliGRAM(s) SubCutaneous daily  furosemide   Injectable 40 milliGRAM(s) IV Push <User Schedule>  influenza   Vaccine 0.5 milliLiter(s) IntraMuscular once  morphine   Solution 2.5 milliGRAM(s) Oral <User Schedule>  multivitamin 1 Tablet(s) Oral daily  pantoprazole    Tablet 40 milliGRAM(s) Oral before breakfast  potassium chloride    Tablet ER 40 milliEquivalent(s) Oral every 4 hours  predniSONE   Tablet 50 milliGRAM(s) Oral daily  sodium chloride 0.65% Nasal 1 Spray(s) Both Nostrils every 4 hours  tiotropium 18 MICROgram(s) Capsule 1 Capsule(s) Inhalation daily    MEDICATIONS  (PRN):  metoclopramide 10 milliGRAM(s) Oral every 6 hours PRN nausea  morphine   Solution 2.5 milliGRAM(s) Oral every 6 hours PRN dyspnea or pain  sodium chloride 0.65% Nasal 1 Spray(s) Both Nostrils once PRN Nasal Congestion      Allergies    No Known Allergies    Intolerances        LABS:                        13.1   19.50 )-----------( 223      ( 29 Oct 2021 07:10 )             42.7     10-29    142  |  95<L>  |  38<H>  ----------------------------<  94  3.2<L>   |  36<H>  |  1.04    Ca    9.7      29 Oct 2021 07:10  Phos  3.1     10-29  Mg     2.10     10-29    TPro  5.8<L>  /  Alb  3.5  /  TBili  0.9  /  DBili  x   /  AST  66<H>  /  ALT  119<H>  /  AlkPhos  170<H>  10-29          RADIOLOGY & ADDITIONAL TESTS:  Studies reviewed.

## 2021-10-29 NOTE — PROGRESS NOTE ADULT - SUBJECTIVE AND OBJECTIVE BOX
[facility] Geriatric[facility] and Palliative Care  Radha Massey Palliative Care Attending  Contact Info: Page [phone] (including Nights/Weekends), message on Microsoft Teams (Radha Massey), or leave  at Palliative Office [phone] (non-urgent)     SUBJECTIVE AND OBJECTIVE: Patient seen today, no family at bedside at time of visit. Patient reports trialing portable HFNC machine in hospital and tolerating it well. RCU team weaning HFNC gradually to goal settings of 40LPM and 40% FiO2. He remains comfortable with current dyspnea medications.     INTERVAL HPI/OVERNIGHT EVENTS:  > Chart reviewed. Flowsheets reviewed. No PRNs utilized in 24 hours.     DNR on chart:   Allergies    No Known Allergies    Intolerances    MEDICATIONS  (STANDING):  albuterol/ipratropium for Nebulization 3 milliLiter(s) Nebulizer every 4 hours  budesonide 160 MICROgram(s)/formoterol 4.5 MICROgram(s) Inhaler 2 Puff(s) Inhalation two times a day  cyanocobalamin 1000 MICROGram(s) Oral daily  enoxaparin Injectable 40 milliGRAM(s) SubCutaneous daily  furosemide   Injectable 40 milliGRAM(s) IV Push <User Schedule>  influenza   Vaccine 0.5 milliLiter(s) IntraMuscular once  morphine   Solution 2.5 milliGRAM(s) Oral <User Schedule>  multivitamin 1 Tablet(s) Oral daily  pantoprazole    Tablet 40 milliGRAM(s) Oral before breakfast  potassium chloride    Tablet ER 40 milliEquivalent(s) Oral every 4 hours  predniSONE   Tablet 50 milliGRAM(s) Oral daily  sodium chloride 0.65% Nasal 1 Spray(s) Both Nostrils every 4 hours  tiotropium 18 MICROgram(s) Capsule 1 Capsule(s) Inhalation daily    MEDICATIONS  (PRN):  metoclopramide 10 milliGRAM(s) Oral every 6 hours PRN nausea  morphine   Solution 2.5 milliGRAM(s) Oral every 6 hours PRN dyspnea or pain  sodium chloride 0.65% Nasal 1 Spray(s) Both Nostrils once PRN Nasal Congestion      ITEMS UNCHECKED ARE NOT PRESENT    PRESENT SYMPTOMS: [ ]Unable to obtain due to poor mentation   Source if other than patient:  [ ]Family   [ ]Team     Pain:  [ ]yes [x ]no  QOL impact -   Location -                    Aggravating factors -  Quality -  Radiation -  Timing-  Severity (0-10 scale):  Minimal acceptable level (0-10 scale):     Dyspnea:      asymptomatic  hypoxia                 [ ]Mild [ ]Moderate [ ]Severe  Anxiety:                             [ ]Mild [ ]Moderate [ ]Severe  Fatigue:                             [ ]Mild [ ]Moderate [ ]Severe  Nausea:                             [ ]Mild [ ]Moderate [ ]Severe  Loss of appetite:              [ ]Mild [ ]Moderate [ ]Severe  Constipation:                    [ ]Mild [ ]Moderate [ ]Severe    PAIN AD Score:	  [email or web address] (Ctrl + left click to view)    Other Symptoms:  [x ]All other review of systems negative     Palliative Performance Status Version 2: 80        %      http://[email or web address]/files/[email or web address]_performance_scale_[email or web address].pdf  PHYSICAL EXAM:  Vital Signs Last 24 Hrs  T(C): 36.2 (29 Oct 2021 05:30), Max: 36.7 (28 Oct 2021 22:17)  T(F): 97.2 (29 Oct 2021 05:30), Max: 98.1 (28 Oct 2021 22:17)  HR: 115 (29 Oct 2021 14:55) (102 - 118)  BP: 112/73 (29 Oct 2021 05:30) (112/73 - 131/83)  BP(mean): --  RR: 28 (29 Oct 2021 14:55) (18 - 28)  SpO2: 86% (29 Oct 2021 14:55) (83% - 90%) I&O's Summary    28 Oct 2021 07:01  -  29 Oct 2021 07:00  --------------------------------------------------------  IN: 1200 mL / OUT: 1000 mL / NET: 200 mL       GENERAL:  [ x]Alert  [x ]Oriented x3   [ ]Lethargic  [ ]Cachexia  [ ]Unarousable  [x ]Verbal  [ ]Non-Verbal  Behavioral:   [ ] Anxiety  [ ] Delirium [ ] Agitation [x ] Other  HEENT:  [ ]Normal   [ ]Dry mouth   [ ]ET Tube/Trach  [ ]Oral lesions  PULMONARY: HFNC   [ ]Clear [ ]Tachypnea  [ ]Audible excessive secretions   [x ]Rhonchi        [ x]Right [ ]Left [ ]Bilateral  [ ]Crackles        [ ]Right [ ]Left [ ]Bilateral  [ ]Wheezing     [ ]Right [ ]Left [ ]Bilateral  [ ]Diminished breath sounds [ ]right [ ]left [ ]bilateral  CARDIOVASCULAR:    [x ]Regular [ ]Irregular [ ]Tachy  [ ]Randy [ ]Murmur [ ]Other  GASTROINTESTINAL:  [x ]Soft  [ ]Distended   [ ]+BS  [ ]Non tender [ ]Tender  [ ]PEG [ ]OGT/ NGT  Last BM: 10/28  GENITOURINARY:  [x ]Normal [ ] Incontinent   [ ]Oliguria/Anuria   [ ]Haro  MUSCULOSKELETAL:   [ ]Normal   [ ]Weakness  [ ]Bed/Wheelchair bound [ x]Edema- b/l lower extremity edema, which is improving   NEUROLOGIC:   [ x]No focal deficits  [ ]Cognitive impairment  [ ]Dysphagia [ ]Dysarthria [ ]Paresis [ ]Other   SKIN: Hyperpigmentation  noted on anterior left leg   [ ]Normal    [ ]Rash  [ ]Pressure ulcer(s)       Present on admission [ ]y [ ]n    CRITICAL CARE:  [ ] Shock Present  [ ]Septic [ ]Cardiogenic [ ]Neurologic [ ]Hypovolemic  [ ]  Vasopressors [ ]  Inotropes   [ ]Respiratory failure present [ ]Mechanical ventilation [ ]Non-invasive ventilatory support [ ]High flow  [ ]Acute  [ ]Chronic [ ]Hypoxic  [ ]Hypercarbic [ ]Other  [ ]Other organ failure     LABS:                        13.1   19.50 )-----------( 223      ( 29 Oct 2021 07:10 )             42.7   10-29    142  |  95<L>  |  38<H>  ----------------------------<  94  3.2<L>   |  36<H>  |  1.04    Ca    9.7      29 Oct 2021 07:10  Phos  3.1     10-29  Mg     2.10     10-29    TPro  5.8<L>  /  Alb  3.5  /  TBili  0.9  /  DBili  x   /  AST  66<H>  /  ALT  119<H>  /  AlkPhos  170<H>  10-29    RADIOLOGY & ADDITIONAL STUDIES: n/a     Protein Calorie Malnutrition Present: [ ]mild [ ]moderate [ ]severe [ ]underweight [ ]morbid obesity  https://www.andeal.org/vault/3280/web/files/ONC/Table_Clinical%20Characteristics%20to%20Document%20Malnutrition-White%20JV%20et%20al%364503.pdf    Height (cm): 177.8 (10-15-21 @ 19:14), 177 (10-04-21 @ 19:57), 177 (08-11-21 @ 09:30)  Weight (kg): 113 (10-17-21 @ 11:59), 130 (10-04-21 @ 19:57), 131 (08-11-21 @ 09:30)  BMI (kg/m2): 35.7 (10-17-21 @ 11:59), 41.1 (10-15-21 @ 19:14), 41.5 (10-04-21 @ 19:57)    [ ]PPSV2 < or = 30%  [ ]significant weight loss [ ]poor nutritional intake [ ]anasarca    [ ]Artificial Nutrition    REFERRALS:   [ ]Chaplaincy  [x ]Hospice  [ ]Child Life  [ ]Social Work  [ ]Case management [ ]Holistic Therapy

## 2021-10-29 NOTE — PROGRESS NOTE ADULT - SUBJECTIVE AND OBJECTIVE BOX
CHIEF COMPLAINT: Patient is a 61y old  Male who presents with a chief complaint of SOB (29 Oct 2021 17:44)      Interval Events: Pt seen by team at bedside.  Trial of Life 2000 ongoing     REVIEW OF SYSTEMS:  Constitutional: Denies pain /Dizziness   CV: Denies   Resp: Denies   GI: Denies   MSK: Denies   Integumentary:  Neurological:  Psychiatric:  Endocrine:  Hematologic/Lymphatic:  Allergic/Immunologic:  [x ] All other systems negative      OBJECTIVE:  ICU Vital Signs Last 24 Hrs  T(C): 36.1 (29 Oct 2021 18:10), Max: 36.7 (28 Oct 2021 22:17)  T(F): 97 (29 Oct 2021 18:10), Max: 98.1 (28 Oct 2021 22:17)  HR: 131 (29 Oct 2021 18:10) (102 - 131)  BP: 122/78 (29 Oct 2021 18:10) (112/73 - 122/78)  BP(mean): --  ABP: --  ABP(mean): --  RR: 22 (29 Oct 2021 18:10) (18 - 28)  SpO2: 83% (29 Oct 2021 18:10) (83% - 90%)        10-28 @ 07:01  -  10-29 @ 07:00  --------------------------------------------------------  IN: 1200 mL / OUT: 1000 mL / NET: 200 mL      CAPILLARY BLOOD GLUCOSE          PHYSICAL EXAM:  General:   HEENT:   Lymph Nodes:  Neck:   Respiratory:   Cardiovascular:   Abdomen:   Extremities:   Skin:   Neurological:  Psychiatry:    HOSPITAL MEDICATIONS:  MEDICATIONS  (STANDING):  albuterol/ipratropium for Nebulization 3 milliLiter(s) Nebulizer every 4 hours  budesonide 160 MICROgram(s)/formoterol 4.5 MICROgram(s) Inhaler 2 Puff(s) Inhalation two times a day  cyanocobalamin 1000 MICROGram(s) Oral daily  enoxaparin Injectable 40 milliGRAM(s) SubCutaneous daily  furosemide   Injectable 40 milliGRAM(s) IV Push <User Schedule>  influenza   Vaccine 0.5 milliLiter(s) IntraMuscular once  morphine   Solution 2.5 milliGRAM(s) Oral <User Schedule>  multivitamin 1 Tablet(s) Oral daily  pantoprazole    Tablet 40 milliGRAM(s) Oral before breakfast  predniSONE   Tablet 50 milliGRAM(s) Oral daily  sodium chloride 0.65% Nasal 1 Spray(s) Both Nostrils every 4 hours  tiotropium 18 MICROgram(s) Capsule 1 Capsule(s) Inhalation daily    MEDICATIONS  (PRN):  metoclopramide 10 milliGRAM(s) Oral every 6 hours PRN nausea  morphine   Solution 2.5 milliGRAM(s) Oral every 6 hours PRN dyspnea or pain  sodium chloride 0.65% Nasal 1 Spray(s) Both Nostrils once PRN Nasal Congestion      LABS:                        13.1   19.50 )-----------( 223      ( 29 Oct 2021 07:10 )             42.7     10-29    142  |  95<L>  |  38<H>  ----------------------------<  94  3.2<L>   |  36<H>  |  1.04    Ca    9.7      29 Oct 2021 07:10  Phos  3.1     10-29  Mg     2.10     10-29    TPro  5.8<L>  /  Alb  3.5  /  TBili  0.9  /  DBili  x   /  AST  66<H>  /  ALT  119<H>  /  AlkPhos  170<H>  10-29        Arterial Blood Gas:  10-27 @ 19:13  7.49/47/82/36/96.5/10.9  ABG lactate: --        MICROBIOLOGY:     RADIOLOGY:  [ ] Reviewed and interpreted by me    PULMONARY FUNCTION TESTS:    EKG:

## 2021-10-29 NOTE — PROGRESS NOTE ADULT - PROBLEM SELECTOR PLAN 3
Patient's goals are to return home. Wife and patient understand that he can go home with hospice services if Hi-martina settings can be weaned to 40LPM and 40% FiO2.   Palliative will continue to assist with sx management.   GOC- DNR/DNI, MOLST completed by primary team     Thank you for allowing us to participate in your patient's care. We will continue to follow. Please page [phone] for any q's or c's.     Radha Massey D.O.   Palliative Medicine.

## 2021-10-29 NOTE — PROGRESS NOTE ADULT - ASSESSMENT
62 yo M with PMH of metastatic NSCLC, s/p 6 cycles Carbo/Pemetrexed (Immunotherapy held given ILD) + 1 cycle pemetrexed maintenance with POD, s/p nab-paclitaxel/ramicirumab with POD, s/p gemcitabine with POD   on 4th line pembrolizumab (C1 10/13/21), COPD, ILD, pulmonary HTN, on 4L NC at home who p/w SOB x 1 week with imaging concerning for postobstructive pneumonia and pulmonary edema. Oncology has been consulted for further recommendations.    CT chest  on admission showing diffuse hepatic metastases, known adrenal mets, right hilar/infrahilar malignancy extending into the right lower lobe and confluent with right paratracheal, right hilar, and subcarinal lymphadenopathy, presumably primary lung cancer. There is resulting occlusion of the right lower lobe airways and postobstructive pneumonia the entire right lower lobe and right middle lobe. Hydrostatic interstitial pulmonary edema/CHF. Small right pleural effusion. There is also left mediastinal and left hilar metastatic lymphadenopathy.  Given ILD, there is always concern that pembrolizumab may be causing a pneumonitis. However, imaging findings are not consistent with pneumonitis, and it would be rare to see this develop so quickly as patient was only treated 3 days ago. More likely is that symptoms are due to pneumonia and fluid overload.    Meeting was held at bedside with patient and his wife, pal care team, primary medicine team and oncology. Discussed that he is not a candidate for treatment at this time given very high supplemental oxygen requirements, he is not able to be discharged home given the high O2 requirements.   Patient would like to continue to try to taper down his supplemental O2.    -Pulmonary consulted. Appreciate recommendations   -C/w 1mg/kg of prednisone daily. PPI and blood sugar control while on high dose steroids.  -Agree with treatment for postobstructive pneumonia and CHF.   -Continue to titrate down O2 demand on HFNC if possible. Patient continues to have high requirements , today 50L @ 60% but noted to be satting at 83-88% which patient sttaes is his baseline. Will continue to monitor.  - Appreciate Palliative Care consult for GOC and symptom management  -Patient with progression of disease and just recently started on 4th line treatment , limited options remain for further DMT and furthermore current performance status would not allow for disease modifying treatments as patient requires high flow O2. This was discussed with patient in detail. Patient hopes to continue to receive cancer treatment and is not accepting of hospice or supportive care options at this time. GOC discussed. Will follow.  -Prognosis guarded, would continue to monitor respiratory symptoms  -Patient to followup with Dr. Zheng ([facility]) upon discharge  -C/w Supportive care, pain control, Nutrition, PT, DVT ppx  -Oncology will continue to follow with you      Case d/w Dr. Aries VELÁSQUEZ  Oncology Physician Assistant  [facility]  Pager [phone]    If after 5pm or weekends please page On-call Oncology Fellow

## 2021-10-30 NOTE — PROGRESS NOTE ADULT - ASSESSMENT
61M NSCLC (on therapy), ILD with occupational exposure and smoking history, and chronic hypoxemic respiratory failure presenting with acute on chronic hypoxemia as well as volume overload. He has had a CTPA in the ED which is worse than a recent CT and is concerning for volume overload, possible infection, and most notably progression of cancer.    #Neuro  A&Ox4  At baseline    #Respiratory  Hypoxic Respiratory Failure  - Likely multifactorial, however most likely cause is patient's malignancy NSCLC  - Continue diuresis, evidence of RV dysfunction likely in the setting of pHTN 2/2 hypoxemia  - Continue airway clearance therapy  - Continue prednisone 40mg qd  - Goals of care discussion had with wife and patient, DNR/DNI with overall goal to go home  - Poor prognosis overall, patient still requiring high FiO2 despite interventions  - Wean O2 as tolerated; still requiring HFNC 80L/80%  - Radiation/Onc consult ordered - will need to see if pt can tolerate lying flat for radiation also requesting IR eval for biopsy infection vs mets in R lung   -Hold off on bx for now   -Pt tolerating Lie 2000 for short periods -continue trials over next week     #CV  -BP remains stable  -Sinus tach at times; likely 2/2 SOB  HFpEF; BNP of 6752 on admission  - EKG showed sinus tachycardia, right atrial enlargement, rightward axis, and nonspecific ST and T wave abnormalities  - EF 56% on echo (10/20), LV systolic wnl, Right ventricular enlargement with decreased RV systolic function  - On IV lasix 40 IV BID- still fluid overloaded; LEs remain edematous    #GI  -Transaminitis- No evidence of abd pain; possibly 2/2 to liver mets  - will continue to trend for now  - Regular diet    #ID  - Leukocytosis likely 2/2 prednisone  - No fevers     #DVT PPX: Lovenox qd  #Ethics- DNR/DNI  #Dispo-DNR/DNI with overall goal to go home if HFNC can be tapered to decreased settings    61M NSCLC (on therapy), ILD with occupational exposure and smoking history, and chronic hypoxemic respiratory failure presenting with acute on chronic hypoxemia as well as volume overload. He has had a CTPA in the ED which is worse than a recent CT and is concerning for volume overload, possible infection, and most notably progression of cancer.    #Neuro  A&Ox4  At baseline    #Respiratory  Hypoxic Respiratory Failure  - Likely multifactorial, however most likely cause is patient's malignancy NSCLC  - Continue diuresis, evidence of RV dysfunction likely in the setting of pHTN 2/2 hypoxemia  - Continue airway clearance therapy  - Continue prednisone 40mg qd  - Goals of care discussion had with wife and patient, DNR/DNI with overall goal to go home  - Poor prognosis overall, patient still requiring high FiO2 despite interventions  - Wean O2 as tolerated; still requiring HFNC 80L/80%  - Radiation/Onc consult ordered - will need to see if pt can tolerate lying flat for radiation also requesting IR eval for biopsy infection vs mets in R lung   -Hold off on bx for now   -Pt tolerating Lie 2000 for short periods -continue trials over next week     #CV  -BP remains stable  -Sinus tach at times; likely 2/2 SOB  HFpEF; BNP of 6752 on admission  - EKG showed sinus tachycardia, right atrial enlargement, rightward axis, and nonspecific ST and T wave abnormalities  - EF 56% on echo (10/20), LV systolic wnl, Right ventricular enlargement with decreased RV systolic function  - On IV lasix 40 IV- still fluid overloaded; LEs remain edematous; BID switched to qd given rising BUN    #GI  -Transaminitis- No evidence of abd pain; possibly 2/2 to liver mets  - will continue to trend for now  - Regular diet    #ID  - Leukocytosis likely 2/2 prednisone  - No fevers     #DVT PPX: Lovenox qd  #Ethics- DNR/DNI  #Dispo-DNR/DNI with overall goal to go home if HFNC can be tapered to decreased settings

## 2021-10-30 NOTE — PROGRESS NOTE ADULT - TIME BILLING
Review of records/results, pulmonary evaluation and management, and discussion with patient and medical team.
patient care
patient care
reviewing chart and coordinating care with primary team/staff, as well as reviewing vitals, radiology, medication list, and recent labs.

## 2021-10-30 NOTE — PROGRESS NOTE ADULT - SUBJECTIVE AND OBJECTIVE BOX
CHIEF COMPLAINT: Patient is a 61y old Male who presents with a chief complaint of SOB.    Interval Events:    REVIEW OF SYSTEMS:  [ ] All other systems negative  [ ] Unable to assess ROS because ________    OBJECTIVE:  ICU Vital Signs Last 24 Hrs  T(C): 36.6 (30 Oct 2021 06:33), Max: 36.6 (30 Oct 2021 06:33)  T(F): 97.9 (30 Oct 2021 06:33), Max: 97.9 (30 Oct 2021 06:33)  HR: 81 (30 Oct 2021 06:33) (81 - 119)  BP: 122/85 (30 Oct 2021 06:33) (115/79 - 124/79)  RR: 22 (30 Oct 2021 06:33) (20 - 28)  SpO2: 82% (30 Oct 2021 06:33) (82% - 88%)      10-29 @ 07:01  -  10-30 @ 07:00  --------------------------------------------------------  IN: 1050 mL / OUT: 950 mL / NET: 100 mL      CAPILLARY BLOOD GLUCOSE      HOSPITAL MEDICATIONS:  MEDICATIONS  (STANDING):  albuterol/ipratropium for Nebulization 3 milliLiter(s) Nebulizer every 4 hours  budesonide 160 MICROgram(s)/formoterol 4.5 MICROgram(s) Inhaler 2 Puff(s) Inhalation two times a day  cyanocobalamin 1000 MICROGram(s) Oral daily  enoxaparin Injectable 40 milliGRAM(s) SubCutaneous daily  furosemide   Injectable 40 milliGRAM(s) IV Push <User Schedule>  influenza   Vaccine 0.5 milliLiter(s) IntraMuscular once  morphine   Solution 2.5 milliGRAM(s) Oral <User Schedule>  multivitamin 1 Tablet(s) Oral daily  pantoprazole    Tablet 40 milliGRAM(s) Oral before breakfast  predniSONE   Tablet 50 milliGRAM(s) Oral daily  sodium chloride 0.65% Nasal 1 Spray(s) Both Nostrils every 4 hours  tiotropium 18 MICROgram(s) Capsule 1 Capsule(s) Inhalation daily    MEDICATIONS  (PRN):  metoclopramide 10 milliGRAM(s) Oral every 6 hours PRN nausea  morphine   Solution 2.5 milliGRAM(s) Oral every 6 hours PRN dyspnea or pain  sodium chloride 0.65% Nasal 1 Spray(s) Both Nostrils once PRN Nasal Congestion      LABS:                          MICROBIOLOGY:     RADIOLOGY:  [ ] Reviewed and interpreted by me    PULMONARY FUNCTION TESTS:    EKG: CHIEF COMPLAINT: Patient is a 61y old Male who presents with a chief complaint of SOB.    Interval Events: Still episodes of desaturation on exertion.    REVIEW OF SYSTEMS:  Pt stating he feels winded at time with movement but has improved. Denies fever, cp, abd pain, N/V  [x] All other systems negative    OBJECTIVE:  ICU Vital Signs Last 24 Hrs  T(C): 36.6 (30 Oct 2021 06:33), Max: 36.6 (30 Oct 2021 06:33)  T(F): 97.9 (30 Oct 2021 06:33), Max: 97.9 (30 Oct 2021 06:33)  HR: 81 (30 Oct 2021 06:33) (81 - 119)  BP: 122/85 (30 Oct 2021 06:33) (115/79 - 124/79)  RR: 22 (30 Oct 2021 06:33) (20 - 28)  SpO2: 82% (30 Oct 2021 06:33) (82% - 88%)      10-29 @ 07:01  -  10-30 @ 07:00  --------------------------------------------------------  IN: 1050 mL / OUT: 950 mL / NET: 100 mL      CAPILLARY BLOOD GLUCOSE      HOSPITAL MEDICATIONS:  MEDICATIONS  (STANDING):  albuterol/ipratropium for Nebulization 3 milliLiter(s) Nebulizer every 4 hours  budesonide 160 MICROgram(s)/formoterol 4.5 MICROgram(s) Inhaler 2 Puff(s) Inhalation two times a day  cyanocobalamin 1000 MICROGram(s) Oral daily  enoxaparin Injectable 40 milliGRAM(s) SubCutaneous daily  furosemide   Injectable 40 milliGRAM(s) IV Push <User Schedule>  influenza   Vaccine 0.5 milliLiter(s) IntraMuscular once  morphine   Solution 2.5 milliGRAM(s) Oral <User Schedule>  multivitamin 1 Tablet(s) Oral daily  pantoprazole    Tablet 40 milliGRAM(s) Oral before breakfast  predniSONE   Tablet 50 milliGRAM(s) Oral daily  sodium chloride 0.65% Nasal 1 Spray(s) Both Nostrils every 4 hours  tiotropium 18 MICROgram(s) Capsule 1 Capsule(s) Inhalation daily    MEDICATIONS  (PRN):  metoclopramide 10 milliGRAM(s) Oral every 6 hours PRN nausea  morphine   Solution 2.5 milliGRAM(s) Oral every 6 hours PRN dyspnea or pain  sodium chloride 0.65% Nasal 1 Spray(s) Both Nostrils once PRN Nasal Congestion      LABS:                        13.6   20.86 )-----------( 217      ( 30 Oct 2021 07:27 )             43.2     10-30    142  |  96<L>  |  42<H>  ----------------------------<  87  3.4<L>   |  32<H>  |  1.00    Ca    9.2      30 Oct 2021 07:27  Phos  3.1     10-30  Mg     2.10     10-30    TPro  6.1  /  Alb  3.5  /  TBili  1.0  /  DBili  x   /  AST  76<H>  /  ALT  131<H>  /  AlkPhos  181<H>  10-30                      MICROBIOLOGY:     RADIOLOGY:  [ ] Reviewed and interpreted by me    PULMONARY FUNCTION TESTS:    EKG: CHIEF COMPLAINT: Patient is a 61y old Male who presents with a chief complaint of SOB.    Interval Events: Still episodes of desaturation on exertion.    REVIEW OF SYSTEMS:  Pt stating he feels winded at time with movement but has improved. Denies fever, cp/palpitations, abd pain, N/V  [x] All other systems negative    OBJECTIVE:  ICU Vital Signs Last 24 Hrs  T(C): 36.6 (30 Oct 2021 06:33), Max: 36.6 (30 Oct 2021 06:33)  T(F): 97.9 (30 Oct 2021 06:33), Max: 97.9 (30 Oct 2021 06:33)  HR: 81 (30 Oct 2021 06:33) (81 - 119)  BP: 122/85 (30 Oct 2021 06:33) (115/79 - 124/79)  RR: 22 (30 Oct 2021 06:33) (20 - 28)  SpO2: 82% (30 Oct 2021 06:33) (82% - 88%)      10-29 @ 07:01  -  10-30 @ 07:00  --------------------------------------------------------  IN: 1050 mL / OUT: 950 mL / NET: 100 mL      CAPILLARY BLOOD GLUCOSE      HOSPITAL MEDICATIONS:  MEDICATIONS  (STANDING):  albuterol/ipratropium for Nebulization 3 milliLiter(s) Nebulizer every 4 hours  budesonide 160 MICROgram(s)/formoterol 4.5 MICROgram(s) Inhaler 2 Puff(s) Inhalation two times a day  cyanocobalamin 1000 MICROGram(s) Oral daily  enoxaparin Injectable 40 milliGRAM(s) SubCutaneous daily  furosemide   Injectable 40 milliGRAM(s) IV Push <User Schedule>  influenza   Vaccine 0.5 milliLiter(s) IntraMuscular once  morphine   Solution 2.5 milliGRAM(s) Oral <User Schedule>  multivitamin 1 Tablet(s) Oral daily  pantoprazole    Tablet 40 milliGRAM(s) Oral before breakfast  predniSONE   Tablet 50 milliGRAM(s) Oral daily  sodium chloride 0.65% Nasal 1 Spray(s) Both Nostrils every 4 hours  tiotropium 18 MICROgram(s) Capsule 1 Capsule(s) Inhalation daily    MEDICATIONS  (PRN):  metoclopramide 10 milliGRAM(s) Oral every 6 hours PRN nausea  morphine   Solution 2.5 milliGRAM(s) Oral every 6 hours PRN dyspnea or pain  sodium chloride 0.65% Nasal 1 Spray(s) Both Nostrils once PRN Nasal Congestion      LABS:                        13.6   20.86 )-----------( 217      ( 30 Oct 2021 07:27 )             43.2     10-30    142  |  96<L>  |  42<H>  ----------------------------<  87  3.4<L>   |  32<H>  |  1.00    Ca    9.2      30 Oct 2021 07:27  Phos  3.1     10-30  Mg     2.10     10-30    TPro  6.1  /  Alb  3.5  /  TBili  1.0  /  DBili  x   /  AST  76<H>  /  ALT  131<H>  /  AlkPhos  181<H>  10-30                      MICROBIOLOGY:     RADIOLOGY:  [ ] Reviewed and interpreted by me    PULMONARY FUNCTION TESTS:    EKG:

## 2021-10-31 NOTE — PROGRESS NOTE ADULT - ASSESSMENT
61M NSCLC (on therapy), ILD with occupational exposure and smoking history, and chronic hypoxemic respiratory failure presenting with acute on chronic hypoxemia as well as volume overload. He has had a CTPA in the ED which is worse than a recent CT and is concerning for volume overload, possible infection, and most notably progression of cancer.    #Neuro  A&Ox4  At baseline    #Respiratory  Hypoxic Respiratory Failure  - Likely multifactorial, however most likely cause is patient's malignancy NSCLC  - Continue diuresis, evidence of RV dysfunction likely in the setting of pHTN 2/2 hypoxemia  - Continue airway clearance therapy  - Continue prednisone 40mg qd  - Goals of care discussion had with wife and patient, DNR/DNI with overall goal to go home  - Poor prognosis overall, patient still requiring high FiO2 despite interventions  - Wean O2 as tolerated; still requiring HFNC 80L/80%  - Radiation/Onc consult ordered - will need to see if pt can tolerate lying flat for radiation also requesting IR eval for biopsy infection vs mets in R lung   -Hold off on bx for now   -Pt tolerating Lie 2000 for short periods -continue trials over next week     #CV  -BP remains stable  -Sinus tach at times; likely 2/2 SOB  HFpEF; BNP of 6752 on admission  - EKG showed sinus tachycardia, right atrial enlargement, rightward axis, and nonspecific ST and T wave abnormalities  - EF 56% on echo (10/20), LV systolic wnl, Right ventricular enlargement with decreased RV systolic function  - On IV lasix 40 IV- still fluid overloaded; LEs remain edematous; BID switched to qd given rising BUN    #GI  -Transaminitis- No evidence of abd pain; possibly 2/2 to liver mets  - will continue to trend for now  - Regular diet    #ID  - Leukocytosis likely 2/2 prednisone  - No fevers     #DVT PPX: Lovenox qd  #Ethics- DNR/DNI  #Dispo-DNR/DNI with overall goal to go home if HFNC can be tapered to decreased settings    61M NSCLC (on therapy), ILD with occupational exposure and smoking history, and chronic hypoxemic respiratory failure presenting with acute on chronic hypoxemia as well as volume overload. He has had a CTPA in the ED which is worse than a recent CT and is concerning for volume overload, possible infection, and most notably progression of cancer.    #Neuro  A&Ox4  At baseline    #Respiratory  Hypoxic Respiratory Failure  - Likely multifactorial, however most likely cause is patient's malignancy NSCLC  - Continue diuresis, evidence of RV dysfunction likely in the setting of pHTN 2/2 hypoxemia  - Continue airway clearance therapy  - Continue prednisone 40mg qd  - Goals of care discussion had with wife and patient, DNR/DNI with overall goal to go home  - Poor prognosis overall, patient still requiring high FiO2 despite interventions  - Wean O2 as tolerated; still requiring HFNC 80L/80%  - Radiation/Onc consult ordered - will need to see if pt can tolerate lying flat for radiation also requesting IR eval for biopsy infection vs mets in R lung   -Hold off on bx for now   -Pt tolerating Lie 2000 for short periods -continue trials over next week     #CV  -BP remains stable  -Sinus tach at times; likely 2/2 SOB  HFpEF; BNP of 6752 on admission  - EKG showed sinus tachycardia, right atrial enlargement, rightward axis, and nonspecific ST and T wave abnormalities  - EF 56% on echo (10/20), LV systolic wnl, Right ventricular enlargement with decreased RV systolic function  - Was on IV lasix 40 BID; decreased down to qd and now dc'd due to worsening creat concerning for overdiuresis.    #GI  -Transaminitis- No evidence of abd pain; possibly 2/2 to liver mets  - will continue to trend for now  - Regular diet    #Renal  -Worsening renal function.  - Holding lasix 10/31 in setting of ?overdiuresis  - Trend creat    #ID  - Leukocytosis likely 2/2 prednisone  - No fevers     #DVT PPX: Lovenox qd  #Ethics- DNR/DNI  #Dispo-DNR/DNI with overall goal to go home if HFNC can be tapered to decreased settings    61M NSCLC (on therapy), ILD with occupational exposure and smoking history, and chronic hypoxemic respiratory failure presenting with acute on chronic hypoxemia as well as volume overload. He has had a CTPA in the ED which is worse than a recent CT and is concerning for volume overload, possible infection, and most notably progression of cancer.    #Neuro  A&Ox4  At baseline    #Respiratory  Hypoxic Respiratory Failure  - Likely multifactorial, however most likely cause is patient's malignancy NSCLC  - Continue diuresis, evidence of RV dysfunction likely in the setting of pHTN 2/2 hypoxemia  - Continue airway clearance therapy  - Continue prednisone 40mg qd  - Goals of care discussion had with wife and patient, DNR/DNI with overall goal to go home  - Poor prognosis overall, patient still requiring high FiO2 despite interventions  - Wean O2 as tolerated; still requiring HFNC 80L/80%  - Radiation/Onc consult ordered - will need to see if pt can tolerate lying flat for radiation also requesting IR eval for biopsy infection vs mets in R lung   -Hold off on bx for now   -Pt tolerating Lie 2000 for short periods -continue trials over next week     #CV  -BP remains stable  -Sinus tach at times; likely 2/2 SOB  HFpEF; BNP of 6752 on admission  - EKG showed sinus tachycardia, right atrial enlargement, rightward axis, and nonspecific ST and T wave abnormalities  - EF 56% on echo (10/20), LV systolic wnl, Right ventricular enlargement with decreased RV systolic function  - Was on IV lasix 40 BID; decreased down to qd yesterday 10/30 and now dc'd today 10/31 due to worsening creat concerning for overdiuresis.    #GI  -Transaminitis- No evidence of abd pain; possibly 2/2 to liver mets  - will continue to trend for now  - Regular diet    #Renal  - Worsening renal function  - Holding lasix 10/31 in setting of ?overdiuresis  - Trend creat    #ID  - Leukocytosis likely 2/2 prednisone  - No fevers     #DVT PPX: Lovenox qd  #Ethics- DNR/DNI  #Dispo- DNR/DNI with overall goal to go home if HFNC can be tapered to decreased settings 40/40   61M NSCLC (on therapy), ILD with occupational exposure and smoking history, and chronic hypoxemic respiratory failure presenting with acute on chronic hypoxemia as well as volume overload. He has had a CTPA in the ED which is worse than a recent CT and is concerning for volume overload, possible infection, and most notably progression of cancer.    #Neuro  A&Ox4  At baseline    #Respiratory  Hypoxic Respiratory Failure  - Likely multifactorial, however most likely cause is patient's malignancy NSCLC  - Continue diuresis, evidence of RV dysfunction likely in the setting of pHTN 2/2 hypoxemia  - Continue airway clearance therapy  - Continue prednisone 40mg qd  - Goals of care discussion had with wife and patient, DNR/DNI with overall goal to go home  - Poor prognosis overall, patient still requiring high FiO2 despite interventions  - Wean O2 as tolerated; still requiring HFNC 80L/80%  - Radiation/Onc consult ordered - will need to see if pt can tolerate lying flat for radiation also requesting IR eval for biopsy infection vs mets in R lung   -Hold off on bx for now   -Pt tolerating Lie 2000 for short periods -continue trials over next week     #CV  -BP remains stable  -Sinus tach at times; likely 2/2 SOB  HFpEF; BNP of 6752 on admission  - EKG showed sinus tachycardia, right atrial enlargement, rightward axis, and nonspecific ST and T wave abnormalities  - EF 56% on echo (10/20), LV systolic wnl, Right ventricular enlargement with decreased RV systolic function  - Was on IV lasix 40 BID; decreased down to qd yesterday 10/30 and now dc'd today 10/31 due to worsening creat concerning for overdiuresis.    #GI  -Transaminitis- No evidence of abd pain; possibly 2/2 to liver mets  - will continue to trend for now  - Regular diet    #Renal  - Worsening renal function  - Holding lasix 10/31 in setting of ?overdiuresis  - Lovenox switched to heparin TID  - Trend creat    #ID  - Leukocytosis likely 2/2 prednisone  - No fevers     #DVT PPX: Now on heparin tid 2/2 BLANKA  #Ethics- DNR/DNI  #Dispo- DNR/DNI with overall goal to go home if HFNC can be tapered to decreased settings 40/40

## 2021-10-31 NOTE — PROGRESS NOTE ADULT - SUBJECTIVE AND OBJECTIVE BOX
CHIEF COMPLAINT: Patient is a 61y old Male who presents with a chief complaint of SOB.    Interval Events:    REVIEW OF SYSTEMS:  [ ] All other systems negative  [ ] Unable to assess ROS because ________    OBJECTIVE:  ICU Vital Signs Last 24 Hrs  T(C): 36.4 (31 Oct 2021 06:00), Max: 36.4 (31 Oct 2021 02:10)  T(F): 97.5 (31 Oct 2021 06:00), Max: 97.6 (31 Oct 2021 02:10)  HR: 76 (31 Oct 2021 06:00) (63 - 129)  BP: 116/72 (31 Oct 2021 06:00) (111/80 - 126/73)  RR: 22 (31 Oct 2021 06:00) (22 - 66)  SpO2: 86% (31 Oct 2021 03:29) (83% - 88%)      10-30 @ 07:01  -  10-31 @ 07:00  --------------------------------------------------------  IN: 1040 mL / OUT: 1280 mL / NET: -240 mL      CAPILLARY BLOOD GLUCOSE      HOSPITAL MEDICATIONS:  MEDICATIONS  (STANDING):  albuterol/ipratropium for Nebulization 3 milliLiter(s) Nebulizer every 4 hours  budesonide 160 MICROgram(s)/formoterol 4.5 MICROgram(s) Inhaler 2 Puff(s) Inhalation two times a day  cyanocobalamin 1000 MICROGram(s) Oral daily  enoxaparin Injectable 40 milliGRAM(s) SubCutaneous daily  furosemide   Injectable 40 milliGRAM(s) IV Push <User Schedule>  influenza   Vaccine 0.5 milliLiter(s) IntraMuscular once  morphine   Solution 2.5 milliGRAM(s) Oral <User Schedule>  multivitamin 1 Tablet(s) Oral daily  pantoprazole    Tablet 40 milliGRAM(s) Oral before breakfast  predniSONE   Tablet 50 milliGRAM(s) Oral daily  sodium chloride 0.65% Nasal 1 Spray(s) Both Nostrils every 4 hours  tiotropium 18 MICROgram(s) Capsule 1 Capsule(s) Inhalation daily    MEDICATIONS  (PRN):  metoclopramide 10 milliGRAM(s) Oral every 6 hours PRN nausea  morphine   Solution 2.5 milliGRAM(s) Oral every 6 hours PRN dyspnea or pain  sodium chloride 0.65% Nasal 1 Spray(s) Both Nostrils once PRN Nasal Congestion      LABS:                          MICROBIOLOGY:     RADIOLOGY:  [ ] Reviewed and interpreted by me    PULMONARY FUNCTION TESTS:    EKG: CHIEF COMPLAINT: Patient is a 61y old Male who presents with a chief complaint of SOB.    Interval Events: Pt noted to have increasing oxygen requirements overnight; HFNC adjusted.    REVIEW OF SYSTEMS:  Still with SOB on exertion and LE edema. Denies fever, CP, abd pain, n/v  [x] All other systems negative    OBJECTIVE:  ICU Vital Signs Last 24 Hrs  T(C): 36.4 (31 Oct 2021 06:00), Max: 36.4 (31 Oct 2021 02:10)  T(F): 97.5 (31 Oct 2021 06:00), Max: 97.6 (31 Oct 2021 02:10)  HR: 76 (31 Oct 2021 06:00) (63 - 129)  BP: 116/72 (31 Oct 2021 06:00) (111/80 - 126/73)  RR: 22 (31 Oct 2021 06:00) (22 - 66)  SpO2: 86% (31 Oct 2021 03:29) (83% - 88%)      10-30 @ 07:01  -  10-31 @ 07:00  --------------------------------------------------------  IN: 1040 mL / OUT: 1280 mL / NET: -240 mL      CAPILLARY BLOOD GLUCOSE      HOSPITAL MEDICATIONS:  MEDICATIONS  (STANDING):  albuterol/ipratropium for Nebulization 3 milliLiter(s) Nebulizer every 4 hours  budesonide 160 MICROgram(s)/formoterol 4.5 MICROgram(s) Inhaler 2 Puff(s) Inhalation two times a day  cyanocobalamin 1000 MICROGram(s) Oral daily  enoxaparin Injectable 40 milliGRAM(s) SubCutaneous daily  furosemide   Injectable 40 milliGRAM(s) IV Push <User Schedule>  influenza   Vaccine 0.5 milliLiter(s) IntraMuscular once  morphine   Solution 2.5 milliGRAM(s) Oral <User Schedule>  multivitamin 1 Tablet(s) Oral daily  pantoprazole    Tablet 40 milliGRAM(s) Oral before breakfast  predniSONE   Tablet 50 milliGRAM(s) Oral daily  sodium chloride 0.65% Nasal 1 Spray(s) Both Nostrils every 4 hours  tiotropium 18 MICROgram(s) Capsule 1 Capsule(s) Inhalation daily    MEDICATIONS  (PRN):  metoclopramide 10 milliGRAM(s) Oral every 6 hours PRN nausea  morphine   Solution 2.5 milliGRAM(s) Oral every 6 hours PRN dyspnea or pain  sodium chloride 0.65% Nasal 1 Spray(s) Both Nostrils once PRN Nasal Congestion      LABS:                        12.6   21.41 )-----------( 204      ( 31 Oct 2021 07:05 )             39.6     10-31    138  |  97<L>  |  58<H>  ----------------------------<  111<H>  3.3<L>   |  33<H>  |  1.42<H>    Ca    9.4      31 Oct 2021 07:05  Phos  4.1     10-31  Mg     2.20     10-31    TPro  5.7<L>  /  Alb  3.3  /  TBili  0.8  /  DBili  x   /  AST  65<H>  /  ALT  124<H>  /  AlkPhos  169<H>  10-31                       MICROBIOLOGY:     RADIOLOGY:  [ ] Reviewed and interpreted by me    PULMONARY FUNCTION TESTS:    EKG:

## 2021-11-01 NOTE — PROGRESS NOTE ADULT - PROBLEM SELECTOR PLAN 3
Patient's goals are to return home. Wife and patient understand that he can go home with hospice services if [place] settings can be weaned to 40LPM and 40% FiO2.   Palliative will continue to assist with sx management.   GOC- DNR/DNI, MOLST completed by primary team     Thank you for allowing us to participate in your patient's care. We will continue to follow. Please page [phone] for any q's or c's.     Radha Massey D.O.   Palliative Medicine.

## 2021-11-01 NOTE — PROGRESS NOTE ADULT - ASSESSMENT
61M NSCLC (on therapy), ILD with occupational exposure and smoking history, and chronic hypoxemic respiratory failure presenting with acute on chronic hypoxemia as well as volume overload. He has had a CTPA in the ED which is worse than a recent CT and is concerning for volume overload, possible infection, and most notably progression of cancer.    #Neuro  A&Ox4  At baseline    #Respiratory  Hypoxic Respiratory Failure  - Likely multifactorial, however most likely cause is patient's malignancy NSCLC  - Continue diuresis, evidence of RV dysfunction likely in the setting of pHTN 2/2 hypoxemia  - Continue airway clearance therapy  - Continue prednisone 40mg qd  - Goals of care discussion had with wife and patient, DNR/DNI with overall goal to go home  - Poor prognosis overall, patient still requiring high FiO2 despite interventions  - Wean O2 as tolerated; still requiring HFNC 80L/80%  - Radiation/Onc consult ordered - will need to see if pt can tolerate lying flat for radiation also requesting IR eval for biopsy infection vs mets in R lung   -Hold off on bx for now   -Pt tolerating Lie 2000 for short periods -continue trials over next week     #CV  -BP remains stable  -Sinus tach at times; likely 2/2 SOB  HFpEF; BNP of 6752 on admission  - EKG showed sinus tachycardia, right atrial enlargement, rightward axis, and nonspecific ST and T wave abnormalities  - EF 56% on echo (10/20), LV systolic wnl, Right ventricular enlargement with decreased RV systolic function  - Was on IV lasix 40 BID; decreased down to qd yesterday 10/30 and now dc'd today 10/31 due to worsening creat concerning for overdiuresis.    #GI  -Transaminitis- No evidence of abd pain; possibly 2/2 to liver mets  - will continue to trend for now  - Regular diet    #Renal  - Worsening renal function  - Holding lasix 10/31 in setting of ?overdiuresis  - Lovenox switched to heparin TID  - Trend creat    #ID  - Leukocytosis likely 2/2 prednisone  - No fevers     #DVT PPX: Now on heparin tid 2/2 BLANKA  #Ethics- DNR/DNI  #Dispo- DNR/DNI with overall goal to go home if HFNC can be tapered to decreased settings 40/40   61M NSCLC (on therapy), ILD with occupational exposure and smoking history, and chronic hypoxemic respiratory failure presenting with acute on chronic hypoxemia as well as volume overload. He has had a CTPA in the ED which is worse than a recent CT and is concerning for volume overload, possible infection, and most notably progression of cancer.    #Neuro  A&Ox4  At baseline    #Respiratory  Hypoxic Respiratory Failure  - Likely multifactorial, however most likely cause is patient's malignancy NSCLC  - Continue diuresis, evidence of RV dysfunction likely in the setting of pHTN 2/2 hypoxemia  - Continue airway clearance therapy  - Continue prednisone 40mg qd  - Goals of care discussion had with wife and patient, DNR/DNI with overall goal to go home  - Poor prognosis overall, patient still requiring high FiO2 despite interventions  - Wean O2 as tolerated; still requiring HFNC 80L/80%  - Radiation/Onc consult ordered - will need to see if pt can tolerate lying flat for radiation also requesting IR eval for biopsy infection vs mets in R lung   -Hold off on bx for now   -Pt tolerating Lie 2000 for short periods -continue trials over next week   - Pulse steroids x 3 days and eval for any improvements   - Palliative ordered Morpine ER 15mg bid and Morphine IR 2.5 mg bid prn     #CV  -BP remains stable  -Sinus tach at times; likely 2/2 SOB  HFpEF; BNP of 6752 on admission  - EKG showed sinus tachycardia, right atrial enlargement, rightward axis, and nonspecific ST and T wave abnormalities  - EF 56% on echo (10/20), LV systolic wnl, Right ventricular enlargement with decreased RV systolic function  - Was on IV lasix 40 BID; decreased down to qd yesterday 10/30 and now dc'd today 10/31 due to worsening creat concerning for overdiuresis.    #GI  -Transaminitis- No evidence of abd pain; possibly 2/2 to liver mets  - will continue to trend for now  - Regular diet    #Renal  - Worsening renal function  - Holding lasix 10/31 in setting of ?overdiuresis  - Lovenox switched to heparin TID  - Trend creat    #ID  - Leukocytosis likely 2/2 prednisone  - No fevers     #DVT PPX: Now on heparin tid 2/2 BLANKA  #Ethics- DNR/DNI  #Dispo- DNR/DNI with overall goal to go home if HFNC can be tapered to decreased settings 40/40

## 2021-11-01 NOTE — PROGRESS NOTE ADULT - PROBLEM SELECTOR PLAN 1
Dyspnea 2/2 Post obstructive pna vs. pulmonary edema in setting of metastatic NSCLC  > d/c morphine to 2.5mg TID. START morphine 15mg BID   > continue 2.5mg PO morphine q6hr prn breakthrough dyspnea   > continue steroids per primary team   > Pulm recs appreciated: taper HFNC, continue nebs  > Goal is to decrease HiFlo to 40lpm and 40%FiO2. Patient trialed portable HFNC in RCU, which he reports tolerating well.   > please ensure patient is on bowel regimen while on opioids. Dyspnea 2/2 Post obstructive pna vs. pulmonary edema in setting of metastatic NSCLC  > d/c morphine to 2.5mg TID. START MS Contin 15mg BID   > continue 2.5mg PO morphine q6hr prn breakthrough dyspnea   > Trialing pulse dose steroids   > Pulm recs appreciated: taper HFNC, continue nebs  > Goal is to decrease HiFlo to 40lpm and 40%FiO2. Patient trialed portable HFNC in RCU  > please ensure patient is on bowel regimen while on opioids.

## 2021-11-01 NOTE — PROGRESS NOTE ADULT - PROBLEM SELECTOR PLAN 2
Onc care at [facility] with Dr. Zheng   s/p RLL resection and chemotherapy, Last chemo on 10/13  > Heme/onc recs appreciated.  > rad/onc recs appreciated- possible biopsy to evaluate for RT

## 2021-11-01 NOTE — PROGRESS NOTE ADULT - ASSESSMENT
chart reviewed  d/c morphine 2.5mg tid   start morphine ER 15mg bid  60 y/o M with history of NSCLC s/p RLL resection on chemo, COPD, ILD, pulmonary HTN, on 4L NC at home presents with SOB x 1 week. On admission the patient was tachypneic and tachycardic but afebrile. PE notable for wheezing, crackles and ronchi. Labs on admission remarkable for an elevated WBC, elevated BNP, and transaminitis. LE U/S ruled out evidence of DVT and CTA ruled out evidence of PE. DDx includes HF exacerbation, pulmonary infection, exacerbation 2/2 pt's lung cancer, worsening ILD 2/2 pembrolizumab, or COPD exacerbation. Palliative consulted for assistance with sx management.     chart reviewed  d/c morphine 2.5mg tid   start morphine ER 15mg bid  62 y/o M with history of NSCLC s/p RLL resection on chemo, COPD, ILD, pulmonary HTN, on 4L NC at home presents with SOB x 1 week. On admission the patient was tachypneic and tachycardic but afebrile. PE notable for wheezing, crackles and ronchi. Labs on admission remarkable for an elevated WBC, elevated BNP, and transaminitis. LE U/S ruled out evidence of DVT and CTA ruled out evidence of PE. DDx includes HF exacerbation, pulmonary infection, exacerbation 2/2 pt's lung cancer, worsening ILD 2/2 pembrolizumab, or COPD exacerbation. Palliative consulted for assistance with sx management.

## 2021-11-01 NOTE — PROGRESS NOTE ADULT - SUBJECTIVE AND OBJECTIVE BOX
CHIEF COMPLAINT:    Interval Events:    REVIEW OF SYSTEMS:  Constitutional:   Eyes:  ENT:  CV:  Resp:  GI:  :  MSK:  Integumentary:  Neurological:  Psychiatric:  Endocrine:  Hematologic/Lymphatic:  Allergic/Immunologic:  [ ] All other systems negative  [ ] Unable to assess ROS because ________    OBJECTIVE:  ICU Vital Signs Last 24 Hrs  T(C): 36.2 (01 Nov 2021 06:46), Max: 36.2 (31 Oct 2021 21:17)  T(F): 97.1 (01 Nov 2021 06:46), Max: 97.1 (31 Oct 2021 21:17)  HR: 65 (01 Nov 2021 06:46) (60 - 119)  BP: 101/68 (01 Nov 2021 06:46) (101/68 - 114/74)  BP(mean): --  ABP: --  ABP(mean): --  RR: 22 (01 Nov 2021 06:46) (20 - 24)  SpO2: 87% (01 Nov 2021 06:46) (83% - 93%)        10-31 @ 07:01  -  11-01 @ 07:00  --------------------------------------------------------  IN: 0 mL / OUT: 300 mL / NET: -300 mL      CAPILLARY BLOOD GLUCOSE          PHYSICAL EXAM:  General:   HEENT:   Lymph Nodes:  Neck:   Respiratory:   Cardiovascular:   Abdomen:   Extremities:   Skin:   Neurological:  Psychiatry:    HOSPITAL MEDICATIONS:  MEDICATIONS  (STANDING):  albuterol/ipratropium for Nebulization 3 milliLiter(s) Nebulizer every 4 hours  budesonide 160 MICROgram(s)/formoterol 4.5 MICROgram(s) Inhaler 2 Puff(s) Inhalation two times a day  cyanocobalamin 1000 MICROGram(s) Oral daily  heparin   Injectable 5000 Unit(s) SubCutaneous every 8 hours  influenza   Vaccine 0.5 milliLiter(s) IntraMuscular once  morphine   Solution 2.5 milliGRAM(s) Oral <User Schedule>  multivitamin 1 Tablet(s) Oral daily  pantoprazole    Tablet 40 milliGRAM(s) Oral before breakfast  predniSONE   Tablet 40 milliGRAM(s) Oral daily  sodium chloride 0.65% Nasal 1 Spray(s) Both Nostrils every 4 hours  tiotropium 18 MICROgram(s) Capsule 1 Capsule(s) Inhalation daily    MEDICATIONS  (PRN):  metoclopramide 10 milliGRAM(s) Oral every 6 hours PRN nausea  morphine   Solution 2.5 milliGRAM(s) Oral every 6 hours PRN dyspnea or pain  sodium chloride 0.65% Nasal 1 Spray(s) Both Nostrils once PRN Nasal Congestion      LABS:                        12.6   21.41 )-----------( 204      ( 31 Oct 2021 07:05 )             39.6     10-31    138  |  97<L>  |  58<H>  ----------------------------<  111<H>  3.3<L>   |  33<H>  |  1.42<H>    Ca    9.4      31 Oct 2021 07:05  Phos  4.1     10-31  Mg     2.20     10-31    TPro  5.7<L>  /  Alb  3.3  /  TBili  0.8  /  DBili  x   /  AST  65<H>  /  ALT  124<H>  /  AlkPhos  169<H>  10-31              MICROBIOLOGY:     RADIOLOGY:  [ ] Reviewed and interpreted by me    PULMONARY FUNCTION TESTS:    EKG: CHIEF COMPLAINT: Patient is a 61y old  Male who presents with a chief complaint of SOB (01 Nov 2021 10:13)      Interval Events: Pt seen at bedside HFNC in use     REVIEW OF SYSTEMS:  Constitutional: Denies pain   CV: Denies   Resp: + WILKINS   GI: denies   : Denies   MSK: Denies   Integumentary:  Neurological: Denies  [x ] All other systems negative      OBJECTIVE:  ICU Vital Signs Last 24 Hrs  T(C): 36.2 (01 Nov 2021 06:46), Max: 36.2 (31 Oct 2021 21:17)  T(F): 97.1 (01 Nov 2021 06:46), Max: 97.1 (31 Oct 2021 21:17)  HR: 65 (01 Nov 2021 06:46) (60 - 119)  BP: 101/68 (01 Nov 2021 06:46) (101/68 - 114/74)  BP(mean): --  ABP: --  ABP(mean): --  RR: 22 (01 Nov 2021 06:46) (20 - 24)  SpO2: 87% (01 Nov 2021 06:46) (83% - 93%)        10-31 @ 07:01  -  11-01 @ 07:00  --------------------------------------------------------  IN: 0 mL / OUT: 300 mL / NET: -300 mL      CAPILLARY BLOOD GLUCOSE        HOSPITAL MEDICATIONS:  MEDICATIONS  (STANDING):  albuterol/ipratropium for Nebulization 3 milliLiter(s) Nebulizer every 4 hours  budesonide 160 MICROgram(s)/formoterol 4.5 MICROgram(s) Inhaler 2 Puff(s) Inhalation two times a day  cyanocobalamin 1000 MICROGram(s) Oral daily  heparin   Injectable 5000 Unit(s) SubCutaneous every 8 hours  influenza   Vaccine 0.5 milliLiter(s) IntraMuscular once  morphine   Solution 2.5 milliGRAM(s) Oral <User Schedule>  multivitamin 1 Tablet(s) Oral daily  pantoprazole    Tablet 40 milliGRAM(s) Oral before breakfast  predniSONE   Tablet 40 milliGRAM(s) Oral daily  sodium chloride 0.65% Nasal 1 Spray(s) Both Nostrils every 4 hours  tiotropium 18 MICROgram(s) Capsule 1 Capsule(s) Inhalation daily    MEDICATIONS  (PRN):  metoclopramide 10 milliGRAM(s) Oral every 6 hours PRN nausea  morphine   Solution 2.5 milliGRAM(s) Oral every 6 hours PRN dyspnea or pain  sodium chloride 0.65% Nasal 1 Spray(s) Both Nostrils once PRN Nasal Congestion      LABS:                        12.6   21.41 )-----------( 204      ( 31 Oct 2021 07:05 )             39.6     10-31    138  |  97<L>  |  58<H>  ----------------------------<  111<H>  3.3<L>   |  33<H>  |  1.42<H>    Ca    9.4      31 Oct 2021 07:05  Phos  4.1     10-31  Mg     2.20     10-31    TPro  5.7<L>  /  Alb  3.3  /  TBili  0.8  /  DBili  x   /  AST  65<H>  /  ALT  124<H>  /  AlkPhos  169<H>  10-31              MICROBIOLOGY:     RADIOLOGY:  [ ] Reviewed and interpreted by me    PULMONARY FUNCTION TESTS:    EKG:

## 2021-11-01 NOTE — PROGRESS NOTE ADULT - SUBJECTIVE AND OBJECTIVE BOX
[facility]  Radha Massey Palliative Care Attending  Contact Info: Page [phone] (including Nights/Weekends), message on Microsoft Teams (Radha Massey), or leave VM at Palliative Office [phone] (non-urgent)     SUBJECTIVE AND OBJECTIVE:     INTERVAL HPI/OVERNIGHT EVENTS:    DNR on chart:   Allergies    No Known Allergies    Intolerances    MEDICATIONS  (STANDING):  albuterol/ipratropium for Nebulization 3 milliLiter(s) Nebulizer every 4 hours  budesonide 160 MICROgram(s)/formoterol 4.5 MICROgram(s) Inhaler 2 Puff(s) Inhalation two times a day  cyanocobalamin 1000 MICROGram(s) Oral daily  heparin   Injectable 5000 Unit(s) SubCutaneous every 8 hours  influenza   Vaccine 0.5 milliLiter(s) IntraMuscular once  morphine ER Tablet 15 milliGRAM(s) Oral every 12 hours  multivitamin 1 Tablet(s) Oral daily  pantoprazole    Tablet 40 milliGRAM(s) Oral before breakfast  predniSONE   Tablet 40 milliGRAM(s) Oral daily  sodium chloride 0.65% Nasal 1 Spray(s) Both Nostrils every 4 hours  tiotropium 18 MICROgram(s) Capsule 1 Capsule(s) Inhalation daily    MEDICATIONS  (PRN):  metoclopramide 10 milliGRAM(s) Oral every 6 hours PRN nausea  morphine   Solution 2.5 milliGRAM(s) Oral every 6 hours PRN dyspnea or pain  sodium chloride 0.65% Nasal 1 Spray(s) Both Nostrils once PRN Nasal Congestion      ITEMS UNCHECKED ARE NOT PRESENT    PRESENT SYMPTOMS: [ ]Unable to obtain due to poor mentation   Source if other than patient:  [ ]Family   [ ]Team     Pain:  [ ]yes [ ]no  QOL impact -   Location -                    Aggravating factors -  Quality -  Radiation -  Timing-  Severity (0-10 scale):  Minimal acceptable level (0-10 scale):     Dyspnea:                           [ ]Mild [ ]Moderate [ ]Severe  Anxiety:                             [ ]Mild [ ]Moderate [ ]Severe  Fatigue:                             [ ]Mild [ ]Moderate [ ]Severe  Nausea:                             [ ]Mild [ ]Moderate [ ]Severe  Loss of appetite:              [ ]Mild [ ]Moderate [ ]Severe  Constipation:                    [ ]Mild [ ]Moderate [ ]Severe    PAIN AD Score:	  [email or web address] (Ctrl + left click to view)    Other Symptoms:  [ ]All other review of systems negative     Palliative Performance Status Version 2:         %      http://npcrc.org/files/news/palliative_performance_scale_[email or web address].pdf  PHYSICAL EXAM:  Vital Signs Last 24 Hrs  T(C): 36.2 (01 Nov 2021 06:46), Max: 36.2 (31 Oct 2021 21:17)  T(F): 97.1 (01 Nov 2021 06:46), Max: 97.1 (31 Oct 2021 21:17)  HR: 50 (01 Nov 2021 09:07) (50 - 119)  BP: 101/68 (01 Nov 2021 06:46) (101/68 - 114/74)  BP(mean): --  RR: 23 (01 Nov 2021 09:07) (22 - 24)  SpO2: 86% (01 Nov 2021 09:07) (83% - 93%) I&O's Summary    31 Oct 2021 07:01  -  01 Nov 2021 07:00  --------------------------------------------------------  IN: 0 mL / OUT: 300 mL / NET: -300 mL       GENERAL:  [ ]Alert  [ ]Oriented x   [ ]Lethargic  [ ]Cachexia  [ ]Unarousable  [ ]Verbal  [ ]Non-Verbal  Behavioral:   [ ]Anxiety  [ ]Delirium [ ]Agitation [ ]Other  HEENT:  [ ]Normal   [ ]Dry mouth   [ ]ET Tube/Trach  [ ]Oral lesions  PULMONARY:   [ ]Clear [ ]Tachypnea  [ ]Audible excessive secretions   [ ]Rhonchi        [ ]Right [ ]Left [ ]Bilateral  [ ]Crackles        [ ]Right [ ]Left [ ]Bilateral  [ ]Wheezing     [ ]Right [ ]Left [ ]Bilateral  [ ]Diminished BS [ ] Right [ ]Left [ ]Bilateral  CARDIOVASCULAR:    [ ]Regular [ ]Irregular [ ]Tachy  [ ]Randy [ ]Murmur [ ]Other  GASTROINTESTINAL:  [ ]Soft  [ ]Distended   [ ]+BS  [ ]Non tender [ ]Tender  [ ]PEG [ ]OGT/ NGT   Last BM:    GENITOURINARY:  [ ]Normal [ ]Incontinent   [ ]Oliguria/Anuria   [ ]Haro  MUSCULOSKELETAL:   [ ]Normal   [ ]Weakness  [ ]Bed/Wheelchair bound [ ]Edema  NEUROLOGIC:   [ ]No focal deficits  [ ] Cognitive impairment  [ ] Dysphagia [ ]Dysarthria [ ] Paresis [ ]Other   SKIN:   [ ]Normal  [ ]Rash   [ ]Pressure ulcer(s) [ ]y [ ]n present on admission    CRITICAL CARE:  [ ]Shock Present  [ ]Septic [ ]Cardiogenic [ ]Neurologic [ ]Hypovolemic  [ ]Vasopressors [ ]Inotropes  [ ]Respiratory failure present [ ]Mechanical Ventilation [ ]Non-invasive ventilatory support [ ]High-Flow  [ ]Acute  [ ]Chronic [ ]Hypoxic  [ ]Hypercarbic [ ]Other  [ ]Other organ failure     LABS:                        12.6   21.41 )-----------( 204      ( 31 Oct 2021 07:05 )             39.6   10-31    138  |  97<L>  |  58<H>  ----------------------------<  111<H>  3.3<L>   |  33<H>  |  1.42<H>    Ca    9.4      31 Oct 2021 07:05  Phos  4.1     10-31  Mg     2.20     10-31    TPro  5.7<L>  /  Alb  3.3  /  TBili  0.8  /  DBili  x   /  AST  65<H>  /  ALT  124<H>  /  AlkPhos  169<H>  10-31        RADIOLOGY & ADDITIONAL STUDIES:    Protein Calorie Malnutrition Present: [ ]mild [ ]moderate [ ]severe [ ]underweight [ ]morbid obesity  https://www.andeal.org/vault/4430/web/files/ONC/Table_Clinical%20Characteristics%20to%20Document%20Malnutrition-White%20JV%20et%20al%202012.pdf    Height (cm): 177.8 (10-15-21 @ 19:14), 177 (10-04-21 @ 19:57), 177 (08-11-21 @ 09:30)  Weight (kg): 113 (10-17-21 @ 11:59), 130 (10-04-21 @ 19:57), 131 (08-11-21 @ 09:30)  BMI (kg/m2): 35.7 (10-17-21 @ 11:59), 41.1 (10-15-21 @ 19:14), 41.5 (10-04-21 @ 19:57)    [ ]PPSV2 < or = 30%  [ ]significant weight loss [ ]poor nutritional intake [ ]anasarca    [ ]Artificial Nutrition    REFERRALS:   [ ]Chaplaincy  [ ]Hospice  [ ]Child Life  [ ]Social Work  [ ]Case management [ ]Holistic Therapy    [facility]  Radha Massey Palliative Care Attending  Contact Info: Page [phone] (including Nights/Weekends), message on Microsoft Teams (Radha Massey), or leave  at Palliative Office [phone] (non-urgent)     SUBJECTIVE AND OBJECTIVE: COMPLETE NOTE TO FOLLOW    INTERVAL HPI/OVERNIGHT EVENTS:  > Chart reviewed. Flowsheets noted. Patient required PRNs of morphine 2.5mg PO x2.     DNR on chart:   Allergies    No Known Allergies    Intolerances    MEDICATIONS  (STANDING):  albuterol/ipratropium for Nebulization 3 milliLiter(s) Nebulizer every 4 hours  budesonide 160 MICROgram(s)/formoterol 4.5 MICROgram(s) Inhaler 2 Puff(s) Inhalation two times a day  cyanocobalamin 1000 MICROGram(s) Oral daily  heparin   Injectable 5000 Unit(s) SubCutaneous every 8 hours  influenza   Vaccine 0.5 milliLiter(s) IntraMuscular once  morphine ER Tablet 15 milliGRAM(s) Oral every 12 hours  multivitamin 1 Tablet(s) Oral daily  pantoprazole    Tablet 40 milliGRAM(s) Oral before breakfast  predniSONE   Tablet 40 milliGRAM(s) Oral daily  sodium chloride 0.65% Nasal 1 Spray(s) Both Nostrils every 4 hours  tiotropium 18 MICROgram(s) Capsule 1 Capsule(s) Inhalation daily    MEDICATIONS  (PRN):  metoclopramide 10 milliGRAM(s) Oral every 6 hours PRN nausea  morphine   Solution 2.5 milliGRAM(s) Oral every 6 hours PRN dyspnea or pain  sodium chloride 0.65% Nasal 1 Spray(s) Both Nostrils once PRN Nasal Congestion      ITEMS UNCHECKED ARE NOT PRESENT    PRESENT SYMPTOMS: [ ]Unable to obtain due to poor mentation   Source if other than patient:  [ ]Family   [ ]Team     Pain:  [ ]yes [ x]no  QOL impact -   Location -                    Aggravating factors -  Quality -  Radiation -  Timing-  Severity (0-10 scale):  Minimal acceptable level (0-10 scale):     Dyspnea:                           [ ]Mild [ ]Moderate [ ]Severe  Anxiety:                             [ ]Mild [ ]Moderate [ ]Severe  Fatigue:                             [ ]Mild [ ]Moderate [ ]Severe  Nausea:                             [ ]Mild [ ]Moderate [ ]Severe  Loss of appetite:              [ ]Mild [ ]Moderate [ ]Severe  Constipation:                    [ ]Mild [ ]Moderate [ ]Severe    PAIN AD Score:	  [email or web address] (Ctrl + left click to view)    Other Symptoms:  [ ]All other review of systems negative     Palliative Performance Status Version 2:         %      http://npcrc.org/files/news/palliative_performance_scale_[email or web address].pdf  PHYSICAL EXAM:  Vital Signs Last 24 Hrs  T(C): 36.2 (01 Nov 2021 06:46), Max: 36.2 (31 Oct 2021 21:17)  T(F): 97.1 (01 Nov 2021 06:46), Max: 97.1 (31 Oct 2021 21:17)  HR: 50 (01 Nov 2021 09:07) (50 - 119)  BP: 101/68 (01 Nov 2021 06:46) (101/68 - 114/74)  BP(mean): --  RR: 23 (01 Nov 2021 09:07) (22 - 24)  SpO2: 86% (01 Nov 2021 09:07) (83% - 93%) I&O's Summary    31 Oct 2021 07:01  -  01 Nov 2021 07:00  --------------------------------------------------------  IN: 0 mL / OUT: 300 mL / NET: -300 mL       GENERAL:  [ ]Alert  [ ]Oriented x   [ ]Lethargic  [ ]Cachexia  [ ]Unarousable  [ ]Verbal  [ ]Non-Verbal  Behavioral:   [ ]Anxiety  [ ]Delirium [ ]Agitation [ ]Other  HEENT:  [ ]Normal   [ ]Dry mouth   [ ]ET Tube/Trach  [ ]Oral lesions  PULMONARY:   [ ]Clear [ ]Tachypnea  [ ]Audible excessive secretions   [ ]Rhonchi        [ ]Right [ ]Left [ ]Bilateral  [ ]Crackles        [ ]Right [ ]Left [ ]Bilateral  [ ]Wheezing     [ ]Right [ ]Left [ ]Bilateral  [ ]Diminished BS [ ] Right [ ]Left [ ]Bilateral  CARDIOVASCULAR:    [ ]Regular [ ]Irregular [ ]Tachy  [ ]Randy [ ]Murmur [ ]Other  GASTROINTESTINAL:  [ ]Soft  [ ]Distended   [ ]+BS  [ ]Non tender [ ]Tender  [ ]PEG [ ]OGT/ NGT   Last BM:    GENITOURINARY:  [ ]Normal [ ]Incontinent   [ ]Oliguria/Anuria   [ ]Ahro  MUSCULOSKELETAL:   [ ]Normal   [ ]Weakness  [ ]Bed/Wheelchair bound [ ]Edema  NEUROLOGIC:   [ ]No focal deficits  [ ] Cognitive impairment  [ ] Dysphagia [ ]Dysarthria [ ] Paresis [ ]Other   SKIN:   [ ]Normal  [ ]Rash   [ ]Pressure ulcer(s) [ ]y [ ]n present on admission    CRITICAL CARE:  [ ]Shock Present  [ ]Septic [ ]Cardiogenic [ ]Neurologic [ ]Hypovolemic  [ ]Vasopressors [ ]Inotropes  [ ]Respiratory failure present [ ]Mechanical Ventilation [ ]Non-invasive ventilatory support [ ]High-Flow  [ ]Acute  [ ]Chronic [ ]Hypoxic  [ ]Hypercarbic [ ]Other  [ ]Other organ failure     LABS:                        12.6   21.41 )-----------( 204      ( 31 Oct 2021 07:05 )             39.6   10-31    138  |  97<L>  |  58<H>  ----------------------------<  111<H>  3.3<L>   |  33<H>  |  1.42<H>    Ca    9.4      31 Oct 2021 07:05  Phos  4.1     10-31  Mg     2.20     10-31    TPro  5.7<L>  /  Alb  3.3  /  TBili  0.8  /  DBili  x   /  AST  65<H>  /  ALT  124<H>  /  AlkPhos  169<H>  10-31        RADIOLOGY & ADDITIONAL STUDIES:    Protein Calorie Malnutrition Present: [ ]mild [ ]moderate [ ]severe [ ]underweight [ ]morbid obesity  https://www.andeal.org/vault/2440/web/files/ONC/Table_Clinical%20Characteristics%20to%20Document%20Malnutrition-White%20JV%20et%20al%202012.pdf    Height (cm): 177.8 (10-15-21 @ 19:14), 177 (10-04-21 @ 19:57), 177 (08-11-21 @ 09:30)  Weight (kg): 113 (10-17-21 @ 11:59), 130 (10-04-21 @ 19:57), 131 (08-11-21 @ 09:30)  BMI (kg/m2): 35.7 (10-17-21 @ 11:59), 41.1 (10-15-21 @ 19:14), 41.5 (10-04-21 @ 19:57)    [ ]PPSV2 < or = 30%  [ ]significant weight loss [ ]poor nutritional intake [ ]anasarca    [ ]Artificial Nutrition    REFERRALS:   [ ]Chaplaincy  [ ]Hospice  [ ]Child Life  [ ]Social Work  [ ]Case management [ ]Holistic Therapy    [facility]  Radha Massey Palliative Care Attending  Contact Info: Page [phone] (including Nights/Weekends), message on Microsoft Teams (Radha Massey), or leave  at Palliative Office [phone] (non-urgent)     SUBJECTIVE AND OBJECTIVE: Patient seen sitting up in bed, no family at time of visit. He denies pain but reports that his lasix had to be stopped due to an increase in his renal numbers.     INTERVAL HPI/OVERNIGHT EVENTS:  > Chart reviewed. Flowsheets noted. Patient required PRNs of morphine 2.5mg PO x2.     DNR on chart:   Allergies    No Known Allergies    Intolerances    MEDICATIONS  (STANDING):  albuterol/ipratropium for Nebulization 3 milliLiter(s) Nebulizer every 4 hours  budesonide 160 MICROgram(s)/formoterol 4.5 MICROgram(s) Inhaler 2 Puff(s) Inhalation two times a day  cyanocobalamin 1000 MICROGram(s) Oral daily  heparin   Injectable 5000 Unit(s) SubCutaneous every 8 hours  influenza   Vaccine 0.5 milliLiter(s) IntraMuscular once  morphine ER Tablet 15 milliGRAM(s) Oral every 12 hours  multivitamin 1 Tablet(s) Oral daily  pantoprazole    Tablet 40 milliGRAM(s) Oral before breakfast  predniSONE   Tablet 40 milliGRAM(s) Oral daily  sodium chloride 0.65% Nasal 1 Spray(s) Both Nostrils every 4 hours  tiotropium 18 MICROgram(s) Capsule 1 Capsule(s) Inhalation daily    MEDICATIONS  (PRN):  metoclopramide 10 milliGRAM(s) Oral every 6 hours PRN nausea  morphine   Solution 2.5 milliGRAM(s) Oral every 6 hours PRN dyspnea or pain  sodium chloride 0.65% Nasal 1 Spray(s) Both Nostrils once PRN Nasal Congestion      ITEMS UNCHECKED ARE NOT PRESENT    PRESENT SYMPTOMS: [ ]Unable to obtain due to poor mentation   Source if other than patient:  [ ]Family   [ ]Team     Pain:  [ ]yes [ x]no  QOL impact -   Location -                    Aggravating factors -  Quality -  Radiation -  Timing-  Severity (0-10 scale):  Minimal acceptable level (0-10 scale):     Dyspnea:                           [ ]Mild [ ]Moderate [ ]Severe  Anxiety:                             [ ]Mild [ ]Moderate [ ]Severe  Fatigue:                             [ ]Mild [ ]Moderate [ ]Severe  Nausea:                             [ ]Mild [ ]Moderate [ ]Severe  Loss of appetite:              [ ]Mild [ ]Moderate [ ]Severe  Constipation:                    [ ]Mild [ ]Moderate [ ]Severe    PAIN AD Score:	  [email or web address]/[email or web address] (Ctrl + left click to view)    Other Symptoms:  [x ]All other review of systems negative     Palliative Performance Status Version 2:    70     %      [email or web address]/files/[email or web address]_performance_scale_[email or web address].pdf  PHYSICAL EXAM:  Vital Signs Last 24 Hrs  T(C): 36.2 (01 Nov 2021 06:46), Max: 36.2 (31 Oct 2021 21:17)  T(F): 97.1 (01 Nov 2021 06:46), Max: 97.1 (31 Oct 2021 21:17)  HR: 50 (01 Nov 2021 09:07) (50 - 119)  BP: 101/68 (01 Nov 2021 06:46) (101/68 - 114/74)  BP(mean): --  RR: 23 (01 Nov 2021 09:07) (22 - 24)  SpO2: 86% (01 Nov 2021 09:07) (83% - 93%) I&O's Summary    31 Oct 2021 07:01  -  01 Nov 2021 07:00  --------------------------------------------------------  IN: 0 mL / OUT: 300 mL / NET: -300 mL       GENERAL:  [ x]Alert  [x ]Oriented x3   [ ]Lethargic  [ ]Cachexia  [ ]Unarousable  [x ]Verbal  [ ]Non-Verbal  Behavioral:   [ ] Anxiety  [ ] Delirium [ ] Agitation [x ] Other  HEENT:  [ ]Normal   [ ]Dry mouth   [ ]ET Tube/Trach  [ ]Oral lesions  PULMONARY: HFNC   [ ]Clear [ ]Tachypnea  [ ]Audible excessive secretions   [x ]Rhonchi        [ x]Right [ ]Left [ ]Bilateral  [ ]Crackles        [ ]Right [ ]Left [ ]Bilateral  [ ]Wheezing     [ ]Right [ ]Left [ ]Bilateral  [ ]Diminished breath sounds [ ]right [ ]left [ ]bilateral  CARDIOVASCULAR:    [x ]Regular [ ]Irregular [ ]Tachy  [ ]Randy [ ]Murmur [ ]Other  GASTROINTESTINAL:  [x ]Soft  [ ]Distended   [ ]+BS  [ ]Non tender [ ]Tender  [ ]PEG [ ]OGT/ NGT  Last BM: 10/31  GENITOURINARY:  [x ]Normal [ ] Incontinent   [ ]Oliguria/Anuria   [ ]Haro  MUSCULOSKELETAL:   [ ]Normal   [ ]Weakness  [ ]Bed/Wheelchair bound [ x]Edema- b/l lower extremity edema  NEUROLOGIC:   [ x]No focal deficits  [ ]Cognitive impairment  [ ]Dysphagia [ ]Dysarthria [ ]Paresis [ ]Other   SKIN: Hyperpigmentation  noted on anterior left leg   [ ]Normal    [ ]Rash  [ ]Pressure ulcer(s)       Present on admission [ ]y [ ]n    CRITICAL CARE:  [ ] Shock Present  [ ]Septic [ ]Cardiogenic [ ]Neurologic [ ]Hypovolemic  [ ]  Vasopressors [ ]  Inotropes   [ ]Respiratory failure present [ ]Mechanical ventilation [ ]Non-invasive ventilatory support [ ]High flow  [ ]Acute  [ ]Chronic [ ]Hypoxic  [ ]Hypercarbic [ ]Other  [ ]Other organ failure       LABS:                        12.6   21.41 )-----------( 204      ( 31 Oct 2021 07:05 )             39.6   10-31    138  |  97<L>  |  58<H>  ----------------------------<  111<H>  3.3<L>   |  33<H>  |  1.42<H>    Ca    9.4      31 Oct 2021 07:05  Phos  4.1     10-31  Mg     2.20     10-31    TPro  5.7<L>  /  Alb  3.3  /  TBili  0.8  /  DBili  x   /  AST  65<H>  /  ALT  124<H>  /  AlkPhos  169<H>  10-31        RADIOLOGY & ADDITIONAL STUDIES: n/a     Protein Calorie Malnutrition Present: [ ]mild [ ]moderate [ ]severe [ ]underweight [ ]morbid obesity  https://www.andeal.org/vault/7360/web/files/ONC/Table_Clinical%20Characteristics%20to%20Document%20Malnutrition-White%20JV%20et%20al%202012.pdf    Height (cm): 177.8 (10-15-21 @ 19:14), 177 (10-04-21 @ 19:57), 177 (08-11-21 @ 09:30)  Weight (kg): 113 (10-17-21 @ 11:59), 130 (10-04-21 @ 19:57), 131 (08-11-21 @ 09:30)  BMI (kg/m2): 35.7 (10-17-21 @ 11:59), 41.1 (10-15-21 @ 19:14), 41.5 (10-04-21 @ 19:57)    [ ]PPSV2 < or = 30%  [ ]significant weight loss [ ]poor nutritional intake [ ]anasarca    [ ]Artificial Nutrition    REFERRALS:   [ ]Chaplaincy  [x ]Hospice  [ ]Child Life  [ ]Social Work  [ ]Case management [ ]Holistic Therapy

## 2021-11-02 NOTE — PROGRESS NOTE ADULT - ASSESSMENT
61M NSCLC (on therapy), ILD with occupational exposure and smoking history, and chronic hypoxemic respiratory failure presenting with acute on chronic hypoxemia as well as volume overload. He has had a CTPA in the ED which is worse than a recent CT and is concerning for volume overload, possible infection, and most notably progression of cancer.    #Neuro  A&Ox4  At baseline    #Respiratory  Hypoxic Respiratory Failure  - Likely multifactorial, however most likely cause is patient's malignancy NSCLC  - Continue diuresis, evidence of RV dysfunction likely in the setting of pHTN 2/2 hypoxemia  - Continue airway clearance therapy  - Continue prednisone 40mg qd  - Goals of care discussion had with wife and patient, DNR/DNI with overall goal to go home  - Poor prognosis overall, patient still requiring high FiO2 despite interventions  - Wean O2 as tolerated; still requiring HFNC 80L/80%  - Radiation/Onc consult ordered - will need to see if pt can tolerate lying flat for radiation also requesting IR eval for biopsy infection vs mets in R lung   -Hold off on bx for now   -Pt tolerating Lie 2000 for short periods -continue trials over next week   - Pulse steroids x 3 days and eval for any improvements   - Palliative ordered Morpine ER 15mg bid and Morphine IR 2.5 mg bid prn     #CV  -BP remains stable  -Sinus tach at times; likely 2/2 SOB  HFpEF; BNP of 6752 on admission  - EKG showed sinus tachycardia, right atrial enlargement, rightward axis, and nonspecific ST and T wave abnormalities  - EF 56% on echo (10/20), LV systolic wnl, Right ventricular enlargement with decreased RV systolic function  - Was on IV lasix 40 BID; decreased down to qd yesterday 10/30 and now dc'd today 10/31 due to worsening creat concerning for overdiuresis.    #GI  -Transaminitis- No evidence of abd pain; possibly 2/2 to liver mets  - will continue to trend for now  - Regular diet    #Renal  - Worsening renal function  - Holding lasix 10/31 in setting of ?overdiuresis  - Lovenox switched to heparin TID  - Trend creat    #ID  - Leukocytosis likely 2/2 prednisone  - No fevers     #DVT PPX: Now on heparin tid 2/2 BLANKA  #Ethics- DNR/DNI  #Dispo- DNR/DNI with overall goal to go home if HFNC can be tapered to decreased settings 40/40   61M NSCLC (on therapy), ILD with occupational exposure and smoking history, and chronic hypoxemic respiratory failure presenting with acute on chronic hypoxemia as well as volume overload. He has had a CTPA in the ED which is worse than a recent CT and is concerning for volume overload, possible infection, and most notably progression of cancer.    #Neuro  A&Ox4  At baseline    #Respiratory  Hypoxic Respiratory Failure  - Likely multifactorial, however most likely cause is patient's malignancy NSCLC  - Continue diuresis, evidence of RV dysfunction likely in the setting of pHTN 2/2 hypoxemia  - Continue airway clearance therapy  - Continue prednisone 40mg qd  - Goals of care discussion had with wife and patient, DNR/DNI with overall goal to go home  - Poor prognosis overall, patient still requiring high FiO2 despite interventions  - Wean O2 as tolerated; still requiring HFNC 80L/80%  - Radiation/Onc consult ordered - will need to see if pt can tolerate lying flat for radiation also requesting IR eval for biopsy infection vs mets in R lung   -Hold off on bx for now   -Pt tolerating Lie 2000 for short periods -will hold off until pulse steroids complete   - Pulse steroids x 3 days and eval for any improvements   - Palliative ordered Morpine ER 15mg bid and Morphine IR 2.5 mg bid prn     #CV  -BP remains stable  -Sinus tach at times; likely 2/2 SOB  HFpEF; BNP of 6752 on admission  - EKG showed sinus tachycardia, right atrial enlargement, rightward axis, and nonspecific ST and T wave abnormalities  - EF 56% on echo (10/20), LV systolic wnl, Right ventricular enlargement with decreased RV systolic function  - Was on IV lasix 40 BID; decreased down to qd yesterday 10/30 and now dc'd today 10/31 due to worsening creat concerning for overdiuresis.  - Pt noted with irregular HR- EKG with Rapid AF - metoprolol     #GI  -Transaminitis- No evidence of abd pain; possibly 2/2 to liver mets  - will continue to trend for now  - Regular diet    #Renal  - Worsening renal function  - Holding lasix 10/31 in setting of ?overdiuresis  - Lovenox switched to heparin TID  - Trend creat    #ID  - Leukocytosis likely 2/2 prednisone  - No fevers     #DVT PPX: Now on heparin tid 2/2 BLANKA  #Ethics- DNR/DNI  #Dispo- DNR/DNI with overall goal to go home if HFNC can be tapered to decreased settings 40/40   61M NSCLC (on therapy), ILD with occupational exposure and smoking history, and chronic hypoxemic respiratory failure presenting with acute on chronic hypoxemia as well as volume overload. He has had a CTPA in the ED which is worse than a recent CT and is concerning for volume overload, possible infection, and most notably progression of cancer.    #Neuro  A&Ox4  At baseline    #Respiratory  Hypoxic Respiratory Failure  - Likely multifactorial, however most likely cause is patient's malignancy NSCLC  - Continue diuresis, evidence of RV dysfunction likely in the setting of pHTN 2/2 hypoxemia  - Continue airway clearance therapy  - Continue prednisone 40mg qd  - Goals of care discussion had with wife and patient, DNR/DNI with overall goal to go home  - Poor prognosis overall, patient still requiring high FiO2 despite interventions  - Wean O2 as tolerated; still requiring HFNC 80L/80%  - Radiation/Onc consult ordered - will need to see if pt can tolerate lying flat for radiation also requesting IR eval for biopsy infection vs mets in R lung   -Hold off on bx for now   -Pt tolerating Lie 2000 for short periods -will hold off until pulse steroids complete   - Pulse steroids x 3 days and eval for any improvements   - Palliative ordered Morpine ER 15mg bid and Morphine IR 2.5 mg bid prn     #CV  -BP remains stable  -Sinus tach at times; likely 2/2 SOB  HFpEF; BNP of 6752 on admission  - EKG showed sinus tachycardia, right atrial enlargement, rightward axis, and nonspecific ST and T wave abnormalities  - EF 56% on echo (10/20), LV systolic wnl, Right ventricular enlargement with decreased RV systolic function  - Was on IV lasix 40 BID; decreased down to qd yesterday 10/30 and now dc'd today 10/31 due to worsening creat concerning for overdiuresis.  - Pt noted with irregular HR- EKG with Rapid AF - metoprolol started and rate improved   - No AC for now Follow up EKG in am     #GI  -Transaminitis- No evidence of abd pain; possibly 2/2 to liver mets  - will continue to trend for now  - Regular diet    #Renal  - Worsening renal function  - Holding lasix 10/31 in setting of ?overdiuresis  - Lovenox switched to heparin TID  - Trend creat- improving     #ID  - Leukocytosis likely 2/2 prednisone  - No fevers     #DVT PPX: Now on heparin tid 2/2 BLANKA  #Ethics- DNR/DNI  #Dispo- DNR/DNI with overall goal to go home if HFNC can be tapered to decreased settings 40/40

## 2021-11-02 NOTE — CHART NOTE - NSCHARTNOTEFT_GEN_A_CORE
Patient assessed by RD on 10/24, now for nutrition follow up. Spoke with pt and obtained subjective information from extensive chart review.     Current Diet : Diet, Regular (10-16-21 @ 17:14)  PO intake:   %  Current Weight: 112.1 kg (10/26), 107.8 kg (10/25); Dosing wt: 113 kg    Nutrition Interval Events: Pt said his oral intake remains pretty good. He is receiving and enjoying Magic Cups (290 kcal, 9 gm protein per 4 oz) x 1/day. No GI distress at this time. Weight reflective of edema which is now 2+ L/R legs. No pressure injuries at this time. Continue nutrition plan of care. RDN services to remain available as needed.     __________________ Pertinent Medications__________________   MEDICATIONS  (STANDING):  albuterol/ipratropium for Nebulization 3 milliLiter(s) Nebulizer every 4 hours  budesonide 160 MICROgram(s)/formoterol 4.5 MICROgram(s) Inhaler 2 Puff(s) Inhalation two times a day  chlorhexidine 4% Liquid 1 Application(s) Topical daily  cyanocobalamin 1000 MICROGram(s) Oral daily  heparin   Injectable 5000 Unit(s) SubCutaneous every 8 hours  influenza   Vaccine 0.5 milliLiter(s) IntraMuscular once  methylPREDNISolone sodium succinate IVPB 1000 milliGRAM(s) IV Intermittent daily  metoprolol tartrate 25 milliGRAM(s) Oral two times a day  morphine ER Tablet 15 milliGRAM(s) Oral every 12 hours  multivitamin 1 Tablet(s) Oral daily  pantoprazole    Tablet 40 milliGRAM(s) Oral before breakfast  senna 2 Tablet(s) Oral at bedtime  sodium chloride 0.65% Nasal 1 Spray(s) Both Nostrils every 4 hours  tiotropium 18 MICROgram(s) Capsule 1 Capsule(s) Inhalation daily  trimethoprim  160 mG/sulfamethoxazole 800 mG 1 Tablet(s) Oral <User Schedule>    MEDICATIONS  (PRN):  metoclopramide 10 milliGRAM(s) Oral every 6 hours PRN nausea  morphine   Solution 2.5 milliGRAM(s) Oral every 6 hours PRN dyspnea or pain  polyethylene glycol 3350 17 Gram(s) Oral daily PRN Constipation  sodium chloride 0.65% Nasal 1 Spray(s) Both Nostrils once PRN Nasal Congestion      __________________ Pertinent Labs__________________   11-02 Na140 mmol/L Glu 96 mg/dL K+ 3.6 mmol/L Cr  1.24 mg/dL BUN 60 mg/dL<H> 11-02 Phos 4.1 mg/dL 11-02 Alb 3.4 g/dL    Estimated Needs:   [x] no change since previous assessment        Previous Nutrition Diagnosis:     Increased Nutrient Needs     Nutrition Diagnosis is [x] ongoing          Goal(s):  1. Patient to meet > 75% estimated energy needs    Recommendations:   1. Continue Nutrition Plan of Care.    Monitoring and Evaluation:   1. Monitor weights, labs, BMs, skin integrity, PO intake and edema.  2. RD services to remain available. Request obtaining new weight to assess trend and determine adequacy of PO intake. Continue with oral supplementation for additional calories and protein.

## 2021-11-02 NOTE — PROGRESS NOTE ADULT - SUBJECTIVE AND OBJECTIVE BOX
CHIEF COMPLAINT: Patient is a 61y old  Male who presents with a chief complaint of SOB (01 Nov 2021 10:13)      Interval Events: Pt seen at bedside using HFNC    REVIEW OF SYSTEMS:  Constitutional:   Eyes:  ENT:  CV:  Resp:  GI:  :  MSK:  Integumentary:  Neurological:  Psychiatric:  Endocrine:  Hematologic/Lymphatic:  Allergic/Immunologic:  [ ] All other systems negative      OBJECTIVE:  ICU Vital Signs Last 24 Hrs  T(C): 36.1 (02 Nov 2021 06:25), Max: 36.6 (01 Nov 2021 10:20)  T(F): 97 (02 Nov 2021 06:25), Max: 97.9 (01 Nov 2021 10:20)  HR: 77 (02 Nov 2021 07:14) (50 - 110)  BP: 106/72 (02 Nov 2021 06:25) (102/80 - 129/74)  BP(mean): --  ABP: --  ABP(mean): --  RR: 21 (02 Nov 2021 07:07) (18 - 23)  SpO2: 86% (02 Nov 2021 07:14) (83% - 96%)        CAPILLARY BLOOD GLUCOSE          HOSPITAL MEDICATIONS:  MEDICATIONS  (STANDING):  albuterol/ipratropium for Nebulization 3 milliLiter(s) Nebulizer every 4 hours  budesonide 160 MICROgram(s)/formoterol 4.5 MICROgram(s) Inhaler 2 Puff(s) Inhalation two times a day  chlorhexidine 4% Liquid 1 Application(s) Topical daily  cyanocobalamin 1000 MICROGram(s) Oral daily  heparin   Injectable 5000 Unit(s) SubCutaneous every 8 hours  influenza   Vaccine 0.5 milliLiter(s) IntraMuscular once  methylPREDNISolone sodium succinate IVPB 1000 milliGRAM(s) IV Intermittent daily  morphine ER Tablet 15 milliGRAM(s) Oral every 12 hours  multivitamin 1 Tablet(s) Oral daily  pantoprazole    Tablet 40 milliGRAM(s) Oral before breakfast  senna 2 Tablet(s) Oral at bedtime  sodium chloride 0.65% Nasal 1 Spray(s) Both Nostrils every 4 hours  tiotropium 18 MICROgram(s) Capsule 1 Capsule(s) Inhalation daily    MEDICATIONS  (PRN):  metoclopramide 10 milliGRAM(s) Oral every 6 hours PRN nausea  morphine   Solution 2.5 milliGRAM(s) Oral every 6 hours PRN dyspnea or pain  polyethylene glycol 3350 17 Gram(s) Oral daily PRN Constipation  sodium chloride 0.65% Nasal 1 Spray(s) Both Nostrils once PRN Nasal Congestion      LABS:                    MICROBIOLOGY:     RADIOLOGY:  [ ] Reviewed and interpreted by me    PULMONARY FUNCTION TESTS:    EKG: CHIEF COMPLAINT: Patient is a 61y old  Male who presents with a chief complaint of SOB (01 Nov 2021 10:13)      Interval Events: Pt seen at bedside using HFNC    REVIEW OF SYSTEMS:  Constitutional: Denies pain   Eyes:  ENT: Pain on swallowing   CV: Denies   Resp: + WILKINS  GI: Denies   [x ] All other systems negative      OBJECTIVE:  ICU Vital Signs Last 24 Hrs  T(C): 36.1 (02 Nov 2021 06:25), Max: 36.6 (01 Nov 2021 10:20)  T(F): 97 (02 Nov 2021 06:25), Max: 97.9 (01 Nov 2021 10:20)  HR: 77 (02 Nov 2021 07:14) (50 - 110)  BP: 106/72 (02 Nov 2021 06:25) (102/80 - 129/74)  BP(mean): --  ABP: --  ABP(mean): --  RR: 21 (02 Nov 2021 07:07) (18 - 23)  SpO2: 86% (02 Nov 2021 07:14) (83% - 96%)        CAPILLARY BLOOD GLUCOSE          HOSPITAL MEDICATIONS:  MEDICATIONS  (STANDING):  albuterol/ipratropium for Nebulization 3 milliLiter(s) Nebulizer every 4 hours  budesonide 160 MICROgram(s)/formoterol 4.5 MICROgram(s) Inhaler 2 Puff(s) Inhalation two times a day  chlorhexidine 4% Liquid 1 Application(s) Topical daily  cyanocobalamin 1000 MICROGram(s) Oral daily  heparin   Injectable 5000 Unit(s) SubCutaneous every 8 hours  influenza   Vaccine 0.5 milliLiter(s) IntraMuscular once  methylPREDNISolone sodium succinate IVPB 1000 milliGRAM(s) IV Intermittent daily  morphine ER Tablet 15 milliGRAM(s) Oral every 12 hours  multivitamin 1 Tablet(s) Oral daily  pantoprazole    Tablet 40 milliGRAM(s) Oral before breakfast  senna 2 Tablet(s) Oral at bedtime  sodium chloride 0.65% Nasal 1 Spray(s) Both Nostrils every 4 hours  tiotropium 18 MICROgram(s) Capsule 1 Capsule(s) Inhalation daily    MEDICATIONS  (PRN):  metoclopramide 10 milliGRAM(s) Oral every 6 hours PRN nausea  morphine   Solution 2.5 milliGRAM(s) Oral every 6 hours PRN dyspnea or pain  polyethylene glycol 3350 17 Gram(s) Oral daily PRN Constipation  sodium chloride 0.65% Nasal 1 Spray(s) Both Nostrils once PRN Nasal Congestion      LABS:                    MICROBIOLOGY:     RADIOLOGY:  [ ] Reviewed and interpreted by me    PULMONARY FUNCTION TESTS:    EKG:

## 2021-11-03 NOTE — PROGRESS NOTE ADULT - PROBLEM SELECTOR PLAN 1
Dyspnea 2/2 Post obstructive pna vs. pulmonary edema in setting of metastatic NSCLC  > In setting of increase in Cr, will switch morphine to dilaudid  > d/c ms contin 15mg bid ER and morphine 2.5mg prn   > started 4mg PO dilaudid (9am and 9pm) and dilaudid 4mg PO q3 prn respiratory distress or severe pain   > Trialing pulse dose steroids   > Pulm recs appreciated: taper HFNC, continue nebs  > Goal is to decrease HiFlo to 40lpm and 40%FiO2. Patient trialed portable HFNC in RCU  > please ensure patient is on bowel regimen while on opioids. Dyspnea 2/2 Post obstructive pna vs. pulmonary edema in setting of metastatic NSCLC  > In setting of increase in Cr, will switch morphine to dilaudid  > d/c ms contin 15mg bid ER and morphine 2.5mg prn   > started 4mg PO dilaudid (9am and 9pm) and dilaudid 4mg PO q3 prn respiratory distress or severe pain   > Trialing pulse dose steroids   > Pulm recs appreciated: taper HFNC, continue nebs  > Hospice would be able to manage HFNC at 40lpm with 40%FiO2.   > Goal is to wean patient's oxygen requirements to settings that are manageable in a home setting and ensuring patient remains asymptomatic despite hypoxia.   > please ensure patient is on bowel regimen while on opioids.

## 2021-11-03 NOTE — PROGRESS NOTE ADULT - ASSESSMENT
In setting of increase in Cr, will switch morphine to dilaudid  d/c ms contin 15mg bid ER and morphine 2.5mg prn   started 4mg PO dilaudid (9am and 9pm) and dilaudid 4mg PO q3 prn respiratory distress or severe pain    60 y/o M with history of NSCLC s/p RLL resection on chemo, COPD, ILD, pulmonary HTN, on 4L NC at home presents with SOB x 1 week. On admission the patient was tachypneic and tachycardic but afebrile. PE notable for wheezing, crackles and ronchi. Labs on admission remarkable for an elevated WBC, elevated BNP, and transaminitis. LE U/S ruled out evidence of DVT and CTA ruled out evidence of PE. DDx includes HF exacerbation, pulmonary infection, exacerbation 2/2 pt's lung cancer, worsening ILD 2/2 pembrolizumab, or COPD exacerbation. Palliative consulted for assistance with sx management.

## 2021-11-03 NOTE — PROGRESS NOTE ADULT - ASSESSMENT
61M NSCLC (on therapy), ILD with occupational exposure and smoking history, and chronic hypoxemic respiratory failure presenting with acute on chronic hypoxemia as well as volume overload. He has had a CTPA in the ED which is worse than a recent CT and is concerning for volume overload, possible infection, and most notably progression of cancer.    #Neuro  A&Ox4  At baseline    #Respiratory  Hypoxic Respiratory Failure  - Likely multifactorial, however most likely cause is patient's malignancy NSCLC  - Continue diuresis, evidence of RV dysfunction likely in the setting of pHTN 2/2 hypoxemia  - Continue airway clearance therapy  - Continue prednisone 40mg qd  - Goals of care discussion had with wife and patient, DNR/DNI with overall goal to go home  - Poor prognosis overall, patient still requiring high FiO2 despite interventions  - Wean O2 as tolerated; still requiring HFNC 80L/80%  - Radiation/Onc consult ordered - will need to see if pt can tolerate lying flat for radiation also requesting IR eval for biopsy infection vs mets in R lung   -Hold off on bx for now   -Pt tolerating Life 2000 for short periods -will hold off until pulse steroids complete   - Pulse steroids x 3 days and eval for any improvements   - Palliative ordered Morpine ER 15mg bid and Morphine IR 2.5 mg bid prn     #CV  -BP remains stable  -Sinus tach at times; likely 2/2 SOB  HFpEF; BNP of 6752 on admission  - EKG showed sinus tachycardia, right atrial enlargement, rightward axis, and nonspecific ST and T wave abnormalities  - EF 56% on echo (10/20), LV systolic wnl, Right ventricular enlargement with decreased RV systolic function  - Was on IV lasix 40 BID; decreased down to qd yesterday 10/30 and now dc'd today 10/31 due to worsening creat concerning for overdiuresis.  - Pt noted with irregular HR- EKG with Rapid AF - metoprolol started and rate improved   - No AC for now Follow up EKG in am     #GI  -Transaminitis- No evidence of abd pain; possibly 2/2 to liver mets  - will continue to trend for now  - Regular diet    #Renal  - Worsening renal function  - Holding lasix 10/31 in setting of ?overdiuresis  - Lovenox switched to heparin TID  - Trend creat- improving     #ID  - Leukocytosis likely 2/2 prednisone  - No fevers     #DVT PPX: Now on heparin tid 2/2 BLANKA  #Ethics- DNR/DNI  #Dispo- DNR/DNI with overall goal to go home if HFNC can be tapered to decreased settings 40/40   61M NSCLC (on therapy), ILD with occupational exposure and smoking history, and chronic hypoxemic respiratory failure presenting with acute on chronic hypoxemia as well as volume overload. He has had a CTPA in the ED which is worse than a recent CT and is concerning for volume overload, possible infection, and most notably progression of cancer.    #Neuro  A&Ox4  At baseline    #Respiratory  Hypoxic Respiratory Failure  - Likely multifactorial, however most likely cause is patient's malignancy NSCLC  - Continue diuresis, evidence of RV dysfunction likely in the setting of pHTN 2/2 hypoxemia  - Continue airway clearance therapy  - Continue prednisone 40mg qd  - Goals of care discussion had with wife and patient, DNR/DNI with overall goal to go home  - Poor prognosis overall, patient still requiring high FiO2 despite interventions  - Wean O2 as tolerated; still requiring HFNC 80L/80%  - Radiation/Onc consult ordered - will need to see if pt can tolerate lying flat for radiation also requesting IR eval for biopsy infection vs mets in R lung   -Hold off on bx for now   -Pt tolerating Life 2000 for short periods -will hold off until pulse steroids complete   - Pulse steroids x 3 days and eval for any improvements   - Palliative ordered Morpine ER 15mg bid and Morphine IR 2.5 mg bid prn   - Pt would like to be dc to home with comfort .  If he feels stable with low pulse ox but comfortable canbe dc home Wife ordered and will bring in oxymizer nasal cannula for pt to try for use at home May also try home HFNC     #CV  -BP remains stable  -Sinus tach at times; likely 2/2 SOB  HFpEF; BNP of 6752 on admission  - EKG showed sinus tachycardia, right atrial enlargement, rightward axis, and nonspecific ST and T wave abnormalities  - EF 56% on echo (10/20), LV systolic wnl, Right ventricular enlargement with decreased RV systolic function  - Was on IV lasix 40 BID; decreased down to qd yesterday 10/30 and now dc'd today 10/31 due to worsening creat concerning for overdiuresis.  - Pt noted with irregular HR- EKG with Rapid AF - metoprolol started and rate improved   - 11/3 EKG still in afib with better rate control OK to start Eloquis     #GI  -Transaminitis- No evidence of abd pain; possibly 2/2 to liver mets  - will continue to trend for now  - Regular diet    #Renal  - Worsening renal function  - Holding lasix 10/31 in setting of ?overdiuresis  - Lovenox switched to heparin TID  - Trend creat-rising again - IV fluids for 12 h and recheck in am     #ID  - Leukocytosis likely 2/2 prednisone  - No fevers     #DVT PPX: Now on heparin tid 2/2 BLANKA  #Ethics- DNR/DNI  #Dispo- DNR/DNI with overall goal to go home if HFNC can be tapered to decreased settings 40/40

## 2021-11-03 NOTE — PROGRESS NOTE ADULT - SUBJECTIVE AND OBJECTIVE BOX
CHIEF COMPLAINT:    Interval Events:    REVIEW OF SYSTEMS:  Constitutional:   Eyes:  ENT:  CV:  Resp:  GI:  :  MSK:  Integumentary:  Neurological:  Psychiatric:  Endocrine:  Hematologic/Lymphatic:  Allergic/Immunologic:  [ ] All other systems negative  [ ] Unable to assess ROS because ________    OBJECTIVE:  ICU Vital Signs Last 24 Hrs  T(C): 36.5 (03 Nov 2021 05:20), Max: 36.6 (02 Nov 2021 18:36)  T(F): 97.7 (03 Nov 2021 05:20), Max: 97.8 (02 Nov 2021 18:36)  HR: 74 (03 Nov 2021 05:20) (48 - 92)  BP: 102/64 (03 Nov 2021 05:20) (102/64 - 136/112)  BP(mean): --  ABP: --  ABP(mean): --  RR: 22 (03 Nov 2021 06:30) (16 - 24)  SpO2: 86% (03 Nov 2021 06:30) (86% - 97%)        CAPILLARY BLOOD GLUCOSE          PHYSICAL EXAM:  General:   HEENT:   Lymph Nodes:  Neck:   Respiratory:   Cardiovascular:   Abdomen:   Extremities:   Skin:   Neurological:  Psychiatry:    HOSPITAL MEDICATIONS:  MEDICATIONS  (STANDING):  albuterol/ipratropium for Nebulization 3 milliLiter(s) Nebulizer every 4 hours  budesonide 160 MICROgram(s)/formoterol 4.5 MICROgram(s) Inhaler 2 Puff(s) Inhalation two times a day  chlorhexidine 4% Liquid 1 Application(s) Topical daily  cyanocobalamin 1000 MICROGram(s) Oral daily  heparin   Injectable 5000 Unit(s) SubCutaneous every 8 hours  influenza   Vaccine 0.5 milliLiter(s) IntraMuscular once  methylPREDNISolone sodium succinate IVPB 1000 milliGRAM(s) IV Intermittent daily  metoprolol tartrate 25 milliGRAM(s) Oral two times a day  morphine ER Tablet 15 milliGRAM(s) Oral every 12 hours  multivitamin 1 Tablet(s) Oral daily  nystatin    Suspension 975931 Unit(s) Swish and Swallow four times a day  pantoprazole    Tablet 40 milliGRAM(s) Oral before breakfast  senna 2 Tablet(s) Oral at bedtime  sodium chloride 0.65% Nasal 1 Spray(s) Both Nostrils every 4 hours  tiotropium 18 MICROgram(s) Capsule 1 Capsule(s) Inhalation daily  trimethoprim  160 mG/sulfamethoxazole 800 mG 1 Tablet(s) Oral <User Schedule>    MEDICATIONS  (PRN):  metoclopramide 10 milliGRAM(s) Oral every 6 hours PRN nausea  morphine   Solution 2.5 milliGRAM(s) Oral every 6 hours PRN dyspnea or pain  polyethylene glycol 3350 17 Gram(s) Oral daily PRN Constipation  sodium chloride 0.65% Nasal 1 Spray(s) Both Nostrils once PRN Nasal Congestion      LABS:                        14.9   16.88 )-----------( 207      ( 03 Nov 2021 07:10 )             46.4     11-02    140  |  97<L>  |  60<H>  ----------------------------<  96  3.6   |  31  |  1.24    Ca    9.1      02 Nov 2021 08:06  Phos  4.1     11-02  Mg     2.80     11-03    TPro  6.0  /  Alb  3.4  /  TBili  0.9  /  DBili  x   /  AST  80<H>  /  ALT  151<H>  /  AlkPhos  195<H>  11-02              MICROBIOLOGY:     RADIOLOGY:  [ ] Reviewed and interpreted by me    PULMONARY FUNCTION TESTS:    EKG: CHIEF COMPLAINT: Patient is a 61y old  Male who presents with a chief complaint of SOB (03 Nov 2021 08:42)      Interval Events: Pt seen at bedside HFNC in use pulse ox 79-90's No issues with pulse ox in low 80's     REVIEW OF SYSTEMS:  Constitutional: Denies pain   ENT: Denies   CV: Denies   Resp: Denies   GI: Denies   [x ] All other systems negative      OBJECTIVE:  ICU Vital Signs Last 24 Hrs  T(C): 36.5 (03 Nov 2021 05:20), Max: 36.6 (02 Nov 2021 18:36)  T(F): 97.7 (03 Nov 2021 05:20), Max: 97.8 (02 Nov 2021 18:36)  HR: 74 (03 Nov 2021 05:20) (48 - 92)  BP: 102/64 (03 Nov 2021 05:20) (102/64 - 136/112)  BP(mean): --  ABP: --  ABP(mean): --  RR: 22 (03 Nov 2021 06:30) (16 - 24)  SpO2: 86% (03 Nov 2021 06:30) (86% - 97%)        CAPILLARY BLOOD GLUCOSE        HOSPITAL MEDICATIONS:  MEDICATIONS  (STANDING):  albuterol/ipratropium for Nebulization 3 milliLiter(s) Nebulizer every 4 hours  budesonide 160 MICROgram(s)/formoterol 4.5 MICROgram(s) Inhaler 2 Puff(s) Inhalation two times a day  chlorhexidine 4% Liquid 1 Application(s) Topical daily  cyanocobalamin 1000 MICROGram(s) Oral daily  heparin   Injectable 5000 Unit(s) SubCutaneous every 8 hours  influenza   Vaccine 0.5 milliLiter(s) IntraMuscular once  methylPREDNISolone sodium succinate IVPB 1000 milliGRAM(s) IV Intermittent daily  metoprolol tartrate 25 milliGRAM(s) Oral two times a day  morphine ER Tablet 15 milliGRAM(s) Oral every 12 hours  multivitamin 1 Tablet(s) Oral daily  nystatin    Suspension 079692 Unit(s) Swish and Swallow four times a day  pantoprazole    Tablet 40 milliGRAM(s) Oral before breakfast  senna 2 Tablet(s) Oral at bedtime  sodium chloride 0.65% Nasal 1 Spray(s) Both Nostrils every 4 hours  tiotropium 18 MICROgram(s) Capsule 1 Capsule(s) Inhalation daily  trimethoprim  160 mG/sulfamethoxazole 800 mG 1 Tablet(s) Oral <User Schedule>    MEDICATIONS  (PRN):  metoclopramide 10 milliGRAM(s) Oral every 6 hours PRN nausea  morphine   Solution 2.5 milliGRAM(s) Oral every 6 hours PRN dyspnea or pain  polyethylene glycol 3350 17 Gram(s) Oral daily PRN Constipation  sodium chloride 0.65% Nasal 1 Spray(s) Both Nostrils once PRN Nasal Congestion      LABS:                        14.9   16.88 )-----------( 207      ( 03 Nov 2021 07:10 )             46.4     11-02    140  |  97<L>  |  60<H>  ----------------------------<  96  3.6   |  31  |  1.24    Ca    9.1      02 Nov 2021 08:06  Phos  4.1     11-02  Mg     2.80     11-03    TPro  6.0  /  Alb  3.4  /  TBili  0.9  /  DBili  x   /  AST  80<H>  /  ALT  151<H>  /  AlkPhos  195<H>  11-02              MICROBIOLOGY:     RADIOLOGY:  [ ] Reviewed and interpreted by me    PULMONARY FUNCTION TESTS:    EKG:

## 2021-11-03 NOTE — PROGRESS NOTE ADULT - PROBLEM SELECTOR PLAN 2
Onc care at [facility] with Dr. Zheng   s/p RLL resection and chemotherapy, Last chemo on 10/13  > Heme/onc recs appreciated.  > rad/onc recs appreciated

## 2021-11-03 NOTE — PROGRESS NOTE ADULT - CVS HE PE MLT D E PC
regular rate and rhythm/no rub
regular rate and rhythm
regular rate and rhythm/no rub
regular rate and rhythm
regular rate and rhythm/no rub
regular rate and rhythm
regular rate and rhythm

## 2021-11-03 NOTE — PROGRESS NOTE ADULT - PROBLEM SELECTOR PLAN 3
Patient's goals are to return home. Wife and patient understand that he can go home with hospice services if Hi-martina settings can be weaned to 40LPM and 40% FiO2.   Palliative will continue to assist with sx management.   GOC- DNR/DNI, MOLST completed by primary team     Thank you for allowing us to participate in your patient's care. We will continue to follow. Please page [phone] for any q's or c's.     Radha Massey D.O.   Palliative Medicine. Patient's goals are to return home. We discussed titrating patient's oxygen requirements to settings that are manageable in a home setting while ensuring patient remains asymptomatic despite hypoxia.     Palliative will continue to assist with sx management.   GOC- DNR/DNI, MOLST completed by primary team     Thank you for allowing us to participate in your patient's care. We will continue to follow. Please page [phone] for any q's or c's.     Radha Massey D.O.   Palliative Medicine.

## 2021-11-03 NOTE — PROGRESS NOTE ADULT - SUBJECTIVE AND OBJECTIVE BOX
[facility] Geriatrics and Palliative Care  Radha Massey Palliative Care Attending  Contact Info: Page [phone] (including Nights/Weekends), message on Microsoft Teams (Radha Massey), or leave VM at Palliative Office [phone] (non-urgent)     SUBJECTIVE AND OBJECTIVE:     INTERVAL HPI/OVERNIGHT EVENTS:  > Overnight events noted. Hi-martina settings reviewed.     DNR on chart:   Allergies    No Known Allergies    Intolerances    MEDICATIONS  (STANDING):  albuterol/ipratropium for Nebulization 3 milliLiter(s) Nebulizer every 4 hours  budesonide 160 MICROgram(s)/formoterol 4.5 MICROgram(s) Inhaler 2 Puff(s) Inhalation two times a day  chlorhexidine 4% Liquid 1 Application(s) Topical daily  cyanocobalamin 1000 MICROGram(s) Oral daily  heparin   Injectable 5000 Unit(s) SubCutaneous every 8 hours  HYDROmorphone   Tablet 4 milliGRAM(s) Oral <User Schedule>  influenza   Vaccine 0.5 milliLiter(s) IntraMuscular once  methylPREDNISolone sodium succinate IVPB 1000 milliGRAM(s) IV Intermittent daily  metoprolol tartrate 25 milliGRAM(s) Oral two times a day  multivitamin 1 Tablet(s) Oral daily  nystatin    Suspension 194749 Unit(s) Swish and Swallow four times a day  pantoprazole    Tablet 40 milliGRAM(s) Oral before breakfast  senna 2 Tablet(s) Oral at bedtime  sodium chloride 0.65% Nasal 1 Spray(s) Both Nostrils every 4 hours  tiotropium 18 MICROgram(s) Capsule 1 Capsule(s) Inhalation daily  trimethoprim  160 mG/sulfamethoxazole 800 mG 1 Tablet(s) Oral <User Schedule>    MEDICATIONS  (PRN):  HYDROmorphone   Tablet 4 milliGRAM(s) Oral every 3 hours PRN respiratory distress or severe pain  metoclopramide 10 milliGRAM(s) Oral every 6 hours PRN nausea  polyethylene glycol 3350 17 Gram(s) Oral daily PRN Constipation  sodium chloride 0.65% Nasal 1 Spray(s) Both Nostrils once PRN Nasal Congestion      ITEMS UNCHECKED ARE NOT PRESENT    PRESENT SYMPTOMS: [ ]Unable to obtain due to poor mentation   Source if other than patient:  [ ]Family   [ ]Team     Pain:  [ ]yes [ x]no  QOL impact -   Location -                    Aggravating factors -  Quality -  Radiation -  Timing-  Severity (0-10 scale):  Minimal acceptable level (0-10 scale):     Dyspnea:                           [ ]Mild [ ]Moderate [ ]Severe  Anxiety:                             [ ]Mild [ ]Moderate [ ]Severe  Fatigue:                             [ ]Mild [ ]Moderate [ ]Severe  Nausea:                             [ ]Mild [ ]Moderate [ ]Severe  Loss of appetite:              [ ]Mild [ ]Moderate [ ]Severe  Constipation:                    [ ]Mild [ ]Moderate [ ]Severe    PAIN AD Score:	  [email or web address] (Ctrl + left click to view)    Other Symptoms: asymptomatic hypoxia   [x ]All other review of systems negative     Palliative Performance Status Version 2:   70      %      [email or web address]  PHYSICAL EXAM:  Vital Signs Last 24 Hrs  T(C): 36.8 (03 Nov 2021 09:29), Max: 36.8 (03 Nov 2021 09:29)  T(F): 98.2 (03 Nov 2021 09:29), Max: 98.2 (03 Nov 2021 09:29)  HR: 88 (03 Nov 2021 09:29) (48 - 99)  BP: 90/73 (03 Nov 2021 09:29) (90/73 - 136/112)  BP(mean): --  RR: 22 (03 Nov 2021 09:29) (16 - 24)  SpO2: 88% (03 Nov 2021 09:29) (86% - 97%) I&O's Summary     GENERAL:  [ x]Alert  [x ]Oriented x3   [ ]Lethargic  [ ]Cachexia  [ ]Unarousable  [x ]Verbal  [ ]Non-Verbal  Behavioral:   [ ] Anxiety  [ ] Delirium [ ] Agitation [x ] Other  HEENT:  [ ]Normal   [ ]Dry mouth   [ ]ET Tube/Trach  [ ]Oral lesions  PULMONARY: HFNC   [ ]Clear [ ]Tachypnea  [ ]Audible excessive secretions   [x ]Rhonchi        [ x]Right [ ]Left [ ]Bilateral  [ ]Crackles        [ ]Right [ ]Left [ ]Bilateral  [ ]Wheezing     [ ]Right [ ]Left [ ]Bilateral  [ ]Diminished breath sounds [ ]right [ ]left [ ]bilateral  CARDIOVASCULAR:    [x ]Regular [ ]Irregular [ ]Tachy  [ ]Randy [ ]Murmur [ ]Other  GASTROINTESTINAL:  [x ]Soft  [ ]Distended   [ ]+BS  [ ]Non tender [ ]Tender  [ ]PEG [ ]OGT/ NGT  Last BM: 11/1  GENITOURINARY:  [x ]Normal [ ] Incontinent   [ ]Oliguria/Anuria   [ ]Haro  MUSCULOSKELETAL:   [ ]Normal   [ ]Weakness  [ ]Bed/Wheelchair bound [ x]Edema- b/l lower extremity edema  NEUROLOGIC:   [ x]No focal deficits  [ ]Cognitive impairment  [ ]Dysphagia [ ]Dysarthria [ ]Paresis [ ]Other   SKIN: Hyperpigmentation  noted on anterior left leg   [ ]Normal    [ ]Rash  [ ]Pressure ulcer(s)       Present on admission [ ]y [ ]n    CRITICAL CARE:  [ ] Shock Present  [ ]Septic [ ]Cardiogenic [ ]Neurologic [ ]Hypovolemic  [ ]  Vasopressors [ ]  Inotropes   [ ]Respiratory failure present [ ]Mechanical ventilation [ ]Non-invasive ventilatory support [ ]High flow  [ ]Acute  [ ]Chronic [ ]Hypoxic  [ ]Hypercarbic [ ]Other  [ ]Other organ failure       LABS:                        14.9   16.88 )-----------( 207      ( 03 Nov 2021 07:10 )             46.4   11-03    138  |  95<L>  |  76<H>  ----------------------------<  110<H>  4.5   |  28  |  1.62<H>    Ca    9.7      03 Nov 2021 07:10  Phos  5.9     11-03  Mg     2.80     11-03    TPro  6.8  /  Alb  4.0  /  TBili  1.1  /  DBili  x   /  AST  121<H>  /  ALT  211<H>  /  AlkPhos  219<H>  11-03        RADIOLOGY & ADDITIONAL STUDIES: n/a     Protein Calorie Malnutrition Present: [ ]mild [ ]moderate [ ]severe [ ]underweight [ ]morbid obesity  https://www.andeal.org/vault/2440/web/files/ONC/Table_Clinical%20Characteristics%20to%20Document%20Malnutrition-White%20JV%20et%20al%202012.pdf    Height (cm): 177.8 (10-15-21 @ 19:14), 177 (10-04-21 @ 19:57), 177 (08-11-21 @ 09:30)  Weight (kg): 113 (10-17-21 @ 11:59), 130 (10-04-21 @ 19:57), 131 (08-11-21 @ 09:30)  BMI (kg/m2): 35.7 (10-17-21 @ 11:59), 41.1 (10-15-21 @ 19:14), 41.5 (10-04-21 @ 19:57)    [ ]PPSV2 < or = 30%  [ ]significant weight loss [ ]poor nutritional intake [ ]anasarca    [ ]Artificial Nutrition    REFERRALS:   [ ]Chaplaincy  [x ]Hospice  [ ]Child Life  [ ]Social Work  [ ]Case management [ ]Holistic Therapy      [facility] Geriatrics and Palliative Care  Radha Massey, Palliative Care Attending  Contact Info: Page [phone] (including Nights/Weekends), message on Microsoft Teams (Radha Massey), or leave  at Palliative Office [phone] (non-urgent)     SUBJECTIVE AND OBJECTIVE: Patient seen with RCU team, patient's wife, Annie (on phone). Patient reports having thrush, which has improved with nystatin swish and swallow. We discussed plan of titrating patient's oxygen requirements to his symptoms and allowing SpO2 to be in the low 80s as long as patient is asymptomatic. Wife and patient were both in agreement. During visit, patient was transitioned to nasal cannula to give him a break from HFNC, to which he stated he felt much more comfortable.     INTERVAL HPI/OVERNIGHT EVENTS:  > Overnight events noted. Hi-martina settings reviewed.     DNR on chart:   Allergies    No Known Allergies    Intolerances    MEDICATIONS  (STANDING):  albuterol/ipratropium for Nebulization 3 milliLiter(s) Nebulizer every 4 hours  budesonide 160 MICROgram(s)/formoterol 4.5 MICROgram(s) Inhaler 2 Puff(s) Inhalation two times a day  chlorhexidine 4% Liquid 1 Application(s) Topical daily  cyanocobalamin 1000 MICROGram(s) Oral daily  heparin   Injectable 5000 Unit(s) SubCutaneous every 8 hours  HYDROmorphone   Tablet 4 milliGRAM(s) Oral <User Schedule>  influenza   Vaccine 0.5 milliLiter(s) IntraMuscular once  methylPREDNISolone sodium succinate IVPB 1000 milliGRAM(s) IV Intermittent daily  metoprolol tartrate 25 milliGRAM(s) Oral two times a day  multivitamin 1 Tablet(s) Oral daily  nystatin    Suspension 142325 Unit(s) Swish and Swallow four times a day  pantoprazole    Tablet 40 milliGRAM(s) Oral before breakfast  senna 2 Tablet(s) Oral at bedtime  sodium chloride 0.65% Nasal 1 Spray(s) Both Nostrils every 4 hours  tiotropium 18 MICROgram(s) Capsule 1 Capsule(s) Inhalation daily  trimethoprim  160 mG/sulfamethoxazole 800 mG 1 Tablet(s) Oral <User Schedule>    MEDICATIONS  (PRN):  HYDROmorphone   Tablet 4 milliGRAM(s) Oral every 3 hours PRN respiratory distress or severe pain  metoclopramide 10 milliGRAM(s) Oral every 6 hours PRN nausea  polyethylene glycol 3350 17 Gram(s) Oral daily PRN Constipation  sodium chloride 0.65% Nasal 1 Spray(s) Both Nostrils once PRN Nasal Congestion      ITEMS UNCHECKED ARE NOT PRESENT    PRESENT SYMPTOMS: [ ]Unable to obtain due to poor mentation   Source if other than patient:  [ ]Family   [ ]Team     Pain:  [ ]yes [ x]no  QOL impact -   Location -                    Aggravating factors -  Quality -  Radiation -  Timing-  Severity (0-10 scale):  Minimal acceptable level (0-10 scale):     Dyspnea:                           [ ]Mild [ ]Moderate [ ]Severe  Anxiety:                             [ ]Mild [ ]Moderate [ ]Severe  Fatigue:                             [ ]Mild [ ]Moderate [ ]Severe  Nausea:                             [ ]Mild [ ]Moderate [ ]Severe  Loss of appetite:              [ ]Mild [ ]Moderate [ ]Severe  Constipation:                    [ ]Mild [ ]Moderate [ ]Severe    PAIN AD Score:	  [email or web address] (Ctrl + left click to view)    Other Symptoms: asymptomatic hypoxia   [x ]All other review of systems negative     Palliative Performance Status Version 2:   70      %      http://[email or web address]/files/[email or web address]_performance_[email or web address]_[email or web address]  PHYSICAL EXAM:  Vital Signs Last 24 Hrs  T(C): 36.8 (03 Nov 2021 09:29), Max: 36.8 (03 Nov 2021 09:29)  T(F): 98.2 (03 Nov 2021 09:29), Max: 98.2 (03 Nov 2021 09:29)  HR: 88 (03 Nov 2021 09:29) (48 - 99)  BP: 90/73 (03 Nov 2021 09:29) (90/73 - 136/112)  BP(mean): --  RR: 22 (03 Nov 2021 09:29) (16 - 24)  SpO2: 88% (03 Nov 2021 09:29) (86% - 97%) I&O's Summary     GENERAL:  [ x]Alert  [x ]Oriented x3   [ ]Lethargic  [ ]Cachexia  [ ]Unarousable  [x ]Verbal  [ ]Non-Verbal  Behavioral:   [ ] Anxiety  [ ] Delirium [ ] Agitation [x ] Other  HEENT:  [ ]Normal   [ ]Dry mouth   [ ]ET Tube/Trach  [ ]Oral lesions  PULMONARY: HFNC   [ ]Clear [ ]Tachypnea  [ ]Audible excessive secretions   [x ]Rhonchi        [ x]Right [ ]Left [ ]Bilateral  [ ]Crackles        [ ]Right [ ]Left [ ]Bilateral  [ ]Wheezing     [ ]Right [ ]Left [ ]Bilateral  [ ]Diminished breath sounds [ ]right [ ]left [ ]bilateral  CARDIOVASCULAR:    [x ]Regular [ ]Irregular [ ]Tachy  [ ]Randy [ ]Murmur [ ]Other  GASTROINTESTINAL:  [x ]Soft  [ ]Distended   [ ]+BS  [ ]Non tender [ ]Tender  [ ]PEG [ ]OGT/ NGT  Last BM: 11/1  GENITOURINARY:  [x ]Normal [ ] Incontinent   [ ]Oliguria/Anuria   [ ]Haro  MUSCULOSKELETAL:   [ ]Normal   [ ]Weakness  [ ]Bed/Wheelchair bound [ x]Edema- b/l lower extremity edema  NEUROLOGIC:   [ x]No focal deficits  [ ]Cognitive impairment  [ ]Dysphagia [ ]Dysarthria [ ]Paresis [ ]Other   SKIN: Hyperpigmentation  noted on anterior left leg   [ ]Normal    [ ]Rash  [ ]Pressure ulcer(s)       Present on admission [ ]y [ ]n    CRITICAL CARE:  [ ] Shock Present  [ ]Septic [ ]Cardiogenic [ ]Neurologic [ ]Hypovolemic  [ ]  Vasopressors [ ]  Inotropes   [ ]Respiratory failure present [ ]Mechanical ventilation [ ]Non-invasive ventilatory support [ ]High flow  [ ]Acute  [ ]Chronic [ ]Hypoxic  [ ]Hypercarbic [ ]Other  [ ]Other organ failure       LABS:                        14.9   16.88 )-----------( 207      ( 03 Nov 2021 07:10 )             46.4   11-03    138  |  95<L>  |  76<H>  ----------------------------<  110<H>  4.5   |  28  |  1.62<H>    Ca    9.7      03 Nov 2021 07:10  Phos  5.9     11-03  Mg     2.80     11-03    TPro  6.8  /  Alb  4.0  /  TBili  1.1  /  DBili  x   /  AST  121<H>  /  ALT  211<H>  /  AlkPhos  219<H>  11-03        RADIOLOGY & ADDITIONAL STUDIES: n/a     Protein Calorie Malnutrition Present: [ ]mild [ ]moderate [ ]severe [ ]underweight [ ]morbid obesity  https://www.andeal.org/vault/2440/web/files/ONC/Table_Clinical%20Characteristics%20to%20Document%20Malnutrition-White%20JV%20et%20al%202012.pdf    Height (cm): 177.8 (10-15-21 @ 19:14), 177 (10-04-21 @ 19:57), 177 (08-11-21 @ 09:30)  Weight (kg): 113 (10-17-21 @ 11:59), 130 (10-04-21 @ 19:57), 131 (08-11-21 @ 09:30)  BMI (kg/m2): 35.7 (10-17-21 @ 11:59), 41.1 (10-15-21 @ 19:14), 41.5 (10-04-21 @ 19:57)    [ ]PPSV2 < or = 30%  [ ]significant weight loss [ ]poor nutritional intake [ ]anasarca    [ ]Artificial Nutrition    REFERRALS:   [ ]Chaplaincy  [x ]Hospice  [ ]Child Life  [ ]Social Work  [ ]Case management [ ]Holistic Therapy

## 2021-11-04 NOTE — CONSULT NOTE ADULT - CONSULT REASON
h/o NSCLC, dyspnea
h/o metastatic NSCLC
LE weakness
sx management
Acute on Chronic Hypoxemic Respiratory Failure

## 2021-11-04 NOTE — CONSULT NOTE ADULT - SUBJECTIVE AND OBJECTIVE BOX
Christian Swanson is a 61 year old RH male with a past medical history of non-small cell lung carcinoma (adenocarcinoma) s/p RLL resection on chemo (last agent pembrolizumab on 10/13 started), COPD, ILD, pulmonary HTN, on 4L NC at home, afib who presented with SOB x 1 week. At baseline, patient is able to perform all his ADLs but had been having increased trouble due to increased swelling in his legs prior to admission. Patient stated that he first experienced increasing difficulty breathing 2 weeks prior to admission when he began to sleep with more pillows at night. One week prior to admission he experienced a pulmonary infection with green sputum when he coughed and that was when his SOB significantly worsened. He received antibiotics which significantly improved his SOB. His leg swelling had worsened and he was experiencing tingling in his legs and increasing difficulty ambulating which brought him into the ED.   While admitted, the patient has been treated for hypoxic respiratory failure, has been receiving steroids and received pulse steroids followed by currently being prednisone 40mg, CHF decompensation, renal dysfunction. Here has been followed by heme/onc, rad onc, pulmonology, palliative care. As per the team and the patient, he was ambulating well up until 2 days ago. Was able to bear weight bilaterally and as of 2 days ago was not able to swing his legs over to the side of the bed. Noted that appearance of weakness was sudden. Stated that he has been very cautious and feels that he will fall so has not stood up unassisted in the last 2 days. Denies any bowel or bladder incontinence or any bowel or bladder retention. Denied any back pain, neck pain, headaches, numbness, tingling. Did state that he had some blurry vision bilaterally that is new. Denied diplopia, dysphagia, dysarthria. Denied any falls or head trauma.   Of note, the patient had a CT of the chest/abdomen/pelvis while admitted which showed a lytic lesion in the L4 vertebral body.     NIHSS: 4  MRS: 0    REVIEW OF SYSTEMS    A 10-system ROS was performed and is negative except for those items noted above and/or in the HPI.    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  Lives with family    MEDICATIONS (HOME):  Home Medications:  Breztri Aerosphere inhalation aerosol: 2 puff(s) inhaled 2 times a day (16 Oct 2021 08:53)  Centrum Silver oral tablet: 1 tab(s) orally once a day, stop day 7/13/20 (16 Oct 2021 08:53)  furosemide 40 mg oral tablet: 1 tab(s) orally once a day (16 Oct 2021 08:53)  metoclopramide 10 mg oral tablet: 10 milligram(s) orally every 6 hours, As Needed for nausea (16 Oct 2021 08:53)  Vitamin B12 1000 mcg oral tablet: 1 tab(s) orally once a day (16 Oct 2021 08:53)    MEDICATIONS  (STANDING):  apixaban 5 milliGRAM(s) Oral two times a day  budesonide 160 MICROgram(s)/formoterol 4.5 MICROgram(s) Inhaler 2 Puff(s) Inhalation two times a day  chlorhexidine 4% Liquid 1 Application(s) Topical daily  cyanocobalamin 1000 MICROGram(s) Oral daily  HYDROmorphone   Tablet 4 milliGRAM(s) Oral <User Schedule>  influenza   Vaccine 0.5 milliLiter(s) IntraMuscular once  metoprolol tartrate 25 milliGRAM(s) Oral two times a day  multivitamin 1 Tablet(s) Oral daily  nystatin    Suspension 900545 Unit(s) Swish and Swallow four times a day  pantoprazole    Tablet 40 milliGRAM(s) Oral before breakfast  predniSONE   Tablet 40 milliGRAM(s) Oral daily  senna 2 Tablet(s) Oral at bedtime  sodium chloride 0.65% Nasal 1 Spray(s) Both Nostrils every 4 hours  sodium chloride 0.9%. 1000 milliLiter(s) (75 mL/Hr) IV Continuous <Continuous>  tiotropium 18 MICROgram(s) Capsule 1 Capsule(s) Inhalation daily  trimethoprim  160 mG/sulfamethoxazole 800 mG 1 Tablet(s) Oral <User Schedule>    MEDICATIONS  (PRN):  ALBUTerol    90 MICROgram(s) HFA Inhaler 2 Puff(s) Inhalation every 6 hours PRN Shortness of Breath and/or Wheezing  HYDROmorphone   Tablet 4 milliGRAM(s) Oral every 3 hours PRN respiratory distress or severe pain  metoclopramide 10 milliGRAM(s) Oral every 6 hours PRN nausea  polyethylene glycol 3350 17 Gram(s) Oral daily PRN Constipation  sodium chloride 0.65% Nasal 1 Spray(s) Both Nostrils once PRN Nasal Congestion    ALLERGIES/INTOLERANCES:  Allergies  No Known Allergies    Intolerances    VITALS & EXAMINATION:  Vital Signs Last 24 Hrs  T(C): 36.7 (04 Nov 2021 13:16), Max: 36.7 (04 Nov 2021 13:16)  T(F): 98 (04 Nov 2021 13:16), Max: 98 (04 Nov 2021 13:16)  HR: 70 (04 Nov 2021 13:16) (45 - 115)  BP: 102/62 (04 Nov 2021 13:16) (98/60 - 126/73)  BP(mean): --  RR: 22 (04 Nov 2021 13:16) (14 - 23)  SpO2: 88% (04 Nov 2021 13:16) (85% - 115%)    General:  Constitutional: Appears stated age, in no apparent distress including pain.  Respiratory: on hi flow  Head: Normocephalic & atraumatic.  Ext: 4+ pitting edema up to the knees bilaterally. Cool LLE as compared with RLE.  Rectal exam: patient deferred rectal exam    Neurological (>12):  MS: Awake, alert, oriented to person, place, situation, time. Normal affect. Follows all commands.    Language: Speech is clear, fluent with good comprehension.    CNs: PERRL (R = 3mm, L = 3mm). VF intact in all 4 quadrants. EOMI no nystagmus. V1-3 intact to LT, well developed masseter muscles b/l. No facial asymmetry b/l, full eye closure strength b/l. Hearing grossly normal (rubbing fingers) b/l. Symmetric palate elevation in midline. Shoulder shrug intact b/l. Tongue midline, normal movements, no atrophy.    Motor: Normal muscle bulk & tone. No noticeable tremor or seizure. Trace LUE pronator drift.               Deltoid	Biceps	Triceps	   R	5	5	5	5			  L	5	4+	4+	4+		    	H-Flex	H-Ext	K-Flex	K-Ext	D-Flex	P-Flex  R	2	3	4+	4+	4+	4+	 	   L	2	3	4+	4+	4+	4+	     Sensation: Intact to LT/PP b/l throughout.     Reflexes:              Biceps(C5)       BR(C6)     Triceps(C7)               Patellar(L4)    Achilles(S1)    Plantar Resp  R	1	          1	             1		        0		    0		Down   L	1	          1	             1		        0		    0		Down     Coordination:  No dysmetria to FTN    Gait: Able to swing legs over in bed, unable to stand without assistance but once standing able to stand on his own. Wide base, took 2 steps and then wanted to go back into bed.    LABORATORY:  CBC                       15.0   19.68 )-----------( 187      ( 04 Nov 2021 07:37 )             47.1     Chem 11-04    137  |  94<L>  |  86<H>  ----------------------------<  113<H>  4.6   |  24  |  2.07<H>    Ca    9.2      04 Nov 2021 07:40  Phos  6.7     11-04  Mg     2.80     11-04    TPro  6.7  /  Alb  4.0  /  TBili  1.1  /  DBili  x   /  AST  107<H>  /  ALT  225<H>  /  AlkPhos  223<H>  11-04    LFTs LIVER FUNCTIONS - ( 04 Nov 2021 07:40 )  Alb: 4.0 g/dL / Pro: 6.7 g/dL / ALK PHOS: 223 U/L / ALT: 225 U/L / AST: 107 U/L / GGT: x           STUDIES & IMAGING:    Radiology (XR, CT, MR, U/S, TTE/DASHA):

## 2021-11-04 NOTE — CONSULT NOTE ADULT - ASSESSMENT
Christian Swanson is a 61 year old RH male with a past medical history of non-small cell lung carcinoma (adenocarcinoma) s/p RLL resection on chemo (last agent pembrolizumab on 10/13 started), COPD, ILD, pulmonary HTN, on 4L NC at home, afib who presented with SOB x 1 week. Neurology consulted for acute onset of bilateral LE weakness.     Impression: proximal LE weakness bilaterally with trace LUE weakness and report of blurry vision unclear in localization with wide differential. Differential includes spinal cord pathology in the lumbar spine given previously noted lesion however no noted upper motor neuron signs. Additionally would want to rule out a bilateral FLASH stroke/mets to brain. Given proximal extremity weakness and use of steroids would want to rule out a steroid myopathy. As LLE is cooler than RLE would also want to rule out a limb ischemia. Regardless of primary pathology, element of deconditioning is present.    Recs:  [] MRI brain w/ and w/o contrast  [] MRI thoracic and lumbar spine w/ and w/o contrast  [] primary team to consider JAVIER and workup of LLE limb ischemia  [] please obtain CPK, B12 (may be false due to supplementation), folate, homocysteine, methylmalonic acid, Vitamin E, SPEP, UPEP, TSH, A1c, lipid panel, RPR  [] may continue Eliquis for stroke prophylaxis as patient with afib  [] PT/OT  [] fall precautions  [] rest of management as primary team  [] further recommendations after MRI completed    Case to be seen and discussed with neurology attending, Dr. Kapadia.

## 2021-11-04 NOTE — PROGRESS NOTE ADULT - SUBJECTIVE AND OBJECTIVE BOX
Patient is a 61y old  Male who presents with a chief complaint of SOB (03 Nov 2021 08:42)      SUBJECTIVE / OVERNIGHT EVENTS:    MEDICATIONS  (STANDING):  budesonide 160 MICROgram(s)/formoterol 4.5 MICROgram(s) Inhaler 2 Puff(s) Inhalation two times a day  chlorhexidine 4% Liquid 1 Application(s) Topical daily  cyanocobalamin 1000 MICROGram(s) Oral daily  HYDROmorphone   Tablet 4 milliGRAM(s) Oral <User Schedule>  influenza   Vaccine 0.5 milliLiter(s) IntraMuscular once  metoprolol tartrate 25 milliGRAM(s) Oral two times a day  multivitamin 1 Tablet(s) Oral daily  nystatin    Suspension 061902 Unit(s) Swish and Swallow four times a day  pantoprazole    Tablet 40 milliGRAM(s) Oral before breakfast  senna 2 Tablet(s) Oral at bedtime  sodium chloride 0.65% Nasal 1 Spray(s) Both Nostrils every 4 hours  sodium chloride 0.9%. 1000 milliLiter(s) (75 mL/Hr) IV Continuous <Continuous>  tiotropium 18 MICROgram(s) Capsule 1 Capsule(s) Inhalation daily  trimethoprim  160 mG/sulfamethoxazole 800 mG 1 Tablet(s) Oral <User Schedule>    MEDICATIONS  (PRN):  ALBUTerol    90 MICROgram(s) HFA Inhaler 2 Puff(s) Inhalation every 6 hours PRN Shortness of Breath and/or Wheezing  HYDROmorphone   Tablet 4 milliGRAM(s) Oral every 3 hours PRN respiratory distress or severe pain  metoclopramide 10 milliGRAM(s) Oral every 6 hours PRN nausea  polyethylene glycol 3350 17 Gram(s) Oral daily PRN Constipation  sodium chloride 0.65% Nasal 1 Spray(s) Both Nostrils once PRN Nasal Congestion        CAPILLARY BLOOD GLUCOSE        I&O's Summary    03 Nov 2021 07:01  -  04 Nov 2021 07:00  --------------------------------------------------------  IN: 0 mL / OUT: 2 mL / NET: -2 mL        PHYSICAL EXAM:  GENERAL: NAD, well-developed  HEAD:  Atraumatic, Normocephalic  EYES: EOMI, PERRLA, conjunctiva and sclera clear  NECK: Supple, No JVD  CHEST/LUNG: Clear to auscultation bilaterally; No wheeze  HEART: Regular rate and rhythm; No murmurs, rubs, or gallops  ABDOMEN: Soft, Nontender, Nondistended; Bowel sounds present  EXTREMITIES:  2+ Peripheral Pulses, No clubbing, cyanosis, or edema  PSYCH: AAOx3  NEUROLOGY: non-focal  SKIN: No rashes or lesions    LABS:                        15.0   19.68 )-----------( 187      ( 04 Nov 2021 07:37 )             47.1     11-04    137  |  94<L>  |  86<H>  ----------------------------<  113<H>  4.6   |  24  |  2.07<H>    Ca    9.2      04 Nov 2021 07:40  Phos  6.7     11-04  Mg     2.80     11-04    TPro  6.7  /  Alb  4.0  /  TBili  1.1  /  DBili  x   /  AST  107<H>  /  ALT  225<H>  /  AlkPhos  223<H>  11-04              RADIOLOGY & ADDITIONAL TESTS:    Imaging Personally Reviewed:    Consultant(s) Notes Reviewed:      Care Discussed with Consultants/Other Providers:   Patient is a 61y old  Male who presents with a chief complaint of SOB (03 Nov 2021 08:42)      SUBJECTIVE / OVERNIGHT EVENTS: No overnight events. Pt c/o lower ext weakness / heaviness which is new today. Pt denies bowel / bladder incontinence. Will have Neuro eval    MEDICATIONS  (STANDING):  budesonide 160 MICROgram(s)/formoterol 4.5 MICROgram(s) Inhaler 2 Puff(s) Inhalation two times a day  chlorhexidine 4% Liquid 1 Application(s) Topical daily  cyanocobalamin 1000 MICROGram(s) Oral daily  HYDROmorphone   Tablet 4 milliGRAM(s) Oral <User Schedule>  influenza   Vaccine 0.5 milliLiter(s) IntraMuscular once  metoprolol tartrate 25 milliGRAM(s) Oral two times a day  multivitamin 1 Tablet(s) Oral daily  nystatin    Suspension 786128 Unit(s) Swish and Swallow four times a day  pantoprazole    Tablet 40 milliGRAM(s) Oral before breakfast  senna 2 Tablet(s) Oral at bedtime  sodium chloride 0.65% Nasal 1 Spray(s) Both Nostrils every 4 hours  sodium chloride 0.9%. 1000 milliLiter(s) (75 mL/Hr) IV Continuous <Continuous>  tiotropium 18 MICROgram(s) Capsule 1 Capsule(s) Inhalation daily  trimethoprim  160 mG/sulfamethoxazole 800 mG 1 Tablet(s) Oral <User Schedule>    MEDICATIONS  (PRN):  ALBUTerol    90 MICROgram(s) HFA Inhaler 2 Puff(s) Inhalation every 6 hours PRN Shortness of Breath and/or Wheezing  HYDROmorphone   Tablet 4 milliGRAM(s) Oral every 3 hours PRN respiratory distress or severe pain  metoclopramide 10 milliGRAM(s) Oral every 6 hours PRN nausea  polyethylene glycol 3350 17 Gram(s) Oral daily PRN Constipation  sodium chloride 0.65% Nasal 1 Spray(s) Both Nostrils once PRN Nasal Congestion        CAPILLARY BLOOD GLUCOSE        I&O's Summary    03 Nov 2021 07:01  -  04 Nov 2021 07:00  --------------------------------------------------------  IN: 0 mL / OUT: 2 mL / NET: -2 mL        PHYSICAL EXAM:  GENERAL: NAD, well-developed, in no distress  HEAD:  Atraumatic, Normocephalic  EYES: EOMI, PERRLA, conjunctiva and sclera clear  NECK: Supple, No JVD  CHEST/LUNG: Diminished BS B/L, No wheeze  HEART: Irregular rate and rhythm; No murmurs, S1, S2 present  ABDOMEN: Soft, Nontender, Nondistended; Bowel sounds present, voiding  EXTREMITIES:  3+edema, weak peripheral Pulses, No cyanosis, LLE cooler than RLL. LUE swelling post IV placement  PSYCH: AAOx3  NEUROLOGY: non-focal  SKIN: No rashes or lesions, dry skin    LABS:                        15.0   19.68 )-----------( 187      ( 04 Nov 2021 07:37 )             47.1     11-04    137  |  94<L>  |  86<H>  ----------------------------<  113<H>  4.6   |  24  |  2.07<H>    Ca    9.2      04 Nov 2021 07:40  Phos  6.7     11-04  Mg     2.80     11-04    TPro  6.7  /  Alb  4.0  /  TBili  1.1  /  DBili  x   /  AST  107<H>  /  ALT  225<H>  /  AlkPhos  223<H>  11-04        RADIOLOGY & ADDITIONAL TESTS:    Imaging Personally Reviewed:    Consultant(s) Notes Reviewed:      Care Discussed with Consultants/Other Providers:

## 2021-11-04 NOTE — CONSULT NOTE ADULT - ATTENDING COMMENTS
dos 11/5/21    61m metastatic nsclc to liver, adrenal, ln, lung, vert body L4. most recnt imaging shows POD.  adm 10/16 with chf exacerbation. course c/b hypox resp failure req steroids, arf  had been able to walk but not for last 2 days when had b/l leg weakness  denies complaints in face and arms  denies back pain and abd pain.    on exam he was alert, on his laptop working. able to give his history.  on oxygen  arms I didn't see drift.   legs weak proximally- used his hands to lift for HF. 3/5 KE at least. distally was better.   legs very edematous, but says overall this is an improvement from before so there was no acute worsening in leg edema prior to the weakness  he didn't wantt o walk as he felt indigestion during the exam    imp:  I know the resident saw arm weakness before but I only see isolated b/l LE weakness without any UMN signs, and he denies issues with the arm.  I think pathology then is less likely in brain or spinal cord  rec MRI L spine to look for upper lumbar nerve root or plexus involvement  consider r/o retroperitoneal hematoma since hes on AC for AF  other possible etiologies include metatstatic spread/extension from bony met, leptomeningeal disease.  steroid myopathy on ddx though would have expected neck and proximal arm involvement too
Patient seen at bedside. Case discussed with Dr Hoffmann. Plan as above.   62 yo M with PMH of metastatic NSCLC started on 4th line treatment with pembrolizumab (C1 10/13/21), COPD, ILD, pulmonary HTN, on 4L NC at home who p/w SOB x 1 week with imaging concerning for postobstructive pneumonia and pulmonary edema.   CT reviewed.  Current symptoms are unlikely to be related to side effects from immunotherapy.  Pulm input appreciated.  On Abx, HFNC and diuresis.   Full code.  Will follow.

## 2021-11-05 NOTE — HOSPICE CARE NOTE - CONVESATION DETAILS
Hospice nurse met with pt and spoke to pt's spouse via tel call along with Palliative Care Attending MD, Dr Radha Massey. Reviewed pt's care needs and proposed inpt hospice care as a transition plan for home hospice care. Pt/spouse listened to this option and also requested if it would be possible for pt to be admitted to [facility] inpt hospice unit. ARMIN Lauren Byrd made a call to [facility] and was informed this facility no longer has an inpt hospice unit. Hospice RN will follow up with pt/family and Palliative Care and Pulmonary Teams for planning for a safe transition of care.

## 2021-11-05 NOTE — HOSPICE CARE NOTE - FAMILY IN AGREEMENT ADDITIONAL DETAILS
Family will need to make decisions re home with hospice care and extra caregiver assistance to meet pt's care needs and also consider the inpatient hospice care option. Decisions/discussion pending.

## 2021-11-05 NOTE — PROGRESS NOTE ADULT - SUBJECTIVE AND OBJECTIVE BOX
[facility]  Radha Massey, Palliative Care Attending  Contact Info: Page [phone] (including Nights/Weekends), message on [employer] Teams (Radha Massey), or leave  at Palliative Office [phone] (non-urgent)     SUBJECTIVE AND OBJECTIVE: Patient seen with hospice RN, Sasha Shelton, and patient's wife, Annie (via telephone). Pt reporting left lower extremity weakness, which he is awaiting CT imaging. I explained to patient's wife, Annie, the concern that patient's functional status is declining and his needs appear to be increasing if he were to return home. She stated she understood but she feels that she and her daughter (who is RN with [employer]) can manage the patient at home together. I explained alternative option of transitioning to inpatient hospice facility, [facility], that could manage him if he were on [place] 40% 40lpm. Annie expressed interest in [facility], which hospice Liasion explained is affiliated with [place], but we would speak with Unit care coordination team to investigate into that option.     INTERVAL HPI/OVERNIGHT EVENTS:  > Over the past 24 hours, patient did not require PRNs in addition to BID dilaudid.     DNR on chart:   Allergies    No Known Allergies    Intolerances    MEDICATIONS  (STANDING):  apixaban 5 milliGRAM(s) Oral two times a day  budesonide 160 MICROgram(s)/formoterol 4.5 MICROgram(s) Inhaler 2 Puff(s) Inhalation two times a day  chlorhexidine 4% Liquid 1 Application(s) Topical daily  cyanocobalamin 1000 MICROGram(s) Oral daily  HYDROmorphone   Tablet 4 milliGRAM(s) Oral <User Schedule>  influenza   Vaccine 0.5 milliLiter(s) IntraMuscular once  metoprolol tartrate 25 milliGRAM(s) Oral two times a day  multivitamin 1 Tablet(s) Oral daily  nystatin    Suspension [id number] Unit(s) Swish and Swallow four times a day  pantoprazole    Tablet 40 milliGRAM(s) Oral before breakfast  predniSONE   Tablet 40 milliGRAM(s) Oral daily  senna 2 Tablet(s) Oral at bedtime  simethicone 80 milliGRAM(s) Chew once  sodium chloride 0.9%. 1000 milliLiter(s) (75 mL/Hr) IV Continuous <Continuous>  tiotropium 18 MICROgram(s) Capsule 1 Capsule(s) Inhalation daily  trimethoprim  160 mG/sulfamethoxazole 800 mG 1 Tablet(s) Oral <User Schedule>    MEDICATIONS  (PRN):  ALBUTerol    90 MICROgram(s) HFA Inhaler 2 Puff(s) Inhalation every 6 hours PRN Shortness of Breath and/or Wheezing  HYDROmorphone   Tablet 4 milliGRAM(s) Oral every 3 hours PRN respiratory distress or severe pain  metoclopramide 10 milliGRAM(s) Oral every 6 hours PRN nausea  polyethylene glycol 3350 17 Gram(s) Oral daily PRN Constipation  sodium chloride 0.65% Nasal 1 Spray(s) Both Nostrils once PRN Nasal Congestion      ITEMS UNCHECKED ARE NOT PRESENT    PRESENT SYMPTOMS: [ ]Unable to obtain due to poor mentation   Source if other than patient:  [ ]Family   [ ]Team     Pain:  [ ]yes [x ]no  QOL impact -   Location -                    Aggravating factors -  Quality -  Radiation -  Timing-  Severity (0-10 scale):  Minimal acceptable level (0-10 scale):     Dyspnea:                           [ ]Mild [ ]Moderate [ ]Severe  Anxiety:                             [ ]Mild [ ]Moderate [ ]Severe  Fatigue:                             [ ]Mild [ ]Moderate [ ]Severe  Nausea:                             [ ]Mild [ ]Moderate [ ]Severe  Loss of appetite:              [ ]Mild [ ]Moderate [ ]Severe  Constipation:                    [ ]Mild [ ]Moderate [ ]Severe    PAIN AD Score:	  http://geriatrictoolkit.[place].[email or web address]/cog/painad.pdf (Ctrl + left click to view)    Other Symptoms:  [ ]All other review of systems negative     Palliative Performance Status Version 2:         %      http://npcrc.org/files/news/palliative_performance_scale_ppsv2.pdf  PHYSICAL EXAM:  Vital Signs Last 24 Hrs  T(C): 36.3 (05 Nov 2021 10:16), Max: 36.6 (04 Nov 2021 17:21)  T(F): 97.4 (05 Nov 2021 10:16), Max: 97.8 (04 Nov 2021 17:21)  HR: 85 (05 Nov 2021 10:16) (64 - 103)  BP: 99/80 (05 Nov 2021 10:16) (99/80 - 116/74)  BP(mean): --  RR: 18 (05 Nov 2021 10:16) (16 - 24)  SpO2: 86% (05 Nov 2021 10:16) (82% - 88%) I&O's Summary    04 Nov 2021 07:01  -  05 Nov 2021 07:00  --------------------------------------------------------  IN: 0 mL / OUT: 650 mL / NET: -650 mL       GENERAL:  [ x]Alert  [x ]Oriented x3   [ ]Lethargic  [ ]Cachexia  [ ]Unarousable  [x ]Verbal  [ ]Non-Verbal  Behavioral:   [ ] Anxiety  [ ] Delirium [ ] Agitation [x ] Other  HEENT:  [ ]Normal   [ ]Dry mouth   [ ]ET Tube/Trach  [ ]Oral lesions  PULMONARY: HFNC   [ ]Clear [ ]Tachypnea  [ ]Audible excessive secretions   [x ]Rhonchi        [ x]Right [ ]Left [ ]Bilateral  [ ]Crackles        [ ]Right [ ]Left [ ]Bilateral  [ ]Wheezing     [ ]Right [ ]Left [ ]Bilateral  [ ]Diminished breath sounds [ ]right [ ]left [ ]bilateral  CARDIOVASCULAR:    [x ]Regular [ ]Irregular [ ]Tachy  [ ]Randy [ ]Murmur [ ]Other  GASTROINTESTINAL:  [x ]Soft  [ ]Distended   [ ]+BS  [ ]Non tender [ ]Tender  [ ]PEG [ ]OGT/ NGT  Last BM: 11/3  GENITOURINARY:  [x ]Normal [ ] Incontinent   [ ]Oliguria/Anuria   [ ]Haro  MUSCULOSKELETAL:   [ ]Normal   [ ]Weakness  [ ]Bed/Wheelchair bound [ x]Edema- b/l lower extremity edema  NEUROLOGIC:   [ x]No focal deficits  [ ]Cognitive impairment  [ ]Dysphagia [ ]Dysarthria [ ]Paresis [ ]Other   SKIN: Hyperpigmentation  noted on anterior left leg   [ ]Normal    [ ]Rash  [ ]Pressure ulcer(s)       Present on admission [ ]y [ ]n    CRITICAL CARE:  [ ] Shock Present  [ ]Septic [ ]Cardiogenic [ ]Neurologic [ ]Hypovolemic  [ ]  Vasopressors [ ]  Inotropes   [ ]Respiratory failure present [ ]Mechanical ventilation [ ]Non-invasive ventilatory support [ ]High flow  [ ]Acute  [ ]Chronic [ ]Hypoxic  [ ]Hypercarbic [ ]Other  [ ]Other organ failure     LABS:                        15.0   19.68 )-----------( 187      ( 04 Nov 2021 07:37 )             47.1   11-04    137  |  94<L>  |  86<H>  ----------------------------<  113<H>  4.6   |  24  |  2.07<H>    Ca    9.2      04 Nov 2021 07:40  Phos  6.7     11-04  Mg     2.80     11-04    TPro  6.7  /  Alb  4.0  /  TBili  1.1  /  DBili  x   /  AST  107<H>  /  ALT  225<H>  /  AlkPhos  223<H>  11-04      RADIOLOGY & ADDITIONAL STUDIES:     Protein Calorie Malnutrition Present: [ ]mild [ ]moderate [ ]severe [ ]underweight [ ]morbid obesity  https://www.andeal.org/vault/2440/web/files/ONC/Table_Clinical%20Characteristics%20to%20Document%20Malnutrition-White%20JV%20et%20al%202012.pdf    Height (cm): 177.8 (10-15-21 @ 19:14), 177 (10-04-21 @ 19:57), 177 (08-11-21 @ 09:30)  Weight (kg): 113 (10-17-21 @ 11:59), 130 (10-04-21 @ 19:57), 131 (08-11-21 @ 09:30)  BMI (kg/m2): 35.7 (10-17-21 @ 11:59), 41.1 (10-15-21 @ 19:14), 41.5 (10-04-21 @ 19:57)    [ ]PPSV2 < or = 30%  [ ]significant weight loss [ ]poor nutritional intake [ ]anasarca    [ ]Artificial Nutrition    REFERRALS:   [ ]Chaplaincy  [ ]Hospice  [ ]Child Life  [ ]Social Work  [ ]Case management [ ]Holistic Therapy

## 2021-11-05 NOTE — PROGRESS NOTE ADULT - GASTROINTESTINAL DETAILS
soft/no distention
soft/nontender/no distention
soft/no distention
soft/nontender/no distention/bowel sounds normal
obese/soft/no distention
soft/nontender/no distention
obese/soft/no distention
soft/nontender/no distention
soft/no distention

## 2021-11-05 NOTE — PROGRESS NOTE ADULT - PROBLEM SELECTOR PROBLEM 4
D/A/I/P: Pt continue sacutely ill, care conference with family around 2000, family had many good questions , very tearful but supportive of each other, wife and 2 children to see patient with family support. Continue on ECMO VAV with VA cannulated R groin and V cannulated at R internal jugular.. CRRT running but not taking anything off. Bleeding rectally through rectal tube, anselmo blood and out OG also, Dr. Menjivar told family that patient has blood clot in colon and that he is not a surgical canidate., see lab result, some of labs are hemolyzing per lab.gave x 2 RBCs for HGB less than 10 as per ECMO protocol.Following IABP for titration of vaSO ACTIVE GTTS . 2/2 VANNESSA DAMPENED.see flowsheet for data. Continue to support and continue POC.   Encounter for palliative care

## 2021-11-05 NOTE — PROGRESS NOTE ADULT - CONSTITUTIONAL DETAILS
well-groomed/no distress
well-groomed/no distress
respiratory distress
no distress
well-groomed/no distress
no distress
well-groomed/respiratory distress
respiratory distress
respiratory distress

## 2021-11-05 NOTE — PROGRESS NOTE ADULT - MS EXT PE MLT D E PC
bilat 3+ edema
clubbing/pedal edema
bilat 3 + edema
bilat 3+ edema
pedal edema
3+ edema
bilat 3+ edema
no cyanosis/pedal edema

## 2021-11-05 NOTE — PROGRESS NOTE ADULT - NEUROLOGICAL DETAILS
alert and oriented x 3
alert and oriented x 3/responds to pain/responds to verbal commands

## 2021-11-05 NOTE — PROGRESS NOTE ADULT - SUBJECTIVE AND OBJECTIVE BOX
Patient is a 61y old  Male who presents with a chief complaint of SOB (04 Nov 2021 16:32)      SUBJECTIVE / OVERNIGHT EVENTS:    MEDICATIONS  (STANDING):  apixaban 5 milliGRAM(s) Oral two times a day  budesonide 160 MICROgram(s)/formoterol 4.5 MICROgram(s) Inhaler 2 Puff(s) Inhalation two times a day  chlorhexidine 4% Liquid 1 Application(s) Topical daily  cyanocobalamin 1000 MICROGram(s) Oral daily  HYDROmorphone   Tablet 4 milliGRAM(s) Oral <User Schedule>  influenza   Vaccine 0.5 milliLiter(s) IntraMuscular once  metoprolol tartrate 25 milliGRAM(s) Oral two times a day  multivitamin 1 Tablet(s) Oral daily  nystatin    Suspension 561591 Unit(s) Swish and Swallow four times a day  pantoprazole    Tablet 40 milliGRAM(s) Oral before breakfast  predniSONE   Tablet 40 milliGRAM(s) Oral daily  senna 2 Tablet(s) Oral at bedtime  sodium chloride 0.9%. 1000 milliLiter(s) (75 mL/Hr) IV Continuous <Continuous>  tiotropium 18 MICROgram(s) Capsule 1 Capsule(s) Inhalation daily  trimethoprim  160 mG/sulfamethoxazole 800 mG 1 Tablet(s) Oral <User Schedule>    MEDICATIONS  (PRN):  ALBUTerol    90 MICROgram(s) HFA Inhaler 2 Puff(s) Inhalation every 6 hours PRN Shortness of Breath and/or Wheezing  HYDROmorphone   Tablet 4 milliGRAM(s) Oral every 3 hours PRN respiratory distress or severe pain  metoclopramide 10 milliGRAM(s) Oral every 6 hours PRN nausea  polyethylene glycol 3350 17 Gram(s) Oral daily PRN Constipation  sodium chloride 0.65% Nasal 1 Spray(s) Both Nostrils once PRN Nasal Congestion        CAPILLARY BLOOD GLUCOSE        I&O's Summary    04 Nov 2021 07:01  -  05 Nov 2021 07:00  --------------------------------------------------------  IN: 0 mL / OUT: 650 mL / NET: -650 mL        PHYSICAL EXAM:  GENERAL: NAD, well-developed  HEAD:  Atraumatic, Normocephalic  EYES: EOMI, PERRLA, conjunctiva and sclera clear  NECK: Supple, No JVD  CHEST/LUNG: Clear to auscultation bilaterally; No wheeze  HEART: Regular rate and rhythm; No murmurs, rubs, or gallops  ABDOMEN: Soft, Nontender, Nondistended; Bowel sounds present  EXTREMITIES:  2+ Peripheral Pulses, No clubbing, cyanosis, or edema  PSYCH: AAOx3  NEUROLOGY: non-focal  SKIN: No rashes or lesions    LABS:                        15.0   19.68 )-----------( 187      ( 04 Nov 2021 07:37 )             47.1     11-04    137  |  94<L>  |  86<H>  ----------------------------<  113<H>  4.6   |  24  |  2.07<H>    Ca    9.2      04 Nov 2021 07:40  Phos  6.7     11-04  Mg     2.80     11-04    TPro  6.7  /  Alb  4.0  /  TBili  1.1  /  DBili  x   /  AST  107<H>  /  ALT  225<H>  /  AlkPhos  223<H>  11-04              RADIOLOGY & ADDITIONAL TESTS:    Imaging Personally Reviewed:    Consultant(s) Notes Reviewed:      Care Discussed with Consultants/Other Providers:   Patient is a 61y old  Male who presents with a chief complaint of SOB (04 Nov 2021 16:32)      SUBJECTIVE / OVERNIGHT EVENTS: No overnight events, pt was resting comfortably on HFNC    MEDICATIONS  (STANDING):  apixaban 5 milliGRAM(s) Oral two times a day  budesonide 160 MICROgram(s)/formoterol 4.5 MICROgram(s) Inhaler 2 Puff(s) Inhalation two times a day  chlorhexidine 4% Liquid 1 Application(s) Topical daily  cyanocobalamin 1000 MICROGram(s) Oral daily  HYDROmorphone   Tablet 4 milliGRAM(s) Oral <User Schedule>  influenza   Vaccine 0.5 milliLiter(s) IntraMuscular once  metoprolol tartrate 25 milliGRAM(s) Oral two times a day  multivitamin 1 Tablet(s) Oral daily  nystatin    Suspension 663921 Unit(s) Swish and Swallow four times a day  pantoprazole    Tablet 40 milliGRAM(s) Oral before breakfast  predniSONE   Tablet 40 milliGRAM(s) Oral daily  senna 2 Tablet(s) Oral at bedtime  sodium chloride 0.9%. 1000 milliLiter(s) (75 mL/Hr) IV Continuous <Continuous>  tiotropium 18 MICROgram(s) Capsule 1 Capsule(s) Inhalation daily  trimethoprim  160 mG/sulfamethoxazole 800 mG 1 Tablet(s) Oral <User Schedule>    MEDICATIONS  (PRN):  ALBUTerol    90 MICROgram(s) HFA Inhaler 2 Puff(s) Inhalation every 6 hours PRN Shortness of Breath and/or Wheezing  HYDROmorphone   Tablet 4 milliGRAM(s) Oral every 3 hours PRN respiratory distress or severe pain  metoclopramide 10 milliGRAM(s) Oral every 6 hours PRN nausea  polyethylene glycol 3350 17 Gram(s) Oral daily PRN Constipation  sodium chloride 0.65% Nasal 1 Spray(s) Both Nostrils once PRN Nasal Congestion      CAPILLARY BLOOD GLUCOSE        I&O's Summary    04 Nov 2021 07:01  -  05 Nov 2021 07:00  --------------------------------------------------------  IN: 0 mL / OUT: 650 mL / NET: -650 mL        LABS:                        15.0   19.68 )-----------( 187      ( 04 Nov 2021 07:37 )             47.1     11-04    137  |  94<L>  |  86<H>  ----------------------------<  113<H>  4.6   |  24  |  2.07<H>    Ca    9.2      04 Nov 2021 07:40  Phos  6.7     11-04  Mg     2.80     11-04    TPro  6.7  /  Alb  4.0  /  TBili  1.1  /  DBili  x   /  AST  107<H>  /  ALT  225<H>  /  AlkPhos  223<H>  11-04        RADIOLOGY & ADDITIONAL TESTS:    Imaging Personally Reviewed:    Consultant(s) Notes Reviewed:      Care Discussed with Consultants/Other Providers:

## 2021-11-05 NOTE — CHART NOTE - NSCHARTNOTEFT_GEN_A_CORE
Pt seen for follow up at bedside, had episode of SOB/increased WOB earlier in afternoon and was placed on BiPAP. Also went into Afib RVR, on standing PO metoprolol as he has has a hx of Afib and given lopressor 5mg IVP with improvement. Pt now resting comfortably, titrated back down to HFNC and sleeping. Satting 85-92% which has been pt's baseline. Focusing on O2 for comfort rather than higher sats as per team's discussion with pt and family. Hr also stable now in 80s. RN aware to monitor closely and notify provider with any changes.     Vital Signs Last 24 Hrs  T(C): 36.8 (05 Nov 2021 16:08), Max: 36.8 (05 Nov 2021 16:08)  T(F): 98.2 (05 Nov 2021 16:08), Max: 98.2 (05 Nov 2021 16:08)  HR: 101 (05 Nov 2021 18:00) (66 - 110)  BP: 110/75 (05 Nov 2021 16:08) (99/80 - 116/74)  RR: 23 (05 Nov 2021 19:57) (16 - 23)  SpO2: 92% (05 Nov 2021 19:57) (82% - 92%)    TRAE Read PA-C  [id number] Pt seen for follow up at bedside, had episode of SOB/increased WOB earlier in afternoon and was placed on BiPAP. Also went into Afib RVR, on standing PO metoprolol as he has has a hx of Afib and given lopressor 5mg IVP with improvement. Pt now resting comfortably, titrated back down to HFNC and sleeping. Satting 85-92% which has been pt's baseline. Focusing on O2 for comfort rather than higher sats as per team's discussion with pt and family. Hr also stable now in 80s. RN aware to monitor closely and notify provider with any changes. Pt is DNR/DNI.    Vital Signs Last 24 Hrs  T(C): 36.8 (05 Nov 2021 16:08), Max: 36.8 (05 Nov 2021 16:08)  T(F): 98.2 (05 Nov 2021 16:08), Max: 98.2 (05 Nov 2021 16:08)  HR: 101 (05 Nov 2021 18:00) (66 - 110)  BP: 110/75 (05 Nov 2021 16:08) (99/80 - 116/74)  RR: 23 (05 Nov 2021 19:57) (16 - 23)  SpO2: 92% (05 Nov 2021 19:57) (82% - 92%)    TRAE Read PA-C  [id number]

## 2021-11-05 NOTE — PROGRESS NOTE ADULT - PROBLEM SELECTOR PLAN 4
Palliative will continue to assist with sx management and further goc.   GOC- DNR/DNI, MOLST completed by primary team     Thank you for allowing us to participate in your patient's care. We will continue to follow. Please page [phone] for any q's or c's.     Radha Massey D.O.   Palliative Medicine.

## 2021-11-05 NOTE — PROGRESS NOTE ADULT - NEGATIVE GENERAL GENITOURINARY SYMPTOMS
no hematuria/no flank pain L/no flank pain R/no incontinence
no hematuria/no flank pain L/no flank pain R/no incontinence

## 2021-11-05 NOTE — PROGRESS NOTE ADULT - NEGATIVE GASTROINTESTINAL SYMPTOMS
no nausea/no vomiting/no diarrhea/no change in bowel habits
no nausea/no vomiting/no diarrhea/no change in bowel habits/no abdominal pain/no melena

## 2021-11-05 NOTE — PROGRESS NOTE ADULT - RS GEN PE MLT RESP DETAILS PC
diminished breath sounds, L/diminished breath sounds, R
Mild wheezing and rhonchi heard bilaterally/airway patent/breath sounds equal/good air movement
Mild wheezing appreciated bilaterally with some rhonchi./airway patent/breath sounds equal/good air movement
Diminished bs R>L
diminished bs/airway patent/breath sounds equal
bilat coarse bs/crackles/airway patent/breath sounds equal
airway patent/breath sounds equal/good air movement
bilat coarse bs/airway patent/breath sounds equal
breath sounds equal/clear to auscultation bilaterally/diminished breath sounds, L/diminished breath sounds, R

## 2021-11-05 NOTE — PROGRESS NOTE ADULT - ASSESSMENT
61M NSCLC (on therapy), ILD with occupational exposure and smoking history, and chronic hypoxemic respiratory failure presenting with acute on chronic hypoxemia as well as volume overload. He has had a CTPA in the ED which is worse than a recent CT and is concerning for volume overload, possible infection, and most notably progression of cancer      Hypoxic Respiratory Failure  - Likely multifactorial, however most likely cause is patient's malignancy NSCLC  - evidence of RV dysfunction likely in the setting of pHTN 2/2 hypoxemia  - Continue airway clearance therapy  - Continue Prednisone 40mg po qd, Lasix d/c 2/2 BLANKA  - s/p GOC discussion with wife and patient, DNR/DNI with overall goal to go home with A services  - Poor prognosis overall, patient still requiring high FiO2 despite interventions  - Wean O2 as tolerated; still requiring HFNC 55L/90%  - Appreciate Radiation/Onc recs - will need to see if pt can tolerate lying flat for radiation also requesting IR eval for biopsy infection vs mets in R lung   - Hold off on biopsy for now   - Pt tolerating Life 2000 for short periods -will continue, RT notified   - s/p Pulse steroids x 3 days, now on Prednisone 40mg po qd  - Palliative ordered Morpine ER 15mg bid and Morphine IR 2.5 mg bid prn d/c 2/2 BLANKA started Dilauded standing & PRN  - Pt would like to be d/c to home with comfort.  If he feels stable with low pulse ox but comfortable can be dc home. Wife ordered and will bring in oxymizer nasal cannula for pt to try for use at home. May also try home HFNC     Acute Lower Ext weakness  - Neuro house cs called will f/u their recs  - PT- re-eval for ROM exercises     Hypertention / CHF  - BP remains stable (soft)  - HFpEF; BNP of 6752 on admission  - EKG showed sinus tachycardia, right atrial enlargement, rightward axis, and nonspecific ST and T wave abnormalities  - EF 56% on echo (10/20), LV systolic wnl, Right ventricular enlargement with decreased RV systolic function  - Was on IV lasix 40 BID; dc'd on 10/31 due to worsening creat concerning for overdiuresis  - Pt noted with irregular HR- EKG with Rapid AF - metoprolol started and rate improved   - 11/3 EKG still in Afib with better rate control- started Eliquis 5mg po BID     Transaminitis  - No evidence of abd pain; possibly 2/2 to liver mets  - will continue to trend for now    Worsening renal function  - Holding Lasix since 10/31 in setting of ?overdiuresis  - Lovenox switched to heparin TID d/c, now on Eliquis  - Trend creat-rising again - IV fluids for 12 h and recheck in am     Leukocytosis likely 2/2 prednisone  - No fevers     DVT PPX: Now on Eliquis for AFib  #Ethics- DNR/DNI  #Dispo- DNR/DNI with overall goal to go home if HFNC can be tapered to decreased settings 40/40   61M NSCLC (on therapy), ILD with occupational exposure and smoking history, and chronic hypoxemic respiratory failure presenting with acute on chronic hypoxemia as well as volume overload. He has had a CTPA in the ED which is worse than a recent CT and is concerning for volume overload, possible infection, and most notably progression of cancer      Hypoxic Respiratory Failure  - Likely multifactorial, however most likely cause is patient's malignancy NSCLC  - evidence of RV dysfunction likely in the setting of pHTN 2/2 hypoxemia  - Continue airway clearance therapy  - Continue Prednisone 40mg po qd, Lasix d/c 2/2 BLANKA  - s/p GOC discussion with wife and patient, DNR/DNI with overall goal to go home with A services  - Poor prognosis overall, patient still requiring high FiO2 despite interventions  - Appreciate Radiation/Onc recs - will need to see if pt can tolerate lying flat for radiation also requesting IR eval for biopsy infection vs mets in R lung. Hold off on biopsy for now   - Pt tolerating Life 2000 for short periods -will continue, RT notified   - s/p Pulse steroids x 3 days, now on Prednisone 40mg po qd  - Palliative ordered Morphine ER 15mg bid and Morphine IR 2.5 mg bid prn d/c 2/2 BLANKA started Dilaudid standing & PRN  - 11/5 pt c/o SOB, chest discomfort-->EKG shows AFib w/ RVR pt already on Metoprolol 25 BID, Metoprolol 5 IVP x1 given. RT increased HFNC to 60L/100% still saturating 75%. Atrovent Neb ordered. Pt was still in distress, anxious. Per d/w Fellow pt was placed on BIPAP 12/6. Spouse at bedside, pt observed to be more comfortable  - CXR ordered, will f/u  - Pt would like to be d/c to home with comfort.  If he feels stable with low pulse ox but comfortable can be dc home. Wife ordered Oxymizer nasal cannula for pt to try for use at home pt tried on 11/5 could not tolerate.    Acute Lower Ext weakness  - Neuro house cs called will f/u their recs  - PT- re-eval for ROM exercises -last seen 11/4    Hypertension / CHF  - BP remains stable (soft)  - HFpEF; BNP of 6752 on admission  - EKG showed sinus tachycardia, right atrial enlargement, rightward axis, and nonspecific ST and T wave abnormalities  - EF 56% on echo (10/20), LV systolic wnl, Right ventricular enlargement with decreased RV systolic function  - Was on IV lasix 40 BID; dc'd on 10/31 due to worsening creat concerning for overdiuresis. 11/5 Lasix 40mg IV x1 given for SOB and worsening work of breathing  - Pt noted with irregular HR- EKG with Rapid AF - metoprolol started and rate improved   - 11/3 EKG still in Afib with better rate control- started Eliquis 5mg po BID     Transaminitis  - No evidence of abd pain; possibly 2/2 to liver mets  - will continue to trend for now    Worsening renal function  - Holding Lasix since 10/31 in setting of ?overdiuresis  - Lovenox switched to heparin TID d/c, now on Eliquis 5mg po BID  - Trend creat-rising again - s/p IV fluids for 12 h on 11/3     Leukocytosis likely 2/2 prednisone  - No fevers     DVT PPX: Now on Eliquis for AFib  #Ethics- DNR/DNI  #Dispo- DNR/DNI with overall goal to go home if HFNC can be tapered to decreased settings 40/40

## 2021-11-05 NOTE — PROGRESS NOTE ADULT - EXTREMITIES
detailed exam
No cyanosis, clubbing or edema
detailed exam

## 2021-11-05 NOTE — PROGRESS NOTE ADULT - NEGATIVE NEUROLOGICAL SYMPTOMS
no generalized seizures/no syncope/no tremors/no headache
no paresthesias/no generalized seizures/no syncope

## 2021-11-05 NOTE — PROGRESS NOTE ADULT - PROBLEM SELECTOR PLAN 3
Onc care at [facility] with Dr. Zheng   s/p RLL resection and chemotherapy, Last chemo on 10/13  > Heme/onc recs appreciated.  > rad/onc recs appreciated

## 2021-11-05 NOTE — PROGRESS NOTE ADULT - PROBLEM SELECTOR PLAN 1
Dyspnea 2/2 Post obstructive pna vs. pulmonary edema in setting of metastatic NSCLC  > 4mg PO dilaudid (9am and 9pm) and dilaudid 4mg PO q3 prn respiratory distress or severe pain   > Trialing pulse dose steroids   > Pulm recs appreciated: taper HFNC, continue nebs  > Hospice would be able to manage HFNC at 40lpm with 40%FiO2. Discussed transition to inpatient hospice at the [facility] that could manage HFNC with 40lpm, 40% fio2, but patient's goal is still home and family believes they can manage patient (despite declining condition). Family interested in [facility], which care coordination team was made aware of.   > Goal is to wean patient's oxygen requirements to settings that are manageable in a home setting and ensuring patient remains asymptomatic despite hypoxia.   > please ensure patient is on bowel regimen while on opioids.

## 2021-11-06 NOTE — PROGRESS NOTE ADULT - SUBJECTIVE AND OBJECTIVE BOX
CHIEF COMPLAINT:Patient is a 61y old  Male who presents with a chief complaint of SOB (05 Nov 2021 14:30)      INTERVAL EVENTS:     ROS: Seen by bedside during AM rounds     OBJECTIVE:  ICU Vital Signs Last 24 Hrs  T(C): 37 (06 Nov 2021 06:24), Max: 37.1 (05 Nov 2021 22:00)  T(F): 98.6 (06 Nov 2021 06:24), Max: 98.7 (05 Nov 2021 22:00)  HR: 84 (06 Nov 2021 07:08) (67 - 110)  BP: 116/79 (06 Nov 2021 06:24) (97/76 - 116/79)  BP(mean): --  ABP: --  ABP(mean): --  RR: 20 (06 Nov 2021 07:08) (18 - 23)  SpO2: 85% (06 Nov 2021 07:08) (85% - 92%)        11-05 @ 07:01  -  11-06 @ 07:00  --------------------------------------------------------  IN: 0 mL / OUT: 1000 mL / NET: -1000 mL      CAPILLARY BLOOD GLUCOSE          PHYSICAL EXAM:  General:   HEENT:   Lymph Nodes:  Neck:   Respiratory:   Cardiovascular:   Abdomen:   Extremities:   Skin:   Neurological:  Psychiatry:        HOSPITAL MEDICATIONS:  MEDICATIONS  (STANDING):  apixaban 5 milliGRAM(s) Oral two times a day  budesonide 160 MICROgram(s)/formoterol 4.5 MICROgram(s) Inhaler 2 Puff(s) Inhalation two times a day  chlorhexidine 4% Liquid 1 Application(s) Topical daily  cyanocobalamin 1000 MICROGram(s) Oral daily  HYDROmorphone   Tablet 4 milliGRAM(s) Oral <User Schedule>  influenza   Vaccine 0.5 milliLiter(s) IntraMuscular once  metoprolol tartrate 25 milliGRAM(s) Oral two times a day  multivitamin 1 Tablet(s) Oral daily  nystatin    Suspension 657991 Unit(s) Swish and Swallow four times a day  pantoprazole    Tablet 40 milliGRAM(s) Oral before breakfast  predniSONE   Tablet 40 milliGRAM(s) Oral daily  senna 2 Tablet(s) Oral at bedtime  sodium chloride 0.9%. 1000 milliLiter(s) (75 mL/Hr) IV Continuous <Continuous>  trimethoprim  160 mG/sulfamethoxazole 800 mG 1 Tablet(s) Oral <User Schedule>    MEDICATIONS  (PRN):  ALBUTerol    90 MICROgram(s) HFA Inhaler 2 Puff(s) Inhalation every 6 hours PRN Shortness of Breath and/or Wheezing  Biotene Dry Mouth Oral Rinse 15 milliLiter(s) Swish and Spit every 4 hours PRN dry mouth  HYDROmorphone   Tablet 4 milliGRAM(s) Oral every 3 hours PRN respiratory distress or severe pain  metoclopramide 10 milliGRAM(s) Oral every 6 hours PRN nausea  polyethylene glycol 3350 17 Gram(s) Oral daily PRN Constipation  sodium chloride 0.65% Nasal 1 Spray(s) Both Nostrils once PRN Nasal Congestion      LABS:                        17.6   25.65 )-----------( 160      ( 06 Nov 2021 07:37 )             55.8     11-06    137  |  93<L>  |  x   ----------------------------<  x   4.6   |  x   |  x     Phos  7.1     11-06  Mg     3.00     11-06             CHIEF COMPLAINT:Patient is a 61y old  Male who presents with a chief complaint of SOB (05 Nov 2021 14:30)      INTERVAL EVENTS: Requried bipap yesterday d/t incr WOB. Placed back on HFNC overnight and tolerating.     ROS: Seen by bedside during AM rounds     OBJECTIVE:  ICU Vital Signs Last 24 Hrs  T(C): 37 (06 Nov 2021 06:24), Max: 37.1 (05 Nov 2021 22:00)  T(F): 98.6 (06 Nov 2021 06:24), Max: 98.7 (05 Nov 2021 22:00)  HR: 84 (06 Nov 2021 07:08) (67 - 110)  BP: 116/79 (06 Nov 2021 06:24) (97/76 - 116/79)  BP(mean): --  ABP: --  ABP(mean): --  RR: 20 (06 Nov 2021 07:08) (18 - 23)  SpO2: 85% (06 Nov 2021 07:08) (85% - 92%)        11-05 @ 07:01  -  11-06 @ 07:00  --------------------------------------------------------  IN: 0 mL / OUT: 1000 mL / NET: -1000 mL      CAPILLARY BLOOD GLUCOSE          PHYSICAL EXAM:  General: NAD   Cards: S1/S2, no murmurs   Pulm: CTA bilaterally. No wheezes.   Abdomen: Soft, NTND.  Extremities: No pedal edema. JORDIN of BL upper and lower extremities.  Neurology: AOx3 with no focal neurological deficits         HOSPITAL MEDICATIONS:  MEDICATIONS  (STANDING):  apixaban 5 milliGRAM(s) Oral two times a day  budesonide 160 MICROgram(s)/formoterol 4.5 MICROgram(s) Inhaler 2 Puff(s) Inhalation two times a day  chlorhexidine 4% Liquid 1 Application(s) Topical daily  cyanocobalamin 1000 MICROGram(s) Oral daily  HYDROmorphone   Tablet 4 milliGRAM(s) Oral <User Schedule>  influenza   Vaccine 0.5 milliLiter(s) IntraMuscular once  metoprolol tartrate 25 milliGRAM(s) Oral two times a day  multivitamin 1 Tablet(s) Oral daily  nystatin    Suspension 298742 Unit(s) Swish and Swallow four times a day  pantoprazole    Tablet 40 milliGRAM(s) Oral before breakfast  predniSONE   Tablet 40 milliGRAM(s) Oral daily  senna 2 Tablet(s) Oral at bedtime  sodium chloride 0.9%. 1000 milliLiter(s) (75 mL/Hr) IV Continuous <Continuous>  trimethoprim  160 mG/sulfamethoxazole 800 mG 1 Tablet(s) Oral <User Schedule>    MEDICATIONS  (PRN):  ALBUTerol    90 MICROgram(s) HFA Inhaler 2 Puff(s) Inhalation every 6 hours PRN Shortness of Breath and/or Wheezing  Biotene Dry Mouth Oral Rinse 15 milliLiter(s) Swish and Spit every 4 hours PRN dry mouth  HYDROmorphone   Tablet 4 milliGRAM(s) Oral every 3 hours PRN respiratory distress or severe pain  metoclopramide 10 milliGRAM(s) Oral every 6 hours PRN nausea  polyethylene glycol 3350 17 Gram(s) Oral daily PRN Constipation  sodium chloride 0.65% Nasal 1 Spray(s) Both Nostrils once PRN Nasal Congestion      LABS:                        17.6   25.65 )-----------( 160      ( 06 Nov 2021 07:37 )             55.8     11-06    137  |  93<L>  |  x   ----------------------------<  x   4.6   |  x   |  x     Phos  7.1     11-06  Mg     3.00     11-06             CHIEF COMPLAINT:Patient is a 61y old  Male who presents with a chief complaint of SOB (05 Nov 2021 14:30)      INTERVAL EVENTS: Requried bipap yesterday d/t incr WOB. Placed back on HFNC overnight and tolerating.     ROS: Seen by bedside during AM rounds, all other systems negative     OBJECTIVE:  ICU Vital Signs Last 24 Hrs  T(C): 37 (06 Nov 2021 06:24), Max: 37.1 (05 Nov 2021 22:00)  T(F): 98.6 (06 Nov 2021 06:24), Max: 98.7 (05 Nov 2021 22:00)  HR: 84 (06 Nov 2021 07:08) (67 - 110)  BP: 116/79 (06 Nov 2021 06:24) (97/76 - 116/79)  BP(mean): --  ABP: --  ABP(mean): --  RR: 20 (06 Nov 2021 07:08) (18 - 23)  SpO2: 85% (06 Nov 2021 07:08) (85% - 92%)        11-05 @ 07:01  -  11-06 @ 07:00  --------------------------------------------------------  IN: 0 mL / OUT: 1000 mL / NET: -1000 mL      CAPILLARY BLOOD GLUCOSE          PHYSICAL EXAM:  General: NAD   Cards: S1/S2, no murmurs   Pulm: CTA bilaterally. No wheezes.   Abdomen: Soft, NTND.  Extremities: No pedal edema. JORDIN of BL upper and lower extremities.  Neurology: AOx3 with no focal neurological deficits         HOSPITAL MEDICATIONS:  MEDICATIONS  (STANDING):  apixaban 5 milliGRAM(s) Oral two times a day  budesonide 160 MICROgram(s)/formoterol 4.5 MICROgram(s) Inhaler 2 Puff(s) Inhalation two times a day  chlorhexidine 4% Liquid 1 Application(s) Topical daily  cyanocobalamin 1000 MICROGram(s) Oral daily  HYDROmorphone   Tablet 4 milliGRAM(s) Oral <User Schedule>  influenza   Vaccine 0.5 milliLiter(s) IntraMuscular once  metoprolol tartrate 25 milliGRAM(s) Oral two times a day  multivitamin 1 Tablet(s) Oral daily  nystatin    Suspension 022920 Unit(s) Swish and Swallow four times a day  pantoprazole    Tablet 40 milliGRAM(s) Oral before breakfast  predniSONE   Tablet 40 milliGRAM(s) Oral daily  senna 2 Tablet(s) Oral at bedtime  sodium chloride 0.9%. 1000 milliLiter(s) (75 mL/Hr) IV Continuous <Continuous>  trimethoprim  160 mG/sulfamethoxazole 800 mG 1 Tablet(s) Oral <User Schedule>    MEDICATIONS  (PRN):  ALBUTerol    90 MICROgram(s) HFA Inhaler 2 Puff(s) Inhalation every 6 hours PRN Shortness of Breath and/or Wheezing  Biotene Dry Mouth Oral Rinse 15 milliLiter(s) Swish and Spit every 4 hours PRN dry mouth  HYDROmorphone   Tablet 4 milliGRAM(s) Oral every 3 hours PRN respiratory distress or severe pain  metoclopramide 10 milliGRAM(s) Oral every 6 hours PRN nausea  polyethylene glycol 3350 17 Gram(s) Oral daily PRN Constipation  sodium chloride 0.65% Nasal 1 Spray(s) Both Nostrils once PRN Nasal Congestion      LABS:                        17.6   25.65 )-----------( 160      ( 06 Nov 2021 07:37 )             55.8     11-06    137  |  93<L>  |  x   ----------------------------<  x   4.6   |  x   |  x     Phos  7.1     11-06  Mg     3.00     11-06

## 2021-11-06 NOTE — PROGRESS NOTE ADULT - ASSESSMENT
61M NSCLC (on therapy), ILD with occupational exposure and smoking history, and chronic hypoxemic respiratory failure presenting with acute on chronic hypoxemia as well as volume overload. He has had a CTPA in the ED which is worse than a recent CT and is concerning for volume overload, possible infection, and most notably progression of cancer      Hypoxic Respiratory Failure  - Likely multifactorial, however most likely cause is patient's malignancy NSCLC  - evidence of RV dysfunction likely in the setting of pHTN 2/2 hypoxemia  - Continue airway clearance therapy  - Continue Prednisone 40mg po qd, Lasix d/c 2/2 BLANKA  - s/p GOC discussion with wife and patient, DNR/DNI with overall goal to go home with A services  - Poor prognosis overall, patient still requiring high FiO2 despite interventions  - Appreciate Radiation/Onc recs - will need to see if pt can tolerate lying flat for radiation also requesting IR eval for biopsy infection vs mets in R lung. Hold off on biopsy for now   - Pt tolerating Life 2000 for short periods -will continue, RT notified   - s/p Pulse steroids x 3 days, now on Prednisone 40mg po qd  - Palliative ordered Morphine ER 15mg bid and Morphine IR 2.5 mg bid prn d/c 2/2 BLANKA started Dilaudid standing & PRN  - 11/5 pt c/o SOB, chest discomfort-->EKG shows AFib w/ RVR pt already on Metoprolol 25 BID, Metoprolol 5 IVP x1 given. RT increased HFNC to 60L/100% still saturating 75%. Atrovent Neb ordered. Pt was still in distress, anxious. Per d/w Fellow pt was placed on BIPAP 12/6. Spouse at bedside, pt observed to be more comfortable  - CXR ordered, will f/u  - Pt would like to be d/c to home with comfort.  If he feels stable with low pulse ox but comfortable can be dc home. Wife ordered Oxymizer nasal cannula for pt to try for use at home pt tried on 11/5 could not tolerate.    Acute Lower Ext weakness  - Neuro house cs called will f/u their recs  - PT- re-eval for ROM exercises -last seen 11/4    Hypertension / CHF  - BP remains stable (soft)  - HFpEF; BNP of 6752 on admission  - EKG showed sinus tachycardia, right atrial enlargement, rightward axis, and nonspecific ST and T wave abnormalities  - EF 56% on echo (10/20), LV systolic wnl, Right ventricular enlargement with decreased RV systolic function  - Was on IV lasix 40 BID; dc'd on 10/31 due to worsening creat concerning for overdiuresis. 11/5 Lasix 40mg IV x1 given for SOB and worsening work of breathing  - Pt noted with irregular HR- EKG with Rapid AF - metoprolol started and rate improved   - 11/3 EKG still in Afib with better rate control- started Eliquis 5mg po BID     Transaminitis  - No evidence of abd pain; possibly 2/2 to liver mets  - will continue to trend for now    Worsening renal function  - Holding Lasix since 10/31 in setting of ?overdiuresis  - Lovenox switched to heparin TID d/c, now on Eliquis 5mg po BID  - Trend creat-rising again - s/p IV fluids for 12 h on 11/3     Leukocytosis likely 2/2 prednisone  - No fevers     DVT PPX: Now on Eliquis for AFib  #Ethics- DNR/DNI  #Dispo- DNR/DNI with overall goal to go home if HFNC can be tapered to decreased settings 40/40

## 2021-11-07 NOTE — PROGRESS NOTE ADULT - ASSESSMENT
61M NSCLC (on therapy), ILD with occupational exposure and smoking history, and chronic hypoxemic respiratory failure presenting with acute on chronic hypoxemia as well as volume overload. He has had a CTPA in the ED which is worse than a recent CT and is concerning for volume overload, possible infection, and most notably progression of cancer      Hypoxic Respiratory Failure  - Likely multifactorial, however most likely cause is patient's malignancy NSCLC  - evidence of RV dysfunction likely in the setting of pHTN 2/2 hypoxemia  - Continue airway clearance therapy  - Continue Prednisone 40mg po qd, Lasix d/c 2/2 BLANKA  - s/p GOC discussion with wife and patient, DNR/DNI with overall goal to go home with A services  - Poor prognosis overall, patient still requiring high FiO2 despite interventions  - Appreciate Radiation/Onc recs - will need to see if pt can tolerate lying flat for radiation also requesting IR eval for biopsy infection vs mets in R lung. Hold off on biopsy for now   - Pt tolerating Life 2000 for short periods -will continue, RT notified   - s/p Pulse steroids x 3 days, now on Prednisone 40mg po qd  - Palliative ordered Morphine ER 15mg bid and Morphine IR 2.5 mg bid prn d/c 2/2 BLANKA started Dilaudid standing & PRN  - 11/5 pt c/o SOB, chest discomfort-->EKG shows AFib w/ RVR pt already on Metoprolol 25 BID, Metoprolol 5 IVP x1 given. RT increased HFNC to 60L/100% still saturating 75%. Atrovent Neb ordered. Pt was still in distress, anxious. Per d/w Fellow pt was placed on BIPAP 12/6. Spouse at bedside, pt observed to be more comfortable  - CXR ordered, will f/u  - Pt would like to be d/c to home with comfort.  If he feels stable with low pulse ox but comfortable can be dc home. Wife ordered Oxymizer nasal cannula for pt to try for use at home pt tried on 11/5 could not tolerate.    Acute Lower Ext weakness  - Neuro house cs called will f/u their recs  - PT- re-eval for ROM exercises -last seen 11/4    Hypertension / CHF  - BP remains stable (soft)  - HFpEF; BNP of 6752 on admission  - EKG showed sinus tachycardia, right atrial enlargement, rightward axis, and nonspecific ST and T wave abnormalities  - EF 56% on echo (10/20), LV systolic wnl, Right ventricular enlargement with decreased RV systolic function  - Was on IV lasix 40 BID; dc'd on 10/31 due to worsening creat concerning for overdiuresis. 11/5 Lasix 40mg IV x1 given for SOB and worsening work of breathing  - Pt noted with irregular HR- EKG with Rapid AF - metoprolol started and rate improved   - 11/3 EKG still in Afib with better rate control- started Eliquis 5mg po BID     Transaminitis  - No evidence of abd pain; possibly 2/2 to liver mets  - will continue to trend for now    Worsening renal function  - Holding Lasix since 10/31 in setting of ?overdiuresis  - Lovenox switched to heparin TID d/c, now on Eliquis 5mg po BID  - Trend creat-rising again - s/p IV fluids for 12 h on 11/3     Leukocytosis likely 2/2 prednisone  - No fevers     DVT PPX: Now on Eliquis for AFib  #Ethics- DNR/DNI  #Dispo- DNR/DNI with overall goal to go home if HFNC can be tapered to decreased settings 40/40   61M NSCLC (on therapy), ILD with occupational exposure and smoking history, and chronic hypoxemic respiratory failure presenting with acute on chronic hypoxemia as well as volume overload. He has had a CTPA in the ED which is worse than a recent CT and is concerning for volume overload, possible infection, and most notably progression of cancer      Hypoxic Respiratory Failure  - Likely multifactorial, however most likely cause is patient's malignancy NSCLC  - evidence of RV dysfunction likely in the setting of pHTN 2/2 hypoxemia  - Continue airway clearance therapy  - Continue Prednisone 40mg po qd, Lasix d/c 2/2 BLANKA  - s/p GOC discussion with wife and patient, DNR/DNI with overall goal to go home with A services  - Poor prognosis overall, patient still requiring high FiO2 despite interventions  - Appreciate Radiation/Onc recs - will need to see if pt can tolerate lying flat for radiation also requesting IR eval for biopsy infection vs mets in R lung. Hold off on biopsy for now   - Pt tolerating Life 2000 for short periods -will continue, RT notified   - s/p Pulse steroids x 3 days, now on Prednisone 40mg po qd  - Palliative ordered Morphine ER 15mg bid and Morphine IR 2.5 mg bid prn d/c 2/2 BLANKA started Dilaudid standing & PRN  - 11/5 pt c/o SOB, chest discomfort-->EKG shows AFib w/ RVR pt already on Metoprolol 25 BID, Metoprolol 5 IVP x1 given. RT increased HFNC to 60L/100% still saturating 75%. Atrovent Neb ordered. Pt was still in distress, anxious. Per d/w Fellow pt was placed on BIPAP 12/6. Spouse at bedside, pt observed to be more comfortable  - CXR ordered, will f/u  - Pt would like to be d/c to home with comfort.  If he feels stable with low pulse ox but comfortable can be dc home. Wife ordered Oxymizer nasal cannula for pt to try for use at home pt tried on 11/5 could not tolerate.    Acute Lower Ext weakness  - Neuro consulted- recommending MRI however likely will have difficulty obtaining given patient on HFNC with high O2 requirements  - PT- re-eval for ROM exercises -last seen 11/4    Hypertension / CHF  - BP remains stable (soft)  - HFpEF; BNP of 6752 on admission  - EKG showed sinus tachycardia, right atrial enlargement, rightward axis, and nonspecific ST and T wave abnormalities  - EF 56% on echo (10/20), LV systolic wnl, Right ventricular enlargement with decreased RV systolic function  - Was on IV lasix 40 BID; dc'd on 10/31 due to worsening creat concerning for overdiuresis. 11/5 Lasix 40mg IV x1 given for SOB and worsening work of breathing  - Pt noted with irregular HR- EKG with Rapid AF - metoprolol started and rate improved   - 11/3 EKG still in Afib with better rate control- started Eliquis 5mg po BID     Transaminitis  - No evidence of abd pain; possibly 2/2 to liver mets  - will continue to trend for now    Worsening renal function  - Holding Lasix since 10/31 in setting of ?overdiuresis  - Lovenox switched to heparin TID d/c, now on Eliquis 5mg po BID  - Trend creat-rising again - s/p IV fluids for 12 h on 11/3   - Diuretics on hold given Cr elevation    Leukocytosis likely 2/2 prednisone  - No fevers     DVT PPX: Now on Eliquis for AFib  #Ethics- DNR/DNI  #Dispo- DNR/DNI with overall goal to go home if HFNC can be tapered to decreased settings 40/40

## 2021-11-07 NOTE — PROGRESS NOTE ADULT - SUBJECTIVE AND OBJECTIVE BOX
CHIEF COMPLAINT:Patient is a 61y old  Male who presents with a chief complaint of SOB (06 Nov 2021 08:23)      INTERVAL EVENTS:     ROS: Seen by bedside during AM rounds     OBJECTIVE:  ICU Vital Signs Last 24 Hrs  T(C): 36.3 (07 Nov 2021 06:26), Max: 36.8 (06 Nov 2021 10:39)  T(F): 97.4 (07 Nov 2021 06:26), Max: 98.2 (06 Nov 2021 10:39)  HR: 55 (07 Nov 2021 07:23) (55 - 96)  BP: 118/81 (07 Nov 2021 06:26) (106/78 - 121/87)  BP(mean): 93 (07 Nov 2021 06:26) (89 - 93)  ABP: --  ABP(mean): --  RR: 22 (07 Nov 2021 07:23) (18 - 22)  SpO2: 88% (07 Nov 2021 07:23) (83% - 96%)        11-06 @ 08:01  -  11-07 @ 07:00  --------------------------------------------------------  IN: 150 mL / OUT: 1550 mL / NET: -1400 mL      CAPILLARY BLOOD GLUCOSE          PHYSICAL EXAM:  General:   HEENT:   Lymph Nodes:  Neck:   Respiratory:   Cardiovascular:   Abdomen:   Extremities:   Skin:   Neurological:  Psychiatry:        HOSPITAL MEDICATIONS:  MEDICATIONS  (STANDING):  apixaban 5 milliGRAM(s) Oral two times a day  budesonide 160 MICROgram(s)/formoterol 4.5 MICROgram(s) Inhaler 2 Puff(s) Inhalation two times a day  chlorhexidine 4% Liquid 1 Application(s) Topical daily  cyanocobalamin 1000 MICROGram(s) Oral daily  HYDROmorphone   Tablet 4 milliGRAM(s) Oral <User Schedule>  influenza   Vaccine 0.5 milliLiter(s) IntraMuscular once  metoprolol tartrate 25 milliGRAM(s) Oral two times a day  multivitamin 1 Tablet(s) Oral daily  nystatin    Suspension 901396 Unit(s) Swish and Swallow four times a day  pantoprazole    Tablet 40 milliGRAM(s) Oral before breakfast  predniSONE   Tablet 40 milliGRAM(s) Oral daily  senna 2 Tablet(s) Oral at bedtime  sodium chloride 0.9%. 1000 milliLiter(s) (75 mL/Hr) IV Continuous <Continuous>  trimethoprim  160 mG/sulfamethoxazole 800 mG 1 Tablet(s) Oral <User Schedule>    MEDICATIONS  (PRN):  ALBUTerol    90 MICROgram(s) HFA Inhaler 2 Puff(s) Inhalation every 6 hours PRN Shortness of Breath and/or Wheezing  Biotene Dry Mouth Oral Rinse 15 milliLiter(s) Swish and Spit every 4 hours PRN dry mouth  HYDROmorphone   Tablet 4 milliGRAM(s) Oral every 3 hours PRN respiratory distress or severe pain  metoclopramide 10 milliGRAM(s) Oral every 6 hours PRN nausea  polyethylene glycol 3350 17 Gram(s) Oral daily PRN Constipation  sodium chloride 0.65% Nasal 1 Spray(s) Both Nostrils once PRN Nasal Congestion      LABS:                        15.1   22.55 )-----------( 124      ( 07 Nov 2021 06:56 )             48.6     11-06    137  |  93<L>  |  99<H>  ----------------------------<  71  4.6   |  26  |  2.16<H>    Ca    9.3      06 Nov 2021 07:37  Phos  7.1     11-06  Mg     3.00     11-06    TPro  7.0  /  Alb  4.1  /  TBili  2.1<H>  /  DBili  x   /  AST  955<H>  /  ALT  1394<H>  /  AlkPhos  268<H>  11-06           CHIEF COMPLAINT:Patient is a 61y old  Male who presents with a chief complaint of SOB (06 Nov 2021 08:23)    INTERVAL EVENTS: no acute events overnight. Clinically unchanged.     ROS: See above. Remaining ROS neg.     OBJECTIVE:  ICU Vital Signs Last 24 Hrs  T(C): 36.3 (07 Nov 2021 06:26), Max: 36.8 (06 Nov 2021 10:39)  T(F): 97.4 (07 Nov 2021 06:26), Max: 98.2 (06 Nov 2021 10:39)  HR: 55 (07 Nov 2021 07:23) (55 - 96)  BP: 118/81 (07 Nov 2021 06:26) (106/78 - 121/87)  BP(mean): 93 (07 Nov 2021 06:26) (89 - 93)  ABP: --  ABP(mean): --  RR: 22 (07 Nov 2021 07:23) (18 - 22)  SpO2: 88% (07 Nov 2021 07:23) (83% - 96%)        11-06 @ 08:01  -  11-07 @ 07:00  --------------------------------------------------------  IN: 150 mL / OUT: 1550 mL / NET: -1400 mL      CAPILLARY BLOOD GLUCOSE          PHYSICAL EXAM:  General: NAD   Cards: S1/S2, no murmurs   Pulm: On HFNC but appears comfortable. Sleeping during exam but easily arousable. MIldly diminished BS b/l with no accessory muscle use.  Abdomen: Soft, NTND.   Extremities: No pedal edema. JORDIN of BL upper and lower extremities.  Neurology: AOx3 with no focal neurological deficits   Skin: refer to RN assessment        HOSPITAL MEDICATIONS:  MEDICATIONS  (STANDING):  apixaban 5 milliGRAM(s) Oral two times a day  budesonide 160 MICROgram(s)/formoterol 4.5 MICROgram(s) Inhaler 2 Puff(s) Inhalation two times a day  chlorhexidine 4% Liquid 1 Application(s) Topical daily  cyanocobalamin 1000 MICROGram(s) Oral daily  HYDROmorphone   Tablet 4 milliGRAM(s) Oral <User Schedule>  influenza   Vaccine 0.5 milliLiter(s) IntraMuscular once  metoprolol tartrate 25 milliGRAM(s) Oral two times a day  multivitamin 1 Tablet(s) Oral daily  nystatin    Suspension 309686 Unit(s) Swish and Swallow four times a day  pantoprazole    Tablet 40 milliGRAM(s) Oral before breakfast  predniSONE   Tablet 40 milliGRAM(s) Oral daily  senna 2 Tablet(s) Oral at bedtime  sodium chloride 0.9%. 1000 milliLiter(s) (75 mL/Hr) IV Continuous <Continuous>  trimethoprim  160 mG/sulfamethoxazole 800 mG 1 Tablet(s) Oral <User Schedule>    MEDICATIONS  (PRN):  ALBUTerol    90 MICROgram(s) HFA Inhaler 2 Puff(s) Inhalation every 6 hours PRN Shortness of Breath and/or Wheezing  Biotene Dry Mouth Oral Rinse 15 milliLiter(s) Swish and Spit every 4 hours PRN dry mouth  HYDROmorphone   Tablet 4 milliGRAM(s) Oral every 3 hours PRN respiratory distress or severe pain  metoclopramide 10 milliGRAM(s) Oral every 6 hours PRN nausea  polyethylene glycol 3350 17 Gram(s) Oral daily PRN Constipation  sodium chloride 0.65% Nasal 1 Spray(s) Both Nostrils once PRN Nasal Congestion      LABS:                        15.1   22.55 )-----------( 124      ( 07 Nov 2021 06:56 )             48.6     11-06    137  |  93<L>  |  99<H>  ----------------------------<  71  4.6   |  26  |  2.16<H>    Ca    9.3      06 Nov 2021 07:37  Phos  7.1     11-06  Mg     3.00     11-06    TPro  7.0  /  Alb  4.1  /  TBili  2.1<H>  /  DBili  x   /  AST  955<H>  /  ALT  1394<H>  /  AlkPhos  268<H>  11-06

## 2021-11-08 NOTE — PROGRESS NOTE ADULT - PROBLEM SELECTOR PLAN 2
biotene oral rinse added  please provide patient with vaseline or lubricant for dry lips biotene oral rinse   vaseline or lubricant for dry lips

## 2021-11-08 NOTE — PROVIDER CONTACT NOTE (OTHER) - REASON
Pt oxygen saturation 69%
Patients bp soft, due for lasix
pt refusing lasix
concerned for administering morphine due to pts o2 stat
Pt is hypotensive and bradycardic. Pt has Po dilaudid ordered.
Pt noted tachycardic/bradycardic on monitor
patient hypoxic
pt desaturating

## 2021-11-08 NOTE — PROGRESS NOTE ADULT - SUBJECTIVE AND OBJECTIVE BOX
[facility] Geriatrics and Palliative Care  Radha Massey Palliative Care Attending  Contact Info: Page [phone] (including Nights/Weekends), message on [employer] Teams (Radha Massey), or leave VM at Palliative Office [phone] (non-urgent)     SUBJECTIVE AND OBJECTIVE: Family meeting today with patient's wife, daughter and son. Patient reporting improvement of symptoms with dilaudid.     INTERVAL HPI/OVERNIGHT EVENTS:    DNR on chart:   Allergies    No Known Allergies    Intolerances    MEDICATIONS  (STANDING):  apixaban 5 milliGRAM(s) Oral two times a day  budesonide 160 MICROgram(s)/formoterol 4.5 MICROgram(s) Inhaler 2 Puff(s) Inhalation two times a day  chlorhexidine 4% Liquid 1 Application(s) Topical daily  cyanocobalamin 1000 MICROGram(s) Oral daily  HYDROmorphone   Tablet 4 milliGRAM(s) Oral once  HYDROmorphone   Tablet 4 milliGRAM(s) Oral <User Schedule>  influenza   Vaccine 0.5 milliLiter(s) IntraMuscular once  metoprolol tartrate 25 milliGRAM(s) Oral two times a day  multivitamin 1 Tablet(s) Oral daily  nystatin    Suspension 303336 Unit(s) Swish and Swallow four times a day  pantoprazole    Tablet 40 milliGRAM(s) Oral before breakfast  predniSONE   Tablet 40 milliGRAM(s) Oral daily  senna 2 Tablet(s) Oral at bedtime  trimethoprim  160 mG/sulfamethoxazole 800 mG 1 Tablet(s) Oral <User Schedule>    MEDICATIONS  (PRN):  ALBUTerol    90 MICROgram(s) HFA Inhaler 2 Puff(s) Inhalation every 6 hours PRN Shortness of Breath and/or Wheezing  Biotene Dry Mouth Oral Rinse 15 milliLiter(s) Swish and Spit every 4 hours PRN dry mouth  HYDROmorphone   Tablet 4 milliGRAM(s) Oral every 3 hours PRN respiratory distress or severe pain  metoclopramide 10 milliGRAM(s) Oral every 6 hours PRN nausea  polyethylene glycol 3350 17 Gram(s) Oral daily PRN Constipation  sodium chloride 0.65% Nasal 1 Spray(s) Both Nostrils once PRN Nasal Congestion      ITEMS UNCHECKED ARE NOT PRESENT    PRESENT SYMPTOMS: [ ]Unable to obtain due to poor mentation   Source if other than patient:  [ ]Family   [ ]Team     Pain:  [ ]yes [ ]no  QOL impact -   Location -                    Aggravating factors -  Quality -  Radiation -  Timing-  Severity (0-10 scale):  Minimal acceptable level (0-10 scale):     Dyspnea:                           [ ]Mild [ ]Moderate [ ]Severe  Anxiety:                             [ ]Mild [ ]Moderate [ ]Severe  Fatigue:                             [ ]Mild [ ]Moderate [ ]Severe  Nausea:                             [ ]Mild [ ]Moderate [ ]Severe  Loss of appetite:              [ ]Mild [ ]Moderate [ ]Severe  Constipation:                    [ ]Mild [ ]Moderate [ ]Severe    PAIN AD Score:	  [email or web address] (Ctrl + left click to view)    Other Symptoms:  [ ]All other review of systems negative     Palliative Performance Status Version 2:         %      http://npcrc.org/files/news/palliative_performance_scale_[email or web address].pdf  PHYSICAL EXAM:  Vital Signs Last 24 Hrs  T(C): 36.3 (08 Nov 2021 05:48), Max: 36.4 (08 Nov 2021 02:30)  T(F): 97.3 (08 Nov 2021 05:48), Max: 97.5 (08 Nov 2021 02:30)  HR: 74 (08 Nov 2021 10:13) (60 - 89)  BP: 123/83 (08 Nov 2021 05:48) (109/81 - 142/86)  BP(mean): --  RR: 20 (08 Nov 2021 10:13) (20 - 24)  SpO2: 81% (08 Nov 2021 10:13) (81% - 93%) I&O's Summary     GENERAL:  [ ]Alert  [ ]Oriented x   [ ]Lethargic  [ ]Cachexia  [ ]Unarousable  [ ]Verbal  [ ]Non-Verbal  Behavioral:   [ ]Anxiety  [ ]Delirium [ ]Agitation [ ]Other  HEENT:  [ ]Normal   [ ]Dry mouth   [ ]ET Tube/Trach  [ ]Oral lesions  PULMONARY:   [ ]Clear [ ]Tachypnea  [ ]Audible excessive secretions   [ ]Rhonchi        [ ]Right [ ]Left [ ]Bilateral  [ ]Crackles        [ ]Right [ ]Left [ ]Bilateral  [ ]Wheezing     [ ]Right [ ]Left [ ]Bilateral  [ ]Diminished BS [ ] Right [ ]Left [ ]Bilateral  CARDIOVASCULAR:    [ ]Regular [ ]Irregular [ ]Tachy  [ ]Randy [ ]Murmur [ ]Other  GASTROINTESTINAL:  [ ]Soft  [ ]Distended   [ ]+BS  [ ]Non tender [ ]Tender  [ ]PEG [ ]OGT/ NGT   Last BM:    GENITOURINARY:  [ ]Normal [ ]Incontinent   [ ]Oliguria/Anuria   [ ]Haro  MUSCULOSKELETAL:   [ ]Normal   [ ]Weakness  [ ]Bed/Wheelchair bound [ ]Edema  NEUROLOGIC:   [ ]No focal deficits  [ ] Cognitive impairment  [ ] Dysphagia [ ]Dysarthria [ ] Paresis [ ]Other   SKIN:   [ ]Normal  [ ]Rash   [ ]Pressure ulcer(s) [ ]y [ ]n present on admission    CRITICAL CARE:  [ ]Shock Present  [ ]Septic [ ]Cardiogenic [ ]Neurologic [ ]Hypovolemic  [ ]Vasopressors [ ]Inotropes  [ ]Respiratory failure present [ ]Mechanical Ventilation [ ]Non-invasive ventilatory support [ ]High-Flow  [ ]Acute  [ ]Chronic [ ]Hypoxic  [ ]Hypercarbic [ ]Other  [ ]Other organ failure     LABS:                        15.7   22.20 )-----------( 124      ( 08 Nov 2021 08:26 )             50.9   11-08    145  |  103  |  57<H>  ----------------------------<  97  3.7   |  32<H>  |  0.95    Ca    8.9      08 Nov 2021 08:26  Phos  2.0     11-08  Mg     2.40     11-08    TPro  5.6<L>  /  Alb  3.3  /  TBili  2.1<H>  /  DBili  x   /  AST  188<H>  /  ALT  745<H>  /  AlkPhos  233<H>  11-08        RADIOLOGY & ADDITIONAL STUDIES:    Protein Calorie Malnutrition Present: [ ]mild [ ]moderate [ ]severe [ ]underweight [ ]morbid obesity  https://www.andeal.org/vault/2440/web/files/ONC/Table_Clinical%20Characteristics%20to%20Document%20Malnutrition-White%20JV%20et%20al%202012.pdf    Height (cm): 177.8 (10-15-21 @ 19:14), 177 (10-04-21 @ 19:57), 177 (08-11-21 @ 09:30)  Weight (kg): 113 (10-17-21 @ 11:59), 130 (10-04-21 @ 19:57), 131 (08-11-21 @ 09:30)  BMI (kg/m2): 35.7 (10-17-21 @ 11:59), 41.1 (10-15-21 @ 19:14), 41.5 (10-04-21 @ 19:57)    [ ]PPSV2 < or = 30%  [ ]significant weight loss [ ]poor nutritional intake [ ]anasarca    [ ]Artificial Nutrition    REFERRALS:   [ ]Chaplaincy  [ ]Hospice  [ ]Child Life  [ ]Social Work  [ ]Case management [ ]Holistic Therapy      [facility] Geriatrics and Palliative Care  Radha Massey, Palliative Care Attending  Contact Info: Page [phone] (including Nights/Weekends), message on [employer] Teams (Radha Massey), or leave  at Palliative Office [phone] (non-urgent)     SUBJECTIVE AND OBJECTIVE:  Patient reporting improvement of symptoms with dilaudid. Mr. Swanson shared that "he feels that he doesn't want to burden his family". We discussed our concern that his condition is declining and his oxygen requirements have not improved. He expressed understanding of the current situation and is appreciative of the care that the teams have been providing to him.     Additionally, family meeting today with patient's wife, daughter and son to help manage expectations and to discuss further plan of care. Pt's wife, Annie, shared that she believes her  is aware that he is declining and is more understanding that he is unable to return home. We explained that the team is continuing to work on trialing airvo machine to see if he tolerates it. If he does tolerate the Airvo, we did share our concerns that he may become symptomatic quicker and this could potentially shorten his life expectancy. We also discussed potential transition to [facility], which has wall oxygen but it would be a rehab facility that would like monitor his SpO2 despite his symptoms.     INTERVAL HPI/OVERNIGHT EVENTS:     DNR on chart:   Allergies    No Known Allergies    Intolerances    MEDICATIONS  (STANDING):  apixaban 5 milliGRAM(s) Oral two times a day  budesonide 160 MICROgram(s)/formoterol 4.5 MICROgram(s) Inhaler 2 Puff(s) Inhalation two times a day  chlorhexidine 4% Liquid 1 Application(s) Topical daily  cyanocobalamin 1000 MICROGram(s) Oral daily  HYDROmorphone   Tablet 4 milliGRAM(s) Oral once  HYDROmorphone   Tablet 4 milliGRAM(s) Oral <User Schedule>  influenza   Vaccine 0.5 milliLiter(s) IntraMuscular once  metoprolol tartrate 25 milliGRAM(s) Oral two times a day  multivitamin 1 Tablet(s) Oral daily  nystatin    Suspension 993535 Unit(s) Swish and Swallow four times a day  pantoprazole    Tablet 40 milliGRAM(s) Oral before breakfast  predniSONE   Tablet 40 milliGRAM(s) Oral daily  senna 2 Tablet(s) Oral at bedtime  trimethoprim  160 mG/sulfamethoxazole 800 mG 1 Tablet(s) Oral <User Schedule>    MEDICATIONS  (PRN):  ALBUTerol    90 MICROgram(s) HFA Inhaler 2 Puff(s) Inhalation every 6 hours PRN Shortness of Breath and/or Wheezing  Biotene Dry Mouth Oral Rinse 15 milliLiter(s) Swish and Spit every 4 hours PRN dry mouth  HYDROmorphone   Tablet 4 milliGRAM(s) Oral every 3 hours PRN respiratory distress or severe pain  metoclopramide 10 milliGRAM(s) Oral every 6 hours PRN nausea  polyethylene glycol 3350 17 Gram(s) Oral daily PRN Constipation  sodium chloride 0.65% Nasal 1 Spray(s) Both Nostrils once PRN Nasal Congestion      ITEMS UNCHECKED ARE NOT PRESENT    PRESENT SYMPTOMS: [ ]Unable to obtain due to poor mentation   Source if other than patient:  [ ]Family   [ ]Team     Pain:  [ ]yes [ ]no  QOL impact -   Location -                    Aggravating factors -  Quality -  Radiation -  Timing-  Severity (0-10 scale):  Minimal acceptable level (0-10 scale):     Dyspnea:                           [ ]Mild [ ]Moderate [ ]Severe  Anxiety:                             [ ]Mild [ ]Moderate [ ]Severe  Fatigue:                             [ ]Mild [ ]Moderate [ ]Severe  Nausea:                             [ ]Mild [ ]Moderate [ ]Severe  Loss of appetite:              [ ]Mild [ ]Moderate [ ]Severe  Constipation:                    [ ]Mild [ ]Moderate [ ]Severe    PAIN AD Score:	  [email or web address] (Ctrl + left click to view)    Other Symptoms:  [ ]All other review of systems negative     Palliative Performance Status Version 2:         %      http://npcrc.org/files/news/palliative_performance_scale_ppsv2.pdf  PHYSICAL EXAM:  Vital Signs Last 24 Hrs  T(C): 36.3 (08 Nov 2021 05:48), Max: 36.4 (08 Nov 2021 02:30)  T(F): 97.3 (08 Nov 2021 05:48), Max: 97.5 (08 Nov 2021 02:30)  HR: 74 (08 Nov 2021 10:13) (60 - 89)  BP: 123/83 (08 Nov 2021 05:48) (109/81 - 142/86)  BP(mean): --  RR: 20 (08 Nov 2021 10:13) (20 - 24)  SpO2: 81% (08 Nov 2021 10:13) (81% - 93%) I&O's Summary     GENERAL:  [ ]Alert  [ ]Oriented x   [ ]Lethargic  [ ]Cachexia  [ ]Unarousable  [ ]Verbal  [ ]Non-Verbal  Behavioral:   [ ]Anxiety  [ ]Delirium [ ]Agitation [ ]Other  HEENT:  [ ]Normal   [ ]Dry mouth   [ ]ET Tube/Trach  [ ]Oral lesions  PULMONARY:   [ ]Clear [ ]Tachypnea  [ ]Audible excessive secretions   [ ]Rhonchi        [ ]Right [ ]Left [ ]Bilateral  [ ]Crackles        [ ]Right [ ]Left [ ]Bilateral  [ ]Wheezing     [ ]Right [ ]Left [ ]Bilateral  [ ]Diminished BS [ ] Right [ ]Left [ ]Bilateral  CARDIOVASCULAR:    [ ]Regular [ ]Irregular [ ]Tachy  [ ]Randy [ ]Murmur [ ]Other  GASTROINTESTINAL:  [ ]Soft  [ ]Distended   [ ]+BS  [ ]Non tender [ ]Tender  [ ]PEG [ ]OGT/ NGT   Last BM:    GENITOURINARY:  [ ]Normal [ ]Incontinent   [ ]Oliguria/Anuria   [ ]Haro  MUSCULOSKELETAL:   [ ]Normal   [ ]Weakness  [ ]Bed/Wheelchair bound [ ]Edema  NEUROLOGIC:   [ ]No focal deficits  [ ] Cognitive impairment  [ ] Dysphagia [ ]Dysarthria [ ] Paresis [ ]Other   SKIN:   [ ]Normal  [ ]Rash   [ ]Pressure ulcer(s) [ ]y [ ]n present on admission    CRITICAL CARE:  [ ]Shock Present  [ ]Septic [ ]Cardiogenic [ ]Neurologic [ ]Hypovolemic  [ ]Vasopressors [ ]Inotropes  [ ]Respiratory failure present [ ]Mechanical Ventilation [ ]Non-invasive ventilatory support [ ]High-Flow  [ ]Acute  [ ]Chronic [ ]Hypoxic  [ ]Hypercarbic [ ]Other  [ ]Other organ failure     LABS:                        15.7   22.20 )-----------( 124      ( 08 Nov 2021 08:26 )             50.9   11-08    145  |  103  |  57<H>  ----------------------------<  97  3.7   |  32<H>  |  0.95    Ca    8.9      08 Nov 2021 08:26  Phos  2.0     11-08  Mg     2.40     11-08    TPro  5.6<L>  /  Alb  3.3  /  TBili  2.1<H>  /  DBili  x   /  AST  188<H>  /  ALT  745<H>  /  AlkPhos  233<H>  11-08        RADIOLOGY & ADDITIONAL STUDIES:    Protein Calorie Malnutrition Present: [ ]mild [ ]moderate [ ]severe [ ]underweight [ ]morbid obesity  https://www.andeal.org/vault/2440/web/files/ONC/Table_Clinical%20Characteristics%20to%20Document%20Malnutrition-White%20JV%20et%20al%202012.pdf    Height (cm): 177.8 (10-15-21 @ 19:14), 177 (10-04-21 @ 19:57), 177 (08-11-21 @ 09:30)  Weight (kg): 113 (10-17-21 @ 11:59), 130 (10-04-21 @ 19:57), 131 (08-11-21 @ 09:30)  BMI (kg/m2): 35.7 (10-17-21 @ 11:59), 41.1 (10-15-21 @ 19:14), 41.5 (10-04-21 @ 19:57)    [ ]PPSV2 < or = 30%  [ ]significant weight loss [ ]poor nutritional intake [ ]anasarca    [ ]Artificial Nutrition    REFERRALS:   [ ]Chaplaincy  [ ]Hospice  [ ]Child Life  [ ]Social Work  [ ]Case management [ ]Holistic Therapy      [facility] Geriatrics and Palliative Care  Radha Massey, Palliative Care Attending  Contact Info: Page [phone] (including Nights/Weekends), message on [employer] Teams (Radha Massey), or leave  at Palliative Office [phone] (non-urgent)     SUBJECTIVE AND OBJECTIVE:  Patient reporting improvement of symptoms with dilaudid. Palliative team spoke to patient, without family at bedside. Mr. Swanson shared that "he feels that he doesn't want to burden his family". We discussed our concern that his condition is declining and his oxygen requirements have not improved. He expressed understanding of the current situation and is appreciative of the care that the teams have been providing to him. He agrees that going home would cause him to suffer and that initially his goal was to go home, but he has acknowledged that it would be unsafe and his needs are too complex to return home. He also mentioned that he "would not want to choke to death".     Additionally, family meeting today with patient's wife, daughter and son to help manage expectations and to discuss further plan of care. Pt's wife, Annie, shared that she believes her  is aware that he is declining and is more understanding that he is unable to return home. We explained that the team is continuing to work on trialing airvo machine to see if he tolerates it. If he does tolerate the Airvo, we did share our concerns that he may become symptomatic quicker and this could potentially shorten his life expectancy. We also discussed potential transition to [facility], which has wall oxygen but it would be a rehab facility that would like monitor his SpO2 despite his symptoms.     INTERVAL HPI/OVERNIGHT EVENTS:   > 11/8:     DNR on chart:   Allergies    No Known Allergies    Intolerances    MEDICATIONS  (STANDING):  apixaban 5 milliGRAM(s) Oral two times a day  budesonide 160 MICROgram(s)/formoterol 4.5 MICROgram(s) Inhaler 2 Puff(s) Inhalation two times a day  chlorhexidine 4% Liquid 1 Application(s) Topical daily  cyanocobalamin 1000 MICROGram(s) Oral daily  HYDROmorphone   Tablet 4 milliGRAM(s) Oral once  HYDROmorphone   Tablet 4 milliGRAM(s) Oral <User Schedule>  influenza   Vaccine 0.5 milliLiter(s) IntraMuscular once  metoprolol tartrate 25 milliGRAM(s) Oral two times a day  multivitamin 1 Tablet(s) Oral daily  nystatin    Suspension 924778 Unit(s) Swish and Swallow four times a day  pantoprazole    Tablet 40 milliGRAM(s) Oral before breakfast  predniSONE   Tablet 40 milliGRAM(s) Oral daily  senna 2 Tablet(s) Oral at bedtime  trimethoprim  160 mG/sulfamethoxazole 800 mG 1 Tablet(s) Oral <User Schedule>    MEDICATIONS  (PRN):  ALBUTerol    90 MICROgram(s) HFA Inhaler 2 Puff(s) Inhalation every 6 hours PRN Shortness of Breath and/or Wheezing  Biotene Dry Mouth Oral Rinse 15 milliLiter(s) Swish and Spit every 4 hours PRN dry mouth  HYDROmorphone   Tablet 4 milliGRAM(s) Oral every 3 hours PRN respiratory distress or severe pain  metoclopramide 10 milliGRAM(s) Oral every 6 hours PRN nausea  polyethylene glycol 3350 17 Gram(s) Oral daily PRN Constipation  sodium chloride 0.65% Nasal 1 Spray(s) Both Nostrils once PRN Nasal Congestion      ITEMS UNCHECKED ARE NOT PRESENT    PRESENT SYMPTOMS: [ ]Unable to obtain due to poor mentation   Source if other than patient:  [ ]Family   [ ]Team     Pain:  [ ]yes [ x]no   QOL impact -   Location -                    Aggravating factors -  Quality -  Radiation -  Timing-  Severity (0-10 scale):  Minimal acceptable level (0-10 scale):     Dyspnea:                           [ ]Mild [ ]Moderate [x ]Severe  Anxiety:                             [ ]Mild [ ]Moderate [ ]Severe  Fatigue:                             [ ]Mild [ ]Moderate [ ]Severe  Nausea:                             [ ]Mild [ ]Moderate [ ]Severe  Loss of appetite:              [ ]Mild [ ]Moderate [ ]Severe  Constipation:                    [ ]Mild [ ]Moderate [ ]Severe    PAIN AD Score:	  [email or web address] (Ctrl + left click to view)    Other Symptoms:  [x ]All other review of systems negative     Palliative Performance Status Version 2:       50  %      http://npcrc.org/files/news/palliative_performance_scale_ppsv2.pdf  PHYSICAL EXAM:  Vital Signs Last 24 Hrs  T(C): 36.3 (08 Nov 2021 05:48), Max: 36.4 (08 Nov 2021 02:30)  T(F): 97.3 (08 Nov 2021 05:48), Max: 97.5 (08 Nov 2021 02:30)  HR: 74 (08 Nov 2021 10:13) (60 - 89)  BP: 123/83 (08 Nov 2021 05:48) (109/81 - 142/86)  BP(mean): --  RR: 20 (08 Nov 2021 10:13) (20 - 24)  SpO2: 81% (08 Nov 2021 10:13) (81% - 93%) I&O's Summary     GENERAL:  [ x]Alert  [x ]Oriented x3   [ ]Lethargic  [ ]Cachexia  [ ]Unarousable  [x ]Verbal  [ ]Non-Verbal  Behavioral:   [ ] Anxiety  [ ] Delirium [ ] Agitation [x ] Other  HEENT:  [ ]Normal   [ ]Dry mouth   [ ]ET Tube/Trach  [ ]Oral lesions  PULMONARY: HFNC   [ ]Clear [ ]Tachypnea  [ ]Audible excessive secretions   [x ]Rhonchi        [ x]Right [ ]Left [ ]Bilateral  [ ]Crackles        [ ]Right [ ]Left [ ]Bilateral  [ ]Wheezing     [ ]Right [ ]Left [ ]Bilateral  [ ]Diminished breath sounds [ ]right [ ]left [ ]bilateral  CARDIOVASCULAR:    [x ]Regular [ ]Irregular [ ]Tachy  [ ]Randy [ ]Murmur [ ]Other  GASTROINTESTINAL:  [x ]Soft  [ ]Distended   [ ]+BS  [ ]Non tender [ ]Tender  [ ]PEG [ ]OGT/ NGT  Last BM: 11/3  GENITOURINARY:  [x ]Normal [ ] Incontinent   [ ]Oliguria/Anuria   [ ]Haro  MUSCULOSKELETAL:   [ ]Normal   [ ]Weakness  [ ]Bed/Wheelchair bound [ x]Edema- b/l lower extremity edema  NEUROLOGIC:   [ x]No focal deficits  [ ]Cognitive impairment  [ ]Dysphagia [ ]Dysarthria [ ]Paresis [ ]Other   SKIN: Hyperpigmentation  noted on anterior left leg   [ ]Normal    [ ]Rash  [ ]Pressure ulcer(s)       Present on admission [ ]y [ ]n    CRITICAL CARE:  [ ] Shock Present  [ ]Septic [ ]Cardiogenic [ ]Neurologic [ ]Hypovolemic  [ ]  Vasopressors [ ]  Inotropes   [x ]Respiratory failure present [ ]Mechanical ventilation [ ]Non-invasive ventilatory support [ x]High flow  [ ]Acute  [ ]Chronic [ ]Hypoxic  [ ]Hypercarbic [ ]Other  [ ]Other organ failure     LABS:                        15.7   22.20 )-----------( 124      ( 08 Nov 2021 08:26 )             50.9   11-08    145  |  103  |  57<H>  ----------------------------<  97  3.7   |  32<H>  |  0.95    Ca    8.9      08 Nov 2021 08:26  Phos  2.0     11-08  Mg     2.40     11-08    TPro  5.6<L>  /  Alb  3.3  /  TBili  2.1<H>  /  DBili  x   /  AST  188<H>  /  ALT  745<H>  /  AlkPhos  233<H>  11-08      RADIOLOGY & ADDITIONAL STUDIES: n/a     Protein Calorie Malnutrition Present: [ ]mild [ ]moderate [ ]severe [ ]underweight [ ]morbid obesity  https://www.andeal.org/vault/2440/web/files/ONC/Table_Clinical%20Characteristics%20to%20Document%20Malnutrition-White%20JV%20et%20al%202012.pdf    Height (cm): 177.8 (10-15-21 @ 19:14), 177 (10-04-21 @ 19:57), 177 (08-11-21 @ 09:30)  Weight (kg): 113 (10-17-21 @ 11:59), 130 (10-04-21 @ 19:57), 131 (08-11-21 @ 09:30)  BMI (kg/m2): 35.7 (10-17-21 @ 11:59), 41.1 (10-15-21 @ 19:14), 41.5 (10-04-21 @ 19:57)    [ ]PPSV2 < or = 30%  [ ]significant weight loss [ ]poor nutritional intake [ ]anasarca    [ ]Artificial Nutrition    REFERRALS:   [ ]Chaplaincy  [x ]Hospice  [ ]Child Life  [ ]Social Work  [ ]Case management [ ]Holistic Therapy      [facility] Geriatrics and Palliative Care  Radha Massey, Palliative Care Attending  Contact Info: Page [phone] (including Nights/Weekends), message on [employer] Teams (Radha Massey), or leave  at Palliative Office [phone] (non-urgent)     SUBJECTIVE AND OBJECTIVE:  Patient reporting improvement of symptoms with dilaudid. Palliative team spoke to patient, without family at bedside. Mr. Swanson shared that "he feels that he doesn't want to burden his family". We discussed our concern that his condition is declining and his oxygen requirements have not improved. He expressed understanding of the current situation and is appreciative of the care that the teams have been providing to him. He agrees that going home would cause him to suffer and that initially his goal was to go home, but he has acknowledged that it would be unsafe and his needs are too complex to return home. He also mentioned that he "would not want to choke to death".     See [facility] discussion for family meeting.     INTERVAL HPI/OVERNIGHT EVENTS:   > Weekend events reviewed. Patient refused some doses of ATC dilaudid but patient more agreeable to taking ATC dilaudid.     DNR on chart:   Allergies    No Known Allergies    Intolerances    MEDICATIONS  (STANDING):  apixaban 5 milliGRAM(s) Oral two times a day  budesonide 160 MICROgram(s)/formoterol 4.5 MICROgram(s) Inhaler 2 Puff(s) Inhalation two times a day  chlorhexidine 4% Liquid 1 Application(s) Topical daily  cyanocobalamin 1000 MICROGram(s) Oral daily  HYDROmorphone   Tablet 4 milliGRAM(s) Oral once  HYDROmorphone   Tablet 4 milliGRAM(s) Oral <User Schedule>  influenza   Vaccine 0.5 milliLiter(s) IntraMuscular once  metoprolol tartrate 25 milliGRAM(s) Oral two times a day  multivitamin 1 Tablet(s) Oral daily  nystatin    Suspension 274249 Unit(s) Swish and Swallow four times a day  pantoprazole    Tablet 40 milliGRAM(s) Oral before breakfast  predniSONE   Tablet 40 milliGRAM(s) Oral daily  senna 2 Tablet(s) Oral at bedtime  trimethoprim  160 mG/sulfamethoxazole 800 mG 1 Tablet(s) Oral <User Schedule>    MEDICATIONS  (PRN):  ALBUTerol    90 MICROgram(s) HFA Inhaler 2 Puff(s) Inhalation every 6 hours PRN Shortness of Breath and/or Wheezing  Biotene Dry Mouth Oral Rinse 15 milliLiter(s) Swish and Spit every 4 hours PRN dry mouth  HYDROmorphone   Tablet 4 milliGRAM(s) Oral every 3 hours PRN respiratory distress or severe pain  metoclopramide 10 milliGRAM(s) Oral every 6 hours PRN nausea  polyethylene glycol 3350 17 Gram(s) Oral daily PRN Constipation  sodium chloride 0.65% Nasal 1 Spray(s) Both Nostrils once PRN Nasal Congestion      ITEMS UNCHECKED ARE NOT PRESENT    PRESENT SYMPTOMS: [ ]Unable to obtain due to poor mentation   Source if other than patient:  [ ]Family   [ ]Team     Pain:  [ ]yes [ x]no   QOL impact -   Location -                    Aggravating factors -  Quality -  Radiation -  Timing-  Severity (0-10 scale):  Minimal acceptable level (0-10 scale):     Dyspnea:                           [ ]Mild [ ]Moderate [x ]Severe  Anxiety:                             [ ]Mild [ ]Moderate [ ]Severe  Fatigue:                             [ ]Mild [ ]Moderate [ ]Severe  Nausea:                             [ ]Mild [ ]Moderate [ ]Severe  Loss of appetite:              [ ]Mild [ ]Moderate [ ]Severe  Constipation:                    [ ]Mild [ ]Moderate [ ]Severe    PAIN AD Score:	  [email or web address] (Ctrl + left click to view)    Other Symptoms:  [x ]All other review of systems negative     Palliative Performance Status Version 2:       50  %      http://npcrc.org/files/news/palliative_performance_scale_[email or web address].pdf  PHYSICAL EXAM:  Vital Signs Last 24 Hrs  T(C): 36.3 (08 Nov 2021 05:48), Max: 36.4 (08 Nov 2021 02:30)  T(F): 97.3 (08 Nov 2021 05:48), Max: 97.5 (08 Nov 2021 02:30)  HR: 74 (08 Nov 2021 10:13) (60 - 89)  BP: 123/83 (08 Nov 2021 05:48) (109/81 - 142/86)  BP(mean): --  RR: 20 (08 Nov 2021 10:13) (20 - 24)  SpO2: 81% (08 Nov 2021 10:13) (81% - 93%) I&O's Summary     GENERAL:  [ x]Alert  [x ]Oriented x3   [ ]Lethargic  [ ]Cachexia  [ ]Unarousable  [x ]Verbal  [ ]Non-Verbal  Behavioral:   [ ] Anxiety  [ ] Delirium [ ] Agitation [x ] Other  HEENT:  [ ]Normal   [ ]Dry mouth   [ ]ET Tube/Trach  [ ]Oral lesions  PULMONARY: HFNC   [ ]Clear [ ]Tachypnea  [ ]Audible excessive secretions   [x ]Rhonchi        [ x]Right [ ]Left [ ]Bilateral  [ ]Crackles        [ ]Right [ ]Left [ ]Bilateral  [ ]Wheezing     [ ]Right [ ]Left [ ]Bilateral  [ ]Diminished breath sounds [ ]right [ ]left [ ]bilateral  CARDIOVASCULAR:    [x ]Regular [ ]Irregular [ ]Tachy  [ ]Randy [ ]Murmur [ ]Other  GASTROINTESTINAL:  [x ]Soft  [ ]Distended   [ ]+BS  [ ]Non tender [ ]Tender  [ ]PEG [ ]OGT/ NGT  Last BM: 11/3  GENITOURINARY:  [x ]Normal [ ] Incontinent   [ ]Oliguria/Anuria   [ ]Haro  MUSCULOSKELETAL:   [ ]Normal   [ ]Weakness  [ ]Bed/Wheelchair bound [ x]Edema- b/l lower extremity edema  NEUROLOGIC:   [ x]No focal deficits  [ ]Cognitive impairment  [ ]Dysphagia [ ]Dysarthria [ ]Paresis [ ]Other   SKIN: Hyperpigmentation  noted on anterior left leg   [ ]Normal    [ ]Rash  [ ]Pressure ulcer(s)       Present on admission [ ]y [ ]n    CRITICAL CARE:  [ ] Shock Present  [ ]Septic [ ]Cardiogenic [ ]Neurologic [ ]Hypovolemic  [ ]  Vasopressors [ ]  Inotropes   [x ]Respiratory failure present [ ]Mechanical ventilation [ ]Non-invasive ventilatory support [ x]High flow  [ ]Acute  [ ]Chronic [ ]Hypoxic  [ ]Hypercarbic [ ]Other  [ ]Other organ failure     LABS:                        15.7   22.20 )-----------( 124      ( 08 Nov 2021 08:26 )             50.9   11-08    145  |  103  |  57<H>  ----------------------------<  97  3.7   |  32<H>  |  0.95    Ca    8.9      08 Nov 2021 08:26  Phos  2.0     11-08  Mg     2.40     11-08    TPro  5.6<L>  /  Alb  3.3  /  TBili  2.1<H>  /  DBili  x   /  AST  188<H>  /  ALT  745<H>  /  AlkPhos  233<H>  11-08      RADIOLOGY & ADDITIONAL STUDIES: n/a     Protein Calorie Malnutrition Present: [ ]mild [ ]moderate [ ]severe [ ]underweight [ ]morbid obesity  https://www.andeal.org/vault/2440/web/files/ONC/Table_Clinical%20Characteristics%20to%20Document%20Malnutrition-White%20JV%20et%20al%202012.pdf    Height (cm): 177.8 (10-15-21 @ 19:14), 177 (10-04-21 @ 19:57), 177 (08-11-21 @ 09:30)  Weight (kg): 113 (10-17-21 @ 11:59), 130 (10-04-21 @ 19:57), 131 (08-11-21 @ 09:30)  BMI (kg/m2): 35.7 (10-17-21 @ 11:59), 41.1 (10-15-21 @ 19:14), 41.5 (10-04-21 @ 19:57)    [ ]PPSV2 < or = 30%  [ ]significant weight loss [ ]poor nutritional intake [ ]anasarca    [ ]Artificial Nutrition    REFERRALS:   [ ]Chaplaincy  [x ]Hospice  [ ]Child Life  [ ]Social Work  [ ]Case management [ ]Holistic Therapy

## 2021-11-08 NOTE — PROVIDER CONTACT NOTE (OTHER) - NAME OF MD/NP/PA/DO NOTIFIED:
Debora Read
team 5- elaine
T5  [place] [id number]
[address] [address]
Mitzy Rivera NP
Team 5 [id number] night float
Choco VELÁSQUEZ
team 5 [id number]

## 2021-11-08 NOTE — PROGRESS NOTE ADULT - ATTENDING SUPERVISION STATEMENT
ACP
Fellow
Fellow
ACP
Fellow
ACP
Fellow
ACP
Resident
Resident/Student
Resident
Resident
Student
Resident/Student
Resident

## 2021-11-08 NOTE — PROVIDER CONTACT NOTE (OTHER) - ASSESSMENT
Pt noted tachycardic/bradycardic on monitor. Decided to perform EKG.
fine wheezing, otherwise asymptomatic. patient stating higher on vitals machine at 88%
patient relaxing in bed, stating "hes fine"
pts vital signs stable, no s/s distress noted.
patient is alert and oriented. Additional VSS. /58. No acute distress noted
A&OX4 no distress noted, RR within normal range, oxygen 85%
Pt oxygen saturation 69%. Comfort measures only. DNR/DNI in place, GOC meeting had with family.

## 2021-11-08 NOTE — PROGRESS NOTE ADULT - PROBLEM SELECTOR PROBLEM 3
Lung cancer
Lung cancer
Encounter for palliative care
Lung cancer
Encounter for palliative care
Lung cancer
Encounter for palliative care
Lung cancer
Lung cancer
Encounter for palliative care
Lung cancer
No

## 2021-11-08 NOTE — PROVIDER CONTACT NOTE (OTHER) - ACTION/TREATMENT ORDERED:
will come see patient at bedside
will notify day team
provider considering making treatment standing rather than PRN. no further recommendations at this time.
Provider notified, & assessed EKG, noted afib. assessed patient bedside & palpated pulse. discussed in rounds to start lopressor for patient. palpate pulse during episodes on monitor. no tele needed
administered as per order as long as mentation is good and above 80% with RR>12.
Provider aware. Pt comfort maintained. Stat order of Dilaudid to be given.
OK to give lasix.

## 2021-11-08 NOTE — PROVIDER CONTACT NOTE (OTHER) - BACKGROUND
COPD with pneumonia and lung cancer
Pt admitted for COPD
Patient admitted with COPD exacerbation and pna.
Pt has hx of COPD and ILD, admitted for COPD exacerbation.
Admitted for COPD exacerbation.
COPD with pneumonia
pt admitted for COPD exacerbation, hx of lung ca
Patient admitted with COPD exacerbation and pna.

## 2021-11-08 NOTE — PROGRESS NOTE ADULT - ASSESSMENT
increased ATC dilaudid to tid  60 y/o M with history of NSCLC s/p RLL resection on chemo, COPD, ILD, pulmonary HTN, on 4L NC at home presents with SOB x 1 week. On admission the patient was tachypneic and tachycardic but afebrile. PE notable for wheezing, crackles and ronchi. Labs on admission remarkable for an elevated WBC, elevated BNP, and transaminitis. LE U/S ruled out evidence of DVT and CTA ruled out evidence of PE. DDx includes HF exacerbation, pulmonary infection, exacerbation 2/2 pt's lung cancer, worsening ILD 2/2 pembrolizumab, or COPD exacerbation. Palliative consulted for assistance with sx management.

## 2021-11-08 NOTE — PROGRESS NOTE ADULT - PROVIDER SPECIALTY LIST ADULT
Heme/Onc
Heme/Onc
Internal Medicine
Palliative Care
Pulmonology
Pulmonology
Heme/Onc
Internal Medicine
Pulmonology
Heme/Onc
Palliative Care
Palliative Care
Pulmonology
Palliative Care
Internal Medicine
Palliative Care
Internal Medicine
Pulmonology
Internal Medicine
Internal Medicine

## 2021-11-08 NOTE — PROGRESS NOTE ADULT - SUBJECTIVE AND OBJECTIVE BOX
CHIEF COMPLAINT:    INTERVAL EVENTS:     ROS: Seen by bedside during AM rounds     OBJECTIVE:  ICU Vital Signs Last 24 Hrs  T(C): 36.3 (08 Nov 2021 05:48), Max: 36.4 (08 Nov 2021 02:30)  T(F): 97.3 (08 Nov 2021 05:48), Max: 97.5 (08 Nov 2021 02:30)  HR: 69 (08 Nov 2021 05:48) (55 - 89)  BP: 123/83 (08 Nov 2021 05:48) (108/72 - 142/86)  BP(mean): --  ABP: --  ABP(mean): --  RR: 22 (08 Nov 2021 05:48) (20 - 24)  SpO2: 85% (08 Nov 2021 05:48) (85% - 94%)        11-06 @ 08:01  -  11-07 @ 07:00  --------------------------------------------------------  IN: 150 mL / OUT: 1550 mL / NET: -1400 mL      CAPILLARY BLOOD GLUCOSE          PHYSICAL EXAM:  General:   HEENT:   Lymph Nodes:  Neck:   Respiratory:   Cardiovascular:   Abdomen:   Extremities:   Skin:   Neurological:  Psychiatry:        HOSPITAL MEDICATIONS:  MEDICATIONS  (STANDING):  apixaban 5 milliGRAM(s) Oral two times a day  budesonide 160 MICROgram(s)/formoterol 4.5 MICROgram(s) Inhaler 2 Puff(s) Inhalation two times a day  chlorhexidine 4% Liquid 1 Application(s) Topical daily  cyanocobalamin 1000 MICROGram(s) Oral daily  HYDROmorphone   Tablet 4 milliGRAM(s) Oral <User Schedule>  influenza   Vaccine 0.5 milliLiter(s) IntraMuscular once  metoprolol tartrate 25 milliGRAM(s) Oral two times a day  multivitamin 1 Tablet(s) Oral daily  nystatin    Suspension 526148 Unit(s) Swish and Swallow four times a day  pantoprazole    Tablet 40 milliGRAM(s) Oral before breakfast  predniSONE   Tablet 40 milliGRAM(s) Oral daily  senna 2 Tablet(s) Oral at bedtime  trimethoprim  160 mG/sulfamethoxazole 800 mG 1 Tablet(s) Oral <User Schedule>    MEDICATIONS  (PRN):  ALBUTerol    90 MICROgram(s) HFA Inhaler 2 Puff(s) Inhalation every 6 hours PRN Shortness of Breath and/or Wheezing  Biotene Dry Mouth Oral Rinse 15 milliLiter(s) Swish and Spit every 4 hours PRN dry mouth  HYDROmorphone   Tablet 4 milliGRAM(s) Oral every 3 hours PRN respiratory distress or severe pain  metoclopramide 10 milliGRAM(s) Oral every 6 hours PRN nausea  polyethylene glycol 3350 17 Gram(s) Oral daily PRN Constipation  sodium chloride 0.65% Nasal 1 Spray(s) Both Nostrils once PRN Nasal Congestion      LABS:                        15.1   22.55 )-----------( 124      ( 07 Nov 2021 06:56 )             48.6     11-07    143  |  101  |  83<H>  ----------------------------<  101<H>  3.7   |  31  |  1.51<H>    Ca    8.8      07 Nov 2021 06:56  Phos  4.1     11-07  Mg     2.60     11-07    TPro  5.6<L>  /  Alb  3.3  /  TBili  1.8<H>  /  DBili  x   /  AST  331<H>  /  ALT  955<H>  /  AlkPhos  208<H>  11-07           CHIEF COMPLAINT:Patient is a 61y old  Male who presents with a chief complaint of SOB (08 Nov 2021 06:41)    INTERVAL EVENTS: no acute issues overnight.     ROS: See above. REmaining ROS neg.    OBJECTIVE:  ICU Vital Signs Last 24 Hrs  T(C): 36.3 (08 Nov 2021 05:48), Max: 36.4 (08 Nov 2021 02:30)  T(F): 97.3 (08 Nov 2021 05:48), Max: 97.5 (08 Nov 2021 02:30)  HR: 69 (08 Nov 2021 05:48) (55 - 89)  BP: 123/83 (08 Nov 2021 05:48) (108/72 - 142/86)  BP(mean): --  ABP: --  ABP(mean): --  RR: 22 (08 Nov 2021 05:48) (20 - 24)  SpO2: 85% (08 Nov 2021 05:48) (85% - 94%)        11-06 @ 08:01  -  11-07 @ 07:00  --------------------------------------------------------  IN: 150 mL / OUT: 1550 mL / NET: -1400 mL      CAPILLARY BLOOD GLUCOSE          PHYSICAL EXAM:  General: NAD   Cards: S1/S2, no murmurs   Pulm: Dminished breath sounds b/l. On BIPAP.  Abdomen: Soft, NTND  Extremities: No pedal edema. JORDIN of BL upper and lower extremities.  Neurology: AOx3 with no focal neurological deficits   Skin: refer to RN assessment.           HOSPITAL MEDICATIONS:  MEDICATIONS  (STANDING):  apixaban 5 milliGRAM(s) Oral two times a day  budesonide 160 MICROgram(s)/formoterol 4.5 MICROgram(s) Inhaler 2 Puff(s) Inhalation two times a day  chlorhexidine 4% Liquid 1 Application(s) Topical daily  cyanocobalamin 1000 MICROGram(s) Oral daily  HYDROmorphone   Tablet 4 milliGRAM(s) Oral <User Schedule>  influenza   Vaccine 0.5 milliLiter(s) IntraMuscular once  metoprolol tartrate 25 milliGRAM(s) Oral two times a day  multivitamin 1 Tablet(s) Oral daily  nystatin    Suspension 577023 Unit(s) Swish and Swallow four times a day  pantoprazole    Tablet 40 milliGRAM(s) Oral before breakfast  predniSONE   Tablet 40 milliGRAM(s) Oral daily  senna 2 Tablet(s) Oral at bedtime  trimethoprim  160 mG/sulfamethoxazole 800 mG 1 Tablet(s) Oral <User Schedule>    MEDICATIONS  (PRN):  ALBUTerol    90 MICROgram(s) HFA Inhaler 2 Puff(s) Inhalation every 6 hours PRN Shortness of Breath and/or Wheezing  Biotene Dry Mouth Oral Rinse 15 milliLiter(s) Swish and Spit every 4 hours PRN dry mouth  HYDROmorphone   Tablet 4 milliGRAM(s) Oral every 3 hours PRN respiratory distress or severe pain  metoclopramide 10 milliGRAM(s) Oral every 6 hours PRN nausea  polyethylene glycol 3350 17 Gram(s) Oral daily PRN Constipation  sodium chloride 0.65% Nasal 1 Spray(s) Both Nostrils once PRN Nasal Congestion      LABS:                        15.1   22.55 )-----------( 124      ( 07 Nov 2021 06:56 )             48.6     11-07    143  |  101  |  83<H>  ----------------------------<  101<H>  3.7   |  31  |  1.51<H>    Ca    8.8      07 Nov 2021 06:56  Phos  4.1     11-07  Mg     2.60     11-07    TPro  5.6<L>  /  Alb  3.3  /  TBili  1.8<H>  /  DBili  x   /  AST  331<H>  /  ALT  955<H>  /  AlkPhos  208<H>  11-07

## 2021-11-08 NOTE — PROGRESS NOTE ADULT - ATTENDING COMMENTS
61M NSCLC (progressed on multiple regimens, most recently started on pembroluzimab, +mets to liver/adrenals), ILD with occupational exposure and smoking history, and chronic hypoxemic respiratory failure on 4L NC p/w AHRF concerning for volume overload along w/ postobstructive PNA.  Pt again reports feeling better however still remains on HFNC 100%. WOB is minimal and pt does not require bipap currently.   Cont HFNC dec fio2 as tolerated to maintain sat>90% (does have episodes of desaturaiton but he reports this is related to him taking off the cannula for a minute)  cont abx course for PNA (vanc and azithro d/c)  airway clearance w/ aerobika  LE edema which he reports is chronic but is now more severe - f/u echo r/o RV failure  duplex LE negative  cont dvt ppx  Prognosis guarded given severity of baseline cancer and his acute decompensation causing severe AHRF .
61M NSCLC (progressed on multiple regimens, most recently started on pembroluzimab, +mets to liver/adrenals), ILD with occupational exposure and smoking history, and chronic hypoxemic respiratory failure on 4L NC p/w AHRF concerning for volume overload along w/ postobstructive PNA.  Pt now DNR and goal is for trying to go home on o2.    Cont HFNC dec fio2 as pt sx tolerate  complete abx course for postobstructive PNA (vanc and azithro d/c)   airway clearance w/ aerobika  echo c/w Rv dysfxn likely from phtn   can consider trial steroids as an attempt to tx poss lymphangitic dz (unilateral B line pattern) but this would be empiric given lack of improvement  duplex LE negative  cont dvt ppx    Cont GOC discussions and if pt becomes hospice it may allow for higher fio2 device at home so he can leave the hospital sooner which is his goal .
61M NSCLC with mets, ILD  with acute on chronic hypoxemic respiratory failure.  Continues to remain significantly hypoxemic on HFNC 100%, 60 LPM saturating in 90s.  However, patient chronically hypoxemic and often saturations are in mid to low 80s at home.  Patient is DNR and ultimate goal is to get discharged home.   Can target saturation in mid 80s as long as patient remains asymptomatic.   Continue prednisone and diuresis.  Will transfer to RCU once bed becomes available.
Agree with above  NSCLC with recent Keytruda and Gemzar on 4LNC at home  Imaging shows areas of consolidation with small amounts of fibrosis in subpleural area, which may be ILD vs CPFE  Continue diuresing given signs of overload (LE edema)  Now on 40L @100% with no symptoms  Continue antibiotics and diuresis  Continue aggressive chest PT and airway clearance maneuvers, as well as diuresis in attempt to improve oxygenation  Had long discussion with patient and wife at bedside. I explained that echo shows normal LVEF but decreased RV systolic dysfunction, and that etiology of acute worsening of hypoxemia is unclear. Patient states his most ardent wish is to go home. We discussed goals of care, and patient and wife both agreed that they would not want intubation or chest compressions. Our goal will now be to transition to trying to titrate oxygen down to symptomatic relief rather than targeting a specific spO2. If HFNC is able to be decreased to reasonable levels, then patient can be discharged home. May need transition to hospice if this is not achievable.  Patient is now DNR/DNI
Agree with plan as outlined above. Patient seen and examined at bedside. Patient history, laboratory data, and imaging personally reviewed.    Pt is a 61M with PMHx former smoker with Langerhans cell histiocytosis and COPD/emphysema as well as occupational exposure with ILD and metastatic lung adenocarcinoma (lung adenocarcinoma s/p RUL wedge 7/2020 s/p carboplatin/pemetrexed x6 with POD, Paclitaxel/Ramucirumab with POD, 3rd line Gemcitabine last 8/2021 with POD) with initiation of pembrolizumab (PDL1 (+)) on 10/14 now presenting to [facility] on 10/16 with acute on chronic hypoxemic respiratory failure most likely 2/2 POD with lung involvement vs. lung toxicity 2/2 immune checkpoint inhibitor.      Pt with acute on chronic hypoxemic respiratory failure likely 2/2 progressive metastatic lung adenocarcinoma +/- underlying volume overload. Will c/w supplemental O2 with goal O2 saturation 84-90%, pt currently requiring HFNC at 80%, 50LPM at rest. c/w airway clearance therapy ATC, c/w bronchodilators ATC with Aerobika and incentive spirometry at bedside. Bedside POCUS (see separate note from today) shows anterior b-lines with abnormal pleura as well as right consolidative pattern likely 2/2 malignancy vs. atelectasis. Will c/w diuresis and prednisone (decreased from mg/kg yesterday). No indication for bronchoscopy at this time. Morphine prn for WOB.      Dispo pending medical optimization. Patient's goal is to go home with home hospice if O2 requirements decrease. PT following, pt able to ambulate with assistance. Pt DNR/DNI. Discussed with pt at bedside today.
Agree with plan as outlined above. Patient seen and examined at bedside. Patient history, laboratory data, and imaging personally reviewed.    Pt is a 61M with PMHx former smoker with Langerhans cell histiocytosis and COPD/emphysema as well as occupational exposure with ILD and metastatic lung adenocarcinoma (lung adenocarcinoma s/p RUL wedge 7/2020 s/p carboplatin/pemetrexed x6 with POD, Paclitaxel/Ramucirumab with POD, 3rd line Gemcitabine last 8/2021 with POD) with initiation of pembrolizumab (PDL1 (+)) on 10/14 now presenting to [facility] on 10/16 with acute on chronic hypoxemic respiratory failure most likely 2/2 POD with lung involvement.      Pt with acute on chronic hypoxemic respiratory failure likely 2/2 progressive metastatic lung adenocarcinoma +/- underlying volume overload. Will c/w supplemental O2 with goal O2 saturation 84-90%, pt currently requiring HFNC but able to titrate down to 55%, 50LPM at rest today. c/w airway clearance therapy ATC, c/w bronchodilators ATC with Aerobika and incentive spirometry at bedside. Bedside POCUS (see separate note from today) shows persistent anterior b-lines with abnormal pleura as well as right consolidative pattern likely 2/2 malignancy vs. atelectasis. Will c/w diuresis and prednisone taper. No indication for bronchoscopy at this time. Morphine prn for WOB. Appreciate Rad Onc input. Pt tried [id number] at bedside with RT 10/28, he tolerated it well (O2 sat ~80%). Will continue to incrementally increase daily x7 days with RT.    Dispo pending medical optimization. Patient's goal is to go home with home hospice if O2 requirements decrease. PT following, pt able to ambulate with assistance. Pt DNR/DNI. Discussed with pt at bedside.
Patient seen at bedside. Case discussed with CHRISTEN Seymour. Plan as above.   O2 requirements improved slightly.   O2 sat at bedside 81-85%.   S/p Abx course for PNA.   Rad onc input appreciated, no RT offered.   Will follow.
seen and examined with acp, agree with above    d/w pt and his wife (on the phone) and palliative team at bedside  patient's goal is to go home, and to feel comfortable - he does not want to treat the 02 saturation number, only his symptoms  he finds the HFNC uncomfortable, and feels much better on the nasal cannula 10L, even with 02 sats about 80%. he also does not want the NRB mask  and, this is how he would like to go home  it is our goal to fulfill his goals as possible  we will trial various forms of 02 - NC up 15l, Airvo high flow, oxymizer pendant, and plan a home discharge that includes titration to pt comfort and not monitoring of 02 saturation
seen and examined with acp, agree with above  3 days pulse steroids  AF with RVR, started on metoprolol, monitor
seen and examined with acp, agree with above  trialing different modes of 02 to see what would be best at home -- as that is pt's goal  however, today, also c/o increased weakness in his LE past 2 days. Harder time standing and transferring.  Strength is equal B/L, but has difficulty raising legs off the bed. Deconditioning? Overall weakness, vs spinal involvement. I see a prior L5 lytic lesion, but I don't see MRI..  can have neuro eval  but overall this may complicate discharge to home, if he can not transfer  his creatinine also cont to rise. possibly post diuretics followed by episode hypotension, BOBY  has been receiving fluids  LE doppler shows non-occulsive below the knee, pt has been started on NOAC for the AF regardless
61M NSCLC (progressed on multiple regimens, most recently started on pembroluzimab, +mets to liver/adrenals), ILD with occupational exposure and smoking history, and chronic hypoxemic respiratory failure on 4L NC p/w AHRF concerning for volume overload along w/ postobstructive PNA.  Pt now DNR and goal is for trying to go home on o2.    Cont HFNC dec fio2 as pt sx tolerate  cont abx course for PNA (vanc and azithro d/c)  airway clearance w/ aerobika  echo c/w Rv dysfxn likely from phtn   duplex LE negative  cont dvt ppx  Cont GOC discussions and if pt becomes hospice it may allow for higher fio2 device at home so he can leave the hospital sooner which is his goal
Agree with plan as outlined above. Patient seen and examined at bedside. Patient history, laboratory data, and imaging personally reviewed.    Pt is a 61M with PMHx former smoker with Langerhans cell histiocytosis and COPD/emphysema as well as occupational exposure with ILD and metastatic lung adenocarcinoma (lung adenocarcinoma s/p RUL wedge 7/2020 s/p carboplatin/pemetrexed x6 with POD, Paclitaxel/Ramucirumab with POD, 3rd line Gemcitabine last 8/2021 with POD) with initiation of pembrolizumab (PDL1 (+)) on 10/14 now presenting to [facility] on 10/16 with acute on chronic hypoxemic respiratory failure most likely 2/2 POD with lung involvement vs. lung toxicity 2/2 immune checkpoint inhibitor.      Pt with acute on chronic hypoxemic respiratory failure likely 2/2 progressive metastatic lung adenocarcinoma +/- underlying volume overload. Will c/w supplemental O2 with goal O2 saturation 84-90%, pt currently requiring HFNC but able to titrate down to 65%, 50LPM at rest today. c/w airway clearance therapy ATC, c/w bronchodilators ATC with Aerobika and incentive spirometry at bedside. Bedside POCUS (see separate note from today) shows persistent anterior b-lines with abnormal pleura as well as right consolidative pattern likely 2/2 malignancy vs. atelectasis. Will c/w diuresis and prednisone taper. No indication for bronchoscopy at this time. Morphine prn for WOB. Will discuss possibility of airway opening with IP and radiation oncology team. Will also try [id number] at bedside with RT today.     Dispo pending medical optimization. Patient's goal is to go home with home hospice if O2 requirements decrease. PT following, pt able to ambulate with assistance. Pt DNR/DNI. Discussed with pt at bedside today.
PT seen and examined. Acute on chronic hypoxic resp failure, NSCLC and ILD. Continues to require HFNC at 100 % and 60 L. Unable to titrate down   as his oxygen saturation is ranging from 85-88 %. LE weakness, neuro recs appreciated. However given tenuous resp status he is unable to lay flat for either and MRI or CT spine at present. Cont steroids and bronchodilators. Diuretics on hold d/t rising Cr, cont to monitor UO/electrolytes. PT wants to go home with hospice but his O2 requirements and care needs are currently a barrier. Ongoing discussions with family regarding home vs inpt hospice. Remains DNR/DNI.
Patient seen at bedside. Case discussed with CHRISTEN Seymour. Plan as above.   Continue to titrate down supplemental O2 if possible  C/w Abx and diuresis  Pt feels well  GOC ongoing, family meeting planned for tomorrow  Will follow
seen and examined with acp, agree with above  Neurology input greatly appreciated.  Unfortunately, I don't think pt can tolerate all the MRI. Can consider CT lumbar spine...  Will trial the pendant, Airvo...  d/c plan -- pt's goal to go home. And we had agreed on a d/c plan for the 02 to ensure pt comfort rather than pulse ox number  However, pt's more recent ability of unable to stand/transfer will not complicate further  He will need 24/7 care to go home, which would be self pay. vs. d/c to facility...  Will need to d/w pt and family, and palliative/hospice f/u as well
seen and examined with acp, agree with above  ongoing discussion of goals of care, end of life care . wife at bedside  patient wants titration only to comfort, d/c continuous pulse ox  he now agrees to dilaudid  d/w palliative team -- meeting planned for today with daughter and wife
seen and examined with acp, agree with above  will try pulse steroids for 3 days as a hope to decrease fi02 requirements
61M NSCLC (progressed on multiple regimens, most recently started on pembroluzimab, +mets to liver/adrenals), ILD with occupational exposure and smoking history, and chronic hypoxemic respiratory failure on 4L NC p/w AHRF concerning for volume overload along w/ postobstructive PNA.  Pt clinically reports feeling better w/ abx regimen and diuresis. However still remains on HFNC 100%. WOB is minimal and pt does not require bipap currently.   Cont HFNC dec fio2 as tolerated to maintain sat>90%  cont abx course for PNA  airway clearance w/ aerobika  LE edema which  reports is chronic but is now more severe - f/u echo r/o RV failure  duplex LE negative  cont dvt ppx  Prognosis guarded given severity of baseline cancer and his acute decompensation causing severe AHRF
61M NSCLC with mets, ILD  with acute on chronic hypoxemic respiratory failure.  Clinically stable, on HFNC 80%, 60 LPM, saturation high 80s.  Continue to wean as tolerated. Saturations low to mid 80s acceptable if patient asymptomatic.  Patient is DNR and ultimate goal is to get discharged home.   Can decrease prednisone to 50 mg once daily and taper.  Keep negative fluid balance.  Will transfer to RCU once bed becomes available.
PT seen and examined. Acute on chronic hypoxic resp failure, NSCLC and ILD. Continues to require HFNC at 100 % and 55 L. Unable to titrate down   as his oxygen saturation is ranging from 85-88 %. LE weakness, neuro recs appreciated. However given tenuous resp status he is unable to lay flat for either and MRI or CT spine at present. Cont steroids and bronchodilators. Cr slwoly trending down, cont to hold diuretics and monitor UO/electrolytes. PT wants to go home with hospice but his O2 requirements and care needs are currently a barrier. Ongoing discussions with family regarding home vs inpt hospice. Remains DNR/DNI.
61 M with LCH, COPD/ emphysema, metastatic lung adenoca s/p chemo, now on pembrolizumab here with acute hypoxemic respiratory failure due to progression of disease vs pneumonitis.    Continues to slowly feel better.  On 60/60 on HFNC.  Wean as tolerated.  Decrease lasix to once a day for fluid overload/pulmonary edema  leave on prednisone 50 mg po daily today, switch to 40 mg po daily tomorrow
60 yo M w/ hx NSCLC s/p RUL wedge resection 7/21/2020: Adenocarcinoma (Carboplatin and Pemetrexed in late Oct 2020 (avoided front-line use of immune checkpoint inhibitor therapy given ILD). Treated with 6 cycles of doublet chemotherapy through Feb 2021 followed by 1 cycle of maintenance Pemetrexed in early March 2021. Developed disease progression. Started 2nd line Nab-Paclitaxel + Ramucirumab in late March 2021; achieved [place]. Developed disease progression on restaging scan in late July 2021. Began 3rd line Gemcitabine in mid Aug 2021 with disease progression after 2 cycles. Started on single-agent pembrolizumab on 10/13/21), recent tx for lung infection p/w SOB and LE edema increased and acute on chronic hypoxic respiratory failure. CT chest concerning for overload vs post-obstructive PNA RLL. ULAD neg. VBG metabolic alkalosis with compensatory respiratory acidosis. uMRSA neg vanco dc'ed. Zosyn day 5. HCO3 increasing with diuresis and neg fluid balance lasix 40 IV qd. TTE with normal EF and RV function decreased. no PASP noted. continued hypoxia on 40 L hiflo. Case d/w Onc poor prognosis. F/u pulm and Onc recs. repeat BMP. As per daughter and pt wife will be here tomm plans for GOC meeting with Onc/pulm and palliative.
62 yo M w/ hx NSCLC s/p RUL wedge resection 7/21/2020: Adenocarcinoma (Carboplatin and Pemetrexed in late Oct 2020 (avoided front-line use of immune checkpoint inhibitor therapy given ILD). Treated with 6 cycles of doublet chemotherapy through Feb 2021 followed by 1 cycle of maintenance Pemetrexed in early March 2021. Developed disease progression. Started 2nd line Nab-Paclitaxel + Ramucirumab in late March 2021; achieved [place]. Developed disease progression on restaging scan in late July 2021. Began 3rd line Gemcitabine in mid Aug 2021 with disease progression after 2 cycles. Started on single-agent pembrolizumab on 10/13/21), recent tx for lung infection p/w SOB and LE edema increased and acute on chronic hypoxic respiratory failure. CT chest concerning for overload vs post-obstructive PNA RLL. ULAD neg. MRSA neg vanco dc'ed. Zosyn day 7/7. TTE with normal EF and RV function decreased. no PASP noted. continued hypoxia ON at times takes off his hiFLO. attempt to wean off hiflo. bedside US showed b lines and restart lasix at 40 IV BID. now DNR/DNI. case d/w Onc poor prognosis limited options will benefit from hospice. Onc unable to speak with wife. d/w palliative care attending if unable to wean off hiflo oxygen to 40 L and 40% unable to get home hospice services. Case d/w Pulm will attempt steroid for possible lymphangitic spread. monitor Oxygen sats and attempt to continue weaning oxygen. readdress GOC next week.
62 yo M w/ hx NSCLC s/p RUL wedge resection 7/21/2020: Adenocarcinoma (Carboplatin and Pemetrexed in late Oct 2020 (avoided front-line use of immune checkpoint inhibitor therapy given ILD). Treated with 6 cycles of doublet chemotherapy through Feb 2021 followed by 1 cycle of maintenance Pemetrexed in early March 2021. Developed disease progression. Started 2nd line Nab-Paclitaxel + Ramucirumab in late March 2021; achieved [place]. Developed disease progression on restaging scan in late July 2021. Began 3rd line Gemcitabine in mid Aug 2021 with disease progression after 2 cycles. Started on single-agent pembrolizumab on 10/13/21), recent tx for lung infection p/w SOB and LE edema increased and acute on chronic hypoxic respiratory failure. CT chest concerning for overload vs post-obstructive PNA RLL. ULAD neg. VBG metabolic alkalosis with compensatory respiratory acidosis. unable to wean off hi martina. MRSA neg vanco dc'ed. Zosyn day 4. f/u CBC and BMP if HCO3 increasing given neg balance  and decreasing Pro BNP would decrease lasix 40 IV daily swelling imporving as per pt. await TTE. +inspiratory crackles L dcreased BS R base; pulm following appreciate recs. Palliative noted.
Patient seen and examined by myself , case discussed  with resident ,agree with the above finding and plan  60 yo M w/ hx NSCLC s/p RUL wedge resection 7/21/2020: Adenocarcinoma (Carboplatin and Pemetrexed in late Oct 2020 (avoided front-line use of immune checkpoint inhibitor therapy given ILD). Treated with 6 cycles of doublet chemotherapy through Feb 2021 followed by 1 cycle of maintenance Pemetrexed in early March 2021. Developed disease progression. Started 2nd line Nab-Paclitaxel + Ramucirumab in late March 2021; achieved [place]. Developed disease progression on restaging scan in late July 2021. Began 3rd line Gemcitabine in mid Aug 2021 with disease progression after 2 cycles. Started on single-agent pembrolizumab on 10/13/21), recent tx for lung infection p/w SOB and LE edema increased and acute on chronic hypoxic respiratory failure. CT chest concerning for overload vs post-obstructive PNA RLL. ULAD neg. MRSA neg vanco dc'ed. Zosyn day 7/7. TTE with normal EF and RV function decreased. no PASP noted. continued hypoxia ON at times takes off his hiFLO. attempt to wean off hiflo. bedside US showed b lines and restart lasix at 40 IV BID. now DNR/DNI. case d/w Onc poor prognosis limited options will benefit from hospice. Onc unable to speak with wife. d/w palliative care attending if unable to wean off hiflo oxygen to 40 L and 40% unable to get home hospice services. As per Pulm will attempt steroid for possible lymphangitic spread. monitor Oxygen sats and attempt to continue weaning oxygen. readdress GOC next week.  had  switched  to lasix 40 mh qD daily, pt says he feels more congested, wants to  go back to BID dosing , pt does not want get lasix too late in the evening as has to go running to pee, pt ot get lasix at 12 noon, will give evening dose at 7 pm, tonight and will give lasix at  6am and 4 pm from tomorrow,   plan of care d/w patient
60 yo M w/ hx NSCLC s/p RUL wedge resection 7/21/2020: Adenocarcinoma (Carboplatin and Pemetrexed in late Oct 2020 (avoided front-line use of immune checkpoint inhibitor therapy given ILD). Treated with 6 cycles of doublet chemotherapy through Feb 2021 followed by 1 cycle of maintenance Pemetrexed in early March 2021. Developed disease progression. Started 2nd line Nab-Paclitaxel + Ramucirumab in late March 2021; achieved [place]. Developed disease progression on restaging scan in late July 2021. Began 3rd line Gemcitabine in mid Aug 2021 with disease progression after 2 cycles. Started on single-agent pembrolizumab on 10/13/21), recent tx for lung infection p/w SOB and LE edema increased and acute on chronic hypoxic respiratory failure. CT chest concerning for overload vs post-obstructive PNA RLL. ULAD neg. MRSA neg vanco dc'ed. s/p Zosyn. TTE with normal EF and RV function decreased. no PASP noted.  bedside US showed b lines and lasix at 40 IV BID. now DNR/DNI. case d/w Onc poor prognosis limited options in current state. empirically started on steroid for poss lymphangitic spread. Case d/w palliative and Heme Onc in presence of wife at bedside plan for opiates for symptom control and continued weaning trial of opiates as per palliative. Pulm recs appreciated goal for 40 L at 40% with target sats in 80's as long as pt is asymptomatic. Pt transferred to R[facility] on pulm service will sign off.
Patient seen and examined by myself , case discussed  with resident ,agree with the above finding and plan  62 yo M w/ hx NSCLC s/p RUL wedge resection 7/21/2020: Adenocarcinoma (Carboplatin and Pemetrexed in late Oct 2020 (avoided front-line use of immune checkpoint inhibitor therapy given ILD). Treated with 6 cycles of doublet chemotherapy through Feb 2021 followed by 1 cycle of maintenance Pemetrexed in early March 2021. Developed disease progression. Started 2nd line Nab-Paclitaxel + Ramucirumab in late March 2021; achieved [place]. Developed disease progression on restaging scan in late July 2021. Began 3rd line Gemcitabine in mid Aug 2021 with disease progression after 2 cycles. Started on single-agent pembrolizumab on 10/13/21), recent tx for lung infection p/w SOB and LE edema increased and acute on chronic hypoxic respiratory failure. CT chest concerning for overload vs post-obstructive PNA RLL. ULAD neg. MRSA neg vanco dc'ed. Zosyn day 7/7. TTE with normal EF and RV function decreased. no PASP noted. continued hypoxia ON at times takes off his hiFLO. attempt to wean off hiflo. bedside US showed b lines and restart lasix at 40 IV BID. now DNR/DNI. case d/w Onc poor prognosis limited options will benefit from hospice. Onc unable to speak with wife. d/w palliative care attending if unable to wean off hiflo oxygen to 40 L and 40% unable to get home hospice services. As per Pulm will attempt steroid for possible lymphangitic spread. monitor Oxygen sats and attempt to continue weaning oxygen. readdress GOC next week.   will switch to lasix 40 mh ID daily, if worsening respiratory status , will go back to BID dosing   plan of care d/w patient
60 y/o M with history of NSCLC s/p RLL resection on chemo, COPD, ILD, pulmonary HTN, on 4L NC at home presents with SOB admitted for hypoxic respiratory failure. Imaging concerning for fluid overload vs infection vs progression of disease    Today patient feels well. Largely asymptomatic from respiratory state. Has significant LE edema.     # C/w broad spectrum antibiotics with vanc/zosyn/azithro. Check urine legionella and MRSA screen. IF negative can dc vanc and azithro. F/u cultures.  #C/w lasix 40IV BID for LE edema/fluid overload state. Check TTE.  #Onc evaluation given progression of disease    Plan discussed with patient and HS5
60 yo M w/ hx NSCLC s/p RUL wedge resection 7/21/2020: Adenocarcinoma (Carboplatin and Pemetrexed in late Oct 2020 (avoided front-line use of immune checkpoint inhibitor therapy given ILD). Treated with 6 cycles of doublet chemotherapy through Feb 2021 followed by 1 cycle of maintenance Pemetrexed in early March 2021. Developed disease progression. Started 2nd line Nab-Paclitaxel + Ramucirumab in late March 2021; achieved [place]. Developed disease progression on restaging scan in late July 2021. Began 3rd line Gemcitabine in mid Aug 2021 with disease progression after 2 cycles. Started on single-agent pembrolizumab on 10/13/21), recent tx for lung infection p/w SOB and LE edema increased and acute on chronic hypoxic respiratory failure. CT chest concerning for overload vs post-obstructive PNA RLL. ULAD neg. check VBG. cont vanco/zosyn. diuresis. f/u MRSA swab. add on pro BNP. await TTE for PASP. cont IV lasix. attempt to wean off hi martina keep spo2>88%. monitor daily weight f/u palliative care recs and pulm recs
62 yo M w/ hx NSCLC s/p RUL wedge resection 7/21/2020: Adenocarcinoma (Carboplatin and Pemetrexed in late Oct 2020 (avoided front-line use of immune checkpoint inhibitor therapy given ILD). Treated with 6 cycles of doublet chemotherapy through Feb 2021 followed by 1 cycle of maintenance Pemetrexed in early March 2021. Developed disease progression. Started 2nd line Nab-Paclitaxel + Ramucirumab in late March 2021; achieved [place]. Developed disease progression on restaging scan in late July 2021. Began 3rd line Gemcitabine in mid Aug 2021 with disease progression after 2 cycles. Started on single-agent pembrolizumab on 10/13/21), recent tx for lung infection p/w SOB and LE edema increased and acute on chronic hypoxic respiratory failure. CT chest concerning for overload vs post-obstructive PNA RLL. ULAD neg. MRSA neg vanco dc'ed. s/p Zosyn. TTE with normal EF and RV function decreased. no PASP noted.  bedside US showed b lines and lasix at 40 IV BID. now DNR/DNI. case d/w Onc poor prognosis limited options in current state. empirically started on steroid for poss lymphnagitic spread. Case d/w palliative and Heme Onc in presence of wife at bedside plan for opiates for symptom control and continued weaning trial of opiates as per palliative. Pulm recs appreciated goal for 40 L at 40% with target sats in 80's as long as pt is asymptomatic. f/u BMP
62 yo M w/ hx NSCLC s/p RUL wedge resection 7/21/2020: Adenocarcinoma (Carboplatin and Pemetrexed in late Oct 2020 (avoided front-line use of immune checkpoint inhibitor therapy given ILD). Treated with 6 cycles of doublet chemotherapy through Feb 2021 followed by 1 cycle of maintenance Pemetrexed in early March 2021. Developed disease progression. Started 2nd line Nab-Paclitaxel + Ramucirumab in late March 2021; achieved [place]. Developed disease progression on restaging scan in late July 2021. Began 3rd line Gemcitabine in mid Aug 2021 with disease progression after 2 cycles. Started on single-agent pembrolizumab on 10/13/21), recent tx for lung infection p/w SOB and LE edema increased and acute on chronic hypoxic respiratory failure. CT chest concerning for overload vs post-obstructive PNA RLL. ULAD neg. MRSA neg vanco dc'ed. s/p Zosyn. TTE with normal EF and RV function decreased. no PASP noted.  bedside US showed b lines and lasix at 40 IV BID. now DNR/DNI. case d/w Onc poor prognosis limited options in current state. empirically started on steroid for poss lymphnagitic spread. Case d/w palliative and Heme Onc in presence of wife at bedside plan for opiates for symptom control and continued weaning trial. Desats 79% yesterday. Pulm recs appreciated goal for 40 L at 40% with target sats in 80's as long as pt is asymptomatic.
62 yo M w/ hx NSCLC s/p RUL wedge resection 7/21/2020: Adenocarcinoma (Carboplatin and Pemetrexed in late Oct 2020 (avoided front-line use of immune checkpoint inhibitor therapy given ILD). Treated with 6 cycles of doublet chemotherapy through Feb 2021 followed by 1 cycle of maintenance Pemetrexed in early March 2021. Developed disease progression. Started 2nd line Nab-Paclitaxel + Ramucirumab in late March 2021; achieved [place]. Developed disease progression on restaging scan in late July 2021. Began 3rd line Gemcitabine in mid Aug 2021 with disease progression after 2 cycles. Started on single-agent pembrolizumab on 10/13/21), recent tx for lung infection p/w SOB and LE edema increased and acute on chronic hypoxic respiratory failure. CT chest concerning for overload vs post-obstructive PNA RLL. ULAD neg. MRSA neg vanco dc'ed. Zosyn day 6. TTE with normal EF and RV function decreased. no PASP noted. continued hypoxia ON at times takes off his hiFLO. attempt to wean off hilfo. bedside US showed b lines and restart lasix at 40 IV BID. monitor BMP. now DNR/DNI. case d/w Onc poor prognosis limited options will benefit from hospice.

## 2021-11-08 NOTE — PROGRESS NOTE ADULT - PROBLEM SELECTOR PLAN 4
Palliative will continue to assist with sx management and further goc.   GOC- DNR/DNI, MOLST completed by primary team     Thank you for allowing us to participate in your patient's care. We will continue to follow. Please page [phone] for any q's or c's.     Radha Massey D.O.   Palliative Medicine.

## 2021-11-08 NOTE — PROGRESS NOTE ADULT - PROBLEM SELECTOR PLAN 1
Dyspnea 2/2 Post obstructive pna vs. pulmonary edema in setting of metastatic NSCLC  > 4mg PO dilaudid (9am and 9pm) and dilaudid 4mg PO q3 prn respiratory distress or severe pain   > Trialing pulse dose steroids   > Pulm recs appreciated: taper HFNC, continue nebs  > Hospice would be able to manage HFNC at 40lpm with 40%FiO2. Discussed transition to inpatient hospice at the [facility] that could manage HFNC with 40lpm, 40% fio2, but patient's goal is still home and family believes they can manage patient (despite declining condition). Family interested in [facility], which care coordination team was made aware of.   > Goal is to wean patient's oxygen requirements to settings that are manageable in a home setting and ensuring patient remains asymptomatic despite hypoxia.   > please ensure patient is on bowel regimen while on opioids. Dyspnea 2/2 Post obstructive pna vs. pulmonary edema in setting of metastatic NSCLC  > Adjusted Dilaudid to tid. Continue dilaudid 4mg PO q3 prn respiratory distress or severe pain   > Trialed pulse dose steroids   > Pulm recs appreciated: taper HFNC, continue nebs  > Hospice would be able to manage HFNC at 40lpm with 40%FiO2. Goals re-discussed today. Patient and family understanding that patient's condition is declining and dispo to home is difficult with potential increase in symptom burden.   > please ensure patient is on bowel regimen while on opioids.

## 2021-11-08 NOTE — PROGRESS NOTE ADULT - ASSESSMENT
61M NSCLC (on therapy), ILD with occupational exposure and smoking history, and chronic hypoxemic respiratory failure presenting with acute on chronic hypoxemia as well as volume overload. He has had a CTPA in the ED which is worse than a recent CT and is concerning for volume overload, possible infection, and most notably progression of cancer      Hypoxic Respiratory Failure  - Likely multifactorial, however most likely cause is patient's malignancy NSCLC  - evidence of RV dysfunction likely in the setting of pHTN 2/2 hypoxemia  - Continue airway clearance therapy  - Continue Prednisone 40mg po qd, Lasix d/c 2/2 BLANKA  - s/p GOC discussion with wife and patient, DNR/DNI with overall goal to go home with A services  - Poor prognosis overall, patient still requiring high FiO2 despite interventions  - Appreciate Radiation/Onc recs - will need to see if pt can tolerate lying flat for radiation also requesting IR eval for biopsy infection vs mets in R lung. Hold off on biopsy for now   - Pt tolerating Life 2000 for short periods -will continue, RT notified   - s/p Pulse steroids x 3 days, now on Prednisone 40mg po qd  - Palliative ordered Morphine ER 15mg bid and Morphine IR 2.5 mg bid prn d/c 2/2 BLANKA started Dilaudid standing & PRN  - 11/5 pt c/o SOB, chest discomfort-->EKG shows AFib w/ RVR pt already on Metoprolol 25 BID, Metoprolol 5 IVP x1 given. RT increased HFNC to 60L/100% still saturating 75%. Atrovent Neb ordered. Pt was still in distress, anxious. Per d/w Fellow pt was placed on BIPAP 12/6. Spouse at bedside, pt observed to be more comfortable  - CXR ordered, will f/u  - Pt would like to be d/c to home with comfort.  If he feels stable with low pulse ox but comfortable can be dc home. Wife ordered Oxymizer nasal cannula for pt to try for use at home pt tried on 11/5 could not tolerate.    Acute Lower Ext weakness  - Neuro consulted- recommending MRI however likely will have difficulty obtaining given patient on HFNC with high O2 requirements  - PT- re-eval for ROM exercises -last seen 11/4    Hypertension / CHF  - BP remains stable (soft)  - HFpEF; BNP of 6752 on admission  - EKG showed sinus tachycardia, right atrial enlargement, rightward axis, and nonspecific ST and T wave abnormalities  - EF 56% on echo (10/20), LV systolic wnl, Right ventricular enlargement with decreased RV systolic function  - Was on IV lasix 40 BID; dc'd on 10/31 due to worsening creat concerning for overdiuresis. 11/5 Lasix 40mg IV x1 given for SOB and worsening work of breathing  - Pt noted with irregular HR- EKG with Rapid AF - metoprolol started and rate improved   - 11/3 EKG still in Afib with better rate control- started Eliquis 5mg po BID     Transaminitis  - No evidence of abd pain; possibly 2/2 to liver mets  - will continue to trend for now    Worsening renal function  - Holding Lasix since 10/31 in setting of ?overdiuresis  - Lovenox switched to heparin TID d/c, now on Eliquis 5mg po BID  - Trend creat-rising again - s/p IV fluids for 12 h on 11/3   - Diuretics on hold given Cr elevation    Leukocytosis likely 2/2 prednisone  - No fevers     DVT PPX: Now on Eliquis for AFib  #Ethics- DNR/DNI  #Dispo- DNR/DNI with overall goal to go home if HFNC can be tapered to decreased settings 40/40   61M NSCLC (on therapy), ILD with occupational exposure and smoking history, and chronic hypoxemic respiratory failure presenting with acute on chronic hypoxemia as well as volume overload. He has had a CTPA in the ED which is worse than a recent CT and is concerning for volume overload, possible infection, and most notably progression of cancer      Hypoxic Respiratory Failure  - Likely multifactorial, however most likely cause is patient's malignancy NSCLC  - evidence of RV dysfunction likely in the setting of pHTN 2/2 hypoxemia  - Continue airway clearance therapy  - Continue Prednisone 40mg po qd, Lasix d/c 2/2 BLANKA  - s/p GOC discussion with wife and patient, DNR/DNI with overall goal to go home with A services  - Poor prognosis overall, patient still requiring high FiO2 despite interventions  - Appreciate Radiation/Onc recs - will need to see if pt can tolerate lying flat for radiation also requesting IR eval for biopsy infection vs mets in R lung. Hold off on biopsy for now   - Pt tolerating Life 2000 for short periods -will continue, RT notified   - s/p Pulse steroids x 3 days, now on Prednisone 40mg po qd  - Palliative ordered Morphine ER 15mg bid and Morphine IR 2.5 mg bid prn d/c 2/2 BLANKA started Dilaudid standing & PRN  - 11/5 pt c/o SOB, chest discomfort-->EKG shows AFib w/ RVR pt already on Metoprolol 25 BID, Metoprolol 5 IVP x1 given. RT increased HFNC to 60L/100% still saturating 75%. Atrovent Neb ordered. Pt was still in distress, anxious. Per d/w Fellow pt was placed on BIPAP 12/6. Spouse at bedside, pt observed to be more comfortable  - Required BIPAP briefly AM 11/8 due to inc WOB/hypoxia (70s) & refusal of Dilaudid but now back on HFNC  - Pt would like to be d/c to home with comfort.  If he feels stable with low pulse ox but comfortable can be dc home. He does not tolerate Oxymizer nasal cannula. Now understanding of difficulty in getting him home given his high FiO2 requirements. Goal for comfort.  discontinued.     Acute Lower Ext weakness  - Neuro consulted- recommending MRI however likely will have difficulty obtaining given patient on HFNC with high O2 requirements  - PT- re-eval for ROM exercises -last seen 11/4    Hypertension / CHF  - BP remains stable (soft)  - HFpEF; BNP of 6752 on admission  - EKG showed sinus tachycardia, right atrial enlargement, rightward axis, and nonspecific ST and T wave abnormalities  - EF 56% on echo (10/20), LV systolic wnl, Right ventricular enlargement with decreased RV systolic function  - Was on IV lasix 40 BID; dc'd on 10/31 due to worsening creat concerning for overdiuresis. 11/5 Lasix 40mg IV x1 given for SOB and worsening work of breathing  - Pt noted with irregular HR- EKG with Rapid AF - metoprolol started and rate improved   - 11/3 EKG still in Afib with better rate control- started Eliquis 5mg po BID     Transaminitis  - No evidence of abd pain; possibly 2/2 to liver mets  - will continue to trend for now    Worsening renal function  - Holding Lasix since 10/31 in setting of ?overdiuresis  - Lovenox switched to heparin TID d/c, now on Eliquis 5mg po BID  - Trend creat-rising again - s/p IV fluids for 12 h on 11/3   - Diuretics on hold given Cr elevation  - Cr now improved    Leukocytosis likely 2/2 prednisone  - No fevers     DVT PPX: Now on Eliquis for AFib  #Ethics- DNR/DNI  #Dispo- DNR/DNI with overall goal to go home if HFNC can be tapered to decreased settings 40/40 however team has been having significant difficulty in titrating patient to lower level given his end stage disease. Palliative reconsulted on 11/8 and GOC discussion had with family- family and patient now understanding of difficulty and low likelihood of being able to have patient sent home given his current high FiO2 requirements on HFNC. Goal now focusing on comfort.

## 2021-11-08 NOTE — PROVIDER CONTACT NOTE (OTHER) - SITUATION
Pt bp is 98/60 pulse is 45. Requesting provider permission to either administer po dilaudid or hold dose. Provider also notified regarding any additional steps to take regarding pt vital signs.
Patients bp 106/58, due for IV lasix. Ok to give vs hold?
Pt oxygen saturation 69%
pt refusing lasix
patient  desaturating to 84%.
patient on  stating and sustaining 85%- patient maxed on HFNC.
Pt noted tachycardic/bradycardic on monitor. Decided to perform EKG.
concerned for administering morphine due to pts o2 stat. patient FIO2 lowered today to 80% HFNC. Goal spo2 is 80% as per pulmonary team.

## 2021-11-08 NOTE — PROGRESS NOTE ADULT - PROBLEM SELECTOR PLAN 3
Onc care at [facility] with Dr. Zheng   s/p RLL resection and chemotherapy, Last chemo on 10/13  > Heme/onc recs appreciated.  > rad/onc recs appreciated Onc care at [facility] with Dr. Zheng   s/p RLL resection and chemotherapy, Last chemo on 10/13  > Heme/onc recs appreciated.  > rad/onc recs appreciated  > Hospice appropriate

## 2021-11-08 NOTE — PROVIDER CONTACT NOTE (OTHER) - RECOMMENDATIONS
Give vs hold IV lasix?
Notify provider.
Approved administration of PO dilaudid, RN to reassess vitals in 1 hr.
duoneb treatment
albuterol?
morphine PRN if patients RR is increased.
Notify provider
covering team aware

## 2021-11-08 NOTE — PROVIDER CONTACT NOTE (OTHER) - DATE AND TIME:
08-Nov-2021 15:00
25-Oct-2021 17:15
02-Nov-2021 09:30
03-Nov-2021 22:15
22-Oct-2021 17:04
23-Oct-2021 06:01
18-Oct-2021 06:14
24-Oct-2021 18:51

## 2021-11-08 NOTE — PROGRESS NOTE ADULT - CONVERSATION DETAILS
Family meeting today with RCU team, hospice, palliative care team, patient's wife, daughter and son to help manage expectations and to discuss further plan of care. Family are aware and understand that despite aggressive interventions by RCU team, that patient's medical status continues to decline and his symptom burden is increasing. Going home is not a realistic option 2/2 to being unable to have his oxygen requirements met at home. He shared with his family that he is symptomatic and in need of RCU level of care to help manage his symptoms and his needs. Family are in agreement that patient requires hospital care and understands that his prognosis is poor. They understand that even if he were to be discharged home, his time would be limited and his quality of life would suffer. Pt's wife, Annie, shared that she believes her  is aware that he is declining and is more understanding that he is unable to return home. Family asked us to speak to patient to ensure his understanding of his prognosis and the fact that going home is not an option at this time.     Palliative team spoke to patient, without family at bedside. Mr. Swanson shared that "he feels that he doesn't want to burden his family". We discussed our concern that his condition is declining and his oxygen requirements have not improved. He expressed understanding of the current situation and is appreciative of the care that the teams have been providing to him. He agrees that going home would cause him to suffer and that initially his goal was to go home, but he has acknowledged that it would be unsafe and his needs are too complex to return home. he is agreement with adjusting opioids and maximizing his quality of life.

## 2021-11-09 NOTE — DISCHARGE NOTE FOR THE EXPIRED PATIENT - HOSPITAL COURSE
61M NSCLC (on therapy), ILD with occupational exposure and smoking history, and chronic hypoxemic respiratory failure presenting with acute on chronic hypoxemia as well as volume overload. He has had a CTPA in the ED which is worse than a recent CT and is concerning for volume overload, possible infection, and most notably progression of cancer      Hypoxic Respiratory Failure  - Likely multifactorial, however most likely cause is patient's malignancy NSCLC  - evidence of RV dysfunction likely in the setting of pHTN 2/2 hypoxemia  - airway clearance therapy  - Lasix d/c 2/2 BLANKA  - s/p GOC discussion with wife and patient, DNR/DNI with overall goal to go home with A services  - Poor prognosis overall, patient still requiring high FiO2 despite interventions  - Appreciate Radiation/Onc recs - will need to see if pt can tolerate lying flat for radiation also requesting IR eval for biopsy infection vs mets in R lung. Hold off on biopsy for now   - Pt tolerating Life 2000 for short periods -will continue, RT notified   - s/p Pulse steroids x 3 days, now on Prednisone 40mg po qd  - Palliative ordered Morphine ER 15mg bid and Morphine IR 2.5 mg bid prn d/c 2/2 BLANKA started Dilaudid standing & PRN  - 11/5 pt c/o SOB, chest discomfort-->EKG shows AFib w/ RVR pt already on Metoprolol 25 BID, Metoprolol 5 IVP x1 given. RT increased HFNC to 60L/100% still saturating 75%. Atrovent Neb ordered. Pt was still in distress, anxious. Per d/w Fellow pt was placed on BIPAP 12/6. Spouse at bedside, pt observed to be more comfortable  - Required BIPAP briefly AM 11/8 due to inc WOB/hypoxia (70s) & refusal of Dilaudid but now back on HFNC  - Pt would like to be d/c to home with comfort.  If he feels stable with low pulse ox but comfortable can be dc home. He does not tolerate Oxymizer nasal cannula. Now understanding of difficulty in getting him home given his high FiO2 requirements. Goal for comfort.  discontinued.     Acute Lower Ext weakness  - Neuro consulted- recommending MRI however likely will have difficulty obtaining given patient on HFNC with high O2 requirements  - PT- re-eval for ROM exercises -last seen 11/4    Hypertension / CHF  - BP remains stable (soft)  - HFpEF; BNP of 6752 on admission  - EKG showed sinus tachycardia, right atrial enlargement, rightward axis, and nonspecific ST and T wave abnormalities  - EF 56% on echo (10/20), LV systolic wnl, Right ventricular enlargement with decreased RV systolic function  - Was on IV lasix 40 BID; dc'd on 10/31 due to worsening creat concerning for overdiuresis. 11/5 Lasix 40mg IV x1 given for SOB and worsening work of breathing  - Pt noted with irregular HR- EKG with Rapid AF - metoprolol started and rate improved   - 11/3 EKG still in Afib with better rate control- started Eliquis 5mg po BID     Transaminitis  - No evidence of abd pain; possibly 2/2 to liver mets  - will continue to trend for now    Worsening renal function  - Holding Lasix since 10/31 in setting of ?overdiuresis  - Lovenox switched to heparin TID d/c, now on Eliquis 5mg po BID  - Trend creat-rising again - s/p IV fluids for 12 h on 11/3   - Diuretics on hold given Cr elevation  - Cr now improved    Leukocytosis likely 2/2 prednisone  - No fevers     DVT PPX: Now on Eliquis for AFib  #Ethics- DNR/DNI  #Dispo- DNR/DNI -comfort measures only  Palliative reconsulted on 11/8 and GOC discussion had with family- family and patient now understanding of difficulty and low likelihood of being able to have patient sent home given his current high FiO2 requirements on HFNC. Goal now focusing on comfort.     Called to see patient for unresponsiveness. On exam, patient had no spontaneous respirations, heart sounds, and movement.  Pt did not respond to verbal or noxious stimuli. Absent heart and breath sounds for more than 1 minute. Pupils are fixed and dilated, corneal reflex was absent. Pt is DNR/DNI on comfort care.    Patient pronounced dead at 05:12. Attending notified. Next of kin/family Wife Annie bedside. Autopsy declined.

## 2021-11-10 LAB
INTERPRETATION 24H UR IFE-IMP: SIGNIFICANT CHANGE UP
INTERPRETATION 24H UR IFE-IMP: SIGNIFICANT CHANGE UP

## 2021-11-16 LAB
% GAMMA, URINE: PRESENT
ALBUMIN 24H MFR UR ELPH: PRESENT
ALPHA1 GLOB 24H MFR UR ELPH: SIGNIFICANT CHANGE UP
ALPHA2 GLOB 24H MFR UR ELPH: PRESENT
B-GLOBULIN 24H MFR UR ELPH: PRESENT
INTERPRETATION 24H UR IFE-IMP: SIGNIFICANT CHANGE UP
M PROTEIN 24H UR ELPH-MRATE: PRESENT — SIGNIFICANT CHANGE UP
PROT ?TM UR-MCNC: 20 MG/DL — SIGNIFICANT CHANGE UP
PROT PATTERN 24H UR ELPH-IMP: SIGNIFICANT CHANGE UP
PROT PATTERN 24H UR ELPH-IMP: SIGNIFICANT CHANGE UP

## 2021-11-24 ENCOUNTER — APPOINTMENT (OUTPATIENT)
Dept: INFUSION THERAPY | Facility: HOSPITAL | Age: 61
End: 2021-11-24

## 2021-11-24 ENCOUNTER — APPOINTMENT (OUTPATIENT)
Dept: HEMATOLOGY ONCOLOGY | Facility: CLINIC | Age: 61
End: 2021-11-24

## 2021-12-15 ENCOUNTER — APPOINTMENT (OUTPATIENT)
Dept: INFUSION THERAPY | Facility: HOSPITAL | Age: 61
End: 2021-12-15

## 2021-12-15 ENCOUNTER — APPOINTMENT (OUTPATIENT)
Dept: HEMATOLOGY ONCOLOGY | Facility: CLINIC | Age: 61
End: 2021-12-15

## 2021-12-16 ENCOUNTER — APPOINTMENT (OUTPATIENT)
Dept: PULMONOLOGY | Facility: CLINIC | Age: 61
End: 2021-12-16

## 2022-04-13 NOTE — PROCEDURE NOTE - NSINDICATIONS_GEN_A_CORE
This refill request is a duplicate. Sent denial notification to pharmacy.  Hannah HanksRN    
emergency venous access

## 2022-08-04 NOTE — PROGRESS NOTE ADULT - ASSESSMENT
61M NSCLC (on therapy), ILD with occupational exposure and smoking history, and chronic hypoxemic respiratory failure presenting with acute on chronic hypoxemia as well as volume overload. He has had a CTPA in the ED which is worse than a recent CT and is concerning for volume overload, possible infection, and most notably progression of cancer.    #Neuro  A&Ox4  At baseline    #Respiratory  Hypoxic Respiratory Failure  - Likely multifactorial, however most likely cause is patient's malignancy NSCLC  - Continue diuresis, evidence of RV dysfunction likely in the setting of pHTN 2/2 hypoxemia  - Continue airway clearance therapy  - Continue prednisone 40mg qd  - Goals of care discussion had with wife and patient, DNR/DNI with overall goal to go home  - Poor prognosis overall, patient still requiring high FiO2 despite interventions  - Wean O2 as tolerated; still requiring HFNC 80L/80%  - Radiation/Onc consult ordered - will need to see if pt can tolerate lying flat for radiation also requesting IR eval for biopsy infection vs mets in R lung   -Hold off on bx for now   -Pt tolerating Life 2000 for short periods -will hold off until pulse steroids complete   - Pulse steroids x 3 days and eval for any improvements   - Palliative ordered Morpine ER 15mg bid and Morphine IR 2.5 mg bid prn   - Pt would like to be dc to home with comfort .  If he feels stable with low pulse ox but comfortable canbe dc home Wife ordered and will bring in oxymizer nasal cannula for pt to try for use at home May also try home HFNC     #CV  -BP remains stable  -Sinus tach at times; likely 2/2 SOB  HFpEF; BNP of 6752 on admission  - EKG showed sinus tachycardia, right atrial enlargement, rightward axis, and nonspecific ST and T wave abnormalities  - EF 56% on echo (10/20), LV systolic wnl, Right ventricular enlargement with decreased RV systolic function  - Was on IV lasix 40 BID; decreased down to qd yesterday 10/30 and now dc'd today 10/31 due to worsening creat concerning for overdiuresis.  - Pt noted with irregular HR- EKG with Rapid AF - metoprolol started and rate improved   - 11/3 EKG still in afib with better rate control OK to start Eloquis     #GI  -Transaminitis- No evidence of abd pain; possibly 2/2 to liver mets  - will continue to trend for now  - Regular diet    #Renal  - Worsening renal function  - Holding lasix 10/31 in setting of ?overdiuresis  - Lovenox switched to heparin TID  - Trend creat-rising again - IV fluids for 12 h and recheck in am     #ID  - Leukocytosis likely 2/2 prednisone  - No fevers     #DVT PPX: Now on heparin tid 2/2 BLANKA  #Ethics- DNR/DNI  #Dispo- DNR/DNI with overall goal to go home if HFNC can be tapered to decreased settings 40/40   04-Aug-2022 17:09 61M NSCLC (on therapy), ILD with occupational exposure and smoking history, and chronic hypoxemic respiratory failure presenting with acute on chronic hypoxemia as well as volume overload. He has had a CTPA in the ED which is worse than a recent CT and is concerning for volume overload, possible infection, and most notably progression of cancer      Hypoxic Respiratory Failure  - Likely multifactorial, however most likely cause is patient's malignancy NSCLC  - evidence of RV dysfunction likely in the setting of pHTN 2/2 hypoxemia  - Continue airway clearance therapy  - Continue Prednisone 40mg po qd, Lasix d/c 2/2 BLANKA  - s/p GOC discussion with wife and patient, DNR/DNI with overall goal to go home with A services  - Poor prognosis overall, patient still requiring high FiO2 despite interventions  - Wean O2 as tolerated; still requiring HFNC 55L/90%  - Appreciate Radiation/Onc recs - will need to see if pt can tolerate lying flat for radiation also requesting IR eval for biopsy infection vs mets in R lung   - Hold off on biopsy for now   - Pt tolerating Life 2000 for short periods -will continue, RT notified   - s/p Pulse steroids x 3 days, now on Prednisone 40mg po qd  - Palliative ordered Morpine ER 15mg bid and Morphine IR 2.5 mg bid prn d/c 2/2 BLANKA started Dilauded standing & PRN  - Pt would like to be d/c to home with comfort.  If he feels stable with low pulse ox but comfortable can be dc home. Wife ordered and will bring in oxymizer nasal cannula for pt to try for use at home. May also try home HFNC     Acute Lower Ext weakness  - Neuro house cs called will f/u their recs  - PT- re-eval for ROM exercises     Hypertention / CHF  - BP remains stable (soft)  - HFpEF; BNP of 6752 on admission  - EKG showed sinus tachycardia, right atrial enlargement, rightward axis, and nonspecific ST and T wave abnormalities  - EF 56% on echo (10/20), LV systolic wnl, Right ventricular enlargement with decreased RV systolic function  - Was on IV lasix 40 BID; dc'd on 10/31 due to worsening creat concerning for overdiuresis  - Pt noted with irregular HR- EKG with Rapid AF - metoprolol started and rate improved   - 11/3 EKG still in Afib with better rate control- started Eliquis 5mg po BID     Transaminitis  - No evidence of abd pain; possibly 2/2 to liver mets  - will continue to trend for now    Worsening renal function  - Holding Lasix since 10/31 in setting of ?overdiuresis  - Lovenox switched to heparin TID d/c, now on Eliquis  - Trend creat-rising again - IV fluids for 12 h and recheck in am     Leukocytosis likely 2/2 prednisone  - No fevers     DVT PPX: Now on Eliquis for AFib  #Ethics- DNR/DNI  #Dispo- DNR/DNI with overall goal to go home if HFNC can be tapered to decreased settings 40/40

## 2022-09-06 NOTE — H&P ADULT - ATTENDING COMMENTS
(3) adequate 60 y/o M with history of lung cancer s/p RLL resection on chemo, COPD, ILD, pulmonary HTN, on 4L NC at home presents with worsening SOB and LE edema. S/p immunotherapy x1 on 10/13/21 for lung cancer. CT performed in the ED revealing pulmonary edema, likely PNA and progression of malignancy. S/p vanc and zosyn in the ED. Received duonebs, steroids and lasix [id number]. Etiology may be 2/2 HF exacerbation vs COPD vs underlying malignancy. Pulm following. Plan to continue treating for possible post obstructive PNA with antibiotics - Zosyn. Lasix 40mg IV BID - strict I&Os. Start on HFNC. Chest PT. Echocardiogram ordered. Palliative and oncology consulted as well.     Plan discussed with HS.

## 2023-01-30 NOTE — H&P PST ADULT - NSICDXFAMILYHX_GEN_ALL_CORE_FT
Please schedule an annual exam for repeat blood work.  It is expected for patient to have blood work yearly when they are on medications, and has been over 1 year since we checked your kidney function.  Please schedule visit within the next 2 months for an annual exam and repeat blood work.   FAMILY HISTORY:  FH: heart disease

## 2023-06-03 NOTE — PROGRESS NOTE ADULT - ASSESSMENT
[address] [facility]  Discharge- Yovani Atkinson 1943, 79 y o  male MRN: [id number]  Unit/Bed#: -01 Encounter: [id number]  Primary Care Provider: Evie Murray 60 y/o M with history of NSCLC s/p RLL resection on chemo, COPD, ILD, pulmonary HTN, on 4L NC at home presents with SOB x 1 week. On admission the patient was tachypneic and tachycardic but afebrile. PE notable for wheezing, crackles and ronchi. Labs on admission remarkable for an elevated WBC, elevated BNP, and transaminitis. LE U/S ruled out evidence of DVT and CTA ruled out evidence of PE. DDx includes HF exacerbation, pulmonary infection, exacerbation 2/2 pt's lung cancer, worsening ILD 2/2 pembrolizumab, or COPD exacerbation. Palliative consulted for assistance with sx management.      